# Patient Record
Sex: MALE | Race: WHITE | NOT HISPANIC OR LATINO | Employment: FULL TIME | URBAN - METROPOLITAN AREA
[De-identification: names, ages, dates, MRNs, and addresses within clinical notes are randomized per-mention and may not be internally consistent; named-entity substitution may affect disease eponyms.]

---

## 2019-02-13 ENCOUNTER — APPOINTMENT (OUTPATIENT)
Dept: RADIOLOGY | Facility: OTHER | Age: 59
End: 2019-02-13
Payer: COMMERCIAL

## 2019-02-13 VITALS
BODY MASS INDEX: 24.34 KG/M2 | SYSTOLIC BLOOD PRESSURE: 128 MMHG | HEART RATE: 98 BPM | WEIGHT: 170 LBS | HEIGHT: 70 IN | DIASTOLIC BLOOD PRESSURE: 88 MMHG

## 2019-02-13 DIAGNOSIS — M79.642 LEFT HAND PAIN: ICD-10-CM

## 2019-02-13 DIAGNOSIS — M79.642 LEFT HAND PAIN: Primary | ICD-10-CM

## 2019-02-13 DIAGNOSIS — M65.322 TRIGGER FINGER, LEFT INDEX FINGER: ICD-10-CM

## 2019-02-13 PROCEDURE — 73130 X-RAY EXAM OF HAND: CPT

## 2019-02-13 PROCEDURE — 99213 OFFICE O/P EST LOW 20 MIN: CPT | Performed by: ORTHOPAEDIC SURGERY

## 2019-02-13 PROCEDURE — 20551 NJX 1 TENDON ORIGIN/INSJ: CPT | Performed by: PHYSICIAN ASSISTANT

## 2019-02-13 RX ORDER — SERTRALINE HYDROCHLORIDE 100 MG/1
50 TABLET, FILM COATED ORAL DAILY
COMMUNITY

## 2019-02-13 RX ORDER — BUPIVACAINE HYDROCHLORIDE 2.5 MG/ML
2 INJECTION, SOLUTION INFILTRATION; PERINEURAL
Status: COMPLETED | OUTPATIENT
Start: 2019-02-13 | End: 2019-02-13

## 2019-02-13 RX ORDER — METHYLPREDNISOLONE ACETATE 40 MG/ML
1 INJECTION, SUSPENSION INTRA-ARTICULAR; INTRALESIONAL; INTRAMUSCULAR; SOFT TISSUE
Status: COMPLETED | OUTPATIENT
Start: 2019-02-13 | End: 2019-02-13

## 2019-02-13 RX ADMIN — METHYLPREDNISOLONE ACETATE 1 ML: 40 INJECTION, SUSPENSION INTRA-ARTICULAR; INTRALESIONAL; INTRAMUSCULAR; SOFT TISSUE at 14:39

## 2019-02-13 RX ADMIN — BUPIVACAINE HYDROCHLORIDE 2 ML: 2.5 INJECTION, SOLUTION INFILTRATION; PERINEURAL at 14:39

## 2019-02-13 NOTE — PROGRESS NOTES
Orthopaedic Surgery - Office Note  Gianfranco Chase (60 y o  male)   : 1960   MRN: [id number]  Encounter Date: 2019    Chief Complaint   Patient presents with    Left Hand - Pain       Assessment / Plan  Left hand index finger trigger finger    · After discussion of risks and benefits the patient agreed to proceed with an injection of steroids in his left trigger finger  · Continue with ice and analgesics as needed  · Patient will start nighttime splinting with Band-Aids  Return in about 1 month (around 3/13/2019)  History of Present Illness  Gianfranco Chase is a 60 y o  male who presents with left hand metacarpal phalangeal pain over all 4 digits and locking over his left index finger  He does have a history of trigger finger which was treated with splinting in the past and possibly injections but he cannot recall for sure  He has history of bilateral carpal tunnel syndrome surgery in  which did relieve the symptoms and his current symptoms are not what he was feeling prior to the surgery  The patient is a  so constantly sits with his hands clutched  Review of systems  Pertinent items are noted in HPI  All other systems were reviewed and are negative  Physical Exam  /88   Pulse 98   Ht 5' 10" (1 778 m)   Wt 77 1 kg (170 lb)   BMI 24 39 kg/m²   Cons: Appears well  No apparent distress  Psych: Alert  Oriented x3  Mood and affect normal   Eyes: PERRLA, EOMI  Resp: Normal effort  No audible wheezing or stridor  CV: Palpable pulse  No discernable arrhythmia  No LE edema  Lymph:  No palpable cervical, axillary, or inguinal lymphadenopathy  Skin: Warm  No palpable masses  No visible lesions  Neuro: Normal muscle tone  Normal and symmetric DTR's  Left Hand & Wrist Exam  Alignment:  Normal resting hand posture  Inspection:  No swelling  No erythema  No muscle atrophy  No deformity  Palpation:  Mild tenderness at The 1st MCP joint    ROM: Normal finger ROM  Flexion of the fingers does cause some discomfort over the 1st MCP joint of all his fingers except for his thumb  Strength:  Not tested  Stability:  No objective hand or wrist instability  Tests:  (+) Triggering of the Index finger of the left hand  Neurovascular:  Sensation intact in Ax/R/M/U nerve distributions  Sensation intact in all digital nerve distributions  Fingers warm and perfused  Studies Reviewed  I have personally reviewed pertinent films in PACS  XR of left hand - From 02/13/2019 show no fractures or dislocations  Minimal arthritic changes noted  Hand/upper extremity injection: L index A2  Date/Time: 2/13/2019 2:39 PM  Consent given by: patient  Supporting Documentation  Indications: pain and tendon swelling   Procedure Details  Condition:trigger finger Location: index finger - L index A2   Ultrasound guidance: no  Approach: volar  Medications administered: 2 mL bupivacaine 0 25 %; 1 mL methylPREDNISolone acetate 40 mg/mL    Patient tolerance: patient tolerated the procedure well with no immediate complications              Medical, Surgical, Family, and Social History  The patient's medical history, family history, and social history, were reviewed and updated as appropriate  Past Medical History:   Diagnosis Date    OCD (obsessive compulsive disorder)        Past Surgical History:   Procedure Laterality Date    CARPECTOMY HAND Bilateral        History reviewed  No pertinent family history  Social History     Occupational History    Not on file   Tobacco Use    Smoking status: Never Smoker    Smokeless tobacco: Never Used   Substance and Sexual Activity    Alcohol use:  Yes    Drug use: Never    Sexual activity: Not on file       No Known Allergies      Current Outpatient Medications:     sertraline (ZOLOFT) 100 mg tablet, Take 50 mg by mouth 2 (two) times a day, Disp: , Rfl:       Rishi Kent PA-C    Scribe Attestation    I,:    am acting as a scribe while in the presence of the attending physician :        I,:    personally performed the services described in this documentation    as scribed in my presence :

## 2020-05-08 ENCOUNTER — APPOINTMENT (OUTPATIENT)
Dept: RADIOLOGY | Facility: MEDICAL CENTER | Age: 60
End: 2020-05-08
Payer: COMMERCIAL

## 2020-05-08 VITALS
TEMPERATURE: 98.8 F | HEART RATE: 82 BPM | WEIGHT: 170 LBS | SYSTOLIC BLOOD PRESSURE: 132 MMHG | DIASTOLIC BLOOD PRESSURE: 88 MMHG | BODY MASS INDEX: 24.39 KG/M2

## 2020-05-08 DIAGNOSIS — M25.511 RIGHT SHOULDER PAIN, UNSPECIFIED CHRONICITY: ICD-10-CM

## 2020-05-08 DIAGNOSIS — M25.511 RIGHT SHOULDER PAIN, UNSPECIFIED CHRONICITY: Primary | ICD-10-CM

## 2020-05-08 DIAGNOSIS — S46.111A LABRAL TEAR OF LONG HEAD OF BICEPS TENDON, RIGHT, INITIAL ENCOUNTER: ICD-10-CM

## 2020-05-08 PROCEDURE — 99214 OFFICE O/P EST MOD 30 MIN: CPT | Performed by: ORTHOPAEDIC SURGERY

## 2020-05-08 PROCEDURE — 73030 X-RAY EXAM OF SHOULDER: CPT

## 2020-07-12 ENCOUNTER — HOSPITAL ENCOUNTER (INPATIENT)
Facility: HOSPITAL | Age: 60
LOS: 2 days | Discharge: HOME | DRG: 247 | End: 2020-07-14
Attending: EMERGENCY MEDICINE | Admitting: HOSPITALIST
Payer: COMMERCIAL

## 2020-07-12 ENCOUNTER — APPOINTMENT (EMERGENCY)
Dept: RADIOLOGY | Facility: HOSPITAL | Age: 60
DRG: 247 | End: 2020-07-12
Payer: COMMERCIAL

## 2020-07-12 DIAGNOSIS — R77.8 ELEVATED TROPONIN: ICD-10-CM

## 2020-07-12 DIAGNOSIS — I21.4 NSTEMI (NON-ST ELEVATED MYOCARDIAL INFARCTION) (HCC): ICD-10-CM

## 2020-07-12 DIAGNOSIS — R07.9 CHEST PAIN: Primary | ICD-10-CM

## 2020-07-12 PROBLEM — E11.9 DIABETES (HCC): Status: ACTIVE | Noted: 2020-07-12

## 2020-07-12 LAB
ANION GAP SERPL CALCULATED.3IONS-SCNC: 14 MMOL/L (ref 4–13)
APTT PPP: 26 SECONDS (ref 23–37)
APTT PPP: 30 SECONDS (ref 23–37)
APTT PPP: 36 SECONDS (ref 23–37)
ATRIAL RATE: 73 BPM
BASOPHILS # BLD AUTO: 0.02 THOUSANDS/ΜL (ref 0–0.1)
BASOPHILS NFR BLD AUTO: 0 % (ref 0–1)
BUN SERPL-MCNC: 16 MG/DL (ref 5–25)
CALCIUM SERPL-MCNC: 8.8 MG/DL (ref 8.3–10.1)
CHLORIDE SERPL-SCNC: 98 MMOL/L (ref 100–108)
CO2 SERPL-SCNC: 22 MMOL/L (ref 21–32)
CREAT SERPL-MCNC: 1.2 MG/DL (ref 0.6–1.3)
EOSINOPHIL # BLD AUTO: 0.23 THOUSAND/ΜL (ref 0–0.61)
EOSINOPHIL NFR BLD AUTO: 3 % (ref 0–6)
ERYTHROCYTE [DISTWIDTH] IN BLOOD BY AUTOMATED COUNT: 11.5 % (ref 11.6–15.1)
ERYTHROCYTE [DISTWIDTH] IN BLOOD BY AUTOMATED COUNT: 11.8 % (ref 11.6–15.1)
GFR SERPL CREATININE-BSD FRML MDRD: 65 ML/MIN/1.73SQ M
GLUCOSE SERPL-MCNC: 168 MG/DL (ref 65–140)
GLUCOSE SERPL-MCNC: 344 MG/DL (ref 65–140)
HCT VFR BLD AUTO: 44.2 % (ref 36.5–49.3)
HCT VFR BLD AUTO: 47.3 % (ref 36.5–49.3)
HGB BLD-MCNC: 15.4 G/DL (ref 12–17)
HGB BLD-MCNC: 16.6 G/DL (ref 12–17)
IMM GRANULOCYTES # BLD AUTO: 0.02 THOUSAND/UL (ref 0–0.2)
IMM GRANULOCYTES NFR BLD AUTO: 0 % (ref 0–2)
INR PPP: 0.88 (ref 0.84–1.19)
INR PPP: 0.9 (ref 0.84–1.19)
LYMPHOCYTES # BLD AUTO: 1.58 THOUSANDS/ΜL (ref 0.6–4.47)
LYMPHOCYTES NFR BLD AUTO: 20 % (ref 14–44)
MCH RBC QN AUTO: 30.3 PG (ref 26.8–34.3)
MCH RBC QN AUTO: 30.6 PG (ref 26.8–34.3)
MCHC RBC AUTO-ENTMCNC: 34.8 G/DL (ref 31.4–37.4)
MCHC RBC AUTO-ENTMCNC: 35.1 G/DL (ref 31.4–37.4)
MCV RBC AUTO: 87 FL (ref 82–98)
MCV RBC AUTO: 87 FL (ref 82–98)
MONOCYTES # BLD AUTO: 0.59 THOUSAND/ΜL (ref 0.17–1.22)
MONOCYTES NFR BLD AUTO: 8 % (ref 4–12)
NEUTROPHILS # BLD AUTO: 5.39 THOUSANDS/ΜL (ref 1.85–7.62)
NEUTS SEG NFR BLD AUTO: 69 % (ref 43–75)
NRBC BLD AUTO-RTO: 0 /100 WBCS
P AXIS: 67 DEGREES
PLATELET # BLD AUTO: 130 THOUSANDS/UL (ref 149–390)
PLATELET # BLD AUTO: 136 THOUSANDS/UL (ref 149–390)
PMV BLD AUTO: 10.5 FL (ref 8.9–12.7)
PMV BLD AUTO: 10.8 FL (ref 8.9–12.7)
POTASSIUM SERPL-SCNC: 3.4 MMOL/L (ref 3.5–5.3)
PR INTERVAL: 146 MS
PROTHROMBIN TIME: 12 SECONDS (ref 11.6–14.5)
PROTHROMBIN TIME: 12.2 SECONDS (ref 11.6–14.5)
QRS AXIS: 41 DEGREES
QRSD INTERVAL: 86 MS
QT INTERVAL: 378 MS
QTC INTERVAL: 416 MS
RBC # BLD AUTO: 5.08 MILLION/UL (ref 3.88–5.62)
RBC # BLD AUTO: 5.43 MILLION/UL (ref 3.88–5.62)
SODIUM SERPL-SCNC: 134 MMOL/L (ref 136–145)
T WAVE AXIS: 21 DEGREES
TROPONIN I SERPL-MCNC: 0.05 NG/ML
TROPONIN I SERPL-MCNC: 0.98 NG/ML
TROPONIN I SERPL-MCNC: 3.9 NG/ML
VENTRICULAR RATE: 73 BPM
WBC # BLD AUTO: 7.83 THOUSAND/UL (ref 4.31–10.16)
WBC # BLD AUTO: 8.22 THOUSAND/UL (ref 4.31–10.16)

## 2020-07-12 PROCEDURE — 85610 PROTHROMBIN TIME: CPT | Performed by: EMERGENCY MEDICINE

## 2020-07-12 PROCEDURE — 85610 PROTHROMBIN TIME: CPT | Performed by: PHYSICIAN ASSISTANT

## 2020-07-12 PROCEDURE — 71046 X-RAY EXAM CHEST 2 VIEWS: CPT

## 2020-07-12 PROCEDURE — 36415 COLL VENOUS BLD VENIPUNCTURE: CPT | Performed by: EMERGENCY MEDICINE

## 2020-07-12 PROCEDURE — 99284 EMERGENCY DEPT VISIT MOD MDM: CPT | Performed by: EMERGENCY MEDICINE

## 2020-07-12 PROCEDURE — 99223 1ST HOSP IP/OBS HIGH 75: CPT | Performed by: INTERNAL MEDICINE

## 2020-07-12 PROCEDURE — 85730 THROMBOPLASTIN TIME PARTIAL: CPT | Performed by: EMERGENCY MEDICINE

## 2020-07-12 PROCEDURE — 93005 ELECTROCARDIOGRAM TRACING: CPT

## 2020-07-12 PROCEDURE — 80048 BASIC METABOLIC PNL TOTAL CA: CPT | Performed by: EMERGENCY MEDICINE

## 2020-07-12 PROCEDURE — 85027 COMPLETE CBC AUTOMATED: CPT | Performed by: PHYSICIAN ASSISTANT

## 2020-07-12 PROCEDURE — 82948 REAGENT STRIP/BLOOD GLUCOSE: CPT

## 2020-07-12 PROCEDURE — 85730 THROMBOPLASTIN TIME PARTIAL: CPT | Performed by: PHYSICIAN ASSISTANT

## 2020-07-12 PROCEDURE — 84484 ASSAY OF TROPONIN QUANT: CPT | Performed by: EMERGENCY MEDICINE

## 2020-07-12 PROCEDURE — 84484 ASSAY OF TROPONIN QUANT: CPT | Performed by: INTERNAL MEDICINE

## 2020-07-12 PROCEDURE — 85025 COMPLETE CBC W/AUTO DIFF WBC: CPT | Performed by: EMERGENCY MEDICINE

## 2020-07-12 PROCEDURE — 99285 EMERGENCY DEPT VISIT HI MDM: CPT

## 2020-07-12 PROCEDURE — 93010 ELECTROCARDIOGRAM REPORT: CPT | Performed by: INTERNAL MEDICINE

## 2020-07-12 PROCEDURE — 85730 THROMBOPLASTIN TIME PARTIAL: CPT | Performed by: INTERNAL MEDICINE

## 2020-07-12 RX ORDER — ASPIRIN 325 MG
325 TABLET ORAL ONCE
Status: COMPLETED | OUTPATIENT
Start: 2020-07-12 | End: 2020-07-12

## 2020-07-12 RX ORDER — NITROGLYCERIN 0.4 MG/1
0.4 TABLET SUBLINGUAL ONCE
Status: COMPLETED | OUTPATIENT
Start: 2020-07-12 | End: 2020-07-12

## 2020-07-12 RX ORDER — HEPARIN SODIUM 10000 [USP'U]/100ML
3-20 INJECTION, SOLUTION INTRAVENOUS
Status: DISCONTINUED | OUTPATIENT
Start: 2020-07-12 | End: 2020-07-14

## 2020-07-12 RX ORDER — CLOPIDOGREL BISULFATE 75 MG/1
300 TABLET ORAL ONCE
Status: DISCONTINUED | OUTPATIENT
Start: 2020-07-12 | End: 2020-07-12

## 2020-07-12 RX ORDER — SODIUM CHLORIDE 9 MG/ML
100 INJECTION, SOLUTION INTRAVENOUS CONTINUOUS
Status: DISCONTINUED | OUTPATIENT
Start: 2020-07-13 | End: 2020-07-13

## 2020-07-12 RX ORDER — LANOLIN ALCOHOL/MO/W.PET/CERES
6 CREAM (GRAM) TOPICAL
Status: DISCONTINUED | OUTPATIENT
Start: 2020-07-12 | End: 2020-07-14 | Stop reason: HOSPADM

## 2020-07-12 RX ORDER — LISINOPRIL 5 MG/1
5 TABLET ORAL EVERY EVENING
Status: DISCONTINUED | OUTPATIENT
Start: 2020-07-12 | End: 2020-07-14 | Stop reason: HOSPADM

## 2020-07-12 RX ORDER — HEPARIN SODIUM 1000 [USP'U]/ML
2000 INJECTION, SOLUTION INTRAVENOUS; SUBCUTANEOUS
Status: DISCONTINUED | OUTPATIENT
Start: 2020-07-12 | End: 2020-07-14

## 2020-07-12 RX ORDER — ATORVASTATIN CALCIUM 40 MG/1
40 TABLET, FILM COATED ORAL
Status: DISCONTINUED | OUTPATIENT
Start: 2020-07-12 | End: 2020-07-12

## 2020-07-12 RX ORDER — CLOPIDOGREL BISULFATE 75 MG/1
600 TABLET ORAL DAILY
Status: DISCONTINUED | OUTPATIENT
Start: 2020-07-12 | End: 2020-07-12

## 2020-07-12 RX ORDER — CLOPIDOGREL BISULFATE 75 MG/1
600 TABLET ORAL ONCE
Status: COMPLETED | OUTPATIENT
Start: 2020-07-12 | End: 2020-07-12

## 2020-07-12 RX ORDER — HEPARIN SODIUM 1000 [USP'U]/ML
4000 INJECTION, SOLUTION INTRAVENOUS; SUBCUTANEOUS
Status: DISCONTINUED | OUTPATIENT
Start: 2020-07-12 | End: 2020-07-14

## 2020-07-12 RX ORDER — HEPARIN SODIUM 5000 [USP'U]/ML
5000 INJECTION, SOLUTION INTRAVENOUS; SUBCUTANEOUS EVERY 8 HOURS SCHEDULED
Status: DISCONTINUED | OUTPATIENT
Start: 2020-07-12 | End: 2020-07-12 | Stop reason: SDUPTHER

## 2020-07-12 RX ORDER — ATORVASTATIN CALCIUM 80 MG/1
80 TABLET, FILM COATED ORAL
Status: DISCONTINUED | OUTPATIENT
Start: 2020-07-12 | End: 2020-07-14 | Stop reason: HOSPADM

## 2020-07-12 RX ORDER — ASPIRIN 81 MG/1
81 TABLET, CHEWABLE ORAL DAILY
Status: DISCONTINUED | OUTPATIENT
Start: 2020-07-13 | End: 2020-07-14 | Stop reason: HOSPADM

## 2020-07-12 RX ADMIN — SERTRALINE HYDROCHLORIDE 50 MG: 50 TABLET ORAL at 17:38

## 2020-07-12 RX ADMIN — NITROGLYCERIN 1 INCH: 20 OINTMENT TOPICAL at 11:00

## 2020-07-12 RX ADMIN — MELATONIN 6 MG: 3 TAB ORAL at 22:32

## 2020-07-12 RX ADMIN — METOPROLOL TARTRATE 12.5 MG: 25 TABLET ORAL at 17:38

## 2020-07-12 RX ADMIN — CLOPIDOGREL BISULFATE 600 MG: 75 TABLET ORAL at 11:00

## 2020-07-12 RX ADMIN — NITROGLYCERIN 0.4 MG: 0.4 TABLET SUBLINGUAL at 07:01

## 2020-07-12 RX ADMIN — ATORVASTATIN CALCIUM 80 MG: 80 TABLET, FILM COATED ORAL at 17:38

## 2020-07-12 RX ADMIN — LISINOPRIL 5 MG: 5 TABLET ORAL at 17:38

## 2020-07-12 RX ADMIN — ASPIRIN 325 MG ORAL TABLET 325 MG: 325 PILL ORAL at 07:01

## 2020-07-12 RX ADMIN — HEPARIN SODIUM 4000 UNITS: 1000 INJECTION INTRAVENOUS; SUBCUTANEOUS at 19:56

## 2020-07-12 RX ADMIN — METOPROLOL TARTRATE 12.5 MG: 25 TABLET ORAL at 11:00

## 2020-07-12 RX ADMIN — HEPARIN SODIUM AND DEXTROSE 12 UNITS/KG/HR: 10000; 5 INJECTION INTRAVENOUS at 12:08

## 2020-07-12 NOTE — ASSESSMENT & PLAN NOTE
Presents with left-sided chest pain, pressure-like, relieved with nitroglycerin  First set troponin negative, EKG with nonspecific ST changes  Repeat EKG  Check troponin, repeat EKG    No prior known cardiac history, TIA/stroke/PAD  History of diabetes on Ozempic  Nonsmoker

## 2020-07-12 NOTE — CONSULTS
Consult - Cardiology   Emerson Roldan 63 y o  male MRN: [id number]  Unit/Bed#: E4 -01 Encounter: [id number]        Reason For Consult: Chest pain, elevated troponin                ASSESSMENT:  1  Chest pain, elevated troponin 0 05 --> # pending:   -EKG NSR, non-specific ST changes in inferior leads   -No prior comparison   -CXR w/o acute disease by my read  2  Type 2 diabetes, A1C was 12 0 in July 2019   3  Dyslipidemia:   -02/2020: Chol 278, Trig 122, , HDL 59   4  Family history of CAD with a brother who had an MI at 47  10  Remote history of ITP, has not seen Hematology for several years (platelets 246 on admission)    PLAN/ DISCUSSION:     His history is concerning for unstable angina  He notes to me that he was given nitroglycerin on admission which brought his pain to his 0  Presently at the time my consultation his pain has gradually come back and is a 4/10  · Apply nitroglycerin paste, if this does not get his pain to zero then start nitroglycerin infusion  · Aspirin 324 x 1, Plavix 600 x 1, thereafter aspirin 81 QD  · Lopressor 12 5 b i d  (HR in the 60's), up titrate as able pending heart rate trend  · Trend troponin until peak, repeat EKG  · Check echocardiogram  · High-dose statin  · Cardiac catheterization advised for direct visualization of coronary arteries --> discussed risks and benefits with the patient who is agreeable to proceed tomorrow  · Will make NPO after midnight and leave message for cath lab scheduling  · Update A1c and lipid panel    History Of Present Illness: This is a 11-year-old gentleman with no prior care from a cardiologist   He has no problems with his heart that he knows of and has never had any testing performed in his heart  He does tell me he has a family history of CAD in his brother who had heart attack at age 62  He does not smoke  He does not use street drugs or drink alcohol in excess  He is generally pretty active and enjoys working on cars    He tells me he could easily walk the length of a football field her up several flights of stairs without any chest pain or shortness of breath  His past medical history is limited to type 2 diabetes  He does tell me that several years ago he was diagnosed with ITP and briefly saw hematologist however this resolved without any treatments  This gentleman is presently hospitalized for chest pain  This began yesterday around 1400 after he was working on a car  He describes it as a pressure  It does not radiate  At its worst it is a 4 or 5/10  Yesterday the pain lasted for several hours before spontaneously resolving  He slept well overnight but awoke early this morning in a cold sweat  As he was getting ready for the day his chest pressure returned and given his family history of CAD he decided to come to the ED for evaluation  He is given nitroglycerin on arrival with complete resolution of his pain  Past Medical History:        Past Medical History:   Diagnosis Date    Diabetes mellitus ([facility] [address] )     OCD (obsessive compulsive disorder)       Past Surgical History:   Procedure Laterality Date    CARPECTOMY HAND Bilateral         Allergy:        Allergies   Allergen Reactions    Metformin GI Intolerance    Rosuvastatin GI Intolerance       Medications:       Prior to Admission medications    Medication Sig Start Date End Date Taking? Authorizing Provider   Semaglutide,0 25 or 0 5MG/DOS, (Ozempic, 0 25 or 0 5 MG/DOSE,) 2 MG/1 5ML SOPN Inject 0 5 mg under the skin   Yes Historical Provider, MD   sertraline (ZOLOFT) 100 mg tablet Take 50 mg by mouth 2 (two) times a day   Yes Historical Provider, MD   Semaglutide (OZEMPIC, 1 MG/DOSE, SC) Inject 0 5 mg under the skin   7/12/20 Yes Historical Provider, MD       Family History:     History reviewed  No pertinent family history       Social History:       Social History     Socioeconomic History    Marital status: /Civil Union     Spouse name: None    Number of children: None    Years of education: None    Highest education level: None   Occupational History    None   Social Needs    Financial resource strain: None    Food insecurity:     Worry: None     Inability: None    Transportation needs:     Medical: None     Non-medical: None   Tobacco Use    Smoking status: Never Smoker    Smokeless tobacco: Never Used   Substance and Sexual Activity    Alcohol use: Yes    Drug use: Never    Sexual activity: None   Lifestyle    Physical activity:     Days per week: None     Minutes per session: None    Stress: None   Relationships    Social connections:     Talks on phone: None     Gets together: None     Attends Sikhism service: None     Active member of club or organization: None     Attends meetings of clubs or organizations: None     Relationship status: None    Intimate partner violence:     Fear of current or ex partner: None     Emotionally abused: None     Physically abused: None     Forced sexual activity: None   Other Topics Concern    None   Social History Narrative    None       ROS:  Symptoms per HPI  Remainder review of systems is negative    Exam:  General:  alert, oriented and in no distress, cooperative  Head: Normocephalic, atraumatic  Eyes:  EOMI  Pupils - equal, round, reactive to accomodation  No icterus  Normal Conjunctiva     Oropharynx: moist and normal-appearing mucosa  Neck: supple, symmetrical, trachea midline and no JVD  Heart:  RRR, No: murmer, rub or gallop, S1 & S2 normal   Respiratory effort / Chest Inspection: unlabored  Lungs:  normal air entry, lungs clear to auscultation and no rales, rhonchi or wheezing   Abdomen: flat, normal findings: bowel sounds normal and soft, non-tender  Lower Limbs:  no pitting edema  Pulses[de-identified]  RLE - DP: present 2+                 LLE - DP: present 2+  Musculoskeletal: ROM grossly normal    DATA:      ECG:   Normal sinus rhythm  Normal axis  Non-specific ST segment changes  Isolated T-wave inversions in lead 3                    Telemetry: Normal sinus rhythm,   HR 60's          Echocardiogram:  Pending           Ischemic Testing:  Pending         Weights: Wt Readings from Last 3 Encounters:   07/12/20 75 6 kg (166 lb 10 7 oz)   05/08/20 77 1 kg (170 lb)   02/13/19 77 1 kg (170 lb)   , Body mass index is 23 91 kg/m²           Lab Studies:    Results from last 7 days   Lab Units 07/12/20  0700   TROPONIN I ng/mL 0 05*          Results from last 7 days   Lab Units 07/12/20  0700   WBC Thousand/uL 7 83   HEMOGLOBIN g/dL 16 6   HEMATOCRIT % 47 3   PLATELETS Thousands/uL 136*   ,   Results from last 7 days   Lab Units 07/12/20  0700   POTASSIUM mmol/L 3 4*   CHLORIDE mmol/L 98*   CO2 mmol/L 22   BUN mg/dL 16   CREATININE mg/dL 1 20   CALCIUM mg/dL 8 8

## 2020-07-12 NOTE — PLAN OF CARE
Problem: Potential for Falls  Goal: Patient will remain free of falls  Description  INTERVENTIONS:  - Assess patient frequently for physical needs  -  Identify cognitive and physical deficits and behaviors that affect risk of falls    -  [place] fall precautions as indicated by assessment   - Educate patient/family on patient safety including physical limitations  - Instruct patient to call for assistance with activity based on assessment  - Modify environment to reduce risk of injury  - Consider OT/PT consult to assist with strengthening/mobility  Outcome: Progressing     Problem: PAIN - ADULT  Goal: Verbalizes/displays adequate comfort level or baseline comfort level  Description  Interventions:  - Encourage patient to monitor pain and request assistance  - Assess pain using appropriate pain scale  - Administer analgesics based on type and severity of pain and evaluate response  - Implement non-pharmacological measures as appropriate and evaluate response  - Consider cultural and social influences on pain and pain management  - Notify physician/advanced practitioner if interventions unsuccessful or patient reports new pain  Outcome: Progressing     Problem: INFECTION - ADULT  Goal: Absence or prevention of progression during hospitalization  Description  INTERVENTIONS:  - Assess and monitor for signs and symptoms of infection  - Monitor lab/diagnostic results  - Monitor all insertion sites, i e  indwelling lines, tubes, and drains  - Monitor endotracheal if appropriate and nasal secretions for changes in amount and color  - [place] appropriate cooling/warming therapies per order  - Administer medications as ordered  - Instruct and encourage patient and family to use good hand hygiene technique  - Identify and instruct in appropriate isolation precautions for identified infection/condition  Outcome: Progressing  Goal: Absence of fever/infection during neutropenic period  Description  INTERVENTIONS:  - Monitor WBC    Outcome: Progressing     Problem: SAFETY ADULT  Goal: Patient will remain free of falls  Description  INTERVENTIONS:  - Assess patient frequently for physical needs  -  Identify cognitive and physical deficits and behaviors that affect risk of falls    -  [place] fall precautions as indicated by assessment   - Educate patient/family on patient safety including physical limitations  - Instruct patient to call for assistance with activity based on assessment  - Modify environment to reduce risk of injury  - Consider OT/PT consult to assist with strengthening/mobility  Outcome: Progressing  Goal: Maintain or return to baseline ADL function  Description  INTERVENTIONS:  -  Assess patient's ability to carry out ADLs; assess patient's baseline for ADL function and identify physical deficits which impact ability to perform ADLs (bathing, care of mouth/teeth, toileting, grooming, dressing, etc )  - Assess/evaluate cause of self-care deficits   - Assess range of motion  - Assess patient's mobility; develop plan if impaired  - Assess patient's need for assistive devices and provide as appropriate  - Encourage maximum independence but intervene and supervise when necessary  - Involve family in performance of ADLs  - Assess for home care needs following discharge   - Consider OT consult to assist with ADL evaluation and planning for discharge  - Provide patient education as appropriate  Outcome: Progressing  Goal: Maintain or return mobility status to optimal level  Description  INTERVENTIONS:  - Assess patient's baseline mobility status (ambulation, transfers, stairs, etc )    - Identify cognitive and physical deficits and behaviors that affect mobility  - Identify mobility aids required to assist with transfers and/or ambulation (gait belt, sit-to-stand, lift, walker, cane, etc )  - [place] fall precautions as indicated by assessment  - Record patient progress and toleration of activity level on Mobility SBAR; progress patient to next Phase/Stage  - Instruct patient to call for assistance with activity based on assessment  - Consider rehabilitation consult to assist with strengthening/weightbearing, etc   Outcome: Progressing     Problem: DISCHARGE PLANNING  Goal: Discharge to home or other facility with appropriate resources  Description  INTERVENTIONS:  - Identify barriers to discharge w/patient and caregiver  - Arrange for needed discharge resources and transportation as appropriate  - Identify discharge learning needs (meds, wound care, etc )  - Arrange for interpretive services to assist at discharge as needed  - Refer to Case Management Department for coordinating discharge planning if the patient needs post-hospital services based on physician/advanced practitioner order or complex needs related to functional status, cognitive ability, or social support system  Outcome: Progressing     Problem: Knowledge Deficit  Goal: Patient/family/caregiver demonstrates understanding of disease process, treatment plan, medications, and discharge instructions  Description  Complete learning assessment and assess knowledge base    Interventions:  - Provide teaching at level of understanding  - Provide teaching via preferred learning methods  Outcome: Progressing     Problem: CARDIOVASCULAR - ADULT  Goal: Maintains optimal cardiac output and hemodynamic stability  Description  INTERVENTIONS:  - Monitor I/O, vital signs and rhythm  - Monitor for S/S and trends of decreased cardiac output  - Administer and titrate ordered vasoactive medications to optimize hemodynamic stability  - Assess quality of pulses, skin color and temperature  - Assess for signs of decreased coronary artery perfusion  - Instruct patient to report change in severity of symptoms  Outcome: Progressing  Goal: Absence of cardiac dysrhythmias or at baseline rhythm  Description  INTERVENTIONS:  - Continuous cardiac monitoring, vital signs, obtain 12 lead EKG if ordered  - Administer antiarrhythmic and heart rate control medications as ordered  - Monitor electrolytes and administer replacement therapy as ordered  Outcome: Progressing

## 2020-07-12 NOTE — ASSESSMENT & PLAN NOTE
Lab Results   Component Value Date    HGBA1C 7 2 (H) 09/29/2014       Recent Labs     07/12/20  0957   POCGLU 168*       Blood Sugar Average: Last 72 hrs:  (P) 168   Check blood glucose 4 times daily with corrective insulin

## 2020-07-12 NOTE — H&P
H&P- Valentina Diaz 1960, 63 y o  male MRN: [id number]    Unit/Bed#: E4 -01 Encounter: [id number]    Primary Care Provider: Arianna Williamson MD   Date and time admitted to hospital: 7/12/2020  6:41 AM    * Chest pain  Assessment & Plan  Presents with left-sided chest pain, pressure-like, relieved with nitroglycerin  First set troponin negative, EKG with nonspecific ST changes  Trend troponin, repeat EKG  No prior known cardiac history, TIA/stroke/PAD  History of diabetes on Ozempic  Nonsmoker    Diabetes [facility])  Assessment & Plan  Lab Results   Component Value Date    HGBA1C 7 2 (H) 09/29/2014     Recent Labs     07/12/20  0957   POCGLU 168*     Blood Sugar Average: Last 72 hrs:  (P) 168   Check blood glucose 4 times daily with corrective insulin    VTE Prophylaxis: Heparin  / sequential compression device   Code Status:  Full code  POLST: There is no POLST form on file for this patient (pre-hospital)    Anticipated Length of Stay:  Patient will be admitted on an Inpatient basis with an anticipated length of stay of  less than 2 midnights  Justification for Hospital Stay:  Chest pain    Total Time for Visit, including Counseling / Coordination of Care: 45 minutes  Greater than 50% of this total time spent on direct patient counseling and coordination of care  History of Present Illness:    Valentina Diaz is a 63 y o  male who presents with left-sided chest pain  Patient was fixing his car when he suddenly felt left-sided pressure-like chest pain which is constant and relieved with nitroglycerin  No prior history of similar chest pain in the past   Patient in the beginning felt he might probably pulled a muscle but however the pain was persistent and different in character  Gave history of generalized fatigue for the last few days  Patient normally active with no significant past medical history except diabetes  Nonsmoker  No prior history of known cardiac illness    History of early MI the family  (brother )    Review of Systems:    Review of Systems   Constitutional: Negative  HENT: Negative  Eyes: Negative  Respiratory: Negative  Cardiovascular: Positive for chest pain  Negative for palpitations and leg swelling  Gastrointestinal: Negative  Endocrine: Negative  Genitourinary: Negative  Musculoskeletal: Negative  Allergic/Immunologic: Negative  Neurological: Negative  Hematological: Negative  Psychiatric/Behavioral: Negative  Past Medical and Surgical History:     Past Medical History:   Diagnosis Date    Diabetes mellitus ([address] )     OCD (obsessive compulsive disorder)        Past Surgical History:   Procedure Laterality Date    CARPECTOMY HAND Bilateral        Meds/Allergies:    Prior to Admission medications    Medication Sig Start Date End Date Taking? Authorizing Provider   Semaglutide,0 25 or 0 5MG/DOS, (Ozempic, 0 25 or 0 5 MG/DOSE,) 2 MG/1 5ML SOPN Inject 0 5 mg under the skin   Yes Historical Provider, MD   sertraline (ZOLOFT) 100 mg tablet Take 50 mg by mouth 2 (two) times a day   Yes Historical Provider, MD   Semaglutide (OZEMPIC, 1 MG/DOSE, SC) Inject 0 5 mg under the skin   7/12/20 Yes Historical Provider, MD     I have reviewed home medications with patient personally  Allergies:    Allergies   Allergen Reactions    Metformin GI Intolerance    Rosuvastatin GI Intolerance       Social History:     Substance Use History:   Social History     Substance and Sexual Activity   Alcohol Use Yes     Social History     Tobacco Use   Smoking Status Never Smoker   Smokeless Tobacco Never Used     Social History     Substance and Sexual Activity   Drug Use Never       Family History:    non-contributory    Physical Exam:     Vitals:   Blood Pressure: 151/80 (07/12/20 0915)  Pulse: 75 (07/12/20 0915)  Temperature: 97 8 °F (36 6 °C) (07/12/20 0915)  Temp Source: Temporal (07/12/20 0915)  Respirations: 18 (07/12/20 0915)  Height: 5' 10" (177 8 cm) (07/12/20 0915)  Weight - Scale: 75 6 kg (166 lb 10 7 oz) (07/12/20 0915)  SpO2: 96 % (07/12/20 0915)    Physical Exam    General appearance: alert, appears stated age and cooperative  Skin: Skin color, texture, turgor normal  No rashes or lesions  Head: Normocephalic, without obvious abnormality, atraumatic  Eyes: conjunctivae/corneas clear  PERRL, EOM's intact  Lungs: clear to auscultation bilaterally  Heart: regular rate and rhythm, S1, S2 normal, no murmur, click, rub or gallop  Abdomen: soft, non-tender; bowel sounds normal; no masses,  no organomegaly  Back: symmetric, no curvature  ROM normal  No CVA tenderness  Extremities: extremities normal, atraumatic, no cyanosis or edema  Neurologic: Alert and oriented X 3, normal strength and tone  Normal symmetric reflexes  Normal coordination and gait  Psychiatric:  Normal mood and affect    Additional Data:     Lab Results: I have personally reviewed pertinent reports  Results from last 7 days   Lab Units 07/12/20  0700   WBC Thousand/uL 7 83   HEMOGLOBIN g/dL 16 6   HEMATOCRIT % 47 3   PLATELETS Thousands/uL 136*   NEUTROS PCT % 69   LYMPHS PCT % 20   MONOS PCT % 8   EOS PCT % 3     Results from last 7 days   Lab Units 07/12/20  0700   SODIUM mmol/L 134*   POTASSIUM mmol/L 3 4*   CHLORIDE mmol/L 98*   CO2 mmol/L 22   BUN mg/dL 16   CREATININE mg/dL 1 20   ANION GAP mmol/L 14*   CALCIUM mg/dL 8 8   GLUCOSE RANDOM mg/dL 344*     Results from last 7 days   Lab Units 07/12/20  0700   INR  0 88     Results from last 7 days   Lab Units 07/12/20  0957   POC GLUCOSE mg/dl 168*               Imaging: I have personally reviewed pertinent reports  XR chest 2 views   ED Interpretation by Felisha Carmona DO (07/12 0807)   nad          EKG, Pathology, and Other Studies Reviewed on Admission: yes    Allscripts / Epic Records Reviewed: Yes     ** Please Note: This note has been constructed using a voice recognition system   **

## 2020-07-12 NOTE — ED PROVIDER NOTES
History  Chief Complaint   Patient presents with    Chest Pain     pt  report chest pain that started yesterday after working on the car  pt  reports felt better but increased this morning  pt  reports "heavy " pt  denies SOB  pt  reports nausea     71-year-old male with a history of diabetes presents to the emergency department complaining of chest pressure  He states he had an episode yesterday while working on his car that lasted several hours  It eventually resolved  He woke again this morning around 4:00 a m  With the chest tightness  He did have some diaphoresis associated with this episode  No shortness of breath  He states he took Motrin this morning without relief  No prior history of cardiac disease  No prior stress test   He states he had a brother that  of heart disease at age 62  History provided by:  Patient   used: No    Chest Pain   Pain location:  Substernal area and L chest  Pain quality: tightness    Pain radiates to:  Does not radiate  Pain radiates to the back: no    Pain severity:  Unable to specify  Onset quality:  Gradual  Duration:  3 hours  Timing:  Constant  Progression:  Unchanged  Chronicity:  New  Context: at rest    Relieved by:  Nothing  Worsened by:  Nothing tried  Ineffective treatments: advil    Associated symptoms: diaphoresis and nausea    Associated symptoms: no abdominal pain, no altered mental status, no anorexia, no anxiety, no back pain, no claudication, no cough, no dizziness, no dysphagia, no fatigue, no fever, no headache, no heartburn, no lower extremity edema, no near-syncope, no numbness, no orthopnea, no palpitations, no PND, no shortness of breath, no syncope, not vomiting and no weakness    Risk factors: diabetes mellitus and male sex    Risk factors: no coronary artery disease, no high cholesterol, no hypertension, not obese, no prior DVT/PE, no smoking and no surgery        Prior to Admission Medications   Prescriptions Last Dose Informant Patient Reported? Taking? Semaglutide,0 25 or 0 5MG/DOS, (Ozempic, 0 25 or 0 5 MG/DOSE,) 2 MG/1 5ML SOPN Past Week at Unknown time  Yes Yes   Sig: Inject 0 5 mg under the skin   sertraline (ZOLOFT) 100 mg tablet 7/11/2020 at Unknown time Self Yes Yes   Sig: Take 50 mg by mouth 2 (two) times a day      Facility-Administered Medications: None       Past Medical History:   Diagnosis Date    Diabetes mellitus ([facility] [address] )     OCD (obsessive compulsive disorder)        Past Surgical History:   Procedure Laterality Date    CARPECTOMY HAND Bilateral        History reviewed  No pertinent family history  I have reviewed and agree with the history as documented  E-Cigarette/Vaping    E-Cigarette Use Never User      E-Cigarette/Vaping Substances     Social History     Tobacco Use    Smoking status: Never Smoker    Smokeless tobacco: Never Used   Substance Use Topics    Alcohol use: Yes    Drug use: Never       Review of Systems   Constitutional: Positive for diaphoresis  Negative for activity change, appetite change, chills, fatigue and fever  HENT: Negative  Negative for trouble swallowing  Eyes: Negative  Respiratory: Negative  Negative for cough and shortness of breath  Cardiovascular: Positive for chest pain  Negative for palpitations, orthopnea, claudication, leg swelling, syncope, PND and near-syncope  Gastrointestinal: Positive for nausea  Negative for abdominal pain, anorexia, heartburn and vomiting  Genitourinary: Negative  Musculoskeletal: Negative for back pain and neck pain  Skin: Negative  Allergic/Immunologic: Negative  Neurological: Negative  Negative for dizziness, weakness, numbness and headaches  Hematological: Negative  Psychiatric/Behavioral: Negative  All other systems reviewed and are negative  Physical Exam  Physical Exam   Constitutional: He is oriented to person, place, and time  He appears well-developed and well-nourished    Non-toxic appearance  He does not have a sickly appearance  He does not appear ill  No distress  HENT:   Head: Normocephalic and atraumatic  Right Ear: External ear normal    Left Ear: External ear normal    Mouth/Throat: No oropharyngeal exudate  Eyes: Pupils are equal, round, and reactive to light  Conjunctivae and EOM are normal  No scleral icterus  Cardiovascular: Normal rate, regular rhythm and normal heart sounds  Pulmonary/Chest: Effort normal and breath sounds normal    Abdominal: Soft  Bowel sounds are normal  He exhibits no distension and no mass  There is no tenderness  No hernia  Musculoskeletal: Normal range of motion  He exhibits no edema, tenderness or deformity  Neurological: He is alert and oriented to person, place, and time  He has normal strength and normal reflexes  He exhibits normal muscle tone  Skin: Skin is warm and dry  No rash noted  He is not diaphoretic  No erythema  No pallor  Psychiatric: He has a normal mood and affect  Nursing note and vitals reviewed        Vital Signs  ED Triage Vitals [07/12/20 0646]   Temperature Pulse Respirations Blood Pressure SpO2   98 6 °F (37 °C) 78 20 (!) 186/100 98 %      Temp Source Heart Rate Source Patient Position - Orthostatic VS BP Location FiO2 (%)   Oral Monitor Sitting Right arm --      Pain Score       4           Vitals:    07/12/20 0646 07/12/20 0703 07/12/20 0821   BP: (!) 186/100 170/92 144/86   Pulse: 78  76   Patient Position - Orthostatic VS: Sitting Lying Lying         Visual Acuity      ED Medications  Medications   nitroglycerin (NITROSTAT) SL tablet 0 4 mg (0 4 mg Sublingual Given 7/12/20 0701)   aspirin tablet 325 mg (325 mg Oral Given 7/12/20 0701)       Diagnostic Studies  Results Reviewed     Procedure Component Value Units Date/Time    Troponin I [[id number]]     Lab Status:  No result Specimen:  Blood     Troponin I [[id number]]  (Abnormal) Collected:  07/12/20 0700    Lab Status:  Final result Specimen:  Blood from Arm, Left Updated:  07/12/20 0743     Troponin I 0 05 ng/mL     Basic metabolic panel [[id number]]  (Abnormal) Collected:  07/12/20 0700    Lab Status:  Final result Specimen:  Blood from Arm, Left Updated:  07/12/20 0733     Sodium 134 mmol/L      Potassium 3 4 mmol/L      Chloride 98 mmol/L      CO2 22 mmol/L      ANION GAP 14 mmol/L      BUN 16 mg/dL      Creatinine 1 20 mg/dL      Glucose 344 mg/dL      Calcium 8 8 mg/dL      eGFR 65 ml/min/1 73sq m     Narrative:       National Kidney Disease Foundation guidelines for Chronic Kidney Disease (CKD):     Stage 1 with normal or high GFR (GFR > 90 mL/min/1 73 square meters)    Stage 2 Mild CKD (GFR = 60-89 mL/min/1 73 square meters)    Stage 3A Moderate CKD (GFR = 45-59 mL/min/1 73 square meters)    Stage 3B Moderate CKD (GFR = 30-44 mL/min/1 73 square meters)    Stage 4 Severe CKD (GFR = 15-29 mL/min/1 73 square meters)    Stage 5 End Stage CKD (GFR <15 mL/min/1 73 square meters)  Note: GFR calculation is accurate only with a steady state creatinine    Protime-INR [[id number]]  (Normal) Collected:  07/12/20 0700    Lab Status:  Final result Specimen:  Blood from Arm, Left Updated:  07/12/20 0724     Protime 12 0 seconds      INR 0 88    APTT [[id number]]  (Normal) Collected:  07/12/20 0700    Lab Status:  Final result Specimen:  Blood from Arm, Left Updated:  07/12/20 0724     PTT 26 seconds     CBC and differential [[id number]]  (Abnormal) Collected:  07/12/20 0700    Lab Status:  Final result Specimen:  Blood from Arm, Left Updated:  07/12/20 0714     WBC 7 83 Thousand/uL      RBC 5 43 Million/uL      Hemoglobin 16 6 g/dL      Hematocrit 47 3 %      MCV 87 fL      MCH 30 6 pg      MCHC 35 1 g/dL      RDW 11 8 %      MPV 10 5 fL      Platelets 913 Thousands/uL      nRBC 0 /100 WBCs      Neutrophils Relative 69 %      Immat GRANS % 0 %      Lymphocytes Relative 20 %      Monocytes Relative 8 %      Eosinophils Relative 3 %      Basophils Relative 0 %      Neutrophils Absolute 5 39 Thousands/µL      Immature Grans Absolute 0 02 Thousand/uL      Lymphocytes Absolute 1 58 Thousands/µL      Monocytes Absolute 0 59 Thousand/µL      Eosinophils Absolute 0 23 Thousand/µL      Basophils Absolute 0 02 Thousands/µL                  XR chest 2 views   ED Interpretation by David Capellan DO (07/12 4944)   nad                 Procedures  ECG 12 Lead Documentation Only  Date/Time: 7/12/2020 7:05 AM  Performed by: David Capellan DO  Authorized by: David Capellan DO     Indications / Diagnosis:  Cp  ECG reviewed by me, the ED Provider: yes    Patient location:  ED  Previous ECG:     Previous ECG:  Unavailable  Interpretation:     Interpretation: abnormal    Rate:     ECG rate assessment: normal    Rhythm:     Rhythm: sinus rhythm    Ectopy:     Ectopy: none    Conduction:     Conduction: normal    ST segments:     ST segments:  Abnormal    Depression:  III  T waves:     T waves: normal               ED Course       US AUDIT      Most Recent Value   Initial Alcohol Screen: US AUDIT-C    1  How often do you have a drink containing alcohol?  0 Filed at: 07/12/2020 0644   2  How many drinks containing alcohol do you have on a typical day you are drinking? 0 Filed at: 07/12/2020 0644   3a  Male UNDER 65: How often do you have five or more drinks on one occasion? 0 Filed at: 07/12/2020 0644   3b  FEMALE Any Age, or MALE 65+: How often do you have 4 or more drinks on one occassion? 0 Filed at: 07/12/2020 0644   Audit-C Score  0 Filed at: 07/12/2020 0490            HEART Risk Score      Most Recent Value   Heart Score Risk Calculator   History  1 Filed at: 07/12/2020 0811   ECG  2 Filed at: 07/12/2020 1776   Age  1 Filed at: 07/12/2020 0869   Risk Factors  1 Filed at: 07/12/2020 0811   Troponin  1 Filed at: 07/12/2020 5898   HEART Score  6 Filed at: 07/12/2020 3333            CARLOS/DAST-10      Most Recent Value   How many times in the past year have you       Used an illegal drug or used a prescription medication for non-medical reasons? Never Filed at: 07/12/2020 0645                                [facility]  Number of Diagnoses or Management Options  Diagnosis management comments: 68-year-old male presents complaining of chest tightness that started yesterday and lasted several hours  He again woke with it this morning at 4:00 a m  And it has been constant since  States he did have some diaphoresis associated with the chest discomfort  No prior history of heart disease  No prior stress test   On exam he appears well in no distress  His exam here is normal   EKG does show some ST depression in lead 3  No old EKG for comparison  Will do cardiac workup based on his story, abnormal EKG and risk factors, will admit for observation  Amount and/or Complexity of Data Reviewed  Clinical lab tests: ordered and reviewed  Tests in the radiology section of CPT®: ordered and reviewed  Discuss the patient with other providers: yes  Independent visualization of images, tracings, or specimens: yes          Disposition  Final diagnoses:   Chest pain   Elevated troponin     Time reflects when diagnosis was documented in both MDM as applicable and the Disposition within this note     Time User Action Codes Description Comment    7/12/2020  8:25 AM Nicci Villalobos Add [R07 9] Chest pain     7/12/2020  8:25 AM Jossue MOYER Add [R79 89] Elevated troponin       ED Disposition     ED Disposition Condition Date/Time Comment    Admit Daron Kitchen Jul 12, 2020  8:25 AM Case was discussed with dr Clare Zaldivar and the patient's admission status was agreed to be Admission Status: inpatient status to the service of Dr Clare Zaldivar   Follow-up Information    None         Patient's Medications   Discharge Prescriptions    No medications on file     No discharge procedures on file      PDMP Review     None          ED Provider  Electronically Signed by           Trinity Paige DO  07/12/20 Alvin  Leonora Montemayor, [place]  07/12/20 8829

## 2020-07-13 ENCOUNTER — APPOINTMENT (INPATIENT)
Dept: NON INVASIVE DIAGNOSTICS | Facility: HOSPITAL | Age: 60
DRG: 247 | End: 2020-07-13
Payer: COMMERCIAL

## 2020-07-13 PROBLEM — I21.4 NSTEMI (NON-ST ELEVATED MYOCARDIAL INFARCTION) (HCC): Status: ACTIVE | Noted: 2020-07-12

## 2020-07-13 LAB
ALBUMIN SERPL BCP-MCNC: 3.5 G/DL (ref 3.5–5)
ALP SERPL-CCNC: 73 U/L (ref 46–116)
ALT SERPL W P-5'-P-CCNC: 34 U/L (ref 12–78)
ANION GAP SERPL CALCULATED.3IONS-SCNC: 8 MMOL/L (ref 4–13)
APTT PPP: 60 SECONDS (ref 23–37)
APTT PPP: 76 SECONDS (ref 23–37)
AST SERPL W P-5'-P-CCNC: 43 U/L (ref 5–45)
BASOPHILS # BLD AUTO: 0.03 THOUSANDS/ΜL (ref 0–0.1)
BASOPHILS NFR BLD AUTO: 0 % (ref 0–1)
BILIRUB SERPL-MCNC: 0.61 MG/DL (ref 0.2–1)
BUN SERPL-MCNC: 16 MG/DL (ref 5–25)
CALCIUM SERPL-MCNC: 8.8 MG/DL (ref 8.3–10.1)
CHLORIDE SERPL-SCNC: 99 MMOL/L (ref 100–108)
CO2 SERPL-SCNC: 26 MMOL/L (ref 21–32)
CREAT SERPL-MCNC: 0.98 MG/DL (ref 0.6–1.3)
EOSINOPHIL # BLD AUTO: 0.25 THOUSAND/ΜL (ref 0–0.61)
EOSINOPHIL NFR BLD AUTO: 3 % (ref 0–6)
ERYTHROCYTE [DISTWIDTH] IN BLOOD BY AUTOMATED COUNT: 11.8 % (ref 11.6–15.1)
GFR SERPL CREATININE-BSD FRML MDRD: 83 ML/MIN/1.73SQ M
GLUCOSE SERPL-MCNC: 171 MG/DL (ref 65–140)
GLUCOSE SERPL-MCNC: 181 MG/DL (ref 65–140)
HCT VFR BLD AUTO: 45.4 % (ref 36.5–49.3)
HGB BLD-MCNC: 15.9 G/DL (ref 12–17)
IMM GRANULOCYTES # BLD AUTO: 0.05 THOUSAND/UL (ref 0–0.2)
IMM GRANULOCYTES NFR BLD AUTO: 1 % (ref 0–2)
KCT BLD-ACNC: 351 SEC (ref 89–137)
LYMPHOCYTES # BLD AUTO: 2.41 THOUSANDS/ΜL (ref 0.6–4.47)
LYMPHOCYTES NFR BLD AUTO: 25 % (ref 14–44)
MAGNESIUM SERPL-MCNC: 1.9 MG/DL (ref 1.6–2.6)
MCH RBC QN AUTO: 30.8 PG (ref 26.8–34.3)
MCHC RBC AUTO-ENTMCNC: 35 G/DL (ref 31.4–37.4)
MCV RBC AUTO: 88 FL (ref 82–98)
MONOCYTES # BLD AUTO: 0.79 THOUSAND/ΜL (ref 0.17–1.22)
MONOCYTES NFR BLD AUTO: 8 % (ref 4–12)
NEUTROPHILS # BLD AUTO: 6.24 THOUSANDS/ΜL (ref 1.85–7.62)
NEUTS SEG NFR BLD AUTO: 63 % (ref 43–75)
NRBC BLD AUTO-RTO: 0 /100 WBCS
PLATELET # BLD AUTO: 132 THOUSANDS/UL (ref 149–390)
PMV BLD AUTO: 10.7 FL (ref 8.9–12.7)
POTASSIUM SERPL-SCNC: 4.1 MMOL/L (ref 3.5–5.3)
PROT SERPL-MCNC: 6.9 G/DL (ref 6.4–8.2)
RBC # BLD AUTO: 5.17 MILLION/UL (ref 3.88–5.62)
SODIUM SERPL-SCNC: 133 MMOL/L (ref 136–145)
SPECIMEN SOURCE: ABNORMAL
TROPONIN I SERPL-MCNC: 4.16 NG/ML
WBC # BLD AUTO: 9.77 THOUSAND/UL (ref 4.31–10.16)

## 2020-07-13 PROCEDURE — 93306 TTE W/DOPPLER COMPLETE: CPT

## 2020-07-13 PROCEDURE — C1769 GUIDE WIRE: HCPCS | Performed by: PHYSICIAN ASSISTANT

## 2020-07-13 PROCEDURE — C1894 INTRO/SHEATH, NON-LASER: HCPCS | Performed by: PHYSICIAN ASSISTANT

## 2020-07-13 PROCEDURE — 85347 COAGULATION TIME ACTIVATED: CPT

## 2020-07-13 PROCEDURE — B2111ZZ FLUOROSCOPY OF MULTIPLE CORONARY ARTERIES USING LOW OSMOLAR CONTRAST: ICD-10-PCS | Performed by: INTERNAL MEDICINE

## 2020-07-13 PROCEDURE — C9600 PERC DRUG-EL COR STENT SING: HCPCS | Performed by: PHYSICIAN ASSISTANT

## 2020-07-13 PROCEDURE — 85730 THROMBOPLASTIN TIME PARTIAL: CPT | Performed by: INTERNAL MEDICINE

## 2020-07-13 PROCEDURE — C1725 CATH, TRANSLUMIN NON-LASER: HCPCS | Performed by: PHYSICIAN ASSISTANT

## 2020-07-13 PROCEDURE — 027034Z DILATION OF CORONARY ARTERY, ONE ARTERY WITH DRUG-ELUTING INTRALUMINAL DEVICE, PERCUTANEOUS APPROACH: ICD-10-PCS | Performed by: INTERNAL MEDICINE

## 2020-07-13 PROCEDURE — 93454 CORONARY ARTERY ANGIO S&I: CPT | Performed by: INTERNAL MEDICINE

## 2020-07-13 PROCEDURE — C1874 STENT, COATED/COV W/DEL SYS: HCPCS

## 2020-07-13 PROCEDURE — 80053 COMPREHEN METABOLIC PANEL: CPT | Performed by: INTERNAL MEDICINE

## 2020-07-13 PROCEDURE — 99153 MOD SED SAME PHYS/QHP EA: CPT | Performed by: PHYSICIAN ASSISTANT

## 2020-07-13 PROCEDURE — 99152 MOD SED SAME PHYS/QHP 5/>YRS: CPT | Performed by: INTERNAL MEDICINE

## 2020-07-13 PROCEDURE — 99232 SBSQ HOSP IP/OBS MODERATE 35: CPT | Performed by: HOSPITALIST

## 2020-07-13 PROCEDURE — 93454 CORONARY ARTERY ANGIO S&I: CPT | Performed by: PHYSICIAN ASSISTANT

## 2020-07-13 PROCEDURE — 82948 REAGENT STRIP/BLOOD GLUCOSE: CPT

## 2020-07-13 PROCEDURE — 93306 TTE W/DOPPLER COMPLETE: CPT | Performed by: INTERNAL MEDICINE

## 2020-07-13 PROCEDURE — 92928 PRQ TCAT PLMT NTRAC ST 1 LES: CPT | Performed by: INTERNAL MEDICINE

## 2020-07-13 PROCEDURE — C1887 CATHETER, GUIDING: HCPCS | Performed by: PHYSICIAN ASSISTANT

## 2020-07-13 PROCEDURE — 83735 ASSAY OF MAGNESIUM: CPT | Performed by: INTERNAL MEDICINE

## 2020-07-13 PROCEDURE — 85025 COMPLETE CBC W/AUTO DIFF WBC: CPT | Performed by: INTERNAL MEDICINE

## 2020-07-13 PROCEDURE — 84484 ASSAY OF TROPONIN QUANT: CPT | Performed by: INTERNAL MEDICINE

## 2020-07-13 PROCEDURE — 99152 MOD SED SAME PHYS/QHP 5/>YRS: CPT | Performed by: PHYSICIAN ASSISTANT

## 2020-07-13 RX ORDER — LIDOCAINE HYDROCHLORIDE 10 MG/ML
INJECTION, SOLUTION EPIDURAL; INFILTRATION; INTRACAUDAL; PERINEURAL CODE/TRAUMA/SEDATION MEDICATION
Status: COMPLETED | OUTPATIENT
Start: 2020-07-13 | End: 2020-07-13

## 2020-07-13 RX ORDER — CLOPIDOGREL BISULFATE 75 MG/1
TABLET ORAL CODE/TRAUMA/SEDATION MEDICATION
Status: COMPLETED | OUTPATIENT
Start: 2020-07-13 | End: 2020-07-13

## 2020-07-13 RX ORDER — MIDAZOLAM HYDROCHLORIDE 2 MG/2ML
INJECTION, SOLUTION INTRAMUSCULAR; INTRAVENOUS CODE/TRAUMA/SEDATION MEDICATION
Status: COMPLETED | OUTPATIENT
Start: 2020-07-13 | End: 2020-07-13

## 2020-07-13 RX ORDER — SODIUM CHLORIDE 9 MG/ML
100 INJECTION, SOLUTION INTRAVENOUS CONTINUOUS
Status: DISPENSED | OUTPATIENT
Start: 2020-07-13 | End: 2020-07-13

## 2020-07-13 RX ORDER — HEPARIN SODIUM 1000 [USP'U]/ML
INJECTION, SOLUTION INTRAVENOUS; SUBCUTANEOUS CODE/TRAUMA/SEDATION MEDICATION
Status: COMPLETED | OUTPATIENT
Start: 2020-07-13 | End: 2020-07-13

## 2020-07-13 RX ORDER — CLOPIDOGREL BISULFATE 75 MG/1
75 TABLET ORAL DAILY
Status: DISCONTINUED | OUTPATIENT
Start: 2020-07-13 | End: 2020-07-14 | Stop reason: HOSPADM

## 2020-07-13 RX ORDER — ACETAMINOPHEN 325 MG/1
650 TABLET ORAL EVERY 6 HOURS PRN
Status: DISCONTINUED | OUTPATIENT
Start: 2020-07-13 | End: 2020-07-14 | Stop reason: HOSPADM

## 2020-07-13 RX ORDER — OXYCODONE HYDROCHLORIDE 10 MG/1
10 TABLET ORAL EVERY 4 HOURS PRN
Status: DISCONTINUED | OUTPATIENT
Start: 2020-07-13 | End: 2020-07-14 | Stop reason: HOSPADM

## 2020-07-13 RX ORDER — FENTANYL CITRATE 50 UG/ML
INJECTION, SOLUTION INTRAMUSCULAR; INTRAVENOUS CODE/TRAUMA/SEDATION MEDICATION
Status: COMPLETED | OUTPATIENT
Start: 2020-07-13 | End: 2020-07-13

## 2020-07-13 RX ORDER — NITROGLYCERIN 20 MG/100ML
INJECTION INTRAVENOUS CODE/TRAUMA/SEDATION MEDICATION
Status: COMPLETED | OUTPATIENT
Start: 2020-07-13 | End: 2020-07-13

## 2020-07-13 RX ORDER — VERAPAMIL HYDROCHLORIDE 2.5 MG/ML
INJECTION, SOLUTION INTRAVENOUS CODE/TRAUMA/SEDATION MEDICATION
Status: COMPLETED | OUTPATIENT
Start: 2020-07-13 | End: 2020-07-13

## 2020-07-13 RX ADMIN — LIDOCAINE HYDROCHLORIDE 2 ML: 10 INJECTION, SOLUTION EPIDURAL; INFILTRATION; INTRACAUDAL; PERINEURAL at 09:12

## 2020-07-13 RX ADMIN — NITROGLYCERIN 200 MCG: 20 INJECTION INTRAVENOUS at 09:15

## 2020-07-13 RX ADMIN — SERTRALINE HYDROCHLORIDE 50 MG: 50 TABLET ORAL at 17:27

## 2020-07-13 RX ADMIN — FENTANYL CITRATE 50 MCG: 50 INJECTION, SOLUTION INTRAMUSCULAR; INTRAVENOUS at 09:14

## 2020-07-13 RX ADMIN — IOHEXOL 100 ML: 350 INJECTION, SOLUTION INTRAVENOUS at 09:37

## 2020-07-13 RX ADMIN — CLOPIDOGREL BISULFATE 75 MG: 75 TABLET ORAL at 10:09

## 2020-07-13 RX ADMIN — HEPARIN SODIUM 6000 UNITS: 1000 INJECTION INTRAVENOUS; SUBCUTANEOUS at 09:18

## 2020-07-13 RX ADMIN — VERAPAMIL HYDROCHLORIDE 2.5 MG: 2.5 INJECTION, SOLUTION INTRAVENOUS at 09:16

## 2020-07-13 RX ADMIN — MORPHINE SULFATE 2 MG: 2 INJECTION, SOLUTION INTRAMUSCULAR; INTRAVENOUS at 12:55

## 2020-07-13 RX ADMIN — SERTRALINE HYDROCHLORIDE 50 MG: 50 TABLET ORAL at 08:11

## 2020-07-13 RX ADMIN — HEPARIN SODIUM 4000 UNITS: 1000 INJECTION INTRAVENOUS; SUBCUTANEOUS at 09:16

## 2020-07-13 RX ADMIN — ASPIRIN 81 MG 81 MG: 81 TABLET ORAL at 08:11

## 2020-07-13 RX ADMIN — CLOPIDOGREL 75 MG: 75 TABLET, FILM COATED ORAL at 09:18

## 2020-07-13 RX ADMIN — SODIUM CHLORIDE 100 ML/HR: 0.9 INJECTION, SOLUTION INTRAVENOUS at 06:01

## 2020-07-13 RX ADMIN — MIDAZOLAM 2 MG: 1 INJECTION INTRAMUSCULAR; INTRAVENOUS at 09:14

## 2020-07-13 RX ADMIN — SODIUM CHLORIDE 100 ML/HR: 0.9 INJECTION, SOLUTION INTRAVENOUS at 13:02

## 2020-07-13 RX ADMIN — ATORVASTATIN CALCIUM 80 MG: 80 TABLET, FILM COATED ORAL at 16:05

## 2020-07-13 RX ADMIN — OXYCODONE HYDROCHLORIDE 10 MG: 10 TABLET ORAL at 21:21

## 2020-07-13 RX ADMIN — OXYCODONE HYDROCHLORIDE 10 MG: 10 TABLET ORAL at 11:28

## 2020-07-13 NOTE — ASSESSMENT & PLAN NOTE
Lab Results   Component Value Date    HGBA1C 7 2 (H) 09/29/2014       Recent Labs     07/12/20  0957 07/13/20  0714   POCGLU 168* 171*       Blood Sugar Average: Last 72 hrs:  (P) 169 5   Check blood glucose 4 times daily with corrective insulin

## 2020-07-13 NOTE — ASSESSMENT & PLAN NOTE
Had elevated trop  Required cardiac cath and stent placement  Hopefully will be able to go home tomorrow    On Plavix, metoprolol, asa  Will add statin prior to discharge

## 2020-07-13 NOTE — UTILIZATION REVIEW
Initial Clinical Review    Admission: Date/Time/Statement: Admission Orders (From admission, onward)     Ordered        07/12/20 0826  Inpatient Admission  Once                   Orders Placed This Encounter   Procedures    Inpatient Admission     Standing Status:   Standing     Number of Occurrences:   1     Order Specific Question:   Admitting Physician     Answer:   Parvez Hewitt [[id number]]     Order Specific Question:   Level of Care     Answer:   Med Surg [[id number]]     Order Specific Question:   Estimated length of stay     Answer:   More than 2 Midnights     Order Specific Question:   Certification     Answer:   I certify that inpatient services are medically necessary for this patient for a duration of greater than two midnights  See H&P and MD Progress Notes for additional information about the patient's course of treatment  ED Arrival Information     Expected Arrival Acuity Means of Arrival Escorted By Service Admission Type    - 7/12/2020 06:37 Urgent Walk-In Self Hospitalist Urgent    Arrival Complaint    Chest Discomfort        Chief Complaint   Patient presents with    Chest Pain     pt  report chest pain that started yesterday after working on the car  pt  reports felt better but increased this morning  pt  reports "heavy " pt  denies SOB  pt  reports nausea     Assessment/Plan: 64 yo male with h/o DM presents to ED from home with Chest pressure  States he had an episode yesterday while working on his car that lasted several hours then resolved  But woke this morning with chest tightness & assoc diaphoreis and nausea  Took motrin w/o relief  BP elevated on arrival EKG does show some ST depression in lead 3  Initial trop 0 05, na 134, K 3 4, Glu 344  Rec'd asa & NTG in the ED with relief  Admitted to IP with Chest Pain R/O ACS  Plan: Tele, ACS R/O, Cardio Consult    Per Cardio 7/12: Acute Coronary Syndrome:  TYPE 1 MI with DM and HTN   Plan for Cardiac Cath, ASA, Plavix, Heparin,  Begin Lopressor q 8 hours   Uptitrate does to 25 b i d  Tomorrow, statin, NTP, ACE - lisinopril, ECHO, Repeat cardiac troponin cardiac enzymes until they start coming down or until PCI    7/13 Cardiac Cath: There was 1-vessel coronary artery disease  1st diagonal: There was a 95 % stenosis  This lesion is a likely culprit for the patient's recent myocardial infarction  It appears amenable to percutaneous intervention  Proximal RCA: There was a 50 % stenosis  Successful balloon angioplasty with stent procedure was performed on the 95 % lesion in the 1st diagonal  Following intervention there was an excellent angiographic appearance with a 0 % residual stenosis    A Resolute [place] Rx 2 25 x 18mm drug-eluting stent was placed across the lesion and deployed at a maximum inflation pressure of 12 thien        ED Triage Vitals [07/12/20 0646]   Temperature Pulse Respirations Blood Pressure SpO2   98 6 °F (37 °C) 78 20 (!) 186/100 98 %      Temp Source Heart Rate Source Patient Position - Orthostatic VS BP Location FiO2 (%)   Oral Monitor Sitting Right arm --      Pain Score       4        Wt Readings from Last 1 Encounters:   07/12/20 75 6 kg (166 lb 10 7 oz)     Additional Vital Signs:   07/13/20 1000  97 3 °F (36 3 °C   67  18  106/61  97 %  None (Room air) Lying   07/13/20 0700  97 5 °F (36 4 °C)  63  18  106/67  99 %  None (Room air) Lying   07/13/20 0144    62    104/69        07/12/20 2212  98 2 °F (36 8 °C)  62  16  126/83  97 %  None (Room air)    07/12/20 1915  98 1 °F (36 7 °C)  65  18  130/90  97 %  None (Room air)    07/12/20 1543  97 9 °F (36 6 °C)  63  18  136/84  96 %  None (Room air) Lying   07/12/20 1204  98 7 °F (37 1 °C)  65  18  140/91  97 %  None (Room air) Sitting   07/12/20 1036    78    151/94        07/12/20 0915  97 8 °F (36 6 °C)  75  18  151/80  96 %  None (Room air) Sitting   07/12/20 0901    74  16  134/78  98 %  None (Room air)    07/12/20 0821    76  16  144/86  98 %  None (Room air) Lying 07/12/20 0703        170/92     Lying       Pertinent Labs/Diagnostic Test Results:     Results from last 7 days   Lab Units 07/13/20  0752 07/12/20  1255 07/12/20  0700   WBC Thousand/uL 9 77 8 22 7 83   HEMOGLOBIN g/dL 15 9 15 4 16 6   HEMATOCRIT % 45 4 44 2 47 3   PLATELETS Thousands/uL 132* 130* 136*   NEUTROS ABS Thousands/µL 6 24  --  5 39     Results from last 7 days   Lab Units 07/13/20  0752 07/12/20  0700   SODIUM mmol/L 133* 134*   POTASSIUM mmol/L 4 1 3 4*   CHLORIDE mmol/L 99* 98*   CO2 mmol/L 26 22   ANION GAP mmol/L 8 14*   BUN mg/dL 16 16   CREATININE mg/dL 0 98 1 20   EGFR ml/min/1 73sq m 83 65   CALCIUM mg/dL 8 8 8 8   MAGNESIUM mg/dL 1 9  --      Results from last 7 days   Lab Units 07/13/20  0752   AST U/L 43   ALT U/L 34   ALK PHOS U/L 73   TOTAL PROTEIN g/dL 6 9   ALBUMIN g/dL 3 5   TOTAL BILIRUBIN mg/dL 0 61     Results from last 7 days   Lab Units 07/13/20  0714 07/12/20  0957   POC GLUCOSE mg/dl 171* 168*     Results from last 7 days   Lab Units 07/13/20  0752 07/12/20  0700   GLUCOSE RANDOM mg/dL 181* 344*     Results from last 7 days   Lab Units 07/13/20  0929 07/12/20  1255 07/12/20  1001 07/12/20  0700   TROPONIN I ng/mL 4 16* 3 90* 0 98* 0 05*     Results from last 7 days   Lab Units 07/13/20  0752 07/13/20  0149 07/12/20  1854 07/12/20  1255 07/12/20  0700   PROTIME seconds  --   --   --  12 2 12 0   INR   --   --   --  0 90 0 88   PTT seconds 60* 76* 36 30 26     7/12 CXR:Normal examination    7/12 EKG:  NSR     ED Treatment:   Medication Administration from 07/12/2020 0637 to 07/12/2020 7027       Date/Time Order Dose Route Action     07/12/2020 0701 nitroglycerin (NITROSTAT) SL tablet 0 4 mg 0 4 mg Sublingual Given     07/12/2020 0701 aspirin tablet 325 mg 325 mg Oral Given        Past Medical History:   Diagnosis Date    Diabetes mellitus ([facility] [address] )     OCD (obsessive compulsive disorder)      Present on Admission:   Diabetes [facility])    Admitting Diagnosis: Chest pain [R07 9]  Elevated troponin [R79 89]  Age/Sex: 63 y o  male  Admission Orders:  Scheduled Medications:  Medications:  aspirin 81 mg Oral Daily   atorvastatin 80 mg Oral Daily With Dinner   clopidogrel 75 mg Oral Daily   lisinopril 5 mg Oral QPM   melatonin 6 mg Oral HS   metoprolol tartrate 12 5 mg Oral Q8H   sertraline 50 mg Oral BID     Continuous IV Infusions:  heparin (porcine) 3-20 Units/kg/hr (Order-Specific) Intravenous Titrated   sodium chloride 100 mL/hr Intravenous Continuous     PRN Meds:  acetaminophen 650 mg Oral Q6H PRN   heparin (porcine) 2,000 Units Intravenous Q1H PRN   heparin (porcine) 4,000 Units Intravenous Q1H PRN   morphine injection 2 mg Intravenous Q4H PRN x 1 7/13   oxyCODONE 10 mg Oral Q4H PRN x 1 713     TELE  SCDs  IP CONSULT TO CARDIOLOGY    Network Utilization Review Department  [email or web address]  ATTENTION: Please call with any questions or concerns to [phone] and carefully listen to the prompts so that you are directed to the right person  All voicemails are confidential   Audrey Grand all requests for admission clinical reviews, approved or denied determinations and any other requests to dedicated fax number below belonging to the campus where the patient is receiving treatment   List of dedicated fax numbers for the Facilities:  [address] [place] [address] DENIALS (Administrative/Medical Necessity) [phone]   [address] (Maternity/NICU/Pediatrics) [phone]   Inna Hernandez [phone]   [facility] [phone]   [place] [facility] [phone]   Rasheeda Garza [phone]   [address] [phone]   [facility]  [phone]   [address] [facility] [address]  [address] [facility] [address] [facility] [phone]

## 2020-07-13 NOTE — PROGRESS NOTES
Progress Note - Arturo Palma 1960, 63 y o  male MRN: [id number]    Unit/Bed#: E4 -01 Encounter: [id number]    Primary Care Provider: Myriam Patterson MD   Date and time admitted to hospital: 2020  6:41 AM        Diabetes [facility])  Assessment & Plan  Lab Results   Component Value Date    HGBA1C 7 2 (H) 2014       Recent Labs     20  0957 20  0714   POCGLU 168* 171*       Blood Sugar Average: Last 72 hrs:  (P) 169 5   Check blood glucose 4 times daily with corrective insulin    * NSTEMI (non-ST elevated myocardial infarction) [facility])  Assessment & Plan  Had elevated trop  Required cardiac cath and stent placement  Hopefully will be able to go home tomorrow    On Plavix, metoprolol, asa  Will add statin prior to discharge  Subjective:   Has some pain at TR band, but it is improving    No chest pain  No palpitations  Objective:     Vitals:   Temp (24hrs), Av 9 °F (36 6 °C), Min:97 3 °F (36 3 °C), Max:98 2 °F (36 8 °C)    Temp:  [97 3 °F (36 3 °C)-98 2 °F (36 8 °C)] 98 °F (36 7 °C)  HR:  [60-73] 65  Resp:  [16-18] 18  BP: (104-136)/(61-90) 115/74  SpO2:  [96 %-99 %] 99 %  Body mass index is 23 91 kg/m²  Input and Output Summary (last 24 hours): Intake/Output Summary (Last 24 hours) at 2020 1235  Last data filed at 2020 0701  Gross per 24 hour   Intake    Output 1325 ml   Net -1325 ml       Physical Exam:     Physical Exam   HENT:   Head: Normocephalic and atraumatic  Eyes: Pupils are equal, round, and reactive to light  EOM are normal    Cardiovascular: Normal rate and regular rhythm  Exam reveals no gallop and no friction rub  No murmur heard  Pulmonary/Chest: Effort normal and breath sounds normal  He has no wheezes  He has no rales  Abdominal: Soft  Bowel sounds are normal  There is no tenderness  Musculoskeletal: He exhibits no edema  Nursing note and vitals reviewed      Right arm:  No swelling at TR band    Has normal capillary refill to right hand      Additional Data:     Labs:    Results from last 7 days   Lab Units 07/13/20  0752   WBC Thousand/uL 9 77   HEMOGLOBIN g/dL 15 9   HEMATOCRIT % 45 4   PLATELETS Thousands/uL 132*   NEUTROS PCT % 63   LYMPHS PCT % 25   MONOS PCT % 8   EOS PCT % 3     Results from last 7 days   Lab Units 07/13/20  0752   POTASSIUM mmol/L 4 1   CHLORIDE mmol/L 99*   CO2 mmol/L 26   BUN mg/dL 16   CREATININE mg/dL 0 98   CALCIUM mg/dL 8 8   ALK PHOS U/L 73   ALT U/L 34   AST U/L 43     Results from last 7 days   Lab Units 07/12/20  1255   INR  0 90     Results from last 7 days   Lab Units 07/13/20  0714 07/12/20  0957   POC GLUCOSE mg/dl 171* 168*               * I Have Reviewed All Lab Data     Recent Cultures (last 7 days):             Last 24 Hours Medication List:     Current Facility-Administered Medications:  acetaminophen 650 mg Oral Q6H PRN Dalton Prechtel, DO    aspirin 81 mg Oral Daily Mk Eubanks PA-C    atorvastatin 80 mg Oral Daily With [employer]    clopidogrel 75 mg Oral Daily [employer], DO    heparin (porcine) 3-20 Units/kg/hr (Order-Specific) Intravenous Titrated Tiago Eubanks PA-C Last Rate: Stopped (07/13/20 0830)   heparin (porcine) 2,000 Units Intravenous Q1H PRN Tiago Eubanks PA-C    heparin (porcine) 4,000 Units Intravenous Q1H PRN Mk Eubanks PA-C    lisinopril 5 mg Oral QPM Faye Mar MD    melatonin 6 mg Oral HS NIKITA Mar    metoprolol tartrate 12 5 mg Oral Q8H Faye Mar MD    morphine injection 2 mg Intravenous Q4H PRN Dalton Prechtel, DO    oxyCODONE 10 mg Oral Q4H PRN Dalton Prechtel, DO    sertraline 50 mg Oral BID Mary Haji MD    sodium chloride 100 mL/hr Intravenous Continuous [employer], DO Last Rate: 100 mL/hr (07/13/20 1005)         VTE Pharmacologic Prophylaxis:   Pharmacologic: off heparin drip      Current Length of Stay: 1 day(s)    Current Patient Status: Inpatient Discharge Plan: home tomorrow      Code Status: Level 1 - Full Code           Today, Patient Was Seen By: Sara Gonzalez DO    ** Please Note: Dictation voice to text software may have been used in the creation of this document   **

## 2020-07-13 NOTE — PLAN OF CARE
Problem: Potential for Falls  Goal: Patient will remain free of falls  Description  INTERVENTIONS:  - Assess patient frequently for physical needs  -  Identify cognitive and physical deficits and behaviors that affect risk of falls    -  [place] fall precautions as indicated by assessment   - Educate patient/family on patient safety including physical limitations  - Instruct patient to call for assistance with activity based on assessment  - Modify environment to reduce risk of injury  - Consider OT/PT consult to assist with strengthening/mobility  Outcome: Progressing

## 2020-07-13 NOTE — QUICK NOTE
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

## 2020-07-13 NOTE — PLAN OF CARE
Problem: Potential for Falls  Goal: Patient will remain free of falls  Description  INTERVENTIONS:  - Assess patient frequently for physical needs  -  Identify cognitive and physical deficits and behaviors that affect risk of falls    -  [place] fall precautions as indicated by assessment   - Educate patient/family on patient safety including physical limitations  - Instruct patient to call for assistance with activity based on assessment  - Modify environment to reduce risk of injury  - Consider OT/PT consult to assist with strengthening/mobility  Outcome: Progressing     Problem: PAIN - ADULT  Goal: Verbalizes/displays adequate comfort level or baseline comfort level  Description  Interventions:  - Encourage patient to monitor pain and request assistance  - Assess pain using appropriate pain scale  - Administer analgesics based on type and severity of pain and evaluate response  - Implement non-pharmacological measures as appropriate and evaluate response  - Consider cultural and social influences on pain and pain management  - Notify physician/advanced practitioner if interventions unsuccessful or patient reports new pain  Outcome: Progressing     Problem: INFECTION - ADULT  Goal: Absence or prevention of progression during hospitalization  Description  INTERVENTIONS:  - Assess and monitor for signs and symptoms of infection  - Monitor lab/diagnostic results  - Monitor all insertion sites, i e  indwelling lines, tubes, and drains  - Monitor endotracheal if appropriate and nasal secretions for changes in amount and color  - [place] appropriate cooling/warming therapies per order  - Administer medications as ordered  - Instruct and encourage patient and family to use good hand hygiene technique  - Identify and instruct in appropriate isolation precautions for identified infection/condition  Outcome: Progressing  Goal: Absence of fever/infection during neutropenic period  Description  INTERVENTIONS:  - Monitor WBC    Outcome: Progressing     Problem: SAFETY ADULT  Goal: Patient will remain free of falls  Description  INTERVENTIONS:  - Assess patient frequently for physical needs  -  Identify cognitive and physical deficits and behaviors that affect risk of falls    -  [place] fall precautions as indicated by assessment   - Educate patient/family on patient safety including physical limitations  - Instruct patient to call for assistance with activity based on assessment  - Modify environment to reduce risk of injury  - Consider OT/PT consult to assist with strengthening/mobility  Outcome: Progressing  Goal: Maintain or return to baseline ADL function  Description  INTERVENTIONS:  -  Assess patient's ability to carry out ADLs; assess patient's baseline for ADL function and identify physical deficits which impact ability to perform ADLs (bathing, care of mouth/teeth, toileting, grooming, dressing, etc )  - Assess/evaluate cause of self-care deficits   - Assess range of motion  - Assess patient's mobility; develop plan if impaired  - Assess patient's need for assistive devices and provide as appropriate  - Encourage maximum independence but intervene and supervise when necessary  - Involve family in performance of ADLs  - Assess for home care needs following discharge   - Consider OT consult to assist with ADL evaluation and planning for discharge  - Provide patient education as appropriate  Outcome: Progressing  Goal: Maintain or return mobility status to optimal level  Description  INTERVENTIONS:  - Assess patient's baseline mobility status (ambulation, transfers, stairs, etc )    - Identify cognitive and physical deficits and behaviors that affect mobility  - Identify mobility aids required to assist with transfers and/or ambulation (gait belt, sit-to-stand, lift, walker, cane, etc )  - [place] fall precautions as indicated by assessment  - Record patient progress and toleration of activity level on Mobility SBAR; progress patient to next Phase/Stage  - Instruct patient to call for assistance with activity based on assessment  - Consider rehabilitation consult to assist with strengthening/weightbearing, etc   Outcome: Progressing     Problem: DISCHARGE PLANNING  Goal: Discharge to home or other facility with appropriate resources  Description  INTERVENTIONS:  - Identify barriers to discharge w/patient and caregiver  - Arrange for needed discharge resources and transportation as appropriate  - Identify discharge learning needs (meds, wound care, etc )  - Arrange for interpretive services to assist at discharge as needed  - Refer to Case Management Department for coordinating discharge planning if the patient needs post-hospital services based on physician/advanced practitioner order or complex needs related to functional status, cognitive ability, or social support system  Outcome: Progressing     Problem: Knowledge Deficit  Goal: Patient/family/caregiver demonstrates understanding of disease process, treatment plan, medications, and discharge instructions  Description  Complete learning assessment and assess knowledge base    Interventions:  - Provide teaching at level of understanding  - Provide teaching via preferred learning methods  Outcome: Progressing     Problem: CARDIOVASCULAR - ADULT  Goal: Maintains optimal cardiac output and hemodynamic stability  Description  INTERVENTIONS:  - Monitor I/O, vital signs and rhythm  - Monitor for S/S and trends of decreased cardiac output  - Administer and titrate ordered vasoactive medications to optimize hemodynamic stability  - Assess quality of pulses, skin color and temperature  - Assess for signs of decreased coronary artery perfusion  - Instruct patient to report change in severity of symptoms  Outcome: Progressing  Goal: Absence of cardiac dysrhythmias or at baseline rhythm  Description  INTERVENTIONS:  - Continuous cardiac monitoring, vital signs, obtain 12 lead EKG if ordered  - Administer antiarrhythmic and heart rate control medications as ordered  - Monitor electrolytes and administer replacement therapy as ordered  Outcome: Progressing

## 2020-07-14 VITALS
RESPIRATION RATE: 18 BRPM | OXYGEN SATURATION: 99 % | HEART RATE: 70 BPM | DIASTOLIC BLOOD PRESSURE: 72 MMHG | TEMPERATURE: 97.7 F | WEIGHT: 166.67 LBS | HEIGHT: 70 IN | SYSTOLIC BLOOD PRESSURE: 126 MMHG | BODY MASS INDEX: 23.86 KG/M2

## 2020-07-14 DIAGNOSIS — I21.4 NSTEMI (NON-ST ELEVATED MYOCARDIAL INFARCTION) (HCC): Primary | ICD-10-CM

## 2020-07-14 LAB
ANION GAP SERPL CALCULATED.3IONS-SCNC: 12 MMOL/L (ref 4–13)
BUN SERPL-MCNC: 14 MG/DL (ref 5–25)
CALCIUM SERPL-MCNC: 8.5 MG/DL (ref 8.3–10.1)
CHLORIDE SERPL-SCNC: 100 MMOL/L (ref 100–108)
CHOLEST SERPL-MCNC: 204 MG/DL (ref 50–200)
CO2 SERPL-SCNC: 23 MMOL/L (ref 21–32)
CREAT SERPL-MCNC: 0.94 MG/DL (ref 0.6–1.3)
ERYTHROCYTE [DISTWIDTH] IN BLOOD BY AUTOMATED COUNT: 11.7 % (ref 11.6–15.1)
EST. AVERAGE GLUCOSE BLD GHB EST-MCNC: 194 MG/DL
GFR SERPL CREATININE-BSD FRML MDRD: 87 ML/MIN/1.73SQ M
GLUCOSE SERPL-MCNC: 162 MG/DL (ref 65–140)
HBA1C MFR BLD: 8.4 %
HCT VFR BLD AUTO: 43.1 % (ref 36.5–49.3)
HDLC SERPL-MCNC: 47 MG/DL
HGB BLD-MCNC: 14.9 G/DL (ref 12–17)
LDLC SERPL CALC-MCNC: 120 MG/DL (ref 0–100)
MCH RBC QN AUTO: 30.3 PG (ref 26.8–34.3)
MCHC RBC AUTO-ENTMCNC: 34.6 G/DL (ref 31.4–37.4)
MCV RBC AUTO: 88 FL (ref 82–98)
NONHDLC SERPL-MCNC: 157 MG/DL
PLATELET # BLD AUTO: 122 THOUSANDS/UL (ref 149–390)
PMV BLD AUTO: 10.4 FL (ref 8.9–12.7)
POTASSIUM SERPL-SCNC: 4 MMOL/L (ref 3.5–5.3)
RBC # BLD AUTO: 4.92 MILLION/UL (ref 3.88–5.62)
SODIUM SERPL-SCNC: 135 MMOL/L (ref 136–145)
TRIGL SERPL-MCNC: 187 MG/DL
WBC # BLD AUTO: 8.22 THOUSAND/UL (ref 4.31–10.16)

## 2020-07-14 PROCEDURE — 83036 HEMOGLOBIN GLYCOSYLATED A1C: CPT | Performed by: PHYSICIAN ASSISTANT

## 2020-07-14 PROCEDURE — 80061 LIPID PANEL: CPT | Performed by: PHYSICIAN ASSISTANT

## 2020-07-14 PROCEDURE — 80048 BASIC METABOLIC PNL TOTAL CA: CPT | Performed by: INTERNAL MEDICINE

## 2020-07-14 PROCEDURE — 85027 COMPLETE CBC AUTOMATED: CPT | Performed by: HOSPITALIST

## 2020-07-14 PROCEDURE — 99239 HOSP IP/OBS DSCHRG MGMT >30: CPT | Performed by: HOSPITALIST

## 2020-07-14 PROCEDURE — 99232 SBSQ HOSP IP/OBS MODERATE 35: CPT | Performed by: PHYSICIAN ASSISTANT

## 2020-07-14 RX ORDER — METOPROLOL SUCCINATE 25 MG/1
12.5 TABLET, EXTENDED RELEASE ORAL EVERY 12 HOURS
Status: DISCONTINUED | OUTPATIENT
Start: 2020-07-14 | End: 2020-07-14 | Stop reason: HOSPADM

## 2020-07-14 RX ORDER — LISINOPRIL 5 MG/1
5 TABLET ORAL EVERY EVENING
Qty: 30 TABLET | Refills: 1 | Status: SHIPPED | OUTPATIENT
Start: 2020-07-14 | End: 2020-07-21 | Stop reason: SDUPTHER

## 2020-07-14 RX ORDER — METOPROLOL SUCCINATE 25 MG/1
60 TABLET, EXTENDED RELEASE ORAL EVERY 12 HOURS
Qty: 60 TABLET | Refills: 1 | Status: SHIPPED | OUTPATIENT
Start: 2020-07-14 | End: 2020-07-21 | Stop reason: SDUPTHER

## 2020-07-14 RX ORDER — CLOPIDOGREL BISULFATE 75 MG/1
75 TABLET ORAL DAILY
Qty: 30 TABLET | Refills: 11 | Status: SHIPPED | OUTPATIENT
Start: 2020-07-15 | End: 2020-07-21 | Stop reason: SDUPTHER

## 2020-07-14 RX ORDER — ASPIRIN 81 MG/1
81 TABLET, CHEWABLE ORAL DAILY
Qty: 30 TABLET | Refills: 11 | Status: SHIPPED | OUTPATIENT
Start: 2020-07-15

## 2020-07-14 RX ORDER — ATORVASTATIN CALCIUM 80 MG/1
80 TABLET, FILM COATED ORAL
Qty: 30 TABLET | Refills: 1 | Status: SHIPPED | OUTPATIENT
Start: 2020-07-14 | End: 2020-07-21 | Stop reason: SDUPTHER

## 2020-07-14 RX ADMIN — ASPIRIN 81 MG 81 MG: 81 TABLET ORAL at 09:54

## 2020-07-14 RX ADMIN — METOPROLOL SUCCINATE 12.5 MG: 25 TABLET, EXTENDED RELEASE ORAL at 09:57

## 2020-07-14 RX ADMIN — SERTRALINE HYDROCHLORIDE 50 MG: 50 TABLET ORAL at 09:54

## 2020-07-14 RX ADMIN — CLOPIDOGREL BISULFATE 75 MG: 75 TABLET ORAL at 09:55

## 2020-07-14 NOTE — ASSESSMENT & PLAN NOTE
Lab Results   Component Value Date    HGBA1C 7 2 (H) 09/29/2014       Recent Labs     07/12/20  0957 07/13/20  0714   POCGLU 168* 171*       Blood Sugar Average: Last 72 hrs:  (P) 169 5     Continue home medication

## 2020-07-14 NOTE — PROGRESS NOTES
Progress Note - Cardiology   [facility]uli 63 y o  male MRN: [id number]  Unit/Bed#: E4 -01 Encounter: [id number]        Problem List:  Principal Problem:    NSTEMI (non-ST elevated myocardial infarction) ([facility] )  Active Problems:    Diabetes ([facility] )      Asessment:  1  Coronary artery disease, s/p PCI X 1 to 95% 1st diag on 7/13/20   -Residual proximal 50% RCA  2  Preserved LVEF 63% on echo 7/13/20  3  Type 2 DM  4  Dyslipidemia  5  Family history of CAD w/ a brother who had MI in his 55's  8  Remote history of ITP, platelets stable since admission  7  Five beats of NSVT, asymptomatic    Plan/ Discussion:  He has had no further episodes of chest discomfort  He is comfortable this morning  Radial cath site is clean, dry, and intact  Distal neurovascular intact  We discussed the details of his procedure and the importance of medications moving forwards  · DAPT (Asa 81, Plavix 75) X 1 year, thereafter only Asa 81 mg  · Change Lopressor to Metoprolol Succinate 12 5 BID  · Atorvastatin 80 mg  · Add-on HgB A1C and Lipid Panel to this mornings lab work  · BP running soft (100's/60's), may not need Lisinopril on discharge  · No strenuous activity until seen in the office, will arrange follow up in 2-3 weeks with out group  · 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     Addendum: 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 yesterday  Follow up has been arranged with him in 2 weeks  Subjective:  Feels well   No physical complaints     Vitals:  Vitals:    07/12/20 0646 07/12/20 0915   Weight: 75 8 kg (167 lb 1 7 oz) 75 6 kg (166 lb 10 7 oz)   ,  Vitals:    07/13/20 1745 07/13/20 1958 07/13/20 2342 07/14/20 0700   BP: 97/55 121/76 94/61 103/65   BP Location: Left arm Left arm Left arm Right arm   Pulse: 76 69 86 68   Resp: 18 16 18 18   Temp: 98 1 °F (36 7 °C) 98 9 °F (37 2 °C) (!) 96 9 °F (36 1 °C) 97 9 °F (36 6 °C)   TempSrc: Temporal Temporal Tympanic Temporal   SpO2: 97% 96% 98% 97%   Weight:       Height:           Exam:  General: Alert awake and oriented, no acute distress    Heart:  Regular rate and rhythm, no murmurs   Respiratory effort:  Breathing comfortably on room air   Lungs:  Clear bilaterally without wheezing, rales, crackles   Lower Limbs:  No edema           Telemetry:       Normal sinus rhythm, , Heart Rate 70's  5 beats NSVT noted     Medications:    Current Facility-Administered Medications:     acetaminophen (TYLENOL) tablet 650 mg, 650 mg, Oral, Q6H PRN, Charles David, DO    aspirin chewable tablet 81 mg, 81 mg, Oral, Daily, Chichi Eubanks PA-C, 81 mg at 07/13/20 7261    atorvastatin (LIPITOR) tablet 80 mg, 80 mg, Oral, Daily With Cyril Eubanks PA-C, 80 mg at 07/13/20 1605    clopidogrel (PLAVIX) tablet 75 mg, 75 mg, Oral, Daily, Luz Elena Liu DO, 75 mg at 07/13/20 1009    heparin (porcine) 25,000 units in 250 mL infusion (premix), 3-20 Units/kg/hr (Order-Specific), Intravenous, Titrated, [employer], PA-C, Stopped at 07/13/20 0830    heparin (porcine) injection 2,000 Units, 2,000 Units, Intravenous, Q1H PRN, Chichi Eubanks PA-C    heparin (porcine) injection 4,000 Units, 4,000 Units, Intravenous, Q1H PRN, Chichi Eubanks PA-C, 4,000 Units at 07/12/20 1956    lisinopril (ZESTRIL) tablet 5 mg, 5 mg, Oral, QPM, Faye Mar MD, 5 mg at 07/12/20 1738    melatonin tablet 6 mg, 6 mg, Oral, HS, NIKITA Mar, 6 mg at 07/12/20 2232    metoprolol tartrate (LOPRESSOR) partial tablet 12 5 mg, 12 5 mg, Oral, Q8H, Faye Mar MD, Stopped at 07/13/20 0144    morphine injection 2 mg, 2 mg, Intravenous, Q4H PRN, Dalton Morales DO, 2 mg at 07/13/20 1255    oxyCODONE (ROXICODONE) immediate release tablet 10 mg, 10 mg, Oral, Q4H PRN, Dalton Morales DO, 10 mg at 07/13/20 2121    sertraline (ZOLOFT) tablet 50 mg, 50 mg, Oral, BID, Ines Paget, MD, 50 mg at 07/13/20 1727      Labs/Data:        Results from last 7 days Lab Units 07/14/20  0500 07/13/20  0752 07/12/20  1255   WBC Thousand/uL 8 22 9 77 8 22   HEMOGLOBIN g/dL 14 9 15 9 15 4   HEMATOCRIT % 43 1 45 4 44 2   PLATELETS Thousands/uL 122* 132* 130*     Results from last 7 days   Lab Units 07/14/20  0500 07/13/20  0929 07/13/20  0752 07/12/20  1255 07/12/20  1001 07/12/20  0700   POTASSIUM mmol/L 4 0  --  4 1  --   --  3 4*   CHLORIDE mmol/L 100  --  99*  --   --  98*   CO2 mmol/L 23  --  26  --   --  22   BUN mg/dL 14  --  16  --   --  16   TROPONIN I ng/mL  --  4 16*  --  3 90* 0 98* 0 05*

## 2020-07-14 NOTE — PLAN OF CARE
Problem: Potential for Falls  Goal: Patient will remain free of falls  Description  INTERVENTIONS:  - Assess patient frequently for physical needs  -  Identify cognitive and physical deficits and behaviors that affect risk of falls    -  [place] fall precautions as indicated by assessment   - Educate patient/family on patient safety including physical limitations  - Instruct patient to call for assistance with activity based on assessment  - Modify environment to reduce risk of injury  - Consider OT/PT consult to assist with strengthening/mobility  Outcome: Progressing     Problem: PAIN - ADULT  Goal: Verbalizes/displays adequate comfort level or baseline comfort level  Description  Interventions:  - Encourage patient to monitor pain and request assistance  - Assess pain using appropriate pain scale  - Administer analgesics based on type and severity of pain and evaluate response  - Implement non-pharmacological measures as appropriate and evaluate response  - Consider cultural and social influences on pain and pain management  - Notify physician/advanced practitioner if interventions unsuccessful or patient reports new pain  Outcome: Progressing     Problem: INFECTION - ADULT  Goal: Absence or prevention of progression during hospitalization  Description  INTERVENTIONS:  - Assess and monitor for signs and symptoms of infection  - Monitor lab/diagnostic results  - Monitor all insertion sites, i e  indwelling lines, tubes, and drains  - Monitor endotracheal if appropriate and nasal secretions for changes in amount and color  - [place] appropriate cooling/warming therapies per order  - Administer medications as ordered  - Instruct and encourage patient and family to use good hand hygiene technique  - Identify and instruct in appropriate isolation precautions for identified infection/condition  Outcome: Progressing  Goal: Absence of fever/infection during neutropenic period  Description  INTERVENTIONS:  - Monitor WBC    Outcome: Progressing     Problem: SAFETY ADULT  Goal: Patient will remain free of falls  Description  INTERVENTIONS:  - Assess patient frequently for physical needs  -  Identify cognitive and physical deficits and behaviors that affect risk of falls    -  [place] fall precautions as indicated by assessment   - Educate patient/family on patient safety including physical limitations  - Instruct patient to call for assistance with activity based on assessment  - Modify environment to reduce risk of injury  - Consider OT/PT consult to assist with strengthening/mobility  Outcome: Progressing  Goal: Maintain or return to baseline ADL function  Description  INTERVENTIONS:  -  Assess patient's ability to carry out ADLs; assess patient's baseline for ADL function and identify physical deficits which impact ability to perform ADLs (bathing, care of mouth/teeth, toileting, grooming, dressing, etc )  - Assess/evaluate cause of self-care deficits   - Assess range of motion  - Assess patient's mobility; develop plan if impaired  - Assess patient's need for assistive devices and provide as appropriate  - Encourage maximum independence but intervene and supervise when necessary  - Involve family in performance of ADLs  - Assess for home care needs following discharge   - Consider OT consult to assist with ADL evaluation and planning for discharge  - Provide patient education as appropriate  Outcome: Progressing  Goal: Maintain or return mobility status to optimal level  Description  INTERVENTIONS:  - Assess patient's baseline mobility status (ambulation, transfers, stairs, etc )    - Identify cognitive and physical deficits and behaviors that affect mobility  - Identify mobility aids required to assist with transfers and/or ambulation (gait belt, sit-to-stand, lift, walker, cane, etc )  - [place] fall precautions as indicated by assessment  - Record patient progress and toleration of activity level on Mobility SBAR; progress patient to next Phase/Stage  - Instruct patient to call for assistance with activity based on assessment  - Consider rehabilitation consult to assist with strengthening/weightbearing, etc   Outcome: Progressing     Problem: DISCHARGE PLANNING  Goal: Discharge to home or other facility with appropriate resources  Description  INTERVENTIONS:  - Identify barriers to discharge w/patient and caregiver  - Arrange for needed discharge resources and transportation as appropriate  - Identify discharge learning needs (meds, wound care, etc )  - Arrange for interpretive services to assist at discharge as needed  - Refer to Case Management Department for coordinating discharge planning if the patient needs post-hospital services based on physician/advanced practitioner order or complex needs related to functional status, cognitive ability, or social support system  Outcome: Progressing     Problem: Knowledge Deficit  Goal: Patient/family/caregiver demonstrates understanding of disease process, treatment plan, medications, and discharge instructions  Description  Complete learning assessment and assess knowledge base    Interventions:  - Provide teaching at level of understanding  - Provide teaching via preferred learning methods  Outcome: Progressing     Problem: CARDIOVASCULAR - ADULT  Goal: Maintains optimal cardiac output and hemodynamic stability  Description  INTERVENTIONS:  - Monitor I/O, vital signs and rhythm  - Monitor for S/S and trends of decreased cardiac output  - Administer and titrate ordered vasoactive medications to optimize hemodynamic stability  - Assess quality of pulses, skin color and temperature  - Assess for signs of decreased coronary artery perfusion  - Instruct patient to report change in severity of symptoms  Outcome: Progressing  Goal: Absence of cardiac dysrhythmias or at baseline rhythm  Description  INTERVENTIONS:  - Continuous cardiac monitoring, vital signs, obtain 12 lead EKG if ordered  - Administer antiarrhythmic and heart rate control medications as ordered  - Monitor electrolytes and administer replacement therapy as ordered  Outcome: Progressing

## 2020-07-14 NOTE — DISCHARGE SUMMARY
Discharge- Naeem Colon 1960, 63 y o  male MRN: [id number]    Unit/Bed#: E4 -01 Encounter: [id number]    Primary Care Provider: Sergio Mederos MD   Date and time admitted to hospital: 7/12/2020  6:41 AM        * NSTEMI (non-ST elevated myocardial infarction) [facility])  Assessment & Plan  Underwent cardiac cath with stent placement  Will go home on Metoprolol, ASA, Plavix, Lipitor      Diabetes [facility])  Assessment & Plan  Lab Results   Component Value Date    HGBA1C 7 2 (H) 09/29/2014       Recent Labs     07/12/20  0957 07/13/20  0714   POCGLU 168* 171*       Blood Sugar Average: Last 72 hrs:  (P) 169 5     Continue home medication        Discharging Physician / Practitioner: Peyton Vigil DO  PCP: Sergio Mederos MD  Admission Date:   Admission Orders (From admission, onward)     Ordered        07/12/20 0826  Inpatient Admission  Once                   Discharge Date: 07/14/20    Resolved Problems  Date Reviewed: 7/14/2020    None            Consultations During Hospital Stay:  · cardiology      Procedures Performed:     · Cardiac catheterization      Reason for Admission: chest pain      Hospital Course:     Naeem Colon is a 63 y o  male patient who originally presented to the hospital on 7/12/2020 due to chest pain  He was found to have a NONSTEMI  He underwent cardiac catheterization with stent placement  He will go home with risk stratification with asa, plavix, metoprolol, and statin  He will follow up with cardiology in the office  Please see above list of diagnoses and related plan for additional information         Condition at Discharge: good       Discharge Day Visit / Exam:     Subjective:  No chest pain  No SOB      Vitals: Blood Pressure: 103/65 (07/14/20 0700)  Pulse: 68 (07/14/20 0700)  Temperature: 97 9 °F (36 6 °C) (07/14/20 0700)  Temp Source: Temporal (07/14/20 0700)  Respirations: 18 (07/14/20 0700)  Height: 5' 10" (177 8 cm) (07/12/20 0915)  Weight - Scale: 75 6 kg (166 lb 10 7 oz) (07/12/20 0915)  SpO2: 97 % (07/14/20 0700)    Exam:     Physical Exam   HENT:   Head: Normocephalic and atraumatic  Eyes: Pupils are equal, round, and reactive to light  EOM are normal    Cardiovascular: Normal rate and regular rhythm  Exam reveals no gallop and no friction rub  No murmur heard  Pulmonary/Chest: Effort normal and breath sounds normal  He has no wheezes  He has no rales  Abdominal: Soft  Bowel sounds are normal  There is no tenderness  Musculoskeletal: He exhibits no edema  Nursing note and vitals reviewed            Discharge instructions/Information to patient and family:   See after visit summary for information provided to patient and family  Provisions for Follow-Up Care:  See after visit summary for information related to follow-up care and any pertinent home health orders  Disposition:     Home       Discharge Statement:  I spent 36 minutes discharging the patient  This time was spent on the day of discharge  I had direct contact with the patient on the day of discharge  Greater than 50% of the total time was spent examining patient, answering all patient questions, arranging and discussing plan of care with patient as well as directly providing post-discharge instructions  Additional time then spent on discharge activities  Discharge Medications:  See after visit summary for reconciled discharge medications provided to patient and family        ** Please Note: This note has been constructed using a voice recognition system **

## 2020-07-14 NOTE — NURSING NOTE
Patient had IV and tele removed  Instructions reviewed and Rx sent electronically  Patient escorted to the lobby with his wife

## 2020-07-14 NOTE — PLAN OF CARE
Problem: Potential for Falls  Goal: Patient will remain free of falls  Description  INTERVENTIONS:  - Assess patient frequently for physical needs  -  Identify cognitive and physical deficits and behaviors that affect risk of falls    -  [place] fall precautions as indicated by assessment   - Educate patient/family on patient safety including physical limitations  - Instruct patient to call for assistance with activity based on assessment  - Modify environment to reduce risk of injury  - Consider OT/PT consult to assist with strengthening/mobility  Outcome: Progressing

## 2020-07-21 ENCOUNTER — OFFICE VISIT (OUTPATIENT)
Dept: CARDIOLOGY CLINIC | Facility: CLINIC | Age: 60
End: 2020-07-21
Payer: COMMERCIAL

## 2020-07-21 VITALS
BODY MASS INDEX: 23.24 KG/M2 | SYSTOLIC BLOOD PRESSURE: 110 MMHG | HEART RATE: 60 BPM | WEIGHT: 162 LBS | DIASTOLIC BLOOD PRESSURE: 70 MMHG | TEMPERATURE: 98.1 F

## 2020-07-21 DIAGNOSIS — Z98.61 HISTORY OF PERCUTANEOUS CORONARY INTERVENTION: ICD-10-CM

## 2020-07-21 DIAGNOSIS — I25.10 CORONARY ARTERY DISEASE INVOLVING NATIVE CORONARY ARTERY OF NATIVE HEART WITHOUT ANGINA PECTORIS: ICD-10-CM

## 2020-07-21 DIAGNOSIS — E11.9 TYPE 2 DIABETES MELLITUS WITHOUT COMPLICATION, WITHOUT LONG-TERM CURRENT USE OF INSULIN (HCC): ICD-10-CM

## 2020-07-21 DIAGNOSIS — E78.2 MIXED DYSLIPIDEMIA: ICD-10-CM

## 2020-07-21 DIAGNOSIS — I21.4 NSTEMI (NON-ST ELEVATED MYOCARDIAL INFARCTION) (HCC): Primary | ICD-10-CM

## 2020-07-21 PROCEDURE — 93000 ELECTROCARDIOGRAM COMPLETE: CPT | Performed by: INTERNAL MEDICINE

## 2020-07-21 PROCEDURE — 99214 OFFICE O/P EST MOD 30 MIN: CPT | Performed by: INTERNAL MEDICINE

## 2020-07-21 RX ORDER — LISINOPRIL 5 MG/1
5 TABLET ORAL EVERY EVENING
Qty: 90 TABLET | Refills: 3 | Status: SHIPPED | OUTPATIENT
Start: 2020-07-21 | End: 2020-10-20 | Stop reason: HOSPADM

## 2020-07-21 RX ORDER — ATORVASTATIN CALCIUM 80 MG/1
80 TABLET, FILM COATED ORAL
Qty: 90 TABLET | Refills: 3 | Status: SHIPPED | OUTPATIENT
Start: 2020-07-21 | End: 2020-10-20 | Stop reason: SDUPTHER

## 2020-07-21 RX ORDER — METOPROLOL SUCCINATE 50 MG/1
50 TABLET, EXTENDED RELEASE ORAL EVERY 12 HOURS
Qty: 180 TABLET | Refills: 3 | Status: SHIPPED | OUTPATIENT
Start: 2020-07-21 | End: 2020-10-20 | Stop reason: SDUPTHER

## 2020-07-21 RX ORDER — CLOPIDOGREL BISULFATE 75 MG/1
75 TABLET ORAL DAILY
Qty: 90 TABLET | Refills: 3 | Status: SHIPPED | OUTPATIENT
Start: 2020-07-21 | End: 2021-09-07

## 2020-07-21 NOTE — ASSESSMENT & PLAN NOTE
Mr Reyna Valenzuela is status post recent MI and was noted to have one-vessel significant CAD and received stent to his 1st diagonal branch of LAD  He also has 50% proximal RCA disease and luminal irregularities in LAD  He seems to be overall doing well  His blood pressure is borderline low and he has some symptoms  He is a  for [employer]  He seems to be having mild palpitations and lightheadedness  Blood pressure is noted to be borderline  Physical examination does not suggest signs of heart failure or valvular heart disease  Today we discussed routine post MI and post intervention care  His him and his wife had several questions which were answered  We are making following changes to the regimen     - as blood pressure is on the borderline low side we are reducing the dose of metoprolol succinate to 50 mg twice daily  - he will continue to take the lisinopril, aspirin, clopidogrel and atorvastatin  - because of palpitation am going to have a 24 hour Holter monitor performed to rule out any ectopy  - he will participate in cardiac rehab as scheduled at least for the initial sessions so that he can learn what he can follow in the future  - I will arrange for a the symptom limited treadmill stress test in 3 weeks from his MI  If he demonstrate reasonable exercise tolerance then he will be cleared to go back to work without restrictions

## 2020-07-21 NOTE — PATIENT INSTRUCTIONS
CARDIOLOGY ASSESSMENT & PLAN:  1  NSTEMI (non-ST elevated myocardial infarction) ([facility])  POCT ECG    atorvastatin (LIPITOR) 80 mg tablet    clopidogrel (PLAVIX) 75 mg tablet    lisinopril (ZESTRIL) 5 mg tablet    metoprolol succinate (TOPROL-XL) 50 mg 24 hr tablet    Holter monitor - 24 hour    Stress test only, exercise   2  Type 2 diabetes mellitus without complication, without long-term current use of insulin ([address] )     3  Mixed dyslipidemia     4  History of percutaneous coronary intervention     5  Coronary artery disease involving native coronary artery of native heart without angina pectoris  Holter monitor - 24 hour    Stress test only, exercise     CAD, S/P MI June 2020, s/p PCI D1 July 2020  Mr Jose Ramon Davila is status post recent MI and was noted to have one-vessel significant CAD and received stent to his 1st diagonal branch of LAD  He also has 50% proximal RCA disease and luminal irregularities in LAD  He seems to be overall doing well  His blood pressure is borderline low and he has some symptoms  He is a  for [employer]  He seems to be having mild palpitations and lightheadedness  Blood pressure is noted to be borderline  Physical examination does not suggest signs of heart failure or valvular heart disease  Today we discussed routine post MI and post intervention care  His him and his wife had several questions which were answered  We are making following changes to the regimen     - as blood pressure is on the borderline low side we are reducing the dose of metoprolol succinate to 50 mg twice daily  - he will continue to take the lisinopril, aspirin, clopidogrel and atorvastatin  - because of palpitation am going to have a 24 hour Holter monitor performed to rule out any ectopy  - he will participate in cardiac rehab as scheduled at least for the initial sessions so that he can learn what he can follow in the future    - I will arrange for a the symptom limited treadmill stress test in 3 weeks from his MI  If he demonstrate reasonable exercise tolerance then he will be cleared to go back to work without restrictions

## 2020-07-21 NOTE — PROGRESS NOTES
[facility]  [id number] [facility], Stephanie Smith  Phone#  [phone]  Fax#  [phone]  *-*-*-*-*-*-*-*-*-*-*-*-*-*-*-*-*-*-*-*-*-*-*-*-*-*-*-*-*-*-*-*-*-*-*-*-*-*-*-*-*-*-*-*-*-*-*-*-*-*-*-*-*-*  Jaqueline Oh DATE: 07/21/20 2:35 PM  PATIENT NAME: Elias Roldan   1960    [id number]  Age: 63 y o  Sex: male  AUTHOR: Shawnee Wahl MD  [facility] PHYSICIAN: Ruddy Pond MD    DIAGNOSES:  1  One-vessel coronary artery disease status post ACS-NSTEMI July 2020, status post PCI of diagonal branch on July 13, 2020   2  Normal left ventricular systolic function  3  Diabetes mellitus with poor glycemic control with last hemoglobin see of 8 4  4  Dyslipidemia  5  Family history premature CAD with brother having MI at 48 years  6  Remote history of ITP  7  History of trigger finger  8  History of OCD     CARDIAC CATHETERIZATION JULY 13, 2020: The coronary circulation is right dominant  -- There was 1-vessel coronary artery disease  -- Left main: Angiography showed minor luminal irregularities  -- LAD: Angiography showed minor luminal irregularities  -- 1st diagonal:  -- 1st diagonal: There was a 95 % stenosis  There was LEA grade 2 flow through the vessel (partial perfusion)  This lesion is a likely culprit for the patient's recent myocardial infarction  It appears amenable to percutaneous  intervention  -- Circumflex: Angiography showed minor luminal irregularities  -- Proximal RCA: There was a 50 % stenosis  ECHOCARDIOGRAM JULY 13, 2020:   Technical quality: Good  Cardiac rhythm: Normal sinus     1  Normal left ventricular size and systolic and diastolic function, EF around 63%  2  Normal right ventricular size and systolic function  4  Normal left and right atrial size  5  Mild aortic valve sclerosis, no aortic valve stenosis or regurgitation  6  Trace mitral and tricuspid valve regurgitation  7  No obvious pulmonary hypertension    8  No pericardial effusion  CURRENT ECG:  Results for orders placed or performed in visit on 07/21/20   POCT ECG    Narrative    Sinus bradycardia at 59 beats per minute, normal axis and intervals, no significant ST T-wave abnormalities  Possible prior septal infarction  CARDIOLOGY ASSESSMENT & PLAN:  1  NSTEMI (non-ST elevated myocardial infarction) ([facility])  POCT ECG    atorvastatin (LIPITOR) 80 mg tablet    clopidogrel (PLAVIX) 75 mg tablet    lisinopril (ZESTRIL) 5 mg tablet    metoprolol succinate (TOPROL-XL) 50 mg 24 hr tablet    Holter monitor - 24 hour    Stress test only, exercise   2  Type 2 diabetes mellitus without complication, without long-term current use of insulin ([facility] [address] )     3  Mixed dyslipidemia     4  History of percutaneous coronary intervention     5  Coronary artery disease involving native coronary artery of native heart without angina pectoris  Holter monitor - 24 hour    Stress test only, exercise     CAD, S/P MI June 2020, s/p PCI D1 July 2020  Mr Wil Jin is status post recent MI and was noted to have one-vessel significant CAD and received stent to his 1st diagonal branch of LAD  He also has 50% proximal RCA disease and luminal irregularities in LAD  He seems to be overall doing well  His blood pressure is borderline low and he has some symptoms  He is a  for [employer]  He seems to be having mild palpitations and lightheadedness  Blood pressure is noted to be borderline  Physical examination does not suggest signs of heart failure or valvular heart disease  Today we discussed routine post MI and post intervention care  His him and his wife had several questions which were answered  We are making following changes to the regimen     - as blood pressure is on the borderline low side we are reducing the dose of metoprolol succinate to 50 mg twice daily  - he will continue to take the lisinopril, aspirin, clopidogrel and atorvastatin    - because of palpitation am going to have a 24 hour Holter monitor performed to rule out any ectopy  - he will participate in cardiac rehab as scheduled at least for the initial sessions so that he can learn what he can follow in the future  - I will arrange for a the symptom limited treadmill stress test in 3 weeks from his MI  If he demonstrate reasonable exercise tolerance then he will be cleared to go back to work without restrictions  INTERVAL HISTORY & HISTORY OF PRESENT ILLNESS:  Artis Tran is here for follow-up regarding his cardiac comorbidities which include:  Coronary artery disease with recent non ST-elevation MI status post PCI of diagonal branch, dyslipidemia, diabetes and comorbidities  Patient is here for post hospitalization visit accompanied with his wife  He reports overall feeling well  He does report occasional transient palpitations  Also experiences transient chest discomfort at times but no were close to what he felt before  Has some symptoms of bloating and indigestion but no heartburn  His not yet active  He has not exerted himself since the recent interventions    Functional capacity status: Moderate   (Excellent- >10 METs; Good: (7-10 METs); Moderate (4-7 METs); Poor (<= 4 METs)    Any chronic stressors:  None   (feeling of poor health, financial problems, and social isolation etc)  Tobacco or alcohol dependence:  None  He has been monitoring his vital signs at home  His systolic blood pressures have ranged 026-351 and diastolic blood pressure have been around 70s  His weight has come down by approximately 3 lb  Being compliant with medications  Denies any bleeding or bruising  He has been monitoring his blood sugars at home and now they ranging 130s to 140s random  He has been started on Ozempic recently  REVIEW OF SYMPTOMS:    Positive for:  As described in HPI  Negative for: All remaining as reviewed below and in HPI        SYSTEM SYMPTOMS REVIEWED:  General--weight change, fever, night sweats  Respiratory--cough, wheezing, shortness of breath, sputum production  Cardiovascular--chest pain, syncope, dyspnea on exertion, edema, decline in exercise tolerance, claudication   Gastrointestinal--persistent vomiting, diarrhea, abdominal distention, blood in stool   Muscular or skeletal--joint pain or swelling   Neurologic--headaches, syncope, abnormal movement  Hematologic--history of easy bruising and bleeding   Endocrine--thyroid enlargement, heat or cold intolerance, polyuria   Psychiatric--anxiety, depression     *-*-*-*-*-*-*-*-*-*-*-*-*-*-*-*-*-*-*-*-*-*-*-*-*-*-*-*-*-*-*-*-*-*-*-*-*-*-*-*-*-*-*-*-*-*-*-*-*-*-*-*-*-*-  VITAL SIGNS:  Vitals:    07/21/20 1344   BP: 110/70   BP Location: Left arm   Patient Position: Sitting   Cuff Size: Adult   Pulse: 60   Temp: 98 1 °F (36 7 °C)   Weight: 73 5 kg (162 lb)     Weight (last 2 days)     Date/Time   Weight    07/21/20 1344   73 5 (162)           ,   Wt Readings from Last 3 Encounters:   07/21/20 73 5 kg (162 lb)   07/12/20 75 6 kg (166 lb 10 7 oz)   05/08/20 77 1 kg (170 lb)    , Body mass index is 23 24 kg/m²  *-*-*-*-*-*-*-*-*-*-*-*-*-*-*-*-*-*-*-*-*-*-*-*-*-*-*-*-*-*-*-*-*-*-*-*-*-*-*-*-*-*-*-*-*-*-*-*-*-*-*-*-*-*-  PHYSICAL EXAM:  General Appearance:    Alert, cooperative, no distress, appears stated age   Head, Eyes, ENT:    No obvious abnormality, moist mucous mebranes  Neck:   Supple, no carotid bruit or JVD   Back:     Symmetric, no curvature  Lungs:     Respirations unlabored  Clear to auscultation bilaterally,    Chest wall:    No tenderness or deformity   Heart:    Regular rate and rhythm, S1 and S2 normal, no murmur, rub  or gallop     Abdomen:     Soft, non-tender, No obvious masses, or organomegaly   Extremities:   Extremities normal, no cyanosis or edema    Skin:   Skin color, texture, turgor normal, no rashes or lesions *-*-*-*-*-*-*-*-*-*-*-*-*-*-*-*-*-*-*-*-*-*-*-*-*-*-*-*-*-*-*-*-*-*-*-*-*-*-*-*-*-*-*-*-*-*-*-*-*-*-*-*-*-*-  CURRENT MEDICATION LIST:    Current Outpatient Medications:     aspirin 81 mg chewable tablet, Chew 1 tablet (81 mg total) daily, Disp: 30 tablet, Rfl: 11    atorvastatin (LIPITOR) 80 mg tablet, Take 1 tablet (80 mg total) by mouth daily with dinner, Disp: 90 tablet, Rfl: 3    clopidogrel (PLAVIX) 75 mg tablet, Take 1 tablet (75 mg total) by mouth daily, Disp: 90 tablet, Rfl: 3    lisinopril (ZESTRIL) 5 mg tablet, Take 1 tablet (5 mg total) by mouth every evening, Disp: 90 tablet, Rfl: 3    metoprolol succinate (TOPROL-XL) 50 mg 24 hr tablet, Take 1 tablet (50 mg total) by mouth every 12 (twelve) hours, Disp: 180 tablet, Rfl: 3    Semaglutide,0 25 or 0 5MG/DOS, (Ozempic, 0 25 or 0 5 MG/DOSE,) 2 MG/1 5ML SOPN, Inject 0 5 mg under the skin, Disp: , Rfl:     sertraline (ZOLOFT) 100 mg tablet, Take 50 mg by mouth 2 (two) times a day, Disp: , Rfl:     ALLERGIES:  Allergies   Allergen Reactions    Metformin GI Intolerance    Rosuvastatin GI Intolerance       *-*-*-*-*-*-*-*-*-*-*-*-*-*-*-*-*-*-*-*-*-*-*-*-*-*-*-*-*-*-*-*-*-*-*-*-*-*-*-*-*-*-*-*-*-*-*-*-*-*-*-*-*-*-  The LABORATORY DATA:  I have personally reviewed pertinent labs      No results found for: NA  Potassium   Date Value Ref Range Status   07/14/2020 4 0 3 5 - 5 3 mmol/L Final   07/13/2020 4 1 3 5 - 5 3 mmol/L Final   07/12/2020 3 4 (L) 3 5 - 5 3 mmol/L Final     Chloride   Date Value Ref Range Status   07/14/2020 100 100 - 108 mmol/L Final   07/13/2020 99 (L) 100 - 108 mmol/L Final   07/12/2020 98 (L) 100 - 108 mmol/L Final     CO2   Date Value Ref Range Status   07/14/2020 23 21 - 32 mmol/L Final   07/13/2020 26 21 - 32 mmol/L Final   07/12/2020 22 21 - 32 mmol/L Final     BUN   Date Value Ref Range Status   07/14/2020 14 5 - 25 mg/dL Final   07/13/2020 16 5 - 25 mg/dL Final   07/12/2020 16 5 - 25 mg/dL Final     Creatinine   Date Value Ref Range Status   07/14/2020 0 94 0 60 - 1 30 mg/dL Final     Comment:     Standardized to IDMS reference method   07/13/2020 0 98 0 60 - 1 30 mg/dL Final     Comment:     Standardized to IDMS reference method   07/12/2020 1 20 0 60 - 1 30 mg/dL Final     Comment:     Standardized to IDMS reference method     eGFR   Date Value Ref Range Status   07/14/2020 87 ml/min/1 73sq m Final   07/13/2020 83 ml/min/1 73sq m Final   07/12/2020 65 ml/min/1 73sq m Final     Calcium   Date Value Ref Range Status   07/14/2020 8 5 8 3 - 10 1 mg/dL Final   07/13/2020 8 8 8 3 - 10 1 mg/dL Final   07/12/2020 8 8 8 3 - 10 1 mg/dL Final     AST   Date Value Ref Range Status   07/13/2020 43 5 - 45 U/L Final     Comment:       Specimen collection should occur prior to Sulfasalazine administration due to the potential for falsely depressed results  ALT   Date Value Ref Range Status   07/13/2020 34 12 - 78 U/L Final     Comment:       Specimen collection should occur prior to Sulfasalazine administration due to the potential for falsely depressed results        Alkaline Phosphatase   Date Value Ref Range Status   07/13/2020 73 46 - 116 U/L Final     Magnesium   Date Value Ref Range Status   07/13/2020 1 9 1 6 - 2 6 mg/dL Final     WBC   Date Value Ref Range Status   07/14/2020 8 22 4 31 - 10 16 Thousand/uL Final   07/13/2020 9 77 4 31 - 10 16 Thousand/uL Final   07/12/2020 8 22 4 31 - 10 16 Thousand/uL Final     Hemoglobin   Date Value Ref Range Status   07/14/2020 14 9 12 0 - 17 0 g/dL Final   07/13/2020 15 9 12 0 - 17 0 g/dL Final   07/12/2020 15 4 12 0 - 17 0 g/dL Final     Platelets   Date Value Ref Range Status   07/14/2020 122 (L) 149 - 390 Thousands/uL Final   07/13/2020 132 (L) 149 - 390 Thousands/uL Final   07/12/2020 130 (L) 149 - 390 Thousands/uL Final     PTT   Date Value Ref Range Status   07/13/2020 60 (H) 23 - 37 seconds Final     Comment:     Therapeutic Heparin Range =  60-90 seconds   07/13/2020 76 (H) 23 - 37 seconds Final Comment:     Therapeutic Heparin Range =  60-90 seconds     INR   Date Value Ref Range Status   07/12/2020 0 90 0 84 - 1 19 Final   07/12/2020 0 88 0 84 - 1 19 Final     No results found for: CKMB, DIGOXIN  No results found for: TSH  No results found for: CHOL   Hemoglobin A1C   Date Value Ref Range Status   07/14/2020 8 4 (H) Normal 3 8-5 6%; PreDiabetic 5 7-6 4%; Diabetic >=6 5%; Glycemic control for adults with diabetes <7 0% % Final   09/29/2014 7 2 (H) <5 7 % of total Hgb Final     Comment:     Result Comment: According to ADA guidelines, hemoglobin A1c <7 0%  represents optimal control in non-pregnant diabetic  patients  Different metrics may apply to specific  patient populations  Standards of Medical Care in    Diabetes Care  2013;36:s11-s66     For the purpose of screening for the presence of  diabetes  <5 7%       Consistent with the absence of diabetes  5 7-6 4%    Consistent with increased risk for diabetes              (prediabetes)  >or=6 5%    Consistent with diabetes     This assay result is consistent with diabetes  mellitus  Currently, no consensus exists for use of hemoglobin  A1c for diagnosis of diabetes for children  Performed at: [address] [facility],[address], [place], [address] [facility], [facility]  16        No results found for: Caro Betters, Rosas Ridges, WOUNDCULT, BODYFLUIDCUL, MRSACULTURE, INFLUAPCR, INFLUBPCR, RSVPCR, LEGIONELLAUR, CDIFFTOXINB    *-*-*-*-*-*-*-*-*-*-*-*-*-*-*-*-*-*-*-*-*-*-*-*-*-*-*-*-*-*-*-*-*-*-*-*-*-*-*-*-*-*-*-*-*-*-*-*-*-*-*-*-*-*-  PREVIOUS CARDIOLOGY & RADIOLOGY RESULTS:  Results for orders placed during the hospital encounter of 07/12/20   Echo complete with contrast if indicated    Katty Blackwood [address]    [facility], [address]  [phone]    Transthoracic Echocardiogram  2D, M-mode, Doppler, and Color Doppler    Study date:  13-Jul-2020    Patient: Miguel Powell  MR number: [id number]  Account number: [de-identified]  : 1960  Age: 63 years  Gender: Male  Status: Inpatient  Location: Bedside  Height: 70 in  Weight: 165 7 lb  BP: 115/ 74 mmHg    Indications: CAD  Diagnoses: I25 83 - Coronary atherosclerosis due to lipid rich plaque    Sonographer:  [address]  Primary Physician:  Rey Villalobos MD  Referring Physician:  Stevenson Eubanks PA-C  Group:  [facility]  Interpreting Physician:  Sonia Cowan MD    IMPRESSIONS:  Technical quality: Good  Cardiac rhythm: Normal sinus    1  Normal left ventricular size and systolic and diastolic function, EF around 63%  2  Normal right ventricular size and systolic function  4  Normal left and right atrial size  5  Mild aortic valve sclerosis, no aortic valve stenosis or regurgitation  6  Trace mitral and tricuspid valve regurgitation  7  No obvious pulmonary hypertension  8  No pericardial effusion  No previous echocardiogram is available for comparison  SUMMARY    LEFT VENTRICLE:  Normal left ventricular cavity size, mildly increased wall thickness, normal left ventricular systolic function  No regional wall motion abnormalities noted  Ejection fraction is estimated as around 63%  Doppler evaluation is consistent  with normal diastolic function  RIGHT VENTRICLE:  Normal right ventricular size and systolic function  Normal estimated right ventricular systolic pressure, 33 mmHg  LEFT ATRIUM:  Normal left atrial cavity size  Intact interatrial septum  RIGHT ATRIUM:  Normal right atrial cavity size  MITRAL VALVE:  Mild mitral valve sclerosis, adequate leaflet mobility  Trace mitral valve regurgitation  AORTIC VALVE:  Tricuspid aortic valve with mild sclerosis, adequate cuspal separation  No aortic valve stenosis or regurgitation  TRICUSPID VALVE:  Trace, physiologic tricuspid valve regurgitation      PULMONIC VALVE:  No significant pulmonic valve regurgitation  AORTA:  Aortic root and proximal ascending aorta are normal in size on 2D imaging  IVC, HEPATIC VEINS:  Inferior vena cava is normal in size and demonstrates appropriate respiratory phasic changes in diameter  PERICARDIUM:  No pericardial effusion  HISTORY: PRIOR HISTORY: Diabetes  DM II    PROCEDURE: The procedure was performed at the bedside  This was a routine study  The transthoracic approach was used  The study included complete 2D imaging, M-mode, complete spectral Doppler, and color Doppler  The heart rate was 62 bpm,  at the start of the study  Image quality was adequate  LEFT VENTRICLE: Normal left ventricular cavity size, mildly increased wall thickness, normal left ventricular systolic function  No regional wall motion abnormalities noted  Ejection fraction is estimated as around 63%  Doppler evaluation  is consistent with normal diastolic function  RIGHT VENTRICLE: Normal right ventricular size and systolic function  Normal estimated right ventricular systolic pressure, 33 mmHg  LEFT ATRIUM: Normal left atrial cavity size  Intact interatrial septum  RIGHT ATRIUM: Normal right atrial cavity size  MITRAL VALVE: Mild mitral valve sclerosis, adequate leaflet mobility  Trace mitral valve regurgitation  AORTIC VALVE: Tricuspid aortic valve with mild sclerosis, adequate cuspal separation  No aortic valve stenosis or regurgitation  TRICUSPID VALVE: Trace, physiologic tricuspid valve regurgitation  PULMONIC VALVE: No significant pulmonic valve regurgitation  PERICARDIUM: No pericardial effusion  AORTA: Aortic root and proximal ascending aorta are normal in size on 2D imaging  SYSTEMIC VEINS: IVC: Inferior vena cava is normal in size and demonstrates appropriate respiratory phasic changes in diameter      SYSTEM MEASUREMENT TABLES    2D  %FS: 50 6 %  Ao Diam: 3 1 cm  EDV(Teich): 56 4 ml  EF Biplane: 64 2 %  EF(Teich): 82 6 %  ESV(Teich): 9 8 ml  IVSd: 0 9 cm  LA Diam: 2 7 cm  LAAs A4C: 13 3 cm2  LAESV A-L A4C: 36 8 ml  LAESV MOD A4C: 31 3 ml  LALs A4C: 4 1 cm  LVEDV MOD A2C: 84 4 ml  LVEDV MOD A4C: 80 9 ml  LVEDV MOD BP: 84 1 ml  LVEF MOD A2C: 64 7 %  LVEF MOD A4C: 62 8 %  LVESV MOD A2C: 29 8 ml  LVESV MOD A4C: 30 1 ml  LVESV MOD BP: 30 1 ml  LVIDd: 3 7 cm  LVIDs: 1 8 cm  LVLd A2C: 8 4 cm  LVLd A4C: 8 1 cm  LVLs A2C: 7 cm  LVLs A4C: 7 2 cm  LVPWd: 1 1 cm  RAAs: 11 8 cm2  RAESV A-L: 26 1 ml  RAESV MOD: 26 2 ml  RALs: 4 5 cm  RVIDd: 3 3 cm  SV MOD A2C: 54 6 ml  SV MOD A4C: 50 8 ml  SV(Teich): 46 6 ml    CW  AV Vmax: 1 1 m/s  AV maxP 5 mmHg  AVC: 331 ms  TR Vmax: 2 4 m/s  TR maxP mmHg    MM  TAPSE: 2 5 cm    PW  E': 0 1 m/s  E/E': 9 9  LVOT Vmax: 1 m/s  LVOT maxPG: 3 7 mmHg  Lateral E': 0 1 m/s  MV A Tha: 0 7 m/s  MV Dec [place]: 4 m/s2  MV DecT: 177 7 ms  MV E Tha: 0 7 m/s  MV E/A Ratio: 0 9  MV PHT: 51 5 ms  MVA By PHT: 4 3 cm2    [facility] Accredited Echocardiography Laboratory    Prepared and electronically signed by    Malissa Back MD  Signed 2020 16:21:42       No results found for this or any previous visit  No results found for this or any previous visit  No results found for this or any previous visit  Echo complete with contrast if indicated  [facility] [address]  [place], [address]  [phone]    Transthoracic Echocardiogram  2D, M-mode, Doppler, and Color Doppler    Study date:  2020    Patient: Aj Beal  MR number: [id number]  Account number: [de-identified]  : 1960  Age: 63 years  Gender: Male  Status: Inpatient  Location: Bedside  Height: 70 in  Weight: 165 7 lb  BP: 115/ 74 mmHg    Indications: CAD  Diagnoses: I25 83 - Coronary atherosclerosis due to lipid rich plaque    Sonographer:  [address]  Primary Physician:  Pilo Barron MD  Referring Physician:  Larose Fothergill Hosford, PA-C  Group:  [facility]  Interpreting Physician:  Jackie Arredondo MD    IMPRESSIONS:  Technical quality: Good  Cardiac rhythm: Normal sinus    1  Normal left ventricular size and systolic and diastolic function, EF around 63%  2  Normal right ventricular size and systolic function  4  Normal left and right atrial size  5  Mild aortic valve sclerosis, no aortic valve stenosis or regurgitation  6  Trace mitral and tricuspid valve regurgitation  7  No obvious pulmonary hypertension  8  No pericardial effusion  No previous echocardiogram is available for comparison  SUMMARY    LEFT VENTRICLE:  Normal left ventricular cavity size, mildly increased wall thickness, normal left ventricular systolic function  No regional wall motion abnormalities noted  Ejection fraction is estimated as around 63%  Doppler evaluation is consistent  with normal diastolic function  RIGHT VENTRICLE:  Normal right ventricular size and systolic function  Normal estimated right ventricular systolic pressure, 33 mmHg  LEFT ATRIUM:  Normal left atrial cavity size  Intact interatrial septum  RIGHT ATRIUM:  Normal right atrial cavity size  MITRAL VALVE:  Mild mitral valve sclerosis, adequate leaflet mobility  Trace mitral valve regurgitation  AORTIC VALVE:  Tricuspid aortic valve with mild sclerosis, adequate cuspal separation  No aortic valve stenosis or regurgitation  TRICUSPID VALVE:  Trace, physiologic tricuspid valve regurgitation  PULMONIC VALVE:  No significant pulmonic valve regurgitation  AORTA:  Aortic root and proximal ascending aorta are normal in size on 2D imaging  IVC, HEPATIC VEINS:  Inferior vena cava is normal in size and demonstrates appropriate respiratory phasic changes in diameter  PERICARDIUM:  No pericardial effusion  HISTORY: PRIOR HISTORY: Diabetes  DM II    PROCEDURE: The procedure was performed at the bedside  This was a routine study  The transthoracic approach was used   The study included complete 2D imaging, M-mode, complete spectral Doppler, and color Doppler  The heart rate was 62 bpm,  at the start of the study  Image quality was adequate  LEFT VENTRICLE: Normal left ventricular cavity size, mildly increased wall thickness, normal left ventricular systolic function  No regional wall motion abnormalities noted  Ejection fraction is estimated as around 63%  Doppler evaluation  is consistent with normal diastolic function  RIGHT VENTRICLE: Normal right ventricular size and systolic function  Normal estimated right ventricular systolic pressure, 33 mmHg  LEFT ATRIUM: Normal left atrial cavity size  Intact interatrial septum  RIGHT ATRIUM: Normal right atrial cavity size  MITRAL VALVE: Mild mitral valve sclerosis, adequate leaflet mobility  Trace mitral valve regurgitation  AORTIC VALVE: Tricuspid aortic valve with mild sclerosis, adequate cuspal separation  No aortic valve stenosis or regurgitation  TRICUSPID VALVE: Trace, physiologic tricuspid valve regurgitation  PULMONIC VALVE: No significant pulmonic valve regurgitation  PERICARDIUM: No pericardial effusion  AORTA: Aortic root and proximal ascending aorta are normal in size on 2D imaging  SYSTEMIC VEINS: IVC: Inferior vena cava is normal in size and demonstrates appropriate respiratory phasic changes in diameter      SYSTEM MEASUREMENT TABLES    2D  %FS: 50 6 %  Ao Diam: 3 1 cm  EDV(Teich): 56 4 ml  EF Biplane: 64 2 %  EF(Teich): 82 6 %  ESV(Teich): 9 8 ml  IVSd: 0 9 cm  LA Diam: 2 7 cm  LAAs A4C: 13 3 cm2  LAESV A-L A4C: 36 8 ml  LAESV MOD A4C: 31 3 ml  LALs A4C: 4 1 cm  LVEDV MOD A2C: 84 4 ml  LVEDV MOD A4C: 80 9 ml  LVEDV MOD BP: 84 1 ml  LVEF MOD A2C: 64 7 %  LVEF MOD A4C: 62 8 %  LVESV MOD A2C: 29 8 ml  LVESV MOD A4C: 30 1 ml  LVESV MOD BP: 30 1 ml  LVIDd: 3 7 cm  LVIDs: 1 8 cm  LVLd A2C: 8 4 cm  LVLd A4C: 8 1 cm  LVLs A2C: 7 cm  LVLs A4C: 7 2 cm  LVPWd: 1 1 cm  RAAs: 11 8 cm2  RAESV A-L: 26 1 ml  RAESV MOD: 26 2 ml  RALs: 4 5 cm  RVIDd: 3 3 cm  SV MOD A2C: 54 6 ml  SV MOD A4C: 50 8 ml  SV(Teich): 46 6 ml    CW  AV Vmax: 1 1 m/s  AV maxP 5 mmHg  AVC: 331 ms  TR Vmax: 2 4 m/s  TR maxP mmHg    MM  TAPSE: 2 5 cm    PW  E': 0 1 m/s  E/E': 9 9  LVOT Vmax: 1 m/s  LVOT maxPG: 3 7 mmHg  Lateral E': 0 1 m/s  MV A Tha: 0 7 m/s  MV Dec [place]: 4 m/s2  MV DecT: 177 7 ms  MV E Tha: 0 7 m/s  MV E/A Ratio: 0 9  MV PHT: 51 5 ms  MVA By PHT: 4 3 cm2    [employer] Accredited Echocardiography Laboratory    Prepared and electronically signed by    Beatriz Coughlin MD  Signed 2020 16:21:42  Cardiac catheterization  [address] [facility] [address]  [place], [address]  [phone]    [facility]    Invasive Cardiovascular Lab Complete Report    Patient: Raudel Rosario  MR number: [id number]  Account number: [de-identified]  Study date: 2020  Gender: Male  : 1960  Height: 70 1 in  Weight: 166 3 lb  BSA: 1 93 mï¾²    Allergies: METFORMIN, ROSUVASTATIN    Diagnostic Cardiologist:  Tiffanie Mcdowell DO  Interventional Cardiologist:  Tiffanie Mcdowell DO  Primary Physician:  Tessie Gonzalez MD    SUMMARY    CORONARY CIRCULATION:  There was 1-vessel coronary artery disease  1st diagonal: There was a 95 % stenosis  There was LEA grade 2 flow through the vessel (partial perfusion)  This lesion is a likely culprit for the patient's recent myocardial infarction  It appears amenable to percutaneous intervention  Proximal RCA: There was a 50 % stenosis  1ST LESION INTERVENTIONS:  A successful balloon angioplasty with stent procedure was performed on the 95 % lesion in the 1st diagonal  Following intervention there was an excellent angiographic appearance with a 0 % residual stenosis  A Resolute Liam Rx 2 25 x 18mm drug-eluting stent was placed across the lesion and deployed at a maximum inflation pressure of 12 thien      INDICATIONS:  --  Possible CAD: myocardial infarction without ST elevation (NSTEMI)  PROCEDURES PERFORMED    --  Left coronary angiography  --  Right coronary angiography  --  Inpatient  --  Mod Sedation Same Physician Initial 15min  --  Mod Sedation Same Physician Add 15min  --  Mod Sedation Same Physician Add 15min  --  Coronary Catheterization (w/o [facility])  --  Coronary Drug Eluting Stent w/PTCA  --  Intervention on D1: balloon angioplasty, stent  PROCEDURE: The risks and alternatives of the procedures and conscious sedation were explained to the patient and informed consent was obtained  The patient was brought to the cath lab and placed on the table  The planned puncture sites  were prepped and draped in the usual sterile fashion  --  Right radial artery access  After performing an Kendrick's test to verify adequate ulnar artery supply to the hand, the radial site was prepped  The puncture site was infiltrated with local anesthetic  The vessel was accessed using the  modified Seldinger technique, a wire was advanced into the vessel, and a sheath was advanced over the wire into the vessel  --  Left coronary artery angiography  A catheter was advanced over a guidewire into the aorta and positioned in the left coronary artery ostium under fluoroscopic guidance  Angiography was performed  --  Right coronary artery angiography  A catheter was advanced over a guidewire into the aorta and positioned in the right coronary artery ostium under fluoroscopic guidance  Angiography was performed  --  Inpatient  --  Mod Sedation Same Physician Initial 15min  --  Mod Sedation Same Physician Add 15min  --  Mod Sedation Same Physician Add 15min  --  Coronary Catheterization (w/o [facility])  LESION INTERVENTION: A successful balloon angioplasty with stent procedure was performed on the 95 % lesion in the 1st diagonal  Following intervention there was an excellent angiographic appearance with a 0 % residual stenosis   This was  an ACC/AHA type B "moderate risk" lesion for intervention  There was LEA 2 flow before the procedure and LEA 3 flow after the procedure  --  Vessel setup was performed  A Launcher 6Fr Ebu 3 5 guiding catheter was used to cannulate the vessel  --  Vessel setup was performed  A Runthrough NS 180cm wire was used to cross the lesion  --  Balloon angioplasty was performed, using a Mini Trek Rx 2 0 x 15mm balloon, with 2 inflations and a maximum inflation pressure of 8 thien  --  A Resolute [place] Rx 2 25 x 18mm drug-eluting stent was placed across the lesion and deployed at a maximum inflation pressure of 12 thien  INTERVENTIONS:  --  Coronary Drug Eluting Stent w/PTCA  PROCEDURE COMPLETION: The patient tolerated the procedure well and was discharged from the cath lab  TIMING: Test started at 09:16  Test concluded at 09:35  HEMOSTASIS: The sheath was removed  The site was compressed with a Hemoband  device  Hemostasis was obtained  MEDICATIONS GIVEN: Fentanyl (1OOmcg/2 ml), 50 mcg, IV, at 09:12  Versed (2mg/2ml), 2 mg, IV, at 09:12  1% Lidocaine, 2 ml, subcutaneously, at 09:12  Verapamil (5mg/2ml), 2 5 mg, IV, at 09:13  Heparin 1000  units/ml, 4,000 units, IV, at 09:13  Nitroglycerin (200mcg/ml), 200 mcg, at 09:13  Heparin 1000 units/ml, 6,000 units, IV, at 09:17  Plavix 75mg, 75 mg, PO, at 09:18  CONTRAST GIVEN: 100 ml Omnipaque (350mg I /ml)  RADIATION EXPOSURE:  Fluoroscopy time: 4 3 min  CORONARY VESSELS:   --  The coronary circulation is right dominant  --  There was 1-vessel coronary artery disease  --  Left main: Angiography showed minor luminal irregularities  --  LAD: Angiography showed minor luminal irregularities  --  1st diagonal:  --  1st diagonal: There was a 95 % stenosis  There was LEA grade 2 flow through the vessel (partial perfusion)  This lesion is a likely culprit for the patient's recent myocardial infarction  It appears amenable to percutaneous  intervention    --  Circumflex: Angiography showed minor luminal irregularities  --  Proximal RCA: There was a 50 % stenosis  IMPRESSIONS:  1V CAD with diagonal culprit fro nstemi, successfully revascualrized with PCI and JENNY placement    RECOMMENDATIONS  gdmt cad, dapt x 1 year minimum    DISPOSITION:  The patient left the catheterization laboratory in stable condition  Prepared and signed by    Josi Arana DO  Signed 2020 09:40:01    Study diagram    Angiographic findings  Native coronary lesions:  ï¾·D1: Lesion 1: 95 % stenosis  ï¾·Proximal RCA: Lesion 1: 50 % stenosis  Intervention results  Native coronary lesions:  ï¾·Successful balloon angioplasty and stent of the 95 % stenosis in D1  Appearance excellent with 0 % residual stenosis  Stent: Resolute Liam Rx 2 25 x 18mm drug-eluting  Hemodynamic tables    Pressures:  Baseline  Pressures:  - HR: 69  Pressures:  - Rhythm:  Pressures:  -- Aortic Pressure (S/D/M): 98/68/83    Outputs:  Baseline  Outputs:  -- CALCULATIONS: [age]  Outputs:  -- CALCULATIONS: Body Surface Area: 1 93  Outputs:  -- CALCULATIONS: Height in cm: 178 00  Outputs:  -- CALCULATIONS: Sex: Male  Outputs:  -- CALCULATIONS: Weight in k 60        *-*-*-*-*-*-*-*-*-*-*-*-*-*-*-*-*-*-*-*-*-*-*-*-*-*-*-*-*-*-*-*-*-*-*-*-*-*-*-*-*-*-*-*-*-*-*-*-*-*-*-*-*-*-  SIGNATURES:   @SIG@   Faye Mar MD     *-*-*-*-*-*-*-*-*-*-*-*-*-*-*-*-*-*-*-*-*-*-*-*-*-*-*-*-*-*-*-*-*-*-*-*-*-*-*-*-*-*-*-*-*-*-*-*-*-*-*-*-*-*-    PAST MEDICAL HISTORY:  Past Medical History:   Diagnosis Date    Diabetes mellitus ([address] )     OCD (obsessive compulsive disorder)     PAST SURGICAL HISTORY:   Past Surgical History:   Procedure Laterality Date    CARPECTOMY HAND Bilateral          FAMILY HISTORY:  History reviewed  No pertinent family history   SOCIAL HISTORY:  Social History     Tobacco Use   Smoking Status Never Smoker   Smokeless Tobacco Never Used      Social History     Substance and Sexual Activity   Alcohol Use Yes     Social History Substance and Sexual Activity   Drug Use Never    @[facility]@     *-*-*-*-*-*-*-*-*-*-*-*-*-*-*-*-*-*-*-*-*-*-*-*-*-*-*-*-*-*-*-*-*-*-*-*-*-*-*-*-*-*-*-*-*-*-*-*-*-*-*-*-*-*  ALLERGIES:  Allergies   Allergen Reactions    Metformin GI Intolerance    Rosuvastatin GI Intolerance    CURRENT SCHEDULED MEDICATIONS:    Current Outpatient Medications:     aspirin 81 mg chewable tablet, Chew 1 tablet (81 mg total) daily, Disp: 30 tablet, Rfl: 11    atorvastatin (LIPITOR) 80 mg tablet, Take 1 tablet (80 mg total) by mouth daily with dinner, Disp: 90 tablet, Rfl: 3    clopidogrel (PLAVIX) 75 mg tablet, Take 1 tablet (75 mg total) by mouth daily, Disp: 90 tablet, Rfl: 3    lisinopril (ZESTRIL) 5 mg tablet, Take 1 tablet (5 mg total) by mouth every evening, Disp: 90 tablet, Rfl: 3    metoprolol succinate (TOPROL-XL) 50 mg 24 hr tablet, Take 1 tablet (50 mg total) by mouth every 12 (twelve) hours, Disp: 180 tablet, Rfl: 3    Semaglutide,0 25 or 0 5MG/DOS, (Ozempic, 0 25 or 0 5 MG/DOSE,) 2 MG/1 5ML SOPN, Inject 0 5 mg under the skin, Disp: , Rfl:     sertraline (ZOLOFT) 100 mg tablet, Take 50 mg by mouth 2 (two) times a day, Disp: , Rfl:      *-*-*-*-*-*-*-*-*-*-*-*-*-*-*-*-*-*-*-*-*-*-*-*-*-*-*-*-*-*-*-*-*-*-*-*-*-*-*-*-*-*-*-*-*-*-*-*-*-*-*-*-*-*

## 2020-07-23 ENCOUNTER — TELEPHONE (OUTPATIENT)
Dept: CARDIOLOGY CLINIC | Facility: CLINIC | Age: 60
End: 2020-07-23

## 2020-07-23 NOTE — TELEPHONE ENCOUNTER
Pt needs paperwork for FMLA completed it is in your inbox arrived 7/17/20 and pt did just have OV with you 7/21  Needs to be returned by 7/29    Please complete

## 2020-07-30 ENCOUNTER — HOSPITAL ENCOUNTER (OUTPATIENT)
Dept: NON INVASIVE DIAGNOSTICS | Facility: HOSPITAL | Age: 60
Discharge: HOME | End: 2020-07-30
Attending: INTERNAL MEDICINE
Payer: COMMERCIAL

## 2020-07-30 ENCOUNTER — TELEPHONE (OUTPATIENT)
Dept: CARDIOLOGY CLINIC | Facility: CLINIC | Age: 60
End: 2020-07-30

## 2020-07-30 DIAGNOSIS — I25.10 CORONARY ARTERY DISEASE INVOLVING NATIVE CORONARY ARTERY OF NATIVE HEART WITHOUT ANGINA PECTORIS: ICD-10-CM

## 2020-07-30 DIAGNOSIS — I21.4 NSTEMI (NON-ST ELEVATED MYOCARDIAL INFARCTION) (HCC): ICD-10-CM

## 2020-07-30 LAB
CHEST PAIN STATEMENT: NORMAL
MAX DIASTOLIC BP: 72 MMHG
MAX HEART RATE: 141 BPM
MAX PREDICTED HEART RATE: 159 BPM
MAX. SYSTOLIC BP: 160 MMHG
PROTOCOL NAME: NORMAL
TARGET HR FORMULA: NORMAL
TEST INDICATION: NORMAL
TIME IN EXERCISE PHASE: NORMAL

## 2020-07-30 PROCEDURE — 93017 CV STRESS TEST TRACING ONLY: CPT

## 2020-07-30 PROCEDURE — 93016 CV STRESS TEST SUPVJ ONLY: CPT | Performed by: INTERNAL MEDICINE

## 2020-07-30 PROCEDURE — 93018 CV STRESS TEST I&R ONLY: CPT | Performed by: INTERNAL MEDICINE

## 2020-07-30 PROCEDURE — 93226 XTRNL ECG REC<48 HR SCAN A/R: CPT

## 2020-07-30 PROCEDURE — 93225 XTRNL ECG REC<48 HRS REC: CPT

## 2020-08-04 PROCEDURE — 93227 XTRNL ECG REC<48 HR R&I: CPT | Performed by: INTERNAL MEDICINE

## 2020-08-11 ENCOUNTER — TELEPHONE (OUTPATIENT)
Dept: CARDIOLOGY CLINIC | Facility: CLINIC | Age: 60
End: 2020-08-11

## 2020-08-11 NOTE — TELEPHONE ENCOUNTER
Wife called stating that due to penndot rules patient cannot have physical to return to work til 9/15 (2 months after discharge) so she wanted Dr Christine Ogden that he will probably be getting additonal paperwork to extend his short term disablility til 9/16/20 for this reason

## 2020-08-25 ENCOUNTER — TELEPHONE (OUTPATIENT)
Dept: CARDIOLOGY CLINIC | Facility: CLINIC | Age: 60
End: 2020-08-25

## 2020-08-25 NOTE — TELEPHONE ENCOUNTER
Phone call from patient, questioned if dr Tiffany Shipman has completed paperwork for return to work certification  Explained to patient, he has not, paperwork is at office, waiting for completion  Will notify dr Tiffany Shipman of call  Please see note in chart from 8/11/20, telephone note to dr Tiffany Shipman from [facility]

## 2020-10-20 ENCOUNTER — OFFICE VISIT (OUTPATIENT)
Dept: CARDIOLOGY CLINIC | Facility: CLINIC | Age: 60
End: 2020-10-20
Payer: COMMERCIAL

## 2020-10-20 VITALS
DIASTOLIC BLOOD PRESSURE: 68 MMHG | BODY MASS INDEX: 22.61 KG/M2 | SYSTOLIC BLOOD PRESSURE: 105 MMHG | HEART RATE: 70 BPM | TEMPERATURE: 96.9 F | WEIGHT: 157.6 LBS

## 2020-10-20 DIAGNOSIS — I21.4 NSTEMI (NON-ST ELEVATED MYOCARDIAL INFARCTION) (HCC): ICD-10-CM

## 2020-10-20 PROCEDURE — 99214 OFFICE O/P EST MOD 30 MIN: CPT | Performed by: INTERNAL MEDICINE

## 2020-10-20 RX ORDER — ATORVASTATIN CALCIUM 40 MG/1
40 TABLET, FILM COATED ORAL
Qty: 90 TABLET | Refills: 3 | Status: SHIPPED | OUTPATIENT
Start: 2020-10-20 | End: 2022-04-26 | Stop reason: HOSPADM

## 2020-10-20 RX ORDER — METOPROLOL SUCCINATE 25 MG/1
12.5 TABLET, EXTENDED RELEASE ORAL DAILY
Qty: 30 TABLET | Refills: 6 | Status: SHIPPED | OUTPATIENT
Start: 2020-10-20 | End: 2022-04-26 | Stop reason: ALTCHOICE

## 2021-02-15 ENCOUNTER — TELEPHONE (OUTPATIENT)
Dept: CARDIOLOGY CLINIC | Facility: CLINIC | Age: 61
End: 2021-02-15

## 2021-02-15 DIAGNOSIS — I25.10 ATHEROSCLEROSIS OF NATIVE CORONARY ARTERY OF NATIVE HEART WITHOUT ANGINA PECTORIS: Primary | ICD-10-CM

## 2021-02-15 NOTE — TELEPHONE ENCOUNTER
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

## 2021-02-16 NOTE — TELEPHONE ENCOUNTER
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

## 2021-02-24 ENCOUNTER — HOSPITAL ENCOUNTER (OUTPATIENT)
Dept: NON INVASIVE DIAGNOSTICS | Age: 61
Discharge: HOME | End: 2021-02-24
Payer: COMMERCIAL

## 2021-02-24 DIAGNOSIS — I25.10 ATHEROSCLEROSIS OF NATIVE CORONARY ARTERY OF NATIVE HEART WITHOUT ANGINA PECTORIS: ICD-10-CM

## 2021-02-24 PROCEDURE — 93016 CV STRESS TEST SUPVJ ONLY: CPT | Performed by: INTERNAL MEDICINE

## 2021-02-24 PROCEDURE — 93017 CV STRESS TEST TRACING ONLY: CPT

## 2021-02-24 PROCEDURE — 93018 CV STRESS TEST I&R ONLY: CPT | Performed by: INTERNAL MEDICINE

## 2021-02-25 ENCOUNTER — TELEPHONE (OUTPATIENT)
Dept: CARDIOLOGY CLINIC | Facility: CLINIC | Age: 61
End: 2021-02-25

## 2021-02-25 LAB
CHEST PAIN STATEMENT: NORMAL
MAX DIASTOLIC BP: 80 MMHG
MAX HEART RATE: 179 BPM
MAX PREDICTED HEART RATE: 159 BPM
MAX. SYSTOLIC BP: 200 MMHG
PROTOCOL NAME: NORMAL
REASON FOR TERMINATION: NORMAL
TARGET HR FORMULA: NORMAL
TEST INDICATION: NORMAL
TIME IN EXERCISE PHASE: NORMAL

## 2021-02-25 NOTE — TELEPHONE ENCOUNTER
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

## 2021-02-26 NOTE — TELEPHONE ENCOUNTER
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

## 2021-03-29 ENCOUNTER — TELEPHONE (OUTPATIENT)
Dept: CARDIOLOGY CLINIC | Facility: CLINIC | Age: 61
End: 2021-03-29

## 2021-03-29 NOTE — TELEPHONE ENCOUNTER
Phone call from patient stating that Dr Sarath Morataya wanted him to have an echo done before his next appt  (April)  He needs an order put in before he has it scheduled  Please place echo order?

## 2021-04-06 ENCOUNTER — TRANSCRIBE ORDERS (OUTPATIENT)
Dept: ADMINISTRATIVE | Facility: HOSPITAL | Age: 61
End: 2021-04-06

## 2021-04-06 DIAGNOSIS — I25.10 CORONARY ARTERY DISEASE INVOLVING NATIVE HEART WITHOUT ANGINA PECTORIS, UNSPECIFIED VESSEL OR LESION TYPE: Primary | ICD-10-CM

## 2021-04-06 NOTE — PROGRESS NOTES
ICU End of Shift Summary. See flowsheets for vital signs and detailed assessment.    Changes this shift: Pt remains intubated on 45% PEEP of 12. HR 90's-100's. T max 98.9. BP stable. Labs were unremarkable. Heparin therapeutic at 950. Remains on insulin gtt alg 2.Meeting CRRT goals of  net negative/hr. 550ml of stool output. Sedated on 7 of versed, and 125 of Fentanyl.    Plan: Continue to monitor and address changes.     Problem: Adult Inpatient Plan of Care  Goal: Plan of Care Review  Outcome: No Change  Goal: Patient-Specific Goal (Individualized)  Outcome: No Change  Goal: Absence of Hospital-Acquired Illness or Injury  Outcome: No Change  Intervention: Identify and Manage Fall Risk  Recent Flowsheet Documentation  Taken 2/5/2021 2000 by Alka Rocha RN  Safety Promotion/Fall Prevention:    clutter free environment maintained    fall prevention program maintained    lighting adjusted  Intervention: Prevent Skin Injury  Recent Flowsheet Documentation  Taken 2/6/2021 0400 by Alka Rocha RN  Body Position: turned  Taken 2/6/2021 0000 by Alka Rocha RN  Body Position: turned  Taken 2/5/2021 2200 by Alka Rocha RN  Body Position: turned  Taken 2/5/2021 2000 by Alka Rocha RN  Body Position: turned  Intervention: Prevent and Manage VTE (Venous Thromboembolism) Risk  Recent Flowsheet Documentation  Taken 2/6/2021 0400 by Alka Rocha RN  VTE Prevention/Management:    pneumatic compression device    PROM (passive range of motion) performed  Taken 2/6/2021 0000 by Alka Rocha RN  VTE Prevention/Management:    pneumatic compression device    PROM (passive range of motion) performed  Taken 2/5/2021 2000 by Alka Rocha RN  VTE Prevention/Management:    pneumatic compression device    PROM (passive range of motion) performed  Intervention: Prevent Infection  Recent Flowsheet Documentation  Taken 2/6/2021 0400 by Alka Rocha RN  Infection Prevention:    environmental surveillance performed    equipment surfaces  echo disinfected    hand hygiene promoted    rest/sleep promoted    personal protective equipment utilized  Taken 2/6/2021 0000 by Alka Rocha RN  Infection Prevention:    environmental surveillance performed    equipment surfaces disinfected    hand hygiene promoted    rest/sleep promoted    personal protective equipment utilized  Taken 2/5/2021 2000 by Alka Rocha RN  Infection Prevention:    environmental surveillance performed    equipment surfaces disinfected    hand hygiene promoted    rest/sleep promoted    personal protective equipment utilized  Goal: Optimal Comfort and Wellbeing  Outcome: No Change  Goal: Readiness for Transition of Care  Outcome: No Change

## 2021-05-11 ENCOUNTER — HOSPITAL ENCOUNTER (OUTPATIENT)
Dept: NON INVASIVE DIAGNOSTICS | Facility: HOSPITAL | Age: 61
Discharge: HOME | End: 2021-05-11
Attending: INTERNAL MEDICINE
Payer: COMMERCIAL

## 2021-05-11 DIAGNOSIS — I25.10 CORONARY ARTERY DISEASE INVOLVING NATIVE HEART WITHOUT ANGINA PECTORIS, UNSPECIFIED VESSEL OR LESION TYPE: ICD-10-CM

## 2021-05-11 PROCEDURE — 93306 TTE W/DOPPLER COMPLETE: CPT

## 2021-05-11 PROCEDURE — 93306 TTE W/DOPPLER COMPLETE: CPT | Performed by: INTERNAL MEDICINE

## 2021-05-24 ENCOUNTER — TELEPHONE (OUTPATIENT)
Dept: CARDIOLOGY CLINIC | Facility: CLINIC | Age: 61
End: 2021-05-24

## 2021-08-18 ENCOUNTER — OFFICE VISIT (OUTPATIENT)
Dept: CARDIOLOGY CLINIC | Facility: CLINIC | Age: 61
End: 2021-08-18
Payer: COMMERCIAL

## 2021-08-18 VITALS
WEIGHT: 161.8 LBS | BODY MASS INDEX: 23.22 KG/M2 | SYSTOLIC BLOOD PRESSURE: 120 MMHG | DIASTOLIC BLOOD PRESSURE: 78 MMHG | HEART RATE: 62 BPM

## 2021-08-18 DIAGNOSIS — I25.10 CORONARY ARTERY DISEASE INVOLVING NATIVE CORONARY ARTERY OF NATIVE HEART WITHOUT ANGINA PECTORIS: ICD-10-CM

## 2021-08-18 DIAGNOSIS — R00.2 PALPITATIONS: Primary | ICD-10-CM

## 2021-08-18 DIAGNOSIS — E78.2 MIXED DYSLIPIDEMIA: ICD-10-CM

## 2021-08-18 DIAGNOSIS — E11.9 TYPE 2 DIABETES MELLITUS WITHOUT COMPLICATION, WITHOUT LONG-TERM CURRENT USE OF INSULIN (HCC): ICD-10-CM

## 2021-08-18 DIAGNOSIS — R53.83 FATIGUE, UNSPECIFIED TYPE: ICD-10-CM

## 2021-08-18 DIAGNOSIS — Z98.61 HISTORY OF PERCUTANEOUS CORONARY INTERVENTION: ICD-10-CM

## 2021-08-18 PROCEDURE — 93000 ELECTROCARDIOGRAM COMPLETE: CPT | Performed by: INTERNAL MEDICINE

## 2021-08-18 PROCEDURE — 99214 OFFICE O/P EST MOD 30 MIN: CPT | Performed by: INTERNAL MEDICINE

## 2021-08-18 NOTE — PATIENT INSTRUCTIONS
CARDIOLOGY ASSESSMENT & PLAN     1  Palpitations  POCT ECG   2  Coronary artery disease involving native coronary artery of native heart without angina pectoris  Hemoglobin A1C    TSH+Free T4    COMPREHENSIVE METABOLIC THCVU(57)    Magnesium   3  Type 2 diabetes mellitus without complication, without long-term current use of insulin ([facility] [address] )     4  History of percutaneous coronary intervention     5  Mixed dyslipidemia     6  Fatigue, unspecified type  Hemoglobin A1C    TSH+Free T4    COMPREHENSIVE METABOLIC XQXTQ(69)    Magnesium    Iron Panel (Includes Ferritin, Iron Sat%, Iron, and TIBC)     CAD, S/P MI June 2020, s/p PCI D1 July 2020  Mr Kristyn Marte is noted to be experiencing decline in exercise tolerance and fatigue in the recent months but has had no chest pain  He underwent a stress test in February 2021 during which he exercised for over 9 minutes and did not show any signs of ischemia  He had an echocardiogram in May 2021 which overall shows normal left ventricular systolic and diastolic function  Currently his blood pressure is well controlled  On examination there are no signs of any pulmonary or peripheral vascular congestion or signs of significant valvular heart disease  His ECG is benign  Etiology of his current symptoms is unclear  I am not convinced that this is ischemia  -- at this time I am advising him to get some blood work including thyroid function tests, hemoglobin A1c, NT proBNP level and iron panel  -- if these tests are normal and he is experiencing symptoms then would consider repeating stress test with imaging modality  -- he may likely benefit from cardiac rehab  I will consider this following review of his blood work being ordered

## 2021-08-18 NOTE — ASSESSMENT & PLAN NOTE
Mr Frankey Dodrill is noted to be experiencing decline in exercise tolerance and fatigue in the recent months but has had no chest pain  He underwent a stress test in February 2021 during which he exercised for over 9 minutes and did not show any signs of ischemia  He had an echocardiogram in May 2021 which overall shows normal left ventricular systolic and diastolic function  Currently his blood pressure is well controlled  On examination there are no signs of any pulmonary or peripheral vascular congestion or signs of significant valvular heart disease  His ECG is benign  Etiology of his current symptoms is unclear  I am not convinced that this is ischemia  -- at this time I am advising him to get some blood work including thyroid function tests, hemoglobin A1c, NT proBNP level and iron panel  -- if these tests are normal and he is experiencing symptoms then would consider repeating stress test with imaging modality  -- he may likely benefit from cardiac rehab  I will consider this following review of his blood work being ordered

## 2021-08-18 NOTE — PROGRESS NOTES
[facility]  [address] [facility], [place] [address]  Phone#  [phone]  Fax#  [phone]  *-*-*-*-*-*-*-*-*-*-*-*-*-*-*-*-*-*-*-*-*-*-*-*-*-*-*-*-*-*-*-*-*-*-*-*-*-*-*-*-*-*-*-*-*-*-*-*-*-*-*-*-*-*  ENCOUNTER DATE: 08/18/21 5:08 PM  PATIENT NAME: Mal Roldan   1960    [id number]  AGE:61 y o  SEX: male  ENCOUNTER PROVIDER:Faye Mar MD     PRIMARY CARE PHYSICIAN: Tono Deluna MD    DIAGNOSES:   1  One-vessel coronary artery disease status post ACS-NSTEMI July 2020, status post PCI of diagonal branch on July 13, 2020    2  Normal left ventricular systolic function  3  Diabetes mellitus with poor glycemic control with last hemoglobin see of 8 4   4  Dyslipidemia   5  Family history premature CAD with brother having MI at 48 years  6  Remote history of ITP   7  History of trigger finger   8  History of OCD     ECHOCARDIOGRAM May 11, 2021:  1  Mild concentric left ventricular hypertrophy, normal left ventricular systolic and diastolic function  EF around 64%  2  Normal right ventricular size and systolic function  3  Normal left and right atrial size  4  Aortic valve sclerosis, no aortic valve stenosis or regurgitation  5  Mild mitral valve sclerosis, trace to mild mitral valve regurgitation  6  Trace tricuspid valve regurgitation  7  No obvious pulmonary hypertension  8  No pericardial effusion      Compared to report of previous echocardiogram from July 13, 2020 there is overall no significant change  CARDIAC CATHETERIZATION JULY 13, 2020: The coronary circulation is right dominant  -- There was 1-vessel coronary artery disease  -- Left main: Angiography showed minor luminal irregularities  -- LAD: Angiography showed minor luminal irregularities  -- 1st diagonal:   -- 1st diagonal: There was a 95 % stenosis  There was LEA grade 2 flow through the vessel (partial perfusion)   This lesion is a likely culprit for the patient's recent myocardial infarction  It appears amenable to percutaneous   intervention  -- Circumflex: Angiography showed minor luminal irregularities  -- Proximal RCA: There was a 50 % stenosis  ECHOCARDIOGRAM JULY 13, 2020:   Technical quality: Good   Cardiac rhythm: Normal sinus     1  Normal left ventricular size and systolic and diastolic function, EF around 63%  2  Normal right ventricular size and systolic function  4  Normal left and right atrial size  5  Mild aortic valve sclerosis, no aortic valve stenosis or regurgitation  6  Trace mitral and tricuspid valve regurgitation  7  No obvious pulmonary hypertension  8  No pericardial effusion  Holter monitor July 2020:   1  Holter monitor reveals the underlying rhythm is sinus rhythm with an average heart rate of 73 beats per minute, a minimum heart rate of 54 beats per minute and a maximum heart rate of 121 beats per minute  2  There are rare ventricular ectopy comprised of isolated VPCs   3  There are rare supraventricular ectopy comprised of isolated APCs   4  There is no evidence of significant bradyarrhythmia or advanced heart block  The longest R to R was 1 4 seconds  5  Patient diary did not report any symptoms  Exercise treadmill stress test July 30, 2020:   1  Exercise stress test is negative for myocardial ischemia   2  Exercise time 9 minutes achieving 88% of MPHR with a total workload of 10 1 METs   3  There is a normal blood pressure response   4  No symptoms reported   5  There are no significant arrhythmias   6  There is no study for comparison       CURRENT ECG     Results for orders placed or performed in visit on 08/18/21   POCT ECG    Narrative    Sinus rhythm, HR 63 beats per minute, normal axis intervals, possible prior septal infarction, no significant ST T-wave abnormalities  No significant chamber hypertrophy or enlargement  CARDIOLOGY ASSESSMENT & PLAN     1  Palpitations  POCT ECG   2   Coronary artery disease involving native coronary artery of native heart without angina pectoris  Hemoglobin A1C    TSH+Free T4    COMPREHENSIVE METABOLIC PEPXO(75)    Magnesium   3  Type 2 diabetes mellitus without complication, without long-term current use of insulin ([facility] [address] )     4  History of percutaneous coronary intervention     5  Mixed dyslipidemia     6  Fatigue, unspecified type  Hemoglobin A1C    TSH+Free T4    COMPREHENSIVE METABOLIC SRQRX(62)    Magnesium    Iron Panel (Includes Ferritin, Iron Sat%, Iron, and TIBC)     CAD, S/P MI June 2020, s/p PCI D1 July 2020  Mr Shawna Beatty is noted to be experiencing decline in exercise tolerance and fatigue in the recent months but has had no chest pain  He underwent a stress test in February 2021 during which he exercised for over 9 minutes and did not show any signs of ischemia  He had an echocardiogram in May 2021 which overall shows normal left ventricular systolic and diastolic function  Currently his blood pressure is well controlled  On examination there are no signs of any pulmonary or peripheral vascular congestion or signs of significant valvular heart disease  His ECG is benign  Etiology of his current symptoms is unclear  I am not convinced that this is ischemia  -- at this time I am advising him to get some blood work including thyroid function tests, hemoglobin A1c, NT proBNP level and iron panel  -- if these tests are normal and he is experiencing symptoms then would consider repeating stress test with imaging modality  -- he may likely benefit from cardiac rehab  I will consider this following review of his blood work being ordered  INTERVAL HISTORY & HISTORY OF PRESENT ILLNESS     Mukul Palm is here for follow-up regarding his cardiac comorbidities which include:  Coronary artery disease with history of MI in 2020 July status post PCI of 1st diagonal branch, hypertension, dyslipidemia and other comorbidities    He is here for follow-up accompanied with his wife  He reports that for last several months he has been feeling tired all the time and is affecting his activities of daily living and work  Reports that he was feeling so tired that he had to pull over while driving  He has stop taking metoprolol medication and this seemed to have improved his tiredness to some extent but not fully  Also reports of increased shortness of breath with activity  Denies typical angina-like chest pain  Denies any orthopnea, PND or pedal edema  Denies any palpitations  Functional capacity status: Moderate clear reduced   (Excellent- >10 METs; Good: (7-10 METs); Moderate (4-7 METs); Poor (<= 4 METs)    Any chronic stressors:  None   (feeling of poor health, financial problems, and social isolation etc)  Tobacco or alcohol dependence:  Does not smoke or drink  Current cardiac medications:  Aspirin 81 mg daily, clopidogrel 75 mg daily  He was supposed to be on atorvastatin and metoprolol succinate but he is not taking them  REVIEW OF SYSTEMS   Positive for:  As described above in HPI  Negative for: All remaining as reviewed below and in HPI      SYSTEM SYMPTOMS REVIEWED:  General--weight change, fever, night sweats  Respiratory--cough, wheezing, shortness of breath, sputum production  Cardiovascular--chest pain, syncope, dyspnea on exertion, edema, decline in exercise tolerance, claudication   Gastrointestinal--persistent vomiting, diarrhea, abdominal distention, blood in stool   Muscular or skeletal--joint pain or swelling   Neurologic--headaches, syncope, abnormal movement  Hematologic--history of easy bruising and bleeding   Endocrine--thyroid enlargement, heat or cold intolerance, polyuria   Psychiatric--anxiety, depression     *-*-*-*-*-*-*-*-*-*-*-*-*-*-*-*-*-*-*-*-*-*-*-*-*-*-*-*-*-*-*-*-*-*-*-*-*-*-*-*-*-*-*-*-*-*-*-*-*-*-*-*-*-*-  VITAL SIGNS     CURRENT VITAL SIGNS:   Vitals:    08/18/21 1643   BP: 120/78   BP Location: Left arm Patient Position: Sitting   Cuff Size: Adult   Pulse: 62   Weight: 73 4 kg (161 lb 12 8 oz)       BMI: Body mass index is 23 22 kg/m²  WEIGHTS:   Wt Readings from Last 25 Encounters:   08/18/21 73 4 kg (161 lb 12 8 oz)   10/20/20 71 5 kg (157 lb 9 6 oz)   07/21/20 73 5 kg (162 lb)   07/12/20 75 6 kg (166 lb 10 7 oz)   05/08/20 77 1 kg (170 lb)   02/13/19 77 1 kg (170 lb)   05/29/15 79 8 kg (176 lb)   05/06/15 79 6 kg (175 lb 6 1 oz)   10/16/14 80 7 kg (178 lb)   05/27/14 82 1 kg (181 lb)   05/07/14 81 9 kg (180 lb 8 oz)   04/23/14 82 3 kg (181 lb 8 oz)   04/01/14 81 4 kg (179 lb 8 oz)   12/17/13 81 6 kg (180 lb)   11/25/13 79 kg (174 lb 4 oz)   08/19/13 80 5 kg (177 lb 8 oz)   08/21/12 80 4 kg (177 lb 3 oz)   06/08/12 81 6 kg (180 lb)   05/09/12 80 9 kg (178 lb 4 oz)        *-*-*-*-*-*-*-*-*-*-*-*-*-*-*-*-*-*-*-*-*-*-*-*-*-*-*-*-*-*-*-*-*-*-*-*-*-*-*-*-*-*-*-*-*-*-*-*-*-*-*-*-*-*-  PHYSICAL EXAM     General Appearance:    Alert, cooperative, no distress, appears stated age   Head, Eyes, ENT:    No obvious abnormality, moist mucous mebranes  Neck:   Supple, no carotid bruit or JVD   Back:     Symmetric, no curvature  Lungs:     Respirations unlabored  Clear to auscultation bilaterally,    Chest wall:    No tenderness or deformity   Heart:    Regular rate and rhythm, S1 and S2 normal, no murmur, rub  or gallop  Abdomen:     Soft, non-tender, No obvious masses, or organomegaly   Extremities:   Extremities warm, no cyanosis or edema    Skin:   No venostatic changes in lower extremities  Normal skin color, texture, and turgor   No rashes or lesions     *-*-*-*-*-*-*-*-*-*-*-*-*-*-*-*-*-*-*-*-*-*-*-*-*-*-*-*-*-*-*-*-*-*-*-*-*-*-*-*-*-*-*-*-*-*-*-*-*-*-*-*-*-*-  CURRENT MEDICATIONS LIST     Current Outpatient Medications:     aspirin 81 mg chewable tablet, Chew 1 tablet (81 mg total) daily, Disp: 30 tablet, Rfl: 11    clopidogrel (PLAVIX) 75 mg tablet, Take 1 tablet (75 mg total) by mouth daily, Disp: 90 tablet, Rfl: 3    Semaglutide,0 25 or 0 5MG/DOS, (Ozempic, 0 25 or 0 5 MG/DOSE,) 2 MG/1 5ML SOPN, Inject 0 5 mg under the skin, Disp: , Rfl:     sertraline (ZOLOFT) 100 mg tablet, Take 50 mg by mouth 2 (two) times a day, Disp: , Rfl:     atorvastatin (LIPITOR) 40 mg tablet, Take 1 tablet (40 mg total) by mouth daily with dinner (Patient not taking: Reported on 8/18/2021), Disp: 90 tablet, Rfl: 3    metoprolol succinate (TOPROL-XL) 25 mg 24 hr tablet, Take 0 5 tablets (12 5 mg total) by mouth daily (Patient not taking: Reported on 8/18/2021), Disp: 30 tablet, Rfl: 6       ALLERGIES     Allergies   Allergen Reactions    Metformin GI Intolerance    Rosuvastatin GI Intolerance       *-*-*-*-*-*-*-*-*-*-*-*-*-*-*-*-*-*-*-*-*-*-*-*-*-*-*-*-*-*-*-*-*-*-*-*-*-*-*-*-*-*-*-*-*-*-*-*-*-*-*-*-*-*-  LABORATORY DATA   No results found for: NA  Potassium   Date Value Ref Range Status   07/14/2020 4 0 3 5 - 5 3 mmol/L Final   07/13/2020 4 1 3 5 - 5 3 mmol/L Final   07/12/2020 3 4 (L) 3 5 - 5 3 mmol/L Final     Chloride   Date Value Ref Range Status   07/14/2020 100 100 - 108 mmol/L Final   07/13/2020 99 (L) 100 - 108 mmol/L Final   07/12/2020 98 (L) 100 - 108 mmol/L Final     CO2   Date Value Ref Range Status   07/14/2020 23 21 - 32 mmol/L Final   07/13/2020 26 21 - 32 mmol/L Final   07/12/2020 22 21 - 32 mmol/L Final     BUN   Date Value Ref Range Status   07/14/2020 14 5 - 25 mg/dL Final   07/13/2020 16 5 - 25 mg/dL Final   07/12/2020 16 5 - 25 mg/dL Final     Creatinine   Date Value Ref Range Status   07/14/2020 0 94 0 60 - 1 30 mg/dL Final     Comment:     Standardized to IDMS reference method   07/13/2020 0 98 0 60 - 1 30 mg/dL Final     Comment:     Standardized to IDMS reference method   07/12/2020 1 20 0 60 - 1 30 mg/dL Final     Comment:     Standardized to IDMS reference method     eGFR   Date Value Ref Range Status   07/14/2020 87 ml/min/1 73sq m Final   07/13/2020 83 ml/min/1 73sq m Final   07/12/2020 65 ml/min/1 73sq m Final     Calcium   Date Value Ref Range Status   07/14/2020 8 5 8 3 - 10 1 mg/dL Final   07/13/2020 8 8 8 3 - 10 1 mg/dL Final   07/12/2020 8 8 8 3 - 10 1 mg/dL Final     AST   Date Value Ref Range Status   07/13/2020 43 5 - 45 U/L Final     Comment:       Specimen collection should occur prior to Sulfasalazine administration due to the potential for falsely depressed results  ALT   Date Value Ref Range Status   07/13/2020 34 12 - 78 U/L Final     Comment:       Specimen collection should occur prior to Sulfasalazine administration due to the potential for falsely depressed results  Alkaline Phosphatase   Date Value Ref Range Status   07/13/2020 73 46 - 116 U/L Final     Magnesium   Date Value Ref Range Status   07/13/2020 1 9 1 6 - 2 6 mg/dL Final     WBC   Date Value Ref Range Status   07/14/2020 8 22 4 31 - 10 16 Thousand/uL Final   07/13/2020 9 77 4 31 - 10 16 Thousand/uL Final   07/12/2020 8 22 4 31 - 10 16 Thousand/uL Final     Hemoglobin   Date Value Ref Range Status   07/14/2020 14 9 12 0 - 17 0 g/dL Final   07/13/2020 15 9 12 0 - 17 0 g/dL Final   07/12/2020 15 4 12 0 - 17 0 g/dL Final     Platelets   Date Value Ref Range Status   07/14/2020 122 (L) 149 - 390 Thousands/uL Final   07/13/2020 132 (L) 149 - 390 Thousands/uL Final   07/12/2020 130 (L) 149 - 390 Thousands/uL Final     PTT   Date Value Ref Range Status   07/13/2020 60 (H) 23 - 37 seconds Final     Comment:     Therapeutic Heparin Range =  60-90 seconds   07/13/2020 76 (H) 23 - 37 seconds Final     Comment:     Therapeutic Heparin Range =  60-90 seconds     INR   Date Value Ref Range Status   07/12/2020 0 90 0 84 - 1 19 Final   07/12/2020 0 88 0 84 - 1 19 Final     No results found for: CKMB, DIGOXIN  No results found for: TSH  No results found for: CHOL   Hemoglobin A1C   Date Value Ref Range Status   07/14/2020 8 4 (H) Normal 3 8-5 6%; PreDiabetic 5 7-6 4%;  Diabetic >=6 5%; Glycemic control for adults with diabetes <7 0% % Final   2014 7 2 (H) <5 7 % of total Hgb Final     Comment:     Result Comment: According to ADA guidelines, hemoglobin A1c <7 0%  represents optimal control in non-pregnant diabetic  patients  Different metrics may apply to specific  patient populations  Standards of Medical Care in    Diabetes Care  2013;36:s11-s66     For the purpose of screening for the presence of  diabetes  <5 7%       Consistent with the absence of diabetes  5 7-6 4%    Consistent with increased risk for diabetes              (prediabetes)  >or=6 5%    Consistent with diabetes     This assay result is consistent with diabetes  mellitus  Currently, no consensus exists for use of hemoglobin  A1c for diagnosis of diabetes for children  Performed at: [address] [facility],[address], [place], [address] [facility], [facility]  16        No results found for: Blease , GRAMSTAIN, URINECX, WOUNDCULT, BODYFLUIDCUL, MRSACULTURE, INFLUAPCR, INFLUBPCR, RSVPCR, LEGIONELLAUR, CDIFFTOXINB    *-*-*-*-*-*-*-*-*-*-*-*-*-*-*-*-*-*-*-*-*-*-*-*-*-*-*-*-*-*-*-*-*-*-*-*-*-*-*-*-*-*-*-*-*-*-*-*-*-*-*-*-*-*-  PREVIOUS CARDIOLOGY & RADIOLOGY TEST RESULTS   I have personally reviewed pertinent results of cardiovascular tests noted below      Results for orders placed during the hospital encounter of 21    Echo complete with contrast if indicated    Narrative  [address]    Transthoracic Echocardiogram  2D, M-mode, Doppler, and Color Doppler    Study date:  11-May-2021    Patient: Pierre Cheatham  MR number: [id number]  Account number: [de-identified]  : 1960  Age: 63 years  Gender: Male  Status: Outpatient  Location: [facility]  Height: 70 in  Weight: 149 6 lb  BP: 105/ 68 mmHg    Indications: CAD    Diagnoses: I25 10 - Atherosclerotic heart disease of native coronary artery without angina pectoris    Sonographer: JAMAL Velazquez  Interpreting Physician:  Eartha Kocher, MD  Referring Physician:  Eartha Kocher, MD  Group:  [facility]    IMPRESSIONS:  Technical quality: Good  Cardiac rhythm: Sinus    1  Mild concentric left ventricular hypertrophy, normal left ventricular systolic and diastolic function  EF around 64%  2  Normal right ventricular size and systolic function  3  Normal left and right atrial size  4  Aortic valve sclerosis, no aortic valve stenosis or regurgitation  5  Mild mitral valve sclerosis, trace to mild mitral valve regurgitation  6  Trace tricuspid valve regurgitation  7  No obvious pulmonary hypertension  8  No pericardial effusion  Compared to report of previous echocardiogram from July 13, 2020 there is overall no significant change  SUMMARY    LEFT VENTRICLE:  Normal left ventricular cavity size, mild concentric left ventricular hypertrophy, normal left ventricular systolic function, normal regional wall motion  Ejection fraction is estimated as around 64%  Normal left ventricular diastolic  function  RIGHT VENTRICLE:  Normal right ventricular size and systolic function  Normal estimated right ventricular systolic pressure, 17 mmHg  LEFT ATRIUM:  Normal left atrial cavity size  Intact interatrial septum  RIGHT ATRIUM:  Normal right atrial cavity size  MITRAL VALVE:  Mild mitral valve leaflet thickening and sclerosis, adequate leaflet mobility  Trace to mild mitral valve regurgitation    AORTIC VALVE:  Tricuspid aortic valve with mild sclerosis, adequate cuspal separation  No aortic valve stenosis or regurgitation  TRICUSPID VALVE:  Normal tricuspid valve morphology and leaflet separation  Trace tricuspid valve regurgitation  PULMONIC VALVE:  No significant pulmonic valve regurgitation  AORTA:  Aortic root and proximal ascending aorta are normal in size on 2D imaging      IVC, HEPATIC VEINS:  Inferior vena cava is normal in size and demonstrates appropriate respiratory phasic changes in diameter  PERICARDIUM:  No pericardial effusion  HISTORY: PRIOR HISTORY: Risk factors: diabetes  PROCEDURE: The study was performed in the [address] [place] [address]  This was a routine study  The transthoracic approach was used  The study included complete 2D imaging, M-mode, complete spectral Doppler, and color Doppler  The heart rate was 79  bpm, at the start of the study  Images were obtained from the parasternal, apical, subcostal, and suprasternal notch acoustic windows  Image quality was adequate  LEFT VENTRICLE: Normal left ventricular cavity size, mild concentric left ventricular hypertrophy, normal left ventricular systolic function, normal regional wall motion  Ejection fraction is estimated as around 64%  Normal left  ventricular diastolic function  RIGHT VENTRICLE: Normal right ventricular size and systolic function  Normal estimated right ventricular systolic pressure, 17 mmHg  LEFT ATRIUM: Normal left atrial cavity size  Intact interatrial septum  RIGHT ATRIUM: Normal right atrial cavity size  MITRAL VALVE: Mild mitral valve leaflet thickening and sclerosis, adequate leaflet mobility  Trace to mild mitral valve regurgitation    AORTIC VALVE: Tricuspid aortic valve with mild sclerosis, adequate cuspal separation  No aortic valve stenosis or regurgitation  TRICUSPID VALVE: Normal tricuspid valve morphology and leaflet separation  Trace tricuspid valve regurgitation  PULMONIC VALVE: No significant pulmonic valve regurgitation  PERICARDIUM: No pericardial effusion  AORTA: Aortic root and proximal ascending aorta are normal in size on 2D imaging  SYSTEMIC VEINS: IVC: Inferior vena cava is normal in size and demonstrates appropriate respiratory phasic changes in diameter      SYSTEM MEASUREMENT TABLES    2D  %FS: 36 9 %  Ao Diam: 2 86 cm  EDV(Teich): 46 93 ml  EF(Teich): 67 96 %  ESV(Teich): 15 04 ml  IVSd: 1 07 cm  LA Area: 15 62 cm2  LA Diam: 3 66 cm  LVEDV MOD A4C: 82 01 ml  LVEF MOD A4C: 63 87 %  LVESV MOD A4C: 29 63 ml  LVIDd: 3 39 cm  LVIDs: 2 14 cm  LVLd A4C: 8 44 cm  LVLs A4C: 6 89 cm  LVPWd: 1 14 cm  RA Area: 12 23 cm2  RVIDd: 2 91 cm  SV MOD A4C: 52 38 ml  SV(Teich): 31 9 ml    CW  TR Vmax: 1 38 m/s  TR maxP 57 mmHg    MM  TAPSE: 2 3 cm    PW  E' Sept: 0 08 m/s  E/E' Sept: 8 32  MV A Tha: 0 76 m/s  MV Dec [place]: 3 87 m/s2  MV DecT: 181 59 ms  MV E Tha: 0 7 m/s  MV E/A Ratio: 0 92  MV PHT: 52 66 ms  MVA By PHT: 4 18 cm2    [employer] Accredited Echocardiography Laboratory    Prepared and electronically signed by    Bolivar Meredith MD  Signed 11-May-2021 12:51:04    No results found for this or any previous visit  No results found for this or any previous visit  No results found for this or any previous visit  Echo complete with contrast if indicated  [address]    Transthoracic Echocardiogram  2D, M-mode, Doppler, and Color Doppler    Study date:  11-May-2021    Patient: Fanta Benavides  MR number: [id number]  Account number: [de-identified]  : 1960  Age: 63 years  Gender: Male  Status: Outpatient  Location: [facility]  Height: 70 in  Weight: 149 6 lb  BP: 105/ 68 mmHg    Indications: CAD    Diagnoses: I25 10 - Atherosclerotic heart disease of native coronary artery without angina pectoris    Sonographer:  Claudette Mohr, RCS  Interpreting Physician:  Bolivar Meredith MD  Referring Physician:  Bolivar Meredith MD  Group:  [facility]    IMPRESSIONS:  Technical quality: Good  Cardiac rhythm: Sinus    1  Mild concentric left ventricular hypertrophy, normal left ventricular systolic and diastolic function  EF around 64%  2  Normal right ventricular size and systolic function  3  Normal left and right atrial size  4  Aortic valve sclerosis, no aortic valve stenosis or regurgitation    5  Mild mitral valve sclerosis, trace to mild mitral valve regurgitation  6  Trace tricuspid valve regurgitation  7  No obvious pulmonary hypertension  8  No pericardial effusion  Compared to report of previous echocardiogram from July 13, 2020 there is overall no significant change  SUMMARY    LEFT VENTRICLE:  Normal left ventricular cavity size, mild concentric left ventricular hypertrophy, normal left ventricular systolic function, normal regional wall motion  Ejection fraction is estimated as around 64%  Normal left ventricular diastolic  function  RIGHT VENTRICLE:  Normal right ventricular size and systolic function  Normal estimated right ventricular systolic pressure, 17 mmHg  LEFT ATRIUM:  Normal left atrial cavity size  Intact interatrial septum  RIGHT ATRIUM:  Normal right atrial cavity size  MITRAL VALVE:  Mild mitral valve leaflet thickening and sclerosis, adequate leaflet mobility  Trace to mild mitral valve regurgitation    AORTIC VALVE:  Tricuspid aortic valve with mild sclerosis, adequate cuspal separation  No aortic valve stenosis or regurgitation  TRICUSPID VALVE:  Normal tricuspid valve morphology and leaflet separation  Trace tricuspid valve regurgitation  PULMONIC VALVE:  No significant pulmonic valve regurgitation  AORTA:  Aortic root and proximal ascending aorta are normal in size on 2D imaging  IVC, HEPATIC VEINS:  Inferior vena cava is normal in size and demonstrates appropriate respiratory phasic changes in diameter  PERICARDIUM:  No pericardial effusion  HISTORY: PRIOR HISTORY: Risk factors: diabetes  PROCEDURE: The study was performed in the [facility]  This was a routine study  The transthoracic approach was used  The study included complete 2D imaging, M-mode, complete spectral Doppler, and color Doppler  The heart rate was 79  bpm, at the start of the study  Images were obtained from the parasternal, apical, subcostal, and suprasternal notch acoustic windows   Image quality was adequate  LEFT VENTRICLE: Normal left ventricular cavity size, mild concentric left ventricular hypertrophy, normal left ventricular systolic function, normal regional wall motion  Ejection fraction is estimated as around 64%  Normal left  ventricular diastolic function  RIGHT VENTRICLE: Normal right ventricular size and systolic function  Normal estimated right ventricular systolic pressure, 17 mmHg  LEFT ATRIUM: Normal left atrial cavity size  Intact interatrial septum  RIGHT ATRIUM: Normal right atrial cavity size  MITRAL VALVE: Mild mitral valve leaflet thickening and sclerosis, adequate leaflet mobility  Trace to mild mitral valve regurgitation    AORTIC VALVE: Tricuspid aortic valve with mild sclerosis, adequate cuspal separation  No aortic valve stenosis or regurgitation  TRICUSPID VALVE: Normal tricuspid valve morphology and leaflet separation  Trace tricuspid valve regurgitation  PULMONIC VALVE: No significant pulmonic valve regurgitation  PERICARDIUM: No pericardial effusion  AORTA: Aortic root and proximal ascending aorta are normal in size on 2D imaging  SYSTEMIC VEINS: IVC: Inferior vena cava is normal in size and demonstrates appropriate respiratory phasic changes in diameter      SYSTEM MEASUREMENT TABLES    2D  %FS: 36 9 %  Ao Diam: 2 86 cm  EDV(Teich): 46 93 ml  EF(Teich): 67 96 %  ESV(Teich): 15 04 ml  IVSd: 1 07 cm  LA Area: 15 62 cm2  LA Diam: 3 66 cm  LVEDV MOD A4C: 82 01 ml  LVEF MOD A4C: 63 87 %  LVESV MOD A4C: 29 63 ml  LVIDd: 3 39 cm  LVIDs: 2 14 cm  LVLd A4C: 8 44 cm  LVLs A4C: 6 89 cm  LVPWd: 1 14 cm  RA Area: 12 23 cm2  RVIDd: 2 91 cm  SV MOD A4C: 52 38 ml  SV(Teich): 31 9 ml    CW  TR Vmax: 1 38 m/s  TR maxP 57 mmHg    MM  TAPSE: 2 3 cm    PW  E' Sept: 0 08 m/s  E/E' Sept: 8 32  MV A Tha: 0 76 m/s  MV Dec Simpson: 3 87 m/s2  MV DecT: 181 59 ms  MV E Tha: 0 7 m/s  MV E/A Ratio: 0 92  MV PHT: 52 66 ms  MVA By PHT: 4 18 cm2    [employer] [facility]    Prepared and electronically signed by    Junie Cardona MD  Signed 11-May-2021 12:51:04        *-*-*-*-*-*-*-*-*-*-*-*-*-*-*-*-*-*-*-*-*-*-*-*-*-*-*-*-*-*-*-*-*-*-*-*-*-*-*-*-*-*-*-*-*-*-*-*-*-*-*-*-*-*-  SIGNATURES:   @WUX@   Faye Mar MD; [facility]    *-*-*-*-*-*-*-*-*-*-*-*-*-*-*-*-*-*-*-*-*-*-*-*-*-*-*-*-*-*-*-*-*-*-*-*-*-*-*-*-*-*-*-*-*-*-*-*-*-*-*-*-*-*-  PAST MEDICAL HISTORY:  Past Medical History:   Diagnosis Date    Diabetes mellitus ([address] )     OCD (obsessive compulsive disorder)     PAST SURGICAL HISTORY:  Past Surgical History:   Procedure Laterality Date    CARPECTOMY HAND Bilateral          FAMILY HISTORY:  No family history on file   SOCIAL HISTORY:  Social History     Tobacco Use   Smoking Status Never Smoker   Smokeless Tobacco Never Used      Social History     Substance and Sexual Activity   Alcohol Use Yes     Social History     Substance and Sexual Activity   Drug Use Never    [unfilled]     *-*-*-*-*-*-*-*-*-*-*-*-*-*-*-*-*-*-*-*-*-*-*-*-*-*-*-*-*-*-*-*-*-*-*-*-*-*-*-*-*-*-*-*-*-*-*-*-*-*-*-*-*-*  ALLERGIES:  Allergies   Allergen Reactions    Metformin GI Intolerance    Rosuvastatin GI Intolerance    CURRENT SCHEDULED MEDICATIONS:    Current Outpatient Medications:     aspirin 81 mg chewable tablet, Chew 1 tablet (81 mg total) daily, Disp: 30 tablet, Rfl: 11    clopidogrel (PLAVIX) 75 mg tablet, Take 1 tablet (75 mg total) by mouth daily, Disp: 90 tablet, Rfl: 3    Semaglutide,0 25 or 0 5MG/DOS, (Ozempic, 0 25 or 0 5 MG/DOSE,) 2 MG/1 5ML SOPN, Inject 0 5 mg under the skin, Disp: , Rfl:     sertraline (ZOLOFT) 100 mg tablet, Take 50 mg by mouth 2 (two) times a day, Disp: , Rfl:     atorvastatin (LIPITOR) 40 mg tablet, Take 1 tablet (40 mg total) by mouth daily with dinner (Patient not taking: Reported on 8/18/2021), Disp: 90 tablet, Rfl: 3    metoprolol succinate (TOPROL-XL) 25 mg 24 hr tablet, Take 0 5 tablets (12 5 mg total) by mouth daily (Patient not taking: Reported on 8/18/2021), Disp: 30 tablet, Rfl: 6     *-*-*-*-*-*-*-*-*-*-*-*-*-*-*-*-*-*-*-*-*-*-*-*-*-*-*-*-*-*-*-*-*-*-*-*-*-*-*-*-*-*-*-*-*-*-*-*-*-*-*-*-*-*

## 2021-08-19 ENCOUNTER — APPOINTMENT (OUTPATIENT)
Dept: LAB | Facility: HOSPITAL | Age: 61
End: 2021-08-19
Attending: INTERNAL MEDICINE
Payer: COMMERCIAL

## 2021-08-19 DIAGNOSIS — R53.83 FATIGUE, UNSPECIFIED TYPE: ICD-10-CM

## 2021-08-19 DIAGNOSIS — I25.10 CORONARY ARTERY DISEASE INVOLVING NATIVE CORONARY ARTERY OF NATIVE HEART WITHOUT ANGINA PECTORIS: ICD-10-CM

## 2021-08-19 LAB
ALBUMIN SERPL BCP-MCNC: 4.3 G/DL (ref 3.5–5)
ALP SERPL-CCNC: 82 U/L (ref 46–116)
ALT SERPL W P-5'-P-CCNC: 29 U/L (ref 12–78)
ANION GAP SERPL CALCULATED.3IONS-SCNC: 10 MMOL/L (ref 4–13)
AST SERPL W P-5'-P-CCNC: 18 U/L (ref 5–45)
BILIRUB SERPL-MCNC: 0.55 MG/DL (ref 0.2–1)
BUN SERPL-MCNC: 19 MG/DL (ref 5–25)
CALCIUM SERPL-MCNC: 9 MG/DL (ref 8.3–10.1)
CHLORIDE SERPL-SCNC: 102 MMOL/L (ref 100–108)
CO2 SERPL-SCNC: 26 MMOL/L (ref 21–32)
CREAT SERPL-MCNC: 1.15 MG/DL (ref 0.6–1.3)
EST. AVERAGE GLUCOSE BLD GHB EST-MCNC: 171 MG/DL
FERRITIN SERPL-MCNC: 394 NG/ML (ref 8–388)
GFR SERPL CREATININE-BSD FRML MDRD: 68 ML/MIN/1.73SQ M
GLUCOSE P FAST SERPL-MCNC: 162 MG/DL (ref 65–99)
HBA1C MFR BLD: 7.6 %
IRON SATN MFR SERPL: 30 %
IRON SERPL-MCNC: 109 UG/DL (ref 65–175)
MAGNESIUM SERPL-MCNC: 2.1 MG/DL (ref 1.6–2.6)
POTASSIUM SERPL-SCNC: 4.1 MMOL/L (ref 3.5–5.3)
PROT SERPL-MCNC: 7.7 G/DL (ref 6.4–8.2)
SODIUM SERPL-SCNC: 138 MMOL/L (ref 136–145)
T4 FREE SERPL-MCNC: 0.86 NG/DL (ref 0.76–1.46)
TIBC SERPL-MCNC: 368 UG/DL (ref 250–450)
TSH SERPL DL<=0.05 MIU/L-ACNC: 1.38 UIU/ML (ref 0.36–3.74)

## 2021-08-19 PROCEDURE — 83550 IRON BINDING TEST: CPT

## 2021-08-19 PROCEDURE — 84443 ASSAY THYROID STIM HORMONE: CPT

## 2021-08-19 PROCEDURE — 82728 ASSAY OF FERRITIN: CPT

## 2021-08-19 PROCEDURE — 80053 COMPREHEN METABOLIC PANEL: CPT

## 2021-08-19 PROCEDURE — 83036 HEMOGLOBIN GLYCOSYLATED A1C: CPT

## 2021-08-19 PROCEDURE — 84439 ASSAY OF FREE THYROXINE: CPT

## 2021-08-19 PROCEDURE — 83735 ASSAY OF MAGNESIUM: CPT

## 2021-08-19 PROCEDURE — 83540 ASSAY OF IRON: CPT

## 2021-08-19 PROCEDURE — 36415 COLL VENOUS BLD VENIPUNCTURE: CPT

## 2021-09-07 DIAGNOSIS — I21.4 NSTEMI (NON-ST ELEVATED MYOCARDIAL INFARCTION) (HCC): ICD-10-CM

## 2021-09-07 RX ORDER — CLOPIDOGREL BISULFATE 75 MG/1
TABLET ORAL
Qty: 90 TABLET | Refills: 3 | Status: SHIPPED | OUTPATIENT
Start: 2021-09-07

## 2021-10-13 ENCOUNTER — OFFICE VISIT (OUTPATIENT)
Dept: CARDIOLOGY CLINIC | Facility: CLINIC | Age: 61
End: 2021-10-13
Payer: COMMERCIAL

## 2021-10-13 VITALS
DIASTOLIC BLOOD PRESSURE: 80 MMHG | WEIGHT: 163 LBS | RESPIRATION RATE: 16 BRPM | SYSTOLIC BLOOD PRESSURE: 108 MMHG | BODY MASS INDEX: 23.34 KG/M2 | HEART RATE: 72 BPM | HEIGHT: 70 IN

## 2021-10-13 DIAGNOSIS — E78.2 MIXED DYSLIPIDEMIA: ICD-10-CM

## 2021-10-13 DIAGNOSIS — E11.9 TYPE 2 DIABETES MELLITUS WITHOUT COMPLICATION, WITHOUT LONG-TERM CURRENT USE OF INSULIN (HCC): ICD-10-CM

## 2021-10-13 DIAGNOSIS — R53.82 CHRONIC FATIGUE: ICD-10-CM

## 2021-10-13 DIAGNOSIS — Z98.61 HISTORY OF PERCUTANEOUS CORONARY INTERVENTION: ICD-10-CM

## 2021-10-13 DIAGNOSIS — I25.10 CORONARY ARTERY DISEASE INVOLVING NATIVE CORONARY ARTERY OF NATIVE HEART WITHOUT ANGINA PECTORIS: Primary | ICD-10-CM

## 2021-10-13 PROCEDURE — 99214 OFFICE O/P EST MOD 30 MIN: CPT | Performed by: INTERNAL MEDICINE

## 2021-10-19 DIAGNOSIS — E78.2 MIXED HYPERLIPIDEMIA: Primary | ICD-10-CM

## 2021-10-19 RX ORDER — PRAVASTATIN SODIUM 40 MG
40 TABLET ORAL DAILY
Qty: 30 TABLET | Refills: 3 | Status: SHIPPED | OUTPATIENT
Start: 2021-10-19 | End: 2022-01-14 | Stop reason: SDUPTHER

## 2021-11-16 ENCOUNTER — CLINICAL SUPPORT (OUTPATIENT)
Dept: CARDIOLOGY CLINIC | Facility: CLINIC | Age: 61
End: 2021-11-16
Payer: COMMERCIAL

## 2021-11-16 DIAGNOSIS — R53.82 CHRONIC FATIGUE: ICD-10-CM

## 2021-11-16 DIAGNOSIS — I25.10 CORONARY ARTERY DISEASE INVOLVING NATIVE CORONARY ARTERY OF NATIVE HEART WITHOUT ANGINA PECTORIS: ICD-10-CM

## 2021-11-16 PROCEDURE — 93248 EXT ECG>7D<15D REV&INTERPJ: CPT | Performed by: INTERNAL MEDICINE

## 2022-01-12 DIAGNOSIS — E78.2 MIXED HYPERLIPIDEMIA: ICD-10-CM

## 2022-01-14 RX ORDER — PRAVASTATIN SODIUM 40 MG
TABLET ORAL
Qty: 90 TABLET | Refills: 1 | Status: SHIPPED | OUTPATIENT
Start: 2022-01-14 | End: 2022-07-10

## 2022-01-28 ENCOUNTER — TELEPHONE (OUTPATIENT)
Dept: CARDIOLOGY CLINIC | Facility: CLINIC | Age: 62
End: 2022-01-28

## 2022-01-28 NOTE — TELEPHONE ENCOUNTER
Pt called looking for form told him Dr Eveline Hutchins is off today and has taken forms to complete  Billie Rosen Please let me know when done so can let patient know

## 2022-01-31 ENCOUNTER — TELEPHONE (OUTPATIENT)
Dept: CARDIOLOGY CLINIC | Facility: CLINIC | Age: 62
End: 2022-01-31

## 2022-04-26 ENCOUNTER — OFFICE VISIT (OUTPATIENT)
Dept: CARDIOLOGY CLINIC | Facility: CLINIC | Age: 62
End: 2022-04-26
Payer: COMMERCIAL

## 2022-04-26 VITALS
WEIGHT: 164.2 LBS | SYSTOLIC BLOOD PRESSURE: 116 MMHG | BODY MASS INDEX: 23.9 KG/M2 | HEART RATE: 78 BPM | DIASTOLIC BLOOD PRESSURE: 78 MMHG

## 2022-04-26 DIAGNOSIS — Z98.61 HISTORY OF PERCUTANEOUS CORONARY INTERVENTION: ICD-10-CM

## 2022-04-26 DIAGNOSIS — E11.9 TYPE 2 DIABETES MELLITUS WITHOUT COMPLICATION, WITHOUT LONG-TERM CURRENT USE OF INSULIN (HCC): ICD-10-CM

## 2022-04-26 DIAGNOSIS — R53.82 CHRONIC FATIGUE: ICD-10-CM

## 2022-04-26 DIAGNOSIS — E78.2 MIXED DYSLIPIDEMIA: ICD-10-CM

## 2022-04-26 DIAGNOSIS — R06.02 SOB (SHORTNESS OF BREATH): Primary | ICD-10-CM

## 2022-04-26 DIAGNOSIS — I25.10 CORONARY ARTERY DISEASE INVOLVING NATIVE CORONARY ARTERY OF NATIVE HEART WITHOUT ANGINA PECTORIS: ICD-10-CM

## 2022-04-26 PROCEDURE — 99214 OFFICE O/P EST MOD 30 MIN: CPT | Performed by: INTERNAL MEDICINE

## 2022-04-26 PROCEDURE — 93000 ELECTROCARDIOGRAM COMPLETE: CPT | Performed by: INTERNAL MEDICINE

## 2022-04-26 NOTE — PATIENT INSTRUCTIONS
CARDIOLOGY ASSESSMENT & PLAN     1  SOB (shortness of breath)  POCT ECG    NM myocardial perfusion spect (rx stress and/or rest)   2  Coronary artery disease involving native coronary artery of native heart without angina pectoris  NM myocardial perfusion spect (rx stress and/or rest)   3  Mixed dyslipidemia     4  History of percutaneous coronary intervention  NM myocardial perfusion spect (rx stress and/or rest)   5  Chronic fatigue     6  Type 2 diabetes mellitus without complication, without long-term current use of insulin ([facility])       CAD, S/P MI June 2020, s/p PCI D1 July 2020  Mr Raul Cabrera is experiencing progressive shortness of breath with activity and decrease in exercise tolerance  He has known CAD with residual 50% stenosis in proximal RCA  He was previously on beta-blocker therapy but stopped taking it because of symptomatic hypotension  Heart rate and blood pressure is reasonably controlled  On examination there is no evidence of pulmonary or peripheral vascular congestion  His ECG is benign  He has had workup for chronic fatigue and he did not identify any specific etiology  He is not on beta-blocker therapy because of fatigue  -- at this time given his known history and concerning symptoms we will arrange for exercise nuclear stress test   -- he is advised to continue current medications    -- I would like to see him back in 2 months and consider and additional anti anginal therapy following review of findings of the stress test

## 2022-04-26 NOTE — ASSESSMENT & PLAN NOTE
Mr Lilliana Adkins is experiencing progressive shortness of breath with activity and decrease in exercise tolerance  He has known CAD with residual 50% stenosis in proximal RCA  He was previously on beta-blocker therapy but stopped taking it because of symptomatic hypotension  Heart rate and blood pressure is reasonably controlled  On examination there is no evidence of pulmonary or peripheral vascular congestion  His ECG is benign  He has had workup for chronic fatigue and he did not identify any specific etiology  He is not on beta-blocker therapy because of fatigue  -- at this time given his known history and concerning symptoms we will arrange for exercise nuclear stress test   -- he is advised to continue current medications    -- I would like to see him back in 2 months and consider and additional anti anginal therapy following review of findings of the stress test

## 2022-04-26 NOTE — PROGRESS NOTES
[facility]  [address] [facility], [place] [address]  Phone#  [phone]  Fax#  [phone]  *-*-*-*-*-*-*-*-*-*-*-*-*-*-*-*-*-*-*-*-*-*-*-*-*-*-*-*-*-*-*-*-*-*-*-*-*-*-*-*-*-*-*-*-*-*-*-*-*-*-*-*-*-*  ENCOUNTER DATE: 04/26/22 3:53 PM  PATIENT NAME: Ana Maria Roldan   1960    [id number]  AGE:61 y o  SEX: male  ENCOUNTER PROVIDER:Faye Mar MD     PRIMARY CARE PHYSICIAN: Clifton King MD    DIAGNOSES:   1  One-vessel coronary artery disease status post ACS-NSTEMI July 2020, status post PCI of diagonal branch on July 13, 2020    2  Normal left ventricular systolic function  3  Diabetes mellitus with poor glycemic control with last hemoglobin see of 8 4   4  Dyslipidemia   5  Family history premature CAD with brother having MI at 48 years  6  Remote history of ITP   7  History of trigger finger   8  History of OCD     EXTENDED HOLTER MONITOR November 2021:  Patient was monitored for 13 days 23 hours  Average heart rate was 87, minimum heart rate was 58  There was 1 run of ventricular tachycardia involving 6 beats  There were 14 runs of nonsustained SVT with longest run lasting 9 beats at 245 beats per minute  Overall ventricular ectopy burden was less than 1%  There were no diary entries but there were triggered events which correlated with sinus rhythm and sinus tachycardia  EXERCISE TREADMILL STRESS TEST February 24, 2021: Total exercise time was 9 minutes 17 seconds, achieving 103% of his maximal age predicted heart rate, he had slightly hypertensive blood pressure response with peak blood pressure of 200/80 mmHg  Resting blood pressure was normal  There were no ECG changes of ischemia  There was no angina reported with exercise  ECHOCARDIOGRAM May 11, 2021:   1  Mild concentric left ventricular hypertrophy, normal left ventricular systolic and diastolic function  EF around 64%  2  Normal right ventricular size and systolic function     3  Normal left and right atrial size  4  Aortic valve sclerosis, no aortic valve stenosis or regurgitation  5  Mild mitral valve sclerosis, trace to mild mitral valve regurgitation  6  Trace tricuspid valve regurgitation  7  No obvious pulmonary hypertension  8  No pericardial effusion        Compared to report of previous echocardiogram from July 13, 2020 there is overall no significant change        CARDIAC CATHETERIZATION JULY 13, 2020: The coronary circulation is right dominant  -- There was 1-vessel coronary artery disease  -- Left main: Angiography showed minor luminal irregularities  -- LAD: Angiography showed minor luminal irregularities  -- 1st diagonal:   -- 1st diagonal: There was a 95 % stenosis  There was LEA grade 2 flow through the vessel (partial perfusion)  This lesion is a likely culprit for the patient's recent myocardial infarction  It appears amenable to percutaneous   intervention  -- Circumflex: Angiography showed minor luminal irregularities  -- Proximal RCA: There was a 50 % stenosis  ECHOCARDIOGRAM JULY 13, 2020:   Technical quality: Good   Cardiac rhythm: Normal sinus     1  Normal left ventricular size and systolic and diastolic function, EF around 63%  2  Normal right ventricular size and systolic function  4  Normal left and right atrial size  5  Mild aortic valve sclerosis, no aortic valve stenosis or regurgitation  6  Trace mitral and tricuspid valve regurgitation  7  No obvious pulmonary hypertension  8  No pericardial effusion  Holter monitor July 2020:   1  Holter monitor reveals the underlying rhythm is sinus rhythm with an average heart rate of 73 beats per minute, a minimum heart rate of 54 beats per minute and a maximum heart rate of 121 beats per minute  2  There are rare ventricular ectopy comprised of isolated VPCs   3  There are rare supraventricular ectopy comprised of isolated APCs   4   There is no evidence of significant bradyarrhythmia or advanced heart block  The longest R to R was 1 4 seconds  5  Patient diary did not report any symptoms  Exercise treadmill stress test July 30, 2020:   1  Exercise stress test is negative for myocardial ischemia   2  Exercise time 9 minutes achieving 88% of MPHR with a total workload of 10 1 METs   3  There is a normal blood pressure response   4  No symptoms reported   5  There are no significant arrhythmias   6  There is no study for comparison       CURRENT ECG     Results for orders placed or performed in visit on 04/26/22   POCT ECG    Narrative    Sinus rhythm, HR 78 beats per minute, normal axis and intervals, no significant ST T-wave abnormalities  No significant chamber hypertrophy or enlargement  No evidence of prior infarction  CARDIOLOGY ASSESSMENT & PLAN     1  SOB (shortness of breath)  POCT ECG    NM myocardial perfusion spect (rx stress and/or rest)   2  Coronary artery disease involving native coronary artery of native heart without angina pectoris  NM myocardial perfusion spect (rx stress and/or rest)   3  Mixed dyslipidemia     4  History of percutaneous coronary intervention  NM myocardial perfusion spect (rx stress and/or rest)   5  Chronic fatigue     6  Type 2 diabetes mellitus without complication, without long-term current use of insulin ([facility])       CAD, S/P MI June 2020, s/p PCI D1 July 2020  Mr Yudy Saab is experiencing progressive shortness of breath with activity and decrease in exercise tolerance  He has known CAD with residual 50% stenosis in proximal RCA  He was previously on beta-blocker therapy but stopped taking it because of symptomatic hypotension  Heart rate and blood pressure is reasonably controlled  On examination there is no evidence of pulmonary or peripheral vascular congestion  His ECG is benign  He has had workup for chronic fatigue and he did not identify any specific etiology  He is not on beta-blocker therapy because of fatigue      -- at this time given his known history and concerning symptoms we will arrange for exercise nuclear stress test   -- he is advised to continue current medications  -- I would like to see him back in 2 months and consider and additional anti anginal therapy following review of findings of the stress test      [address] is here for follow-up regarding his cardiac comorbidities which include:  Coronary artery disease with history of MI and PCI in 2020, dyslipidemia and other comorbidities  He is here for follow-up accompanied with his wife  He was last seen by me in October 2021  At that time he was reporting palpitation  An extended Holter monitor was requested  This did not identify significant arrhythmias  Today he reports that he has been experiencing progressive shortness of breath with physical activity and fatigue  Denies symptoms at rest but his exercise tolerance has decreased and he feels short of breath  Denies symptoms at rest or when doing at normal paced  Denies orthopnea, PND or pedal edema  Occasionally gets palpitations  Has had no presyncope/syncope  Functional capacity status: Moderately reduced   (Excellent- >10 METs; Good: (7-10 METs); Moderate (4-7 METs); Poor (<= 4 METs)    Any chronic stressors:  None   (feeling of poor health, financial problems, and social isolation etc)  Tobacco or alcohol dependence:  He does not smoke  He does not drink  Current cardiac medications:  Aspirin 81 mg daily, clopidogrel 75 mg daily, pravastatin 40 mg daily, coenzyme Q time  Was changed from Lipitor to pravastatin due to fatigue and muscle aches  Blood work done at [facility] on April 14, 2022 shows normal renal function and electrolytes, normal liver function tests, hemoglobin A1c is 8 9, previous [facility] was 7 6 in August 2020    Lipid profile in January 2022 showed total cholesterol 181, directed HDL 65, triglyceride 105, calculated LDL 96 last TSH was 1 38 in August 2021    REVIEW OF SYSTEMS   Positive for:  As noted above in HPI  Negative for: All remaining as reviewed below and in HPI  SYSTEM SYMPTOMS REVIEWED:  General--weight change, fever, night sweats  Respiratory--cough, wheezing, shortness of breath, sputum production  Cardiovascular--chest pain, syncope, dyspnea on exertion, edema, decline in exercise tolerance, claudication   Gastrointestinal--persistent vomiting, diarrhea, abdominal distention, blood in stool   Muscular or skeletal--joint pain or swelling   Neurologic--headaches, syncope, abnormal movement  Hematologic--history of easy bruising and bleeding   Endocrine--thyroid enlargement, heat or cold intolerance, polyuria   Psychiatric--anxiety, depression     *-*-*-*-*-*-*-*-*-*-*-*-*-*-*-*-*-*-*-*-*-*-*-*-*-*-*-*-*-*-*-*-*-*-*-*-*-*-*-*-*-*-*-*-*-*-*-*-*-*-*-*-*-*-  VITAL SIGNS     CURRENT VITAL SIGNS:   Vitals:    04/26/22 1510   BP: 116/78   BP Location: Left arm   Patient Position: Sitting   Cuff Size: Large   Pulse: 78   Weight: 74 5 kg (164 lb 3 2 oz)       BMI: Body mass index is 23 9 kg/m²      WEIGHTS:   Wt Readings from Last 25 Encounters:   04/26/22 74 5 kg (164 lb 3 2 oz)   10/13/21 73 9 kg (163 lb)   08/18/21 73 4 kg (161 lb 12 8 oz)   10/20/20 71 5 kg (157 lb 9 6 oz)   07/21/20 73 5 kg (162 lb)   07/12/20 75 6 kg (166 lb 10 7 oz)   05/08/20 77 1 kg (170 lb)   02/13/19 77 1 kg (170 lb)   05/29/15 79 8 kg (176 lb)   05/06/15 79 6 kg (175 lb 6 1 oz)   10/16/14 80 7 kg (178 lb)   05/27/14 82 1 kg (181 lb)   05/07/14 81 9 kg (180 lb 8 oz)   04/23/14 82 3 kg (181 lb 8 oz)   04/01/14 81 4 kg (179 lb 8 oz)   12/17/13 81 6 kg (180 lb)   11/25/13 79 kg (174 lb 4 oz)   08/19/13 80 5 kg (177 lb 8 oz)   08/21/12 80 4 kg (177 lb 3 oz)   06/08/12 81 6 kg (180 lb)   05/09/12 80 9 kg (178 lb 4 oz) *-*-*-*-*-*-*-*-*-*-*-*-*-*-*-*-*-*-*-*-*-*-*-*-*-*-*-*-*-*-*-*-*-*-*-*-*-*-*-*-*-*-*-*-*-*-*-*-*-*-*-*-*-*-  PHYSICAL EXAM     General Appearance:    Alert, cooperative, no distress, appears stated age   Head, Eyes, ENT:    No obvious abnormality, moist mucous mebranes  Neck:   Supple, no carotid bruit or JVD   Back:     Symmetric, no curvature  Lungs:     Respirations unlabored  Clear to auscultation bilaterally,    Chest wall:    No tenderness or deformity   Heart:    Regular rate and rhythm, S1 and S2 normal, no murmur, rub  or gallop  Abdomen:     Soft, non-tender, No obvious masses, or organomegaly   Extremities:   Extremities warm, no cyanosis or edema    Skin:   no venostatic changes in lower extremities  Normal skin color, texture, and turgor   No rashes or lesions     *-*-*-*-*-*-*-*-*-*-*-*-*-*-*-*-*-*-*-*-*-*-*-*-*-*-*-*-*-*-*-*-*-*-*-*-*-*-*-*-*-*-*-*-*-*-*-*-*-*-*-*-*-*-  CURRENT MEDICATIONS LIST     Current Outpatient Medications:     aspirin 81 mg chewable tablet, Chew 1 tablet (81 mg total) daily, Disp: 30 tablet, Rfl: 11    clopidogrel (PLAVIX) 75 mg tablet, TAKE 1 TABLET BY MOUTH EVERY DAY, Disp: 90 tablet, Rfl: 3    co-enzyme Q-10 30 MG capsule, Take 30 mg by mouth 3 (three) times a day, Disp: , Rfl:     pravastatin (PRAVACHOL) 40 mg tablet, TAKE 1 TABLET BY MOUTH EVERY DAY, Disp: 90 tablet, Rfl: 1    Semaglutide,0 25 or 0 5MG/DOS, (Ozempic, 0 25 or 0 5 MG/DOSE,) 2 MG/1 5ML SOPN, Inject 0 5 mg under the skin, Disp: , Rfl:     sertraline (ZOLOFT) 100 mg tablet, Take 50 mg by mouth daily  , Disp: , Rfl:        ALLERGIES     Allergies   Allergen Reactions    Metformin GI Intolerance    Rosuvastatin GI Intolerance       *-*-*-*-*-*-*-*-*-*-*-*-*-*-*-*-*-*-*-*-*-*-*-*-*-*-*-*-*-*-*-*-*-*-*-*-*-*-*-*-*-*-*-*-*-*-*-*-*-*-*-*-*-*-  LABORATORY DATA   No results found for: NA  Potassium   Date Value Ref Range Status   08/19/2021 4 1 3 5 - 5 3 mmol/L Final   07/14/2020 4 0 3 5 - 5 3 mmol/L Final   07/13/2020 4 1 3 5 - 5 3 mmol/L Final     Chloride   Date Value Ref Range Status   08/19/2021 102 100 - 108 mmol/L Final   07/14/2020 100 100 - 108 mmol/L Final   07/13/2020 99 (L) 100 - 108 mmol/L Final     CO2   Date Value Ref Range Status   08/19/2021 26 21 - 32 mmol/L Final   07/14/2020 23 21 - 32 mmol/L Final   07/13/2020 26 21 - 32 mmol/L Final     BUN   Date Value Ref Range Status   08/19/2021 19 5 - 25 mg/dL Final   07/14/2020 14 5 - 25 mg/dL Final   07/13/2020 16 5 - 25 mg/dL Final     Creatinine   Date Value Ref Range Status   08/19/2021 1 15 0 60 - 1 30 mg/dL Final     Comment:     Standardized to IDMS reference method   07/14/2020 0 94 0 60 - 1 30 mg/dL Final     Comment:     Standardized to IDMS reference method   07/13/2020 0 98 0 60 - 1 30 mg/dL Final     Comment:     Standardized to IDMS reference method     eGFR   Date Value Ref Range Status   08/19/2021 68 ml/min/1 73sq m Final   07/14/2020 87 ml/min/1 73sq m Final   07/13/2020 83 ml/min/1 73sq m Final     Calcium   Date Value Ref Range Status   08/19/2021 9 0 8 3 - 10 1 mg/dL Final   07/14/2020 8 5 8 3 - 10 1 mg/dL Final   07/13/2020 8 8 8 3 - 10 1 mg/dL Final     AST   Date Value Ref Range Status   08/19/2021 18 5 - 45 U/L Final     Comment:     Specimen collection should occur prior to Sulfasalazine administration due to the potential for falsely depressed results  07/13/2020 43 5 - 45 U/L Final     Comment:       Specimen collection should occur prior to Sulfasalazine administration due to the potential for falsely depressed results  ALT   Date Value Ref Range Status   08/19/2021 29 12 - 78 U/L Final     Comment:     Specimen collection should occur prior to Sulfasalazine administration due to the potential for falsely depressed results  07/13/2020 34 12 - 78 U/L Final     Comment:       Specimen collection should occur prior to Sulfasalazine administration due to the potential for falsely depressed results        Alkaline Phosphatase   Date Value Ref Range Status   08/19/2021 82 46 - 116 U/L Final   07/13/2020 73 46 - 116 U/L Final     Magnesium   Date Value Ref Range Status   08/19/2021 2 1 1 6 - 2 6 mg/dL Final   07/13/2020 1 9 1 6 - 2 6 mg/dL Final     WBC   Date Value Ref Range Status   07/14/2020 8 22 4 31 - 10 16 Thousand/uL Final   07/13/2020 9 77 4 31 - 10 16 Thousand/uL Final   07/12/2020 8 22 4 31 - 10 16 Thousand/uL Final     Hemoglobin   Date Value Ref Range Status   07/14/2020 14 9 12 0 - 17 0 g/dL Final   07/13/2020 15 9 12 0 - 17 0 g/dL Final   07/12/2020 15 4 12 0 - 17 0 g/dL Final     Platelets   Date Value Ref Range Status   07/14/2020 122 (L) 149 - 390 Thousands/uL Final   07/13/2020 132 (L) 149 - 390 Thousands/uL Final   07/12/2020 130 (L) 149 - 390 Thousands/uL Final     PTT   Date Value Ref Range Status   07/13/2020 60 (H) 23 - 37 seconds Final     Comment:     Therapeutic Heparin Range =  60-90 seconds   07/13/2020 76 (H) 23 - 37 seconds Final     Comment:     Therapeutic Heparin Range =  60-90 seconds     INR   Date Value Ref Range Status   07/12/2020 0 90 0 84 - 1 19 Final   07/12/2020 0 88 0 84 - 1 19 Final     No results found for: CKMB, DIGOXIN  No results found for: TSH  No results found for: CHOL   Hemoglobin A1C   Date Value Ref Range Status   08/19/2021 7 6 (H) Normal 3 8-5 6%; PreDiabetic 5 7-6 4%; Diabetic >=6 5%; Glycemic control for adults with diabetes <7 0% % Final     Hgb A1c   Date Value Ref Range Status   08/26/2020 7 9 (H) <5 7 % of total Hgb Final     Comment:     For someone without known diabetes, a hemoglobin A1c  value of 6 5% or greater indicates that they may have   diabetes and this should be confirmed with a follow-up   test     For someone with known diabetes, a value <7% indicates   that their diabetes is well controlled and a value   greater than or equal to 7% indicates suboptimal   control   A1c targets should be individualized based on   duration of diabetes, age, comorbid conditions, and   other considerations  Currently, no consensus exists regarding use of  hemoglobin A1c for diagnosis of diabetes for children          No results found for: Exie Michelle, GRAMSTAIN, URINECX, WOUNDCULT, BODYFLUIDCUL, MRSACULTURE, INFLUAPCR, INFLUBPCR, RSVPCR, LEGIONELLAUR, CDIFFTOXINB    *-*-*-*-*-*-*-*-*-*-*-*-*-*-*-*-*-*-*-*-*-*-*-*-*-*-*-*-*-*-*-*-*-*-*-*-*-*-*-*-*-*-*-*-*-*-*-*-*-*-*-*-*-*-  PREVIOUS CARDIOLOGY & RADIOLOGY TEST RESULTS   I have personally reviewed pertinent results of cardiovascular tests noted below  Results for orders placed during the hospital encounter of 21    Echo complete with contrast if indicated    Narrative  [facility] [address]  [address] [place] [address]    Transthoracic Echocardiogram  2D, M-mode, Doppler, and Color Doppler    Study date:  11-May-2021    Patient: Mc Owens  MR number: [id number]  Account number: [de-identified]  : 1960  Age: 63 years  Gender: Male  Status: Outpatient  Location: [facility]  Height: 70 in  Weight: 149 6 lb  BP: 105/ 68 mmHg    Indications: CAD    Diagnoses: I25 10 - Atherosclerotic heart disease of native coronary artery without angina pectoris    Sonographer:  Kelleen Olszewski, RCS  Interpreting Physician:  Bebeto Castillo MD  Referring Physician:  Bebeto Castillo MD  Group:  [facility]    IMPRESSIONS:  Technical quality: Good  Cardiac rhythm: Sinus    1  Mild concentric left ventricular hypertrophy, normal left ventricular systolic and diastolic function  EF around 64%  2  Normal right ventricular size and systolic function  3  Normal left and right atrial size  4  Aortic valve sclerosis, no aortic valve stenosis or regurgitation  5  Mild mitral valve sclerosis, trace to mild mitral valve regurgitation  6  Trace tricuspid valve regurgitation  7  No obvious pulmonary hypertension  8  No pericardial effusion      Compared to report of previous echocardiogram from July 13, 2020 there is overall no significant change  SUMMARY    LEFT VENTRICLE:  Normal left ventricular cavity size, mild concentric left ventricular hypertrophy, normal left ventricular systolic function, normal regional wall motion  Ejection fraction is estimated as around 64%  Normal left ventricular diastolic  function  RIGHT VENTRICLE:  Normal right ventricular size and systolic function  Normal estimated right ventricular systolic pressure, 17 mmHg  LEFT ATRIUM:  Normal left atrial cavity size  Intact interatrial septum  RIGHT ATRIUM:  Normal right atrial cavity size  MITRAL VALVE:  Mild mitral valve leaflet thickening and sclerosis, adequate leaflet mobility  Trace to mild mitral valve regurgitation    AORTIC VALVE:  Tricuspid aortic valve with mild sclerosis, adequate cuspal separation  No aortic valve stenosis or regurgitation  TRICUSPID VALVE:  Normal tricuspid valve morphology and leaflet separation  Trace tricuspid valve regurgitation  PULMONIC VALVE:  No significant pulmonic valve regurgitation  AORTA:  Aortic root and proximal ascending aorta are normal in size on 2D imaging  IVC, HEPATIC VEINS:  Inferior vena cava is normal in size and demonstrates appropriate respiratory phasic changes in diameter  PERICARDIUM:  No pericardial effusion  HISTORY: PRIOR HISTORY: Risk factors: diabetes  PROCEDURE: The study was performed in the [address] [place] [address]  This was a routine study  The transthoracic approach was used  The study included complete 2D imaging, M-mode, complete spectral Doppler, and color Doppler  The heart rate was 79  bpm, at the start of the study  Images were obtained from the parasternal, apical, subcostal, and suprasternal notch acoustic windows  Image quality was adequate      LEFT VENTRICLE: Normal left ventricular cavity size, mild concentric left ventricular hypertrophy, normal left ventricular systolic function, normal regional wall motion  Ejection fraction is estimated as around 64%  Normal left  ventricular diastolic function  RIGHT VENTRICLE: Normal right ventricular size and systolic function  Normal estimated right ventricular systolic pressure, 17 mmHg  LEFT ATRIUM: Normal left atrial cavity size  Intact interatrial septum  RIGHT ATRIUM: Normal right atrial cavity size  MITRAL VALVE: Mild mitral valve leaflet thickening and sclerosis, adequate leaflet mobility  Trace to mild mitral valve regurgitation    AORTIC VALVE: Tricuspid aortic valve with mild sclerosis, adequate cuspal separation  No aortic valve stenosis or regurgitation  TRICUSPID VALVE: Normal tricuspid valve morphology and leaflet separation  Trace tricuspid valve regurgitation  PULMONIC VALVE: No significant pulmonic valve regurgitation  PERICARDIUM: No pericardial effusion  AORTA: Aortic root and proximal ascending aorta are normal in size on 2D imaging  SYSTEMIC VEINS: IVC: Inferior vena cava is normal in size and demonstrates appropriate respiratory phasic changes in diameter      SYSTEM MEASUREMENT TABLES    2D  %FS: 36 9 %  Ao Diam: 2 86 cm  EDV(Teich): 46 93 ml  EF(Teich): 67 96 %  ESV(Teich): 15 04 ml  IVSd: 1 07 cm  LA Area: 15 62 cm2  LA Diam: 3 66 cm  LVEDV MOD A4C: 82 01 ml  LVEF MOD A4C: 63 87 %  LVESV MOD A4C: 29 63 ml  LVIDd: 3 39 cm  LVIDs: 2 14 cm  LVLd A4C: 8 44 cm  LVLs A4C: 6 89 cm  LVPWd: 1 14 cm  RA Area: 12 23 cm2  RVIDd: 2 91 cm  SV MOD A4C: 52 38 ml  SV(Teich): 31 9 ml    CW  TR Vmax: 1 38 m/s  TR maxP 57 mmHg    MM  TAPSE: 2 3 cm    PW  E' Sept: 0 08 m/s  E/E' Sept: 8 32  MV A Tha: 0 76 m/s  MV Dec [place]: 3 87 m/s2  MV DecT: 181 59 ms  MV E Tha: 0 7 m/s  MV E/A Ratio: 0 92  MV PHT: 52 66 ms  MVA By PHT: 4 18 cm2    [employer] Accredited Echocardiography Laboratory    Prepared and electronically signed by    Janet Perkins MD  Signed 11-May-2021 12:51:04    No results found for this or any previous visit  No results found for this or any previous visit  No results found for this or any previous visit  Echo complete with contrast if indicated  [address]  [address]    Transthoracic Echocardiogram  2D, M-mode, Doppler, and Color Doppler    Study date:  11-May-2021    Patient: Winnie Shannon  MR number: [id number]  Account number: [de-identified]  : 1960  Age: 63 years  Gender: Male  Status: Outpatient  Location: [place]   Height: 70 in  Weight: 149 6 lb  BP: 105/ 68 mmHg    Indications: CAD    Diagnoses: I25 10 - Atherosclerotic heart disease of native coronary artery without angina pectoris    Sonographer:  JAMAL Colon  Interpreting Physician:  Nyasia Blackman MD  Referring Physician:  Nyasia Blackman MD  Group:  [facility]    IMPRESSIONS:  Technical quality: Good  Cardiac rhythm: Sinus    1  Mild concentric left ventricular hypertrophy, normal left ventricular systolic and diastolic function  EF around 64%  2  Normal right ventricular size and systolic function  3  Normal left and right atrial size  4  Aortic valve sclerosis, no aortic valve stenosis or regurgitation  5  Mild mitral valve sclerosis, trace to mild mitral valve regurgitation  6  Trace tricuspid valve regurgitation  7  No obvious pulmonary hypertension  8  No pericardial effusion  Compared to report of previous echocardiogram from 2020 there is overall no significant change  SUMMARY    LEFT VENTRICLE:  Normal left ventricular cavity size, mild concentric left ventricular hypertrophy, normal left ventricular systolic function, normal regional wall motion  Ejection fraction is estimated as around 64%  Normal left ventricular diastolic  function  RIGHT VENTRICLE:  Normal right ventricular size and systolic function  Normal estimated right ventricular systolic pressure, 17 mmHg      LEFT ATRIUM:  Normal left atrial cavity size  Intact interatrial septum  RIGHT ATRIUM:  Normal right atrial cavity size  MITRAL VALVE:  Mild mitral valve leaflet thickening and sclerosis, adequate leaflet mobility  Trace to mild mitral valve regurgitation    AORTIC VALVE:  Tricuspid aortic valve with mild sclerosis, adequate cuspal separation  No aortic valve stenosis or regurgitation  TRICUSPID VALVE:  Normal tricuspid valve morphology and leaflet separation  Trace tricuspid valve regurgitation  PULMONIC VALVE:  No significant pulmonic valve regurgitation  AORTA:  Aortic root and proximal ascending aorta are normal in size on 2D imaging  IVC, HEPATIC VEINS:  Inferior vena cava is normal in size and demonstrates appropriate respiratory phasic changes in diameter  PERICARDIUM:  No pericardial effusion  HISTORY: PRIOR HISTORY: Risk factors: diabetes  PROCEDURE: The study was performed in the [facility]  This was a routine study  The transthoracic approach was used  The study included complete 2D imaging, M-mode, complete spectral Doppler, and color Doppler  The heart rate was 79  bpm, at the start of the study  Images were obtained from the parasternal, apical, subcostal, and suprasternal notch acoustic windows  Image quality was adequate  LEFT VENTRICLE: Normal left ventricular cavity size, mild concentric left ventricular hypertrophy, normal left ventricular systolic function, normal regional wall motion  Ejection fraction is estimated as around 64%  Normal left  ventricular diastolic function  RIGHT VENTRICLE: Normal right ventricular size and systolic function  Normal estimated right ventricular systolic pressure, 17 mmHg  LEFT ATRIUM: Normal left atrial cavity size  Intact interatrial septum  RIGHT ATRIUM: Normal right atrial cavity size  MITRAL VALVE: Mild mitral valve leaflet thickening and sclerosis, adequate leaflet mobility   Trace to mild mitral valve regurgitation    AORTIC VALVE: Tricuspid aortic valve with mild sclerosis, adequate cuspal separation  No aortic valve stenosis or regurgitation  TRICUSPID VALVE: Normal tricuspid valve morphology and leaflet separation  Trace tricuspid valve regurgitation  PULMONIC VALVE: No significant pulmonic valve regurgitation  PERICARDIUM: No pericardial effusion  AORTA: Aortic root and proximal ascending aorta are normal in size on 2D imaging  SYSTEMIC VEINS: IVC: Inferior vena cava is normal in size and demonstrates appropriate respiratory phasic changes in diameter  SYSTEM MEASUREMENT TABLES    2D  %FS: 36 9 %  Ao Diam: 2 86 cm  EDV(Teich): 46 93 ml  EF(Teich): 67 96 %  ESV(Teich): 15 04 ml  IVSd: 1 07 cm  LA Area: 15 62 cm2  LA Diam: 3 66 cm  LVEDV MOD A4C: 82 01 ml  LVEF MOD A4C: 63 87 %  LVESV MOD A4C: 29 63 ml  LVIDd: 3 39 cm  LVIDs: 2 14 cm  LVLd A4C: 8 44 cm  LVLs A4C: 6 89 cm  LVPWd: 1 14 cm  RA Area: 12 23 cm2  RVIDd: 2 91 cm  SV MOD A4C: 52 38 ml  SV(Teich): 31 9 ml    CW  TR Vmax: 1 38 m/s  TR maxP 57 mmHg    MM  TAPSE: 2 3 cm    PW  E' Sept: 0 08 m/s  E/E' Sept: 8 32  MV A Tha: 0 76 m/s  MV Dec Towns: 3 87 m/s2  MV DecT: 181 59 ms  MV E Tha: 0 7 m/s  MV E/A Ratio: 0 92  MV PHT: 52 66 ms  MVA By PHT: 4 18 cm2    [facility]    Prepared and electronically signed by    Marshal Hardy MD  Signed 11-May-2021 12:51:04        *-*-*-*-*-*-*-*-*-*-*-*-*-*-*-*-*-*-*-*-*-*-*-*-*-*-*-*-*-*-*-*-*-*-*-*-*-*-*-*-*-*-*-*-*-*-*-*-*-*-*-*-*-*-  SIGNATURES:   [unfilled]   Faye Mar MD; [facility]    *-*-*-*-*-*-*-*-*-*-*-*-*-*-*-*-*-*-*-*-*-*-*-*-*-*-*-*-*-*-*-*-*-*-*-*-*-*-*-*-*-*-*-*-*-*-*-*-*-*-*-*-*-*-  PAST MEDICAL HISTORY:  Past Medical History:   Diagnosis Date    Diabetes mellitus ([facility] [address] )     OCD (obsessive compulsive disorder)     PAST SURGICAL HISTORY:  Past Surgical History:   Procedure Laterality Date    CARPECTOMY HAND Bilateral          FAMILY HISTORY:  No family history on file  SOCIAL HISTORY:  Social History     Tobacco Use   Smoking Status Never Smoker   Smokeless Tobacco Never Used      Social History     Substance and Sexual Activity   Alcohol Use Yes     Social History     Substance and Sexual Activity   Drug Use Never    [id number]     *-*-*-*-*-*-*-*-*-*-*-*-*-*-*-*-*-*-*-*-*-*-*-*-*-*-*-*-*-*-*-*-*-*-*-*-*-*-*-*-*-*-*-*-*-*-*-*-*-*-*-*-*-*  ALLERGIES:  Allergies   Allergen Reactions    Metformin GI Intolerance    Rosuvastatin GI Intolerance    CURRENT SCHEDULED MEDICATIONS:    Current Outpatient Medications:     aspirin 81 mg chewable tablet, Chew 1 tablet (81 mg total) daily, Disp: 30 tablet, Rfl: 11    clopidogrel (PLAVIX) 75 mg tablet, TAKE 1 TABLET BY MOUTH EVERY DAY, Disp: 90 tablet, Rfl: 3    co-enzyme Q-10 30 MG capsule, Take 30 mg by mouth 3 (three) times a day, Disp: , Rfl:     pravastatin (PRAVACHOL) 40 mg tablet, TAKE 1 TABLET BY MOUTH EVERY DAY, Disp: 90 tablet, Rfl: 1    Semaglutide,0 25 or 0 5MG/DOS, (Ozempic, 0 25 or 0 5 MG/DOSE,) 2 MG/1 5ML SOPN, Inject 0 5 mg under the skin, Disp: , Rfl:     sertraline (ZOLOFT) 100 mg tablet, Take 50 mg by mouth daily  , Disp: , Rfl:      *-*-*-*-*-*-*-*-*-*-*-*-*-*-*-*-*-*-*-*-*-*-*-*-*-*-*-*-*-*-*-*-*-*-*-*-*-*-*-*-*-*-*-*-*-*-*-*-*-*-*-*-*-*

## 2022-05-10 ENCOUNTER — HOSPITAL ENCOUNTER (OUTPATIENT)
Dept: NUCLEAR MEDICINE | Facility: HOSPITAL | Age: 62
Discharge: HOME | End: 2022-05-10
Attending: INTERNAL MEDICINE
Payer: COMMERCIAL

## 2022-05-10 ENCOUNTER — HOSPITAL ENCOUNTER (OUTPATIENT)
Dept: NON INVASIVE DIAGNOSTICS | Facility: HOSPITAL | Age: 62
Discharge: HOME | End: 2022-05-10
Attending: INTERNAL MEDICINE
Payer: COMMERCIAL

## 2022-05-10 VITALS
DIASTOLIC BLOOD PRESSURE: 78 MMHG | HEART RATE: 87 BPM | BODY MASS INDEX: 24.29 KG/M2 | WEIGHT: 164 LBS | SYSTOLIC BLOOD PRESSURE: 126 MMHG | HEIGHT: 69 IN | OXYGEN SATURATION: 99 %

## 2022-05-10 DIAGNOSIS — Z98.61 HISTORY OF PERCUTANEOUS CORONARY INTERVENTION: ICD-10-CM

## 2022-05-10 DIAGNOSIS — I25.10 CORONARY ARTERY DISEASE INVOLVING NATIVE CORONARY ARTERY OF NATIVE HEART WITHOUT ANGINA PECTORIS: ICD-10-CM

## 2022-05-10 DIAGNOSIS — R06.02 SOB (SHORTNESS OF BREATH): ICD-10-CM

## 2022-05-10 LAB
CHEST PAIN STATEMENT: NORMAL
MAX DIASTOLIC BP: 68 MMHG
MAX HEART RATE: 146 BPM
MAX PREDICTED HEART RATE: 158 BPM
MAX. SYSTOLIC BP: 140 MMHG
PROTOCOL NAME: NORMAL
REASON FOR TERMINATION: NORMAL
TARGET HR FORMULA: NORMAL
TIME IN EXERCISE PHASE: NORMAL

## 2022-05-10 PROCEDURE — 78452 HT MUSCLE IMAGE SPECT MULT: CPT

## 2022-05-10 PROCEDURE — 93017 CV STRESS TEST TRACING ONLY: CPT

## 2022-05-10 PROCEDURE — A9502 TC99M TETROFOSMIN: HCPCS

## 2022-05-10 RX ADMIN — REGADENOSON 0.4 MG: 0.08 INJECTION, SOLUTION INTRAVENOUS at 10:07

## 2022-05-11 LAB
MAX HR PERCENT: 92 %
NUC STRESS EJECTION FRACTION: 65 %
RATE PRESSURE PRODUCT: NORMAL
SL CV REST NUCLEAR ISOTOPE DOSE: 10.3 MCI
SL CV STRESS NUCLEAR ISOTOPE DOSE: 31.2 MCI
SL CV STRESS RECOVERY BP: NORMAL MMHG
SL CV STRESS RECOVERY HR: 105 BPM
SL CV STRESS RECOVERY O2 SAT: 98 %
STRESS ANGINA INDEX: 0
STRESS BASELINE BP: NORMAL MMHG
STRESS BASELINE HR: 87 BPM
STRESS DUKE TREADMILL SCORE: 3
STRESS O2 SAT REST: 99 %
STRESS PEAK HR: 146 BPM
STRESS PERCENT HR: 92 %
STRESS POST ESTIMATED WORKLOAD: 1.5 METS
STRESS POST EXERCISE DUR MIN: 3 MIN
STRESS POST EXERCISE DUR SEC: 0 SEC
STRESS POST O2 SAT PEAK: 99 %
STRESS POST PEAK BP: 140 MMHG
STRESS ST DEPRESSION: 0 MM
STRESS TARGET HR: 146 BPM
STRESS/REST PERFUSION RATIO: 1.16

## 2022-05-11 PROCEDURE — 93016 CV STRESS TEST SUPVJ ONLY: CPT | Performed by: INTERNAL MEDICINE

## 2022-05-11 PROCEDURE — 78452 HT MUSCLE IMAGE SPECT MULT: CPT | Performed by: INTERNAL MEDICINE

## 2022-05-11 PROCEDURE — 93018 CV STRESS TEST I&R ONLY: CPT | Performed by: INTERNAL MEDICINE

## 2022-05-26 ENCOUNTER — APPOINTMENT (OUTPATIENT)
Dept: RADIOLOGY | Facility: MEDICAL CENTER | Age: 62
End: 2022-05-26
Payer: COMMERCIAL

## 2022-05-26 ENCOUNTER — OFFICE VISIT (OUTPATIENT)
Dept: URGENT CARE | Facility: MEDICAL CENTER | Age: 62
End: 2022-05-26
Payer: COMMERCIAL

## 2022-05-26 VITALS
HEART RATE: 69 BPM | DIASTOLIC BLOOD PRESSURE: 72 MMHG | SYSTOLIC BLOOD PRESSURE: 124 MMHG | OXYGEN SATURATION: 98 % | WEIGHT: 164 LBS | RESPIRATION RATE: 18 BRPM | TEMPERATURE: 98.2 F | BODY MASS INDEX: 24.22 KG/M2

## 2022-05-26 DIAGNOSIS — M54.50 LUMBAR PAIN: Primary | ICD-10-CM

## 2022-05-26 DIAGNOSIS — M54.31 SCIATICA OF RIGHT SIDE: ICD-10-CM

## 2022-05-26 DIAGNOSIS — M54.50 LUMBAR PAIN: ICD-10-CM

## 2022-05-26 PROCEDURE — 72110 X-RAY EXAM L-2 SPINE 4/>VWS: CPT

## 2022-05-26 PROCEDURE — G0382 LEV 3 HOSP TYPE B ED VISIT: HCPCS | Performed by: FAMILY MEDICINE

## 2022-05-26 RX ORDER — METHOCARBAMOL 500 MG/1
500 TABLET, FILM COATED ORAL
Qty: 20 TABLET | Refills: 0 | Status: SHIPPED | OUTPATIENT
Start: 2022-05-26

## 2022-05-26 RX ORDER — CHOLECALCIFEROL (VITAMIN D3) 1250 MCG
CAPSULE ORAL
COMMUNITY
Start: 2022-04-23

## 2022-05-26 NOTE — PATIENT INSTRUCTIONS
Lumbar x-ray revealed mild degenerative changes  No fracture subluxation observed  Official radiology interpretation is still pending  However based on history I suspect there is some mild sciatica on the right side  I prescribed Robaxin 500 mg at bedtime  Patient warned about somnolence  Advised patient to consider taking NSAIDs throughout the day  Also recommended alternate between ice and heat 15-30 minutes as often as needed  He was also given outpatient physical therapy referral   Sciatica   WHAT YOU NEED TO KNOW:   Sciatica is a condition that causes pain along your sciatic nerve  The sciatic nerve runs from your spine through both sides of your buttocks  It then runs down the back of your thigh, into your lower leg and foot  Your sciatic nerve may be compressed, inflamed, irritated, or stretched  DISCHARGE INSTRUCTIONS:   Medicines:   NSAIDs:  These medicines decrease swelling and pain  NSAIDs are available without a doctor's order  Ask your healthcare provider which medicine is right for you  Ask how much to take and when to take it  Take as directed  NSAIDs can cause stomach bleeding or kidney problems if not taken correctly  Acetaminophen: This medicine decreases pain  Acetaminophen is available without a doctor's order  Ask how much to take and when to take it  Follow directions  Acetaminophen can cause liver damage if not taken correctly  Muscle relaxers  help decrease pain and muscle spasms  Take your medicine as directed  Contact your healthcare provider if you think your medicine is not helping or if you have side effects  Tell him of her if you are allergic to any medicine  Keep a list of the medicines, vitamins, and herbs you take  Include the amounts, and when and why you take them  Bring the list or the pill bottles to follow-up visits  Carry your medicine list with you in case of an emergency      Follow up with your doctor as directed:  Write down your questions so you remember to ask them during your visits  Manage your symptoms:   Activity:  Decrease your activity  Do not lift heavy objects or twist your back for at least 6 weeks  Slowly return to your usual activity  Ice:  Ice helps decrease swelling and pain  Ice may also help prevent tissue damage  Use an ice pack, or put crushed ice in a plastic bag  Cover it with a towel and place it on your low back or leg for 15 to 20 minutes every hour or as directed  Heat:  Heat helps decrease pain and muscle spasms  Apply heat on the area for 20 to 30 minutes every 2 hours for as many days as directed  Physical therapy:  You may need to see physical therapist to teach you exercises to help improve movement and strength, and to decrease pain  An occupational therapist teaches you skills to help with your daily activities  Use assistive devices if directed: You may need to wear back support, such as a back brace  You may need crutches, a cane, or a walker to decrease stress on your lower back and leg muscles  Ask your healthcare provider for more information about assistive devices and how to use them correctly  Self-care: Avoid pressure on your back and legs:  Do not  lift heavy objects, or stand or sit for long periods of time  Lift objects safely:  Keep your back straight and bend your knees when you  an object  Do not bend or twist your back when you lift  Maintain a healthy weight:  Ask your healthcare provider how much you should weigh  Ask him to help you create a weight loss plan if you are overweight  Exercise:  Ask your healthcare provider about the best stretching, warmup, and exercise plan for you  Contact your healthcare provider if:   You have pain in your lower back at night or when resting  You have pain in your lower back with numbness below the knee  You have weakness in one leg only  You have questions or concerns about your condition or care      Return to the emergency department if:   You have trouble holding back your urine or bowel movements  You have weakness in both legs  You have numbness in your groin or buttocks  © Copyright [address] 2022 Information is for End User's use only and may not be sold, redistributed or otherwise used for commercial purposes  All illustrations and images included in CareNotes® are the copyrighted property of [employer]  or [employer]   The above information is an  only  It is not intended as medical advice for individual conditions or treatments  Talk to your doctor, nurse or pharmacist before following any medical regimen to see if it is safe and effective for you  Low Back Strain   WHAT YOU NEED TO KNOW:   Low back strain is an injury to your lower back muscles or tendons  Tendons are strong tissues that connect muscles to bones  The lower back supports most of your body weight and helps you move, twist, and bend  DISCHARGE INSTRUCTIONS:   Return to the emergency department if:   You hear or feel a pop in your lower back  You have increased swelling or pain in your lower back  You have trouble moving your legs  Your legs are numb  Call your doctor if:   You have a fever  Your pain does not go away, even after treatment  You have questions or concerns about your condition or care  Medicines: The following medicines may be ordered by your healthcare provider:  Acetaminophen  decreases pain and fever  It is available without a doctor's order  Ask how much to take and how often to take it  Follow directions  Read the labels of all other medicines you are using to see if they also contain acetaminophen, or ask your doctor or pharmacist  Acetaminophen can cause liver damage if not taken correctly  Do not use more than 4 grams (4,000 milligrams) total of acetaminophen in one day  NSAIDs , such as ibuprofen, help decrease swelling, pain, and fever   This medicine is available with or without a doctor's order  NSAIDs can cause stomach bleeding or kidney problems in certain people  If you take blood thinner medicine, always ask your healthcare provider if NSAIDs are safe for you  Always read the medicine label and follow directions  Muscle relaxers  help decrease pain and muscle spasms  Prescription pain medicine  may be given  Ask your healthcare provider how to take this medicine safely  Some prescription pain medicines contain acetaminophen  Do not take other medicines that contain acetaminophen without talking to your healthcare provider  Too much acetaminophen may cause liver damage  Prescription pain medicine may cause constipation  Ask your healthcare provider how to prevent or treat constipation  Take your medicine as directed  Contact your healthcare provider if you think your medicine is not helping or if you have side effects  Tell him or her if you are allergic to any medicine  Keep a list of the medicines, vitamins, and herbs you take  Include the amounts, and when and why you take them  Bring the list or the pill bottles to follow-up visits  Carry your medicine list with you in case of an emergency  Self-care:   Rest  as directed  You may need to rest in bed for a period of time after your injury  Do not lift heavy objects  Apply ice  on your back for 15 to 20 minutes every hour or as directed  Use an ice pack, or put crushed ice in a plastic bag  Cover it with a towel  Ice helps prevent tissue damage and decreases swelling and pain  Apply heat  on your lower back for 20 to 30 minutes every 2 hours for as many days as directed  Heat helps decrease pain and muscle spasms  Slowly start to increase your activity  as the pain decreases, or as directed  Prevent another low back strain:   Use correct body movements  Bend at the hips and knees when you  objects  Do not bend from the waist  Use your leg muscles as you lift the load   Do not use your back  Keep the object close to your chest as you lift it  Try not to twist or lift anything above your waist          Change your position often when you stand for long periods of time  Rest one foot on a small box or footrest, and then switch to the other foot often  Try not to sit for long periods of time  When you do, sit in a straight-backed chair with your feet flat on the floor  Never reach, pull, or push while you are sitting  Warm up before you exercise  Do exercises that strengthen your back muscles  Ask your healthcare provider about the best exercise plan for you  Maintain a healthy weight  Ask your healthcare provider how much you should weigh  Ask him to help you create a weight loss plan if you are overweight  © Copyright [employer] 2022 Information is for End User's use only and may not be sold, redistributed or otherwise used for commercial purposes  All illustrations and images included in CareNotes® are the copyrighted property of [employer]  or [employer]   The above information is an  only  It is not intended as medical advice for individual conditions or treatments  Talk to your doctor, nurse or pharmacist before following any medical regimen to see if it is safe and effective for you

## 2022-05-26 NOTE — PROGRESS NOTES
[facility]        NAME: Lilliana Adkins is a 60 y o  male  : 1960    MRN: [id number]  DATE: May 26, 2022  TIME: 9:25 AM    Assessment and Plan   Lumbar pain [M54 50]  1  Lumbar pain  XR spine lumbar minimum 4 views non injury    methocarbamol (ROBAXIN) 500 mg tablet    Ambulatory Referral to Physical Therapy   2  Sciatica of right side  methocarbamol (ROBAXIN) 500 mg tablet    Ambulatory Referral to Physical Therapy         Patient Instructions       Follow up with PCP in 3-5 days  Proceed to  ER if symptoms worsen  Chief Complaint     Chief Complaint   Patient presents with    Back Pain     Patient C/O lower back pain x 3 month that has radiated to his groin for the last 3 weeks  History of Present Illness       59-year-old male here today with complaint lumbar pain for the last 3 or 4 months  Pain seems to be across the lumbar area  At times it radiates into his right groin  Symptoms seem to be aggravated by bending walking feels better if he lies supine  Denies in the back injuries or trauma  Applying ice seems to help  He is taking some ibuprofen with no improvement  He is currently seeing a chiropractor and has had 3 treatments so far with no improvement  Pain at rest is 0/10 but increases to 9/10 with activities  Review of Systems   Review of Systems   Constitutional: Negative  Musculoskeletal: Positive for back pain           Current Medications       Current Outpatient Medications:     methocarbamol (ROBAXIN) 500 mg tablet, Take 1 tablet (500 mg total) by mouth daily at bedtime as needed for muscle spasms, Disp: 20 tablet, Rfl: 0    aspirin 81 mg chewable tablet, Chew 1 tablet (81 mg total) daily, Disp: 30 tablet, Rfl: 11    Cholecalciferol (Vitamin D3) 1 25 MG (96371 UT) CAPS, TAKE 1 CAPSULE BY MOUTH ONE TIME PER WEEK, Disp: , Rfl:     clopidogrel (PLAVIX) 75 mg tablet, TAKE 1 TABLET BY MOUTH EVERY DAY, Disp: 90 tablet, Rfl: 3    co-enzyme Q-10 30 MG capsule, Take 30 mg by mouth 3 (three) times a day, Disp: , Rfl:     pravastatin (PRAVACHOL) 40 mg tablet, TAKE 1 TABLET BY MOUTH EVERY DAY, Disp: 90 tablet, Rfl: 1    Semaglutide,0 25 or 0 5MG/DOS, (Ozempic, 0 25 or 0 5 MG/DOSE,) 2 MG/1 5ML SOPN, Inject 0 5 mg under the skin, Disp: , Rfl:     sertraline (ZOLOFT) 100 mg tablet, Take 50 mg by mouth daily  , Disp: , Rfl:     Current Allergies     Allergies as of 05/26/2022 - Reviewed 05/26/2022   Allergen Reaction Noted    Metformin GI Intolerance 07/29/2019    Rosuvastatin GI Intolerance 07/29/2019            The following portions of the patient's history were reviewed and updated as appropriate: allergies, current medications, past family history, past medical history, past social history, past surgical history and problem list      Past Medical History:   Diagnosis Date    Diabetes mellitus ([facility] [address] )     OCD (obsessive compulsive disorder)        Past Surgical History:   Procedure Laterality Date    CARPECTOMY HAND Bilateral        No family history on file  Medications have been verified  Objective   /72   Pulse 69   Temp 98 2 °F (36 8 °C)   Resp 18   Wt 74 4 kg (164 lb)   SpO2 98%   BMI 24 22 kg/m²   No LMP for male patient  Physical Exam     Physical Exam  Vitals and nursing note reviewed  Constitutional:       Appearance: Normal appearance  Musculoskeletal:         General: Tenderness present  Comments: LS spine: Flexion to 45 degrees extension to 20 degrees lateral rotation side bend 30 degrees  For muscles palpated over the right paraspinal muscle  Findings are consistent with muscle spasm that extend all the way up to the thoracic spine  Extremities:  Lower-good strength and tone, 5/5  Gait-normal   Neurological:      Mental Status: He is alert  Comments: Straight leg raise-negative  DTRs-2+  Good tactile sensation of lower extremity

## 2022-06-21 ENCOUNTER — OFFICE VISIT (OUTPATIENT)
Dept: CARDIOLOGY CLINIC | Facility: CLINIC | Age: 62
End: 2022-06-21
Payer: COMMERCIAL

## 2022-06-21 VITALS
SYSTOLIC BLOOD PRESSURE: 110 MMHG | HEIGHT: 69 IN | DIASTOLIC BLOOD PRESSURE: 70 MMHG | WEIGHT: 160.2 LBS | HEART RATE: 74 BPM | BODY MASS INDEX: 23.73 KG/M2

## 2022-06-21 DIAGNOSIS — R53.82 CHRONIC FATIGUE: ICD-10-CM

## 2022-06-21 DIAGNOSIS — I25.10 CORONARY ARTERY DISEASE INVOLVING NATIVE CORONARY ARTERY OF NATIVE HEART WITHOUT ANGINA PECTORIS: Primary | ICD-10-CM

## 2022-06-21 DIAGNOSIS — R06.02 SOB (SHORTNESS OF BREATH): ICD-10-CM

## 2022-06-21 DIAGNOSIS — E78.2 MIXED DYSLIPIDEMIA: ICD-10-CM

## 2022-06-21 DIAGNOSIS — E11.9 TYPE 2 DIABETES MELLITUS WITHOUT COMPLICATION, WITHOUT LONG-TERM CURRENT USE OF INSULIN (HCC): ICD-10-CM

## 2022-06-21 PROCEDURE — 99214 OFFICE O/P EST MOD 30 MIN: CPT | Performed by: INTERNAL MEDICINE

## 2022-06-21 RX ORDER — SEMAGLUTIDE 1.34 MG/ML
INJECTION, SOLUTION SUBCUTANEOUS
COMMUNITY
Start: 2022-06-10

## 2022-06-21 NOTE — PATIENT INSTRUCTIONS
CARDIOLOGY ASSESSMENT & PLAN     1  Coronary artery disease involving native coronary artery of native heart without angina pectoris     2  Mixed dyslipidemia     3  Type 2 diabetes mellitus without complication, without long-term current use of insulin ([address] )     4  SOB (shortness of breath)     5  Chronic fatigue       CAD, S/P MI June 2020, s/p PCI D1 July 2020  Mr Dilia Penaloza continues to experience fatigue and exertional shortness of breath though description and does not suggest angina  He had a nuclear stress test recently which demonstrated normal myocardial perfusion with no ECG changes and no angina noted during the stress test   His left ventricular systolic function was normal and there was no regional wall motion abnormality  He did have sinus tachycardia response to low-level physical activity suggesting possible physical deconditioning  He does have suboptimally controlled diabetes  He has been intolerant to beta-blocker use due to fatigue  He has been intolerant to statin therapy for similar symptom of fatigue  -- at this time I am advising him to continue current medications and to gradually increase physical activity  He does not have to exert himself but he should do more walking and gradually increase the time spent on this effort  -- I would also like him to be worked up for possible medication related increased fatigue  His diabetes medicine and Zoloft has been associated with fatigue in about 11 to 15% patient's  May also on need workup for possible underlying iron and vitamin deficiency that can also produce similar symptoms  He may also been need workup for possible fibromyalgia  -- today we reviewed the warning signs of angina  If symptoms become worse or persist and there is no other explanation then the next step would be to do a cardiac catheterization  -- I would see him back in 6 months time  Earlier if necessary

## 2022-06-21 NOTE — PROGRESS NOTES
[facility]  [address] [facility], Stephanie [place]  Phone#  [phone]  Fax#  [phone]  *-*-*-*-*-*-*-*-*-*-*-*-*-*-*-*-*-*-*-*-*-*-*-*-*-*-*-*-*-*-*-*-*-*-*-*-*-*-*-*-*-*-*-*-*-*-*-*-*-*-*-*-*-*  ENCOUNTER DATE: 06/21/22 4:11 PM  PATIENT NAME: Josh Roldan   1960    [id number]  AGE:62 y o  SEX: male  ENCOUNTER PROVIDER:Faye Mar MD     PRIMARY CARE PHYSICIAN: Avery Crespo    DIAGNOSES:   1  One-vessel coronary artery disease status post ACS-NSTEMI July 2020, status post PCI of diagonal branch on July 13, 2020    2  Normal left ventricular systolic function  3  Diabetes mellitus with poor glycemic control with last hemoglobin see of 8 4   4  Dyslipidemia   5  Family history premature CAD with brother having MI at 48 years  6  Remote history of ITP   7  History of trigger finger   8  History of OCD     REGADENOSON NUCLEAR STRESS TEST May 2022:  1  Negative regadenoson nuclear stress test for symptoms of angina pectoris and negative for ECG changes of ischemia  2  Normal myocardial perfusion scan with no evidence of reversible or fixed perfusion abnormality  3  Normal left ventricular systolic function and normal regional wall motion  Post-stress left ventricular ejection fraction was determined as 65%  4  Normal resting blood pressure and appropriate blood pressure response to exercise  5  Nonspecific resting ECG abnormality  There was sinus tachycardia response to low-level physical activity suggesting possible physical deconditioning  6  There was an incidental finding relatively nonfocal soft tissue uptake of tracer in left shoulder/axillary/upper back region  This may represent an inflammation or possible muscle strain  Clinical correlation is recommended  EXTENDED HOLTER MONITOR November 2021: Patient was monitored for 13 days 23 hours  Average heart rate was 87, minimum heart rate was 58  There was 1 run of ventricular tachycardia involving 6 beats  There were 14 runs of nonsustained SVT with longest run lasting 9 beats at 245 beats per minute  Overall ventricular ectopy burden was less than 1%  There were no diary entries but there were triggered events which correlated with sinus rhythm and sinus tachycardia  ECHOCARDIOGRAM May 11, 2021:   1  Mild concentric left ventricular hypertrophy, normal left ventricular systolic and diastolic function  EF around 64%  2  Normal right ventricular size and systolic function  3  Normal left and right atrial size  4  Aortic valve sclerosis, no aortic valve stenosis or regurgitation  5  Mild mitral valve sclerosis, trace to mild mitral valve regurgitation  6  Trace tricuspid valve regurgitation  7  No obvious pulmonary hypertension  8  No pericardial effusion          CARDIAC CATHETERIZATION JULY 13, 2020: The coronary circulation is right dominant  -- There was 1-vessel coronary artery disease  -- Left main: Angiography showed minor luminal irregularities  -- LAD: Angiography showed minor luminal irregularities  -- 1st diagonal:   -- 1st diagonal: There was a 95 % stenosis  There was LEA grade 2 flow through the vessel (partial perfusion)  This lesion is a likely culprit for the patient's recent myocardial infarction  It appears amenable to percutaneous   intervention  -- Circumflex: Angiography showed minor luminal irregularities  -- Proximal RCA: There was a 50 % stenosis  ECHOCARDIOGRAM JULY 13, 2020:   Technical quality: Good   Cardiac rhythm: Normal sinus     1  Normal left ventricular size and systolic and diastolic function, EF around 63%  2  Normal right ventricular size and systolic function  4  Normal left and right atrial size  5  Mild aortic valve sclerosis, no aortic valve stenosis or regurgitation  6  Trace mitral and tricuspid valve regurgitation  7  No obvious pulmonary hypertension  8  No pericardial effusion  Holter monitor July 2020:   1   Holter monitor reveals the underlying rhythm is sinus rhythm with an average heart rate of 73 beats per minute, a minimum heart rate of 54 beats per minute and a maximum heart rate of 121 beats per minute  2  There are rare ventricular ectopy comprised of isolated VPCs   3  There are rare supraventricular ectopy comprised of isolated APCs   4  There is no evidence of significant bradyarrhythmia or advanced heart block  The longest R to R was 1 4 seconds  5  Patient diary did not report any symptoms  CURRENT ECG   No results found for this visit on 06/21/22  CARDIOLOGY ASSESSMENT & PLAN     1  Coronary artery disease involving native coronary artery of native heart without angina pectoris     2  Mixed dyslipidemia     3  Type 2 diabetes mellitus without complication, without long-term current use of insulin ([facility] [address] )     4  SOB (shortness of breath)     5  Chronic fatigue       CAD, S/P MI June 2020, s/p PCI D1 July 2020  Mr Sugey Corbin continues to experience fatigue and exertional shortness of breath though description and does not suggest angina  He had a nuclear stress test recently which demonstrated normal myocardial perfusion with no ECG changes and no angina noted during the stress test   His left ventricular systolic function was normal and there was no regional wall motion abnormality  He did have sinus tachycardia response to low-level physical activity suggesting possible physical deconditioning  He does have suboptimally controlled diabetes  He has been intolerant to beta-blocker use due to fatigue  He has been intolerant to statin therapy for similar symptom of fatigue  -- at this time I am advising him to continue current medications and to gradually increase physical activity  He does not have to exert himself but he should do more walking and gradually increase the time spent on this effort    -- I would also like him to be worked up for possible medication related increased fatigue  His diabetes medicine and Zoloft has been associated with fatigue in about 11 to 15% patient's  May also on need workup for possible underlying iron and vitamin deficiency that can also produce similar symptoms  He may also been need workup for possible fibromyalgia  -- today we reviewed the warning signs of angina  If symptoms become worse or persist and there is no other explanation then the next step would be to do a cardiac catheterization  -- I would see him back in 6 months time  Earlier if necessary  INTERVAL HISTORY & HISTORY OF PRESENT ILLNESS     Mukul Irelandjanny Beth is here for follow-up regarding his cardiac comorbidities which include: Coronary artery disease with history of MI and PCI in 2020, dyslipidemia and other comorbidities  He is here for follow-up accompanied with his wife  At last visit in April 2022 he had reporting fatigue and progressive shortness of breath with physical activity  We recommended nuclear stress test which has since been completed  Today he reports that his symptoms persist   Denies symptoms at rest but when he exerts himself he feels fatigued and tired  Denies orthopnea or PND  Denies diaphoresis or chest tightness  Has had no pedal edema  Reports occasional palpitations and she he feels short of breath when he gets palpitations  Functional capacity status: Moderately decreased  He continues to function as a  but he reports finding difficulty  (Excellent- >10 METs; Good: (7-10 METs); Moderate (4-7 METs); Poor (<= 4 METs)    Any chronic stressors:  None   (feeling of poor health, financial problems, and social isolation etc)  Tobacco or alcohol dependence:  He does not smoke  He does not drink  Current cardiac medications:   Aspirin 81 mg daily, clopidogrel 75 mg daily, pravastatin 40 mg daily, coenzyme Q time  Was changed from Lipitor to pravastatin due to fatigue and muscle aches      Blood work done at [facility] on April 14, 2022 shows normal renal function and electrolytes, normal liver function tests, hemoglobin A1c is 8 9, previous [place] was 7 6 in August 2020  Lipid profile in January 2022 showed total cholesterol 181, directed HDL 65, triglyceride 105, calculated LDL 96 last TSH was 1 38 in August 2021  REVIEW OF SYSTEMS   Positive for:  As noted above in HPI  Negative for: All remaining as reviewed below and in HPI  SYSTEM SYMPTOMS REVIEWED:  General--weight change, fever, night sweats  Respiratory--cough, wheezing, shortness of breath, sputum production  Cardiovascular--chest pain, syncope, dyspnea on exertion, edema, decline in exercise tolerance, claudication   Gastrointestinal--persistent vomiting, diarrhea, abdominal distention, blood in stool   Muscular or skeletal--joint pain or swelling   Neurologic--headaches, syncope, abnormal movement  Hematologic--history of easy bruising and bleeding   Endocrine--thyroid enlargement, heat or cold intolerance, polyuria   Psychiatric--anxiety, depression     *-*-*-*-*-*-*-*-*-*-*-*-*-*-*-*-*-*-*-*-*-*-*-*-*-*-*-*-*-*-*-*-*-*-*-*-*-*-*-*-*-*-*-*-*-*-*-*-*-*-*-*-*-*-  VITAL SIGNS     CURRENT VITAL SIGNS:   Vitals:    06/21/22 1524   BP: 110/70   BP Location: Right arm   Patient Position: Sitting   Cuff Size: Adult   Pulse: 74   Weight: 72 7 kg (160 lb 3 2 oz)   Height: 5' 9" (1 753 m)       BMI: Body mass index is 23 66 kg/m²      WEIGHTS:   Wt Readings from Last 25 Encounters:   06/21/22 72 7 kg (160 lb 3 2 oz)   05/26/22 74 4 kg (164 lb)   05/10/22 74 4 kg (164 lb)   04/26/22 74 5 kg (164 lb 3 2 oz)   10/13/21 73 9 kg (163 lb)   08/18/21 73 4 kg (161 lb 12 8 oz)   10/20/20 71 5 kg (157 lb 9 6 oz)   07/21/20 73 5 kg (162 lb)   07/12/20 75 6 kg (166 lb 10 7 oz)   05/08/20 77 1 kg (170 lb)   02/13/19 77 1 kg (170 lb)   05/29/15 79 8 kg (176 lb)   05/06/15 79 6 kg (175 lb 6 1 oz)   10/16/14 80 7 kg (178 lb)   05/27/14 82 1 kg (181 lb)   05/07/14 81 9 kg (180 lb 8 oz)   04/23/14 82 3 kg (181 lb 8 oz)   04/01/14 81 4 kg (179 lb 8 oz)   12/17/13 81 6 kg (180 lb)   11/25/13 79 kg (174 lb 4 oz)   08/19/13 80 5 kg (177 lb 8 oz)   08/21/12 80 4 kg (177 lb 3 oz)   06/08/12 81 6 kg (180 lb)   05/09/12 80 9 kg (178 lb 4 oz)        *-*-*-*-*-*-*-*-*-*-*-*-*-*-*-*-*-*-*-*-*-*-*-*-*-*-*-*-*-*-*-*-*-*-*-*-*-*-*-*-*-*-*-*-*-*-*-*-*-*-*-*-*-*-  PHYSICAL EXAM     General Appearance:    Alert, cooperative, no distress, appears stated age   Head, Eyes, ENT:    No obvious abnormality, moist mucous mebranes  Neck:   Supple, no carotid bruit or JVD   Back:     Symmetric, no curvature  Lungs:     Respirations unlabored  Clear to auscultation bilaterally,    Chest wall:    No tenderness or deformity   Heart:    Regular rate and rhythm, S1 and S2 normal, no murmur, rub  or gallop  Abdomen:     Soft, non-tender, No obvious masses, or organomegaly   Extremities:   Extremities warm, no cyanosis or edema    Skin:   no venostatic changes in lower extremities  Normal skin color, texture, and turgor   No rashes or lesions     *-*-*-*-*-*-*-*-*-*-*-*-*-*-*-*-*-*-*-*-*-*-*-*-*-*-*-*-*-*-*-*-*-*-*-*-*-*-*-*-*-*-*-*-*-*-*-*-*-*-*-*-*-*-  CURRENT MEDICATIONS LIST     Current Outpatient Medications:     aspirin 81 mg chewable tablet, Chew 1 tablet (81 mg total) daily, Disp: 30 tablet, Rfl: 11    Cholecalciferol (Vitamin D3) 1 25 MG (03061 UT) CAPS, TAKE 1 CAPSULE BY MOUTH ONE TIME PER WEEK, Disp: , Rfl:     clopidogrel (PLAVIX) 75 mg tablet, TAKE 1 TABLET BY MOUTH EVERY DAY, Disp: 90 tablet, Rfl: 3    co-enzyme Q-10 30 MG capsule, Take 30 mg by mouth 3 (three) times a day, Disp: , Rfl:     methocarbamol (ROBAXIN) 500 mg tablet, Take 1 tablet (500 mg total) by mouth daily at bedtime as needed for muscle spasms, Disp: 20 tablet, Rfl: 0    Ozempic, 1 MG/DOSE, 4 MG/3ML SOPN injection pen, INJECT 1 MG UNDER THE SKIN EVERY 7 DAYS, Disp: , Rfl:     pravastatin (PRAVACHOL) 40 mg tablet, TAKE 1 TABLET BY MOUTH EVERY DAY, Disp: 90 tablet, Rfl: 1    sertraline (ZOLOFT) 100 mg tablet, Take 50 mg by mouth daily  , Disp: , Rfl:     Semaglutide,0 25 or 0 5MG/DOS, 2 MG/1 5ML SOPN, Inject 0 5 mg under the skin (Patient not taking: Reported on 6/21/2022), Disp: , Rfl:        ALLERGIES     Allergies   Allergen Reactions    Metformin GI Intolerance    Rosuvastatin GI Intolerance       *-*-*-*-*-*-*-*-*-*-*-*-*-*-*-*-*-*-*-*-*-*-*-*-*-*-*-*-*-*-*-*-*-*-*-*-*-*-*-*-*-*-*-*-*-*-*-*-*-*-*-*-*-*-  LABORATORY DATA   No results found for: NA  Potassium   Date Value Ref Range Status   08/19/2021 4 1 3 5 - 5 3 mmol/L Final   07/14/2020 4 0 3 5 - 5 3 mmol/L Final   07/13/2020 4 1 3 5 - 5 3 mmol/L Final     Chloride   Date Value Ref Range Status   08/19/2021 102 100 - 108 mmol/L Final   07/14/2020 100 100 - 108 mmol/L Final   07/13/2020 99 (L) 100 - 108 mmol/L Final     CO2   Date Value Ref Range Status   08/19/2021 26 21 - 32 mmol/L Final   07/14/2020 23 21 - 32 mmol/L Final   07/13/2020 26 21 - 32 mmol/L Final     BUN   Date Value Ref Range Status   08/19/2021 19 5 - 25 mg/dL Final   07/14/2020 14 5 - 25 mg/dL Final   07/13/2020 16 5 - 25 mg/dL Final     Creatinine   Date Value Ref Range Status   08/19/2021 1 15 0 60 - 1 30 mg/dL Final     Comment:     Standardized to IDMS reference method   07/14/2020 0 94 0 60 - 1 30 mg/dL Final     Comment:     Standardized to IDMS reference method   07/13/2020 0 98 0 60 - 1 30 mg/dL Final     Comment:     Standardized to IDMS reference method     eGFR   Date Value Ref Range Status   08/19/2021 68 ml/min/1 73sq m Final   07/14/2020 87 ml/min/1 73sq m Final   07/13/2020 83 ml/min/1 73sq m Final     Calcium   Date Value Ref Range Status   08/19/2021 9 0 8 3 - 10 1 mg/dL Final   07/14/2020 8 5 8 3 - 10 1 mg/dL Final   07/13/2020 8 8 8 3 - 10 1 mg/dL Final     AST   Date Value Ref Range Status   08/19/2021 18 5 - 45 U/L Final     Comment:     Specimen collection should occur prior to Sulfasalazine administration due to the potential for falsely depressed results  07/13/2020 43 5 - 45 U/L Final     Comment:       Specimen collection should occur prior to Sulfasalazine administration due to the potential for falsely depressed results  ALT   Date Value Ref Range Status   08/19/2021 29 12 - 78 U/L Final     Comment:     Specimen collection should occur prior to Sulfasalazine administration due to the potential for falsely depressed results  07/13/2020 34 12 - 78 U/L Final     Comment:       Specimen collection should occur prior to Sulfasalazine administration due to the potential for falsely depressed results        Alkaline Phosphatase   Date Value Ref Range Status   08/19/2021 82 46 - 116 U/L Final   07/13/2020 73 46 - 116 U/L Final     Magnesium   Date Value Ref Range Status   08/19/2021 2 1 1 6 - 2 6 mg/dL Final   07/13/2020 1 9 1 6 - 2 6 mg/dL Final     WBC   Date Value Ref Range Status   07/14/2020 8 22 4 31 - 10 16 Thousand/uL Final   07/13/2020 9 77 4 31 - 10 16 Thousand/uL Final   07/12/2020 8 22 4 31 - 10 16 Thousand/uL Final     Hemoglobin   Date Value Ref Range Status   07/14/2020 14 9 12 0 - 17 0 g/dL Final   07/13/2020 15 9 12 0 - 17 0 g/dL Final   07/12/2020 15 4 12 0 - 17 0 g/dL Final     Platelets   Date Value Ref Range Status   07/14/2020 122 (L) 149 - 390 Thousands/uL Final   07/13/2020 132 (L) 149 - 390 Thousands/uL Final   07/12/2020 130 (L) 149 - 390 Thousands/uL Final     PTT   Date Value Ref Range Status   07/13/2020 60 (H) 23 - 37 seconds Final     Comment:     Therapeutic Heparin Range =  60-90 seconds   07/13/2020 76 (H) 23 - 37 seconds Final     Comment:     Therapeutic Heparin Range =  60-90 seconds     INR   Date Value Ref Range Status   07/12/2020 0 90 0 84 - 1 19 Final   07/12/2020 0 88 0 84 - 1 19 Final     No results found for: CKMB, DIGOXIN  No results found for: TSH  No results found for: CHOL   Hemoglobin A1C   Date Value Ref Range Status   08/19/2021 7 6 (H) Normal 3 8-5 6%; PreDiabetic 5 7-6 4%; Diabetic >=6 5%; Glycemic control for adults with diabetes <7 0% % Final     Hgb A1c   Date Value Ref Range Status   2020 7 9 (H) <5 7 % of total Hgb Final     Comment:     For someone without known diabetes, a hemoglobin A1c  value of 6 5% or greater indicates that they may have   diabetes and this should be confirmed with a follow-up   test     For someone with known diabetes, a value <7% indicates   that their diabetes is well controlled and a value   greater than or equal to 7% indicates suboptimal   control  A1c targets should be individualized based on   duration of diabetes, age, comorbid conditions, and   other considerations  Currently, no consensus exists regarding use of  hemoglobin A1c for diagnosis of diabetes for children          No results found for: Shawna Melquiades, GRAMSTAIN, URINECX, WOUNDCULT, BODYFLUIDCUL, MRSACULTURE, INFLUAPCR, INFLUBPCR, RSVPCR, LEGIONELLAUR, CDIFFTOXINB    *-*-*-*-*-*-*-*-*-*-*-*-*-*-*-*-*-*-*-*-*-*-*-*-*-*-*-*-*-*-*-*-*-*-*-*-*-*-*-*-*-*-*-*-*-*-*-*-*-*-*-*-*-*-  PREVIOUS CARDIOLOGY & RADIOLOGY TEST RESULTS   I have personally reviewed pertinent results of cardiovascular tests noted below      Results for orders placed during the hospital encounter of 21    Echo complete with contrast if indicated    Narrative  [address] [place] [address]    Transthoracic Echocardiogram  2D, M-mode, Doppler, and Color Doppler    Study date:  11-May-2021    Patient: Elizabeth Renee  MR number: [id number]  Account number: [de-identified]  : 1960  Age: 63 years  Gender: Male  Status: Outpatient  Location: [facility]  Height: 70 in  Weight: 149 6 lb  BP: 105/ 68 mmHg    Indications: CAD    Diagnoses: I25 10 - Atherosclerotic heart disease of native coronary artery without angina pectoris    Sonographer:  JAMAL Wolf  Interpreting Physician:  Faye Mar MD  Referring Physician:  Yanci Keith MD  Group:  [facility]    IMPRESSIONS:  Technical quality: Good  Cardiac rhythm: Sinus    1  Mild concentric left ventricular hypertrophy, normal left ventricular systolic and diastolic function  EF around 64%  2  Normal right ventricular size and systolic function  3  Normal left and right atrial size  4  Aortic valve sclerosis, no aortic valve stenosis or regurgitation  5  Mild mitral valve sclerosis, trace to mild mitral valve regurgitation  6  Trace tricuspid valve regurgitation  7  No obvious pulmonary hypertension  8  No pericardial effusion  Compared to report of previous echocardiogram from July 13, 2020 there is overall no significant change  SUMMARY    LEFT VENTRICLE:  Normal left ventricular cavity size, mild concentric left ventricular hypertrophy, normal left ventricular systolic function, normal regional wall motion  Ejection fraction is estimated as around 64%  Normal left ventricular diastolic  function  RIGHT VENTRICLE:  Normal right ventricular size and systolic function  Normal estimated right ventricular systolic pressure, 17 mmHg  LEFT ATRIUM:  Normal left atrial cavity size  Intact interatrial septum  RIGHT ATRIUM:  Normal right atrial cavity size  MITRAL VALVE:  Mild mitral valve leaflet thickening and sclerosis, adequate leaflet mobility  Trace to mild mitral valve regurgitation    AORTIC VALVE:  Tricuspid aortic valve with mild sclerosis, adequate cuspal separation  No aortic valve stenosis or regurgitation  TRICUSPID VALVE:  Normal tricuspid valve morphology and leaflet separation  Trace tricuspid valve regurgitation  PULMONIC VALVE:  No significant pulmonic valve regurgitation  AORTA:  Aortic root and proximal ascending aorta are normal in size on 2D imaging      IVC, HEPATIC VEINS:  Inferior vena cava is normal in size and demonstrates appropriate respiratory phasic changes in diameter  PERICARDIUM:  No pericardial effusion  HISTORY: PRIOR HISTORY: Risk factors: diabetes  PROCEDURE: The study was performed in the [facility]  This was a routine study  The transthoracic approach was used  The study included complete 2D imaging, M-mode, complete spectral Doppler, and color Doppler  The heart rate was 79  bpm, at the start of the study  Images were obtained from the parasternal, apical, subcostal, and suprasternal notch acoustic windows  Image quality was adequate  LEFT VENTRICLE: Normal left ventricular cavity size, mild concentric left ventricular hypertrophy, normal left ventricular systolic function, normal regional wall motion  Ejection fraction is estimated as around 64%  Normal left  ventricular diastolic function  RIGHT VENTRICLE: Normal right ventricular size and systolic function  Normal estimated right ventricular systolic pressure, 17 mmHg  LEFT ATRIUM: Normal left atrial cavity size  Intact interatrial septum  RIGHT ATRIUM: Normal right atrial cavity size  MITRAL VALVE: Mild mitral valve leaflet thickening and sclerosis, adequate leaflet mobility  Trace to mild mitral valve regurgitation    AORTIC VALVE: Tricuspid aortic valve with mild sclerosis, adequate cuspal separation  No aortic valve stenosis or regurgitation  TRICUSPID VALVE: Normal tricuspid valve morphology and leaflet separation  Trace tricuspid valve regurgitation  PULMONIC VALVE: No significant pulmonic valve regurgitation  PERICARDIUM: No pericardial effusion  AORTA: Aortic root and proximal ascending aorta are normal in size on 2D imaging  SYSTEMIC VEINS: IVC: Inferior vena cava is normal in size and demonstrates appropriate respiratory phasic changes in diameter      SYSTEM MEASUREMENT TABLES    2D  %FS: 36 9 %  Ao Diam: 2 86 cm  EDV(Teich): 46 93 ml  EF(Teich): 67 96 %  ESV(Teich): 15 04 ml  IVSd: 1 07 cm  LA Area: 15 62 cm2  LA Diam: 3 66 cm  LVEDV MOD A4C: 82 01 ml  LVEF MOD A4C: 63 87 %  LVESV MOD A4C: 29 63 ml  LVIDd: 3 39 cm  LVIDs: 2 14 cm  LVLd A4C: 8 44 cm  LVLs A4C: 6 89 cm  LVPWd: 1 14 cm  RA Area: 12 23 cm2  RVIDd: 2 91 cm  SV MOD A4C: 52 38 ml  SV(Teich): 31 9 ml    CW  TR Vmax: 1 38 m/s  TR maxP 57 mmHg    MM  TAPSE: 2 3 cm    PW  E' Sept: 0 08 m/s  E/E' Sept: 8 32  MV A Tha: 0 76 m/s  MV Dec Tate: 3 87 m/s2  MV DecT: 181 59 ms  MV E Tha: 0 7 m/s  MV E/A Ratio: 0 92  MV PHT: 52 66 ms  MVA By PHT: 4 18 cm2    [employer] Accredited Echocardiography [facility]    Prepared and electronically signed by    Denise Jimenez MD  Signed 11-May-2021 12:51:04    No results found for this or any previous visit  No results found for this or any previous visit  No results found for this or any previous visit  XR spine lumbar minimum 4 views non injury  Narrative: LUMBAR SPINE    INDICATION:   M[age]: Low back pain, unspecified  COMPARISON:  None    VIEWS:  XR SPINE LUMBAR MINIMUM 4 VIEWS NON INJURY  Images: 5    FINDINGS:    There are 5 non rib bearing lumbar vertebral bodies  There is no evidence of acute fracture or destructive osseous lesion  Alignment is unremarkable  Only minimal degenerative changes    The pedicles appear intact  Soft tissues are unremarkable  Impression: Normal examination  Workstation performed: [id number]        *-*-*-*-*-*-*-*-*-*-*-*-*-*-*-*-*-*-*-*-*-*-*-*-*-*-*-*-*-*-*-*-*-*-*-*-*-*-*-*-*-*-*-*-*-*-*-*-*-*-*-*-*-*-  SIGNATURES:   @GTR@   Denise Jimenez MD; MHA    *-*-*-*-*-*-*-*-*-*-*-*-*-*-*-*-*-*-*-*-*-*-*-*-*-*-*-*-*-*-*-*-*-*-*-*-*-*-*-*-*-*-*-*-*-*-*-*-*-*-*-*-*-*-  PAST MEDICAL HISTORY:  Past Medical History:   Diagnosis Date    Diabetes mellitus ([facility] [address] )     OCD (obsessive compulsive disorder)     PAST SURGICAL HISTORY:  Past Surgical History:   Procedure Laterality Date    CARPECTOMY HAND Bilateral          FAMILY HISTORY:  History reviewed  No pertinent family history   SOCIAL HISTORY:  Social History     Tobacco Use   Smoking Status Never Smoker   Smokeless Tobacco Never Used      Social History     Substance and Sexual Activity   Alcohol Use Yes     Social History     Substance and Sexual Activity   Drug Use Never    [unfilled]     *-*-*-*-*-*-*-*-*-*-*-*-*-*-*-*-*-*-*-*-*-*-*-*-*-*-*-*-*-*-*-*-*-*-*-*-*-*-*-*-*-*-*-*-*-*-*-*-*-*-*-*-*-*  ALLERGIES:  Allergies   Allergen Reactions    Metformin GI Intolerance    Rosuvastatin GI Intolerance    CURRENT SCHEDULED MEDICATIONS:    Current Outpatient Medications:     aspirin 81 mg chewable tablet, Chew 1 tablet (81 mg total) daily, Disp: 30 tablet, Rfl: 11    Cholecalciferol (Vitamin D3) 1 25 MG (10343 UT) CAPS, TAKE 1 CAPSULE BY MOUTH ONE TIME PER WEEK, Disp: , Rfl:     clopidogrel (PLAVIX) 75 mg tablet, TAKE 1 TABLET BY MOUTH EVERY DAY, Disp: 90 tablet, Rfl: 3    co-enzyme Q-10 30 MG capsule, Take 30 mg by mouth 3 (three) times a day, Disp: , Rfl:     methocarbamol (ROBAXIN) 500 mg tablet, Take 1 tablet (500 mg total) by mouth daily at bedtime as needed for muscle spasms, Disp: 20 tablet, Rfl: 0    Ozempic, 1 MG/DOSE, 4 MG/3ML SOPN injection pen, INJECT 1 MG UNDER THE SKIN EVERY 7 DAYS, Disp: , Rfl:     pravastatin (PRAVACHOL) 40 mg tablet, TAKE 1 TABLET BY MOUTH EVERY DAY, Disp: 90 tablet, Rfl: 1    sertraline (ZOLOFT) 100 mg tablet, Take 50 mg by mouth daily  , Disp: , Rfl:     Semaglutide,0 25 or 0 5MG/DOS, 2 MG/1 5ML SOPN, Inject 0 5 mg under the skin (Patient not taking: Reported on 6/21/2022), Disp: , Rfl:      *-*-*-*-*-*-*-*-*-*-*-*-*-*-*-*-*-*-*-*-*-*-*-*-*-*-*-*-*-*-*-*-*-*-*-*-*-*-*-*-*-*-*-*-*-*-*-*-*-*-*-*-*-*

## 2022-06-21 NOTE — ASSESSMENT & PLAN NOTE
Mr Janis Scruggs continues to experience fatigue and exertional shortness of breath though description and does not suggest angina  He had a nuclear stress test recently which demonstrated normal myocardial perfusion with no ECG changes and no angina noted during the stress test   His left ventricular systolic function was normal and there was no regional wall motion abnormality  He did have sinus tachycardia response to low-level physical activity suggesting possible physical deconditioning  He does have suboptimally controlled diabetes  He has been intolerant to beta-blocker use due to fatigue  He has been intolerant to statin therapy for similar symptom of fatigue  -- at this time I am advising him to continue current medications and to gradually increase physical activity  He does not have to exert himself but he should do more walking and gradually increase the time spent on this effort  -- I would also like him to be worked up for possible medication related increased fatigue  His diabetes medicine and Zoloft has been associated with fatigue in about 11 to 15% patient's  May also on need workup for possible underlying iron and vitamin deficiency that can also produce similar symptoms  He may also been need workup for possible fibromyalgia  -- today we reviewed the warning signs of angina  If symptoms become worse or persist and there is no other explanation then the next step would be to do a cardiac catheterization  -- I would see him back in 6 months time  Earlier if necessary

## 2022-07-07 DIAGNOSIS — E78.2 MIXED HYPERLIPIDEMIA: ICD-10-CM

## 2022-07-07 NOTE — TELEPHONE ENCOUNTER
Requested medication(s) are due for refill today: Yes  Patient has already received a courtesy refill: No  Other reason request has been forwarded to provider: failed

## 2022-07-10 RX ORDER — PRAVASTATIN SODIUM 40 MG
TABLET ORAL
Qty: 90 TABLET | Refills: 1 | Status: SHIPPED | OUTPATIENT
Start: 2022-07-10

## 2022-12-13 DIAGNOSIS — I21.4 NSTEMI (NON-ST ELEVATED MYOCARDIAL INFARCTION) (HCC): ICD-10-CM

## 2022-12-13 RX ORDER — CLOPIDOGREL BISULFATE 75 MG/1
TABLET ORAL
Qty: 90 TABLET | Refills: 3 | Status: SHIPPED | OUTPATIENT
Start: 2022-12-13

## 2023-05-02 ENCOUNTER — OFFICE VISIT (OUTPATIENT)
Dept: CARDIOLOGY CLINIC | Facility: CLINIC | Age: 63
End: 2023-05-02

## 2023-05-02 VITALS
HEART RATE: 82 BPM | HEIGHT: 69 IN | DIASTOLIC BLOOD PRESSURE: 70 MMHG | WEIGHT: 164.6 LBS | BODY MASS INDEX: 24.38 KG/M2 | SYSTOLIC BLOOD PRESSURE: 128 MMHG

## 2023-05-02 DIAGNOSIS — I21.4 NSTEMI (NON-ST ELEVATED MYOCARDIAL INFARCTION) (HCC): ICD-10-CM

## 2023-05-02 DIAGNOSIS — I25.10 CORONARY ARTERY DISEASE INVOLVING NATIVE CORONARY ARTERY OF NATIVE HEART WITHOUT ANGINA PECTORIS: Primary | ICD-10-CM

## 2023-05-02 DIAGNOSIS — Z98.61 HISTORY OF PERCUTANEOUS CORONARY INTERVENTION: ICD-10-CM

## 2023-05-02 DIAGNOSIS — R53.82 CHRONIC FATIGUE: ICD-10-CM

## 2023-05-02 DIAGNOSIS — E78.2 MIXED DYSLIPIDEMIA: ICD-10-CM

## 2023-05-02 DIAGNOSIS — E11.9 TYPE 2 DIABETES MELLITUS WITHOUT COMPLICATION, WITHOUT LONG-TERM CURRENT USE OF INSULIN (HCC): ICD-10-CM

## 2023-05-02 NOTE — PROGRESS NOTES
[facility]  [address] [facility], [place] [address]  Phone#  [phone]  Fax#  [phone]  *-*-*-*-*-*-*-*-*-*-*-*-*-*-*-*-*-*-*-*-*-*-*-*-*-*-*-*-*-*-*-*-*-*-*-*-*-*-*-*-*-*-*-*-*-*-*-*-*-*-*-*-*-*  ENCOUNTER DATE: 05/02/23 3:35 PM  PATIENT NAME: Nadeen Roldan   1960    [id number]  AGE:62 y o  SEX: male  ENCOUNTER PROVIDER:Faye Mar     PRIMARY CARE PHYSICIAN: Guevara Jarvis    DIAGNOSES:   1  One-vessel coronary artery disease status post ACS-NSTEMI July 2020, status post PCI of diagonal branch on July 13, 2020    2  Normal left ventricular systolic function  3  Diabetes mellitus with poor glycemic control with last hemoglobin see of 8 4   4  Dyslipidemia   5  Family history premature CAD with brother having MI at 48 years  6  Remote history of ITP   7  History of trigger finger   8  History of OCD     REGADENOSON NUCLEAR STRESS TEST May 2022:  1  Negative regadenoson nuclear stress test for symptoms of angina pectoris and negative for ECG changes of ischemia  2  Normal myocardial perfusion scan with no evidence of reversible or fixed perfusion abnormality  3  Normal left ventricular systolic function and normal regional wall motion  Post-stress left ventricular ejection fraction was determined as 65%  4  Normal resting blood pressure and appropriate blood pressure response to exercise  5  Nonspecific resting ECG abnormality  There was sinus tachycardia response to low-level physical activity suggesting possible physical deconditioning  6  There was an incidental finding relatively nonfocal soft tissue uptake of tracer in left shoulder/axillary/upper back region  This may represent an inflammation or possible muscle strain  Clinical correlation is recommended  EXTENDED HOLTER MONITOR November 2021: Patient was monitored for 13 days 23 hours  Average heart rate was 87, minimum heart rate was 58  There was 1 run of ventricular tachycardia involving 6 beats   There were 14 runs of nonsustained SVT with longest run lasting 9 beats at 245 beats per minute  Overall ventricular ectopy burden was less than 1%  There were no diary entries but there were triggered events which correlated with sinus rhythm and sinus tachycardia  ECHOCARDIOGRAM May 11, 2021:   1  Mild concentric left ventricular hypertrophy, normal left ventricular systolic and diastolic function  EF around 64%  2  Normal right ventricular size and systolic function  3  Normal left and right atrial size  4  Aortic valve sclerosis, no aortic valve stenosis or regurgitation  5  Mild mitral valve sclerosis, trace to mild mitral valve regurgitation  6  Trace tricuspid valve regurgitation  7  No obvious pulmonary hypertension  8  No pericardial effusion          CARDIAC CATHETERIZATION JULY 13, 2020: The coronary circulation is right dominant  -- There was 1-vessel coronary artery disease  -- Left main: Angiography showed minor luminal irregularities  -- LAD: Angiography showed minor luminal irregularities  -- 1st diagonal:   -- 1st diagonal: There was a 95 % stenosis  There was LEA grade 2 flow through the vessel (partial perfusion)  This lesion is a likely culprit for the patient's recent myocardial infarction  It appears amenable to percutaneous   intervention  -- Circumflex: Angiography showed minor luminal irregularities  -- Proximal RCA: There was a 50 % stenosis  ECHOCARDIOGRAM JULY 13, 2020:   Technical quality: Good   Cardiac rhythm: Normal sinus     1  Normal left ventricular size and systolic and diastolic function, EF around 63%  2  Normal right ventricular size and systolic function  4  Normal left and right atrial size  5  Mild aortic valve sclerosis, no aortic valve stenosis or regurgitation  6  Trace mitral and tricuspid valve regurgitation  7  No obvious pulmonary hypertension  8  No pericardial effusion  Holter monitor July 2020:   1   Holter monitor reveals the underlying rhythm is sinus rhythm with an average heart rate of 73 beats per minute, a minimum heart rate of 54 beats per minute and a maximum heart rate of 121 beats per minute  2  There are rare ventricular ectopy comprised of isolated VPCs   3  There are rare supraventricular ectopy comprised of isolated APCs   4  There is no evidence of significant bradyarrhythmia or advanced heart block  The longest R to R was 1 4 seconds  5  Patient diary did not report any symptoms  CURRENT ECG     Results for orders placed or performed in visit on 05/02/23   POCT ECG    Narrative    Sinus rhythm, HR 82 bpm, normal axis and intervals, delayed R wave transition, no significant chamber hypertrophy enlargement  No evidence of prior infarction  No significant ST-T wave abnormalities  CARDIOLOGY ASSESSMENT & PLAN     1  NSTEMI (non-ST elevated myocardial infarction) ([facility] [address] )  POCT ECG      2  Coronary artery disease involving native coronary artery of native heart without angina pectoris  POCT ECG      3  Type 2 diabetes mellitus without complication, without long-term current use of insulin ([facility] [address] )        4  History of percutaneous coronary intervention        5  Chronic fatigue        6  Mixed dyslipidemia          CAD, S/P MI June 2020, s/p PCI D1 July 2020  Mr Mukul MOYER Yuli overall stable from cardiac perspective with no recent symptoms that suggest angina or decompensated heart failure  Unfortunately he has not been taking any of his cardiac medications  Likely his blood pressure is well controlled  Physical examination is overall unremarkable  He states that he is scheduled to have blood work in the next few days  He has poorly controlled diabetes mellitus and he has stopped taking his diabetes medications also  ECG is overall unremarkable  Today I reinforced with him the importance of taking medications consistently  He has agreed to restart taking aspirin    He mentions that he is taking 1 anticholesterol medication  I am not sure if this is pravastatin or atorvastatin as both are listed on his medication list   -- Given his noncompliance I am holding off on reinitiating beta-blocker therapy  -- I am advising him to work closely with primary care physician and if needed see diabetes specialist so that his diabetes can be controlled as this is his single major risk factor for recurrence of coronary artery disease  -- Advising him to continue normal activities as much as tolerated  -- We will plan to see him back again in 8 months to a year  Earlier if necessary  -- Dietary and medical compliance are reinforced  -- He is advised  to report any concerning symptoms such as chest pain, shortness of breath, decline in exercise tolerance or presyncope/syncope  INTERVAL HISTORY & HISTORY OF PRESENT ILLNESS     Mukul Ga is here for follow-up regarding his cardiac comorbidities which include: Coronary artery disease with history of MI and PCI in 2020, dyslipidemia and other comorbidities  He is here for follow-up accompanied with his wife  He was last seen by me in June 2022  He mentions that since last visit he has had no hospitalizations or significant illnesses  He does report having elevated blood sugars  He was previously placed on Ozempic therapy however it is he discontinued this because it was giving him significant abdominal symptoms with nausea  From a cardiac perspective he has had no recent chest pains or palpitations or dizziness or lightheadedness  He does report some huffing and puffing and palpitation when he is exerting himself but this is for short period of time  He does report continued fatigue which she has been dealing with since last year  Denies orthopnea PND or worsening pedal edema  Has had no passing out or near passing out episodes    Reports that he has stopped working since last year because he could not exert himself due to shortness of breath and fatigue  He mentions that he has discontinued most of his medications  Only medication he is taking right now is sertraline for his OCD and other cholesterol medications  Functional capacity status: Moderately decreased  (Excellent- >10 METs; Good: (7-10 METs); Moderate (4-7 METs); Poor (<= 4 METs)    Any chronic stressors:  None   (feeling of poor health, financial problems, and social isolation etc)  Tobacco or alcohol dependence:  He does not smoke  He does not drink  Current cardiac medications:    Currently he is not on aspirin or clopidogrel but says he is taking one of the anticholesterol medications  Blood work from 3/24/2023 at [facility] [place] [facility] is significant for hemoglobin A1c of 9 4  Last routine chemistry is from April 2022 and showed normal renal function and electrolytes, normal liver function tests,  Lipid profile in January 2022 showed total cholesterol 181, directed HDL 65, triglyceride 105, calculated LDL 96 last TSH was 1 38 in August 2021  REVIEW OF SYSTEMS   Positive for:  As noted above in HPI  Negative for: All remaining as reviewed below and in HPI      SYSTEM SYMPTOMS REVIEWED:  General--weight change, fever, night sweats  Respiratory--cough, wheezing, shortness of breath, sputum production  Cardiovascular--chest pain, syncope, dyspnea on exertion, edema, decline in exercise tolerance, claudication   Gastrointestinal--persistent vomiting, diarrhea, abdominal distention, blood in stool   Muscular or skeletal--joint pain or swelling   Neurologic--headaches, syncope, abnormal movement  Hematologic--history of easy bruising and bleeding   Endocrine--thyroid enlargement, heat or cold intolerance, polyuria   Psychiatric--anxiety, depression     *-*-*-*-*-*-*-*-*-*-*-*-*-*-*-*-*-*-*-*-*-*-*-*-*-*-*-*-*-*-*-*-*-*-*-*-*-*-*-*-*-*-*-*-*-*-*-*-*-*-*-*-*-*-  VITAL SIGNS     CURRENT VITAL SIGNS:   Vitals:    05/02/23 1505   BP: 128/70   Pulse: 82   Weight: "74 7 kg (164 lb 9 6 oz)   Height: 5' 9\" (1 753 m)       BMI: Body mass index is 24 31 kg/m²  WEIGHTS:   Wt Readings from Last 25 Encounters:   05/02/23 74 7 kg (164 lb 9 6 oz)   06/21/22 72 7 kg (160 lb 3 2 oz)   05/26/22 74 4 kg (164 lb)   05/10/22 74 4 kg (164 lb)   04/26/22 74 5 kg (164 lb 3 2 oz)   10/13/21 73 9 kg (163 lb)   08/18/21 73 4 kg (161 lb 12 8 oz)   10/20/20 71 5 kg (157 lb 9 6 oz)   07/21/20 73 5 kg (162 lb)   07/12/20 75 6 kg (166 lb 10 7 oz)   05/08/20 77 1 kg (170 lb)   02/13/19 77 1 kg (170 lb)   05/29/15 79 8 kg (176 lb)   05/06/15 79 6 kg (175 lb 6 1 oz)   10/16/14 80 7 kg (178 lb)   05/27/14 82 1 kg (181 lb)   05/07/14 81 9 kg (180 lb 8 oz)   04/23/14 82 3 kg (181 lb 8 oz)   04/01/14 81 4 kg (179 lb 8 oz)   12/17/13 81 6 kg (180 lb)   11/25/13 79 kg (174 lb 4 oz)   08/19/13 80 5 kg (177 lb 8 oz)   08/21/12 80 4 kg (177 lb 3 oz)   06/08/12 81 6 kg (180 lb)   05/09/12 80 9 kg (178 lb 4 oz)        *-*-*-*-*-*-*-*-*-*-*-*-*-*-*-*-*-*-*-*-*-*-*-*-*-*-*-*-*-*-*-*-*-*-*-*-*-*-*-*-*-*-*-*-*-*-*-*-*-*-*-*-*-*-  PHYSICAL EXAM     General Appearance:    Alert, cooperative, no distress, appears stated age   Head, Eyes, ENT:    No obvious abnormality, moist mucous mebranes  Neck:   Supple, no carotid bruit or JVD   Back:     Symmetric, no curvature  Lungs:     Respirations unlabored  Clear to auscultation bilaterally,    Chest wall:    No tenderness or deformity   Heart:    Regular rate and rhythm, S1 and S2 normal, no murmur, rub  or gallop  Abdomen:     Soft, non-tender   Extremities:   Extremities warm, no cyanosis or edema    Skin:   no venostatic changes in lower extremities  Normal skin color, texture, and turgor   No rashes or lesions     *-*-*-*-*-*-*-*-*-*-*-*-*-*-*-*-*-*-*-*-*-*-*-*-*-*-*-*-*-*-*-*-*-*-*-*-*-*-*-*-*-*-*-*-*-*-*-*-*-*-*-*-*-*-  CURRENT MEDICATIONS LIST     Current Outpatient Medications:     sertraline (ZOLOFT) 100 mg tablet, Take 50 mg by mouth daily  , Disp: , Rfl:    " aspirin 81 mg chewable tablet, Chew 1 tablet (81 mg total) daily (Patient not taking: Reported on 5/2/2023), Disp: 30 tablet, Rfl: 11    Cholecalciferol (Vitamin D3) 1 25 MG (57195 UT) CAPS, TAKE 1 CAPSULE BY MOUTH ONE TIME PER WEEK (Patient not taking: Reported on 5/2/2023), Disp: , Rfl:     clopidogrel (PLAVIX) 75 mg tablet, TAKE 1 TABLET BY MOUTH EVERY DAY (Patient not taking: Reported on 5/2/2023), Disp: 90 tablet, Rfl: 3    co-enzyme Q-10 30 MG capsule, Take 30 mg by mouth 3 (three) times a day (Patient not taking: Reported on 5/2/2023), Disp: , Rfl:     methocarbamol (ROBAXIN) 500 mg tablet, Take 1 tablet (500 mg total) by mouth daily at bedtime as needed for muscle spasms (Patient not taking: Reported on 4/17/2023), Disp: 20 tablet, Rfl: 0    naproxen (EC NAPROSYN) 500 MG EC tablet, Take 1 tablet (500 mg total) by mouth 2 (two) times a day with meals for 15 days (Patient not taking: Reported on 5/2/2023), Disp: 30 tablet, Rfl: 0    Ozempic, 1 MG/DOSE, 4 MG/3ML SOPN injection pen, INJECT 1 MG UNDER THE SKIN EVERY 7 DAYS (Patient not taking: Reported on 5/2/2023), Disp: , Rfl:     pravastatin (PRAVACHOL) 40 mg tablet, TAKE 1 TABLET BY MOUTH EVERY DAY (Patient not taking: Reported on 5/2/2023), Disp: 90 tablet, Rfl: 1    Semaglutide,0 25 or 0 5MG/DOS, 2 MG/1 5ML SOPN, Inject 0 5 mg under the skin (Patient not taking: Reported on 6/21/2022), Disp: , Rfl:        ALLERGIES     Allergies   Allergen Reactions    Metformin GI Intolerance    Rosuvastatin GI Intolerance       *-*-*-*-*-*-*-*-*-*-*-*-*-*-*-*-*-*-*-*-*-*-*-*-*-*-*-*-*-*-*-*-*-*-*-*-*-*-*-*-*-*-*-*-*-*-*-*-*-*-*-*-*-*-  LABORATORY DATA   No results found for: NA  Potassium   Date Value Ref Range Status   08/19/2021 4 1 3 5 - 5 3 mmol/L Final   07/14/2020 4 0 3 5 - 5 3 mmol/L Final   07/13/2020 4 1 3 5 - 5 3 mmol/L Final     Chloride   Date Value Ref Range Status   08/19/2021 102 100 - 108 mmol/L Final   07/14/2020 100 100 - 108 mmol/L Final 07/13/2020 99 (L) 100 - 108 mmol/L Final     CO2   Date Value Ref Range Status   08/19/2021 26 21 - 32 mmol/L Final   07/14/2020 23 21 - 32 mmol/L Final   07/13/2020 26 21 - 32 mmol/L Final     BUN   Date Value Ref Range Status   08/19/2021 19 5 - 25 mg/dL Final   07/14/2020 14 5 - 25 mg/dL Final   07/13/2020 16 5 - 25 mg/dL Final     Creatinine   Date Value Ref Range Status   08/19/2021 1 15 0 60 - 1 30 mg/dL Final     Comment:     Standardized to IDMS reference method   07/14/2020 0 94 0 60 - 1 30 mg/dL Final     Comment:     Standardized to IDMS reference method   07/13/2020 0 98 0 60 - 1 30 mg/dL Final     Comment:     Standardized to IDMS reference method     eGFR   Date Value Ref Range Status   08/19/2021 68 ml/min/1 73sq m Final   07/14/2020 87 ml/min/1 73sq m Final   07/13/2020 83 ml/min/1 73sq m Final     Calcium   Date Value Ref Range Status   08/19/2021 9 0 8 3 - 10 1 mg/dL Final   07/14/2020 8 5 8 3 - 10 1 mg/dL Final   07/13/2020 8 8 8 3 - 10 1 mg/dL Final     AST   Date Value Ref Range Status   08/19/2021 18 5 - 45 U/L Final     Comment:     Specimen collection should occur prior to Sulfasalazine administration due to the potential for falsely depressed results  07/13/2020 43 5 - 45 U/L Final     Comment:       Specimen collection should occur prior to Sulfasalazine administration due to the potential for falsely depressed results  ALT   Date Value Ref Range Status   08/19/2021 29 12 - 78 U/L Final     Comment:     Specimen collection should occur prior to Sulfasalazine administration due to the potential for falsely depressed results  07/13/2020 34 12 - 78 U/L Final     Comment:       Specimen collection should occur prior to Sulfasalazine administration due to the potential for falsely depressed results        Alkaline Phosphatase   Date Value Ref Range Status   08/19/2021 82 46 - 116 U/L Final   07/13/2020 73 46 - 116 U/L Final     Magnesium   Date Value Ref Range Status   08/19/2021 2 1 1 6 - 2 6 mg/dL Final   07/13/2020 1 9 1 6 - 2 6 mg/dL Final     WBC   Date Value Ref Range Status   07/14/2020 8 22 4 31 - 10 16 Thousand/uL Final   07/13/2020 9 77 4 31 - 10 16 Thousand/uL Final   07/12/2020 8 22 4 31 - 10 16 Thousand/uL Final     Hemoglobin   Date Value Ref Range Status   07/14/2020 14 9 12 0 - 17 0 g/dL Final   07/13/2020 15 9 12 0 - 17 0 g/dL Final   07/12/2020 15 4 12 0 - 17 0 g/dL Final     Platelets   Date Value Ref Range Status   07/14/2020 122 (L) 149 - 390 Thousands/uL Final   07/13/2020 132 (L) 149 - 390 Thousands/uL Final   07/12/2020 130 (L) 149 - 390 Thousands/uL Final     PTT   Date Value Ref Range Status   07/13/2020 60 (H) 23 - 37 seconds Final     Comment:     Therapeutic Heparin Range =  60-90 seconds   07/13/2020 76 (H) 23 - 37 seconds Final     Comment:     Therapeutic Heparin Range =  60-90 seconds     INR   Date Value Ref Range Status   07/12/2020 0 90 0 84 - 1 19 Final   07/12/2020 0 88 0 84 - 1 19 Final     No results found for: CKMB, DIGOXIN  No results found for: TSH  No results found for: CHOL   Hemoglobin A1C   Date Value Ref Range Status   08/19/2021 7 6 (H) Normal 3 8-5 6%; PreDiabetic 5 7-6 4%; Diabetic >=6 5%; Glycemic control for adults with diabetes <7 0% % Final     Hgb A1c   Date Value Ref Range Status   08/26/2020 7 9 (H) <5 7 % of total Hgb Final     Comment:     For someone without known diabetes, a hemoglobin A1c  value of 6 5% or greater indicates that they may have   diabetes and this should be confirmed with a follow-up   test     For someone with known diabetes, a value <7% indicates   that their diabetes is well controlled and a value   greater than or equal to 7% indicates suboptimal   control  A1c targets should be individualized based on   duration of diabetes, age, comorbid conditions, and   other considerations      Currently, no consensus exists regarding use of  hemoglobin A1c for diagnosis of diabetes for children          No results found for: Chyna Macias, GRAMSTAIN, URINECX, WOUNDCULT, BODYFLUIDCUL, MRSACULTURE, INFLUAPCR, INFLUBPCR, RSVPCR, LEGIONELLAUR, CDIFFTOXINB    *-*-*-*-*-*-*-*-*-*-*-*-*-*-*-*-*-*-*-*-*-*-*-*-*-*-*-*-*-*-*-*-*-*-*-*-*-*-*-*-*-*-*-*-*-*-*-*-*-*-*-*-*-*-  PREVIOUS CARDIOLOGY & RADIOLOGY TEST RESULTS   I have personally reviewed pertinent results of cardiovascular tests noted below  Results for orders placed during the hospital encounter of 21    Echo complete with contrast if indicated    Narrative  [facility] [employer] [address]    Transthoracic Echocardiogram  2D, M-mode, Doppler, and Color Doppler    Study date:  11-May-2021    Patient: Fred Pete  MR number: [id number]  Account number: [de-identified]  : 1960  Age: 63 years  Gender: Male  Status: Outpatient  Location: [facility]  Height: 70 in  Weight: 149 6 lb  BP: 105/ 68 mmHg    Indications: CAD    Diagnoses: I25 10 - Atherosclerotic heart disease of native coronary artery without angina pectoris    Sonographer:  JAMAL Quiroga  Interpreting Physician:  Santi Swift MD  Referring Physician:  Santi Swift MD  Group:  [facility]    IMPRESSIONS:  Technical quality: Good  Cardiac rhythm: Sinus    1  Mild concentric left ventricular hypertrophy, normal left ventricular systolic and diastolic function  EF around 64%  2  Normal right ventricular size and systolic function  3  Normal left and right atrial size  4  Aortic valve sclerosis, no aortic valve stenosis or regurgitation  5  Mild mitral valve sclerosis, trace to mild mitral valve regurgitation  6  Trace tricuspid valve regurgitation  7  No obvious pulmonary hypertension  8  No pericardial effusion  Compared to report of previous echocardiogram from 2020 there is overall no significant change      SUMMARY    LEFT VENTRICLE:  Normal left ventricular cavity size, mild concentric left ventricular hypertrophy, normal left ventricular systolic function, normal regional wall motion  Ejection fraction is estimated as around 64%  Normal left ventricular diastolic  function  RIGHT VENTRICLE:  Normal right ventricular size and systolic function  Normal estimated right ventricular systolic pressure, 17 mmHg  LEFT ATRIUM:  Normal left atrial cavity size  Intact interatrial septum  RIGHT ATRIUM:  Normal right atrial cavity size  MITRAL VALVE:  Mild mitral valve leaflet thickening and sclerosis, adequate leaflet mobility  Trace to mild mitral valve regurgitation    AORTIC VALVE:  Tricuspid aortic valve with mild sclerosis, adequate cuspal separation  No aortic valve stenosis or regurgitation  TRICUSPID VALVE:  Normal tricuspid valve morphology and leaflet separation  Trace tricuspid valve regurgitation  PULMONIC VALVE:  No significant pulmonic valve regurgitation  AORTA:  Aortic root and proximal ascending aorta are normal in size on 2D imaging  IVC, HEPATIC VEINS:  Inferior vena cava is normal in size and demonstrates appropriate respiratory phasic changes in diameter  PERICARDIUM:  No pericardial effusion  HISTORY: PRIOR HISTORY: Risk factors: diabetes  PROCEDURE: The study was performed in the [address] [place] [address]  This was a routine study  The transthoracic approach was used  The study included complete 2D imaging, M-mode, complete spectral Doppler, and color Doppler  The heart rate was 79  bpm, at the start of the study  Images were obtained from the parasternal, apical, subcostal, and suprasternal notch acoustic windows  Image quality was adequate  LEFT VENTRICLE: Normal left ventricular cavity size, mild concentric left ventricular hypertrophy, normal left ventricular systolic function, normal regional wall motion  Ejection fraction is estimated as around 64%  Normal left  ventricular diastolic function  RIGHT VENTRICLE: Normal right ventricular size and systolic function  Normal estimated right ventricular systolic pressure, 17 mmHg  LEFT ATRIUM: Normal left atrial cavity size  Intact interatrial septum  RIGHT ATRIUM: Normal right atrial cavity size  MITRAL VALVE: Mild mitral valve leaflet thickening and sclerosis, adequate leaflet mobility  Trace to mild mitral valve regurgitation    AORTIC VALVE: Tricuspid aortic valve with mild sclerosis, adequate cuspal separation  No aortic valve stenosis or regurgitation  TRICUSPID VALVE: Normal tricuspid valve morphology and leaflet separation  Trace tricuspid valve regurgitation  PULMONIC VALVE: No significant pulmonic valve regurgitation  PERICARDIUM: No pericardial effusion  AORTA: Aortic root and proximal ascending aorta are normal in size on 2D imaging  SYSTEMIC VEINS: IVC: Inferior vena cava is normal in size and demonstrates appropriate respiratory phasic changes in diameter  SYSTEM MEASUREMENT TABLES    2D  %FS: 36 9 %  Ao Diam: 2 86 cm  EDV(Teich): 46 93 ml  EF(Teich): 67 96 %  ESV(Teich): 15 04 ml  IVSd: 1 07 cm  LA Area: 15 62 cm2  LA Diam: 3 66 cm  LVEDV MOD A4C: 82 01 ml  LVEF MOD A4C: 63 87 %  LVESV MOD A4C: 29 63 ml  LVIDd: 3 39 cm  LVIDs: 2 14 cm  LVLd A4C: 8 44 cm  LVLs A4C: 6 89 cm  LVPWd: 1 14 cm  RA Area: 12 23 cm2  RVIDd: 2 91 cm  SV MOD A4C: 52 38 ml  SV(Teich): 31 9 ml    CW  TR Vmax: 1 38 m/s  TR maxP 57 mmHg    MM  TAPSE: 2 3 cm    PW  E' Sept: 0 08 m/s  E/E' Sept: 8 32  MV A Tha: 0 76 m/s  MV Dec Nolan: 3 87 m/s2  MV DecT: 181 59 ms  MV E Tha: 0 7 m/s  MV E/A Ratio: 0 92  MV PHT: 52 66 ms  MVA By PHT: 4 18 cm2    [employer] Accredited Echocardiography Laboratory    Prepared and electronically signed by    Bereket Prince MD  Signed 11-May-2021 12:51:04    No results found for this or any previous visit  No results found for this or any previous visit  No results found for this or any previous visit      XR spine lumbar 2 or 3 views injury  Narrative: LUMBAR SPINE    INDICATION:   M[age]: Low back pain, unspecified  COMPARISON:  Lumbar spine radiographs 5/26/2022  VIEWS:  XR SPINE LUMBAR 2 OR 3 VIEWS INJURY  Images: 3    FINDINGS:    There are 5 non rib bearing lumbar vertebral bodies  There is no evidence of acute fracture or destructive osseous lesion  Alignment is unremarkable  Mild degenerative disease of L2-L4 with minimal anterior endplate osteophytosis, similar to prior study  The pedicles appear intact  Soft tissues are unremarkable  Impression: Mild lumbar degenerative disc disease, similar to prior study  Workstation performed: [id number]        *-*-*-*-*-*-*-*-*-*-*-*-*-*-*-*-*-*-*-*-*-*-*-*-*-*-*-*-*-*-*-*-*-*-*-*-*-*-*-*-*-*-*-*-*-*-*-*-*-*-*-*-*-*-  SIGNATURES:   @AMQ@   Ever Singh MD; A    *-*-*-*-*-*-*-*-*-*-*-*-*-*-*-*-*-*-*-*-*-*-*-*-*-*-*-*-*-*-*-*-*-*-*-*-*-*-*-*-*-*-*-*-*-*-*-*-*-*-*-*-*-*-  PAST MEDICAL HISTORY:  Past Medical History:   Diagnosis Date    Diabetes mellitus ([facility] [address] )     OCD (obsessive compulsive disorder)     PAST SURGICAL HISTORY:  Past Surgical History:   Procedure Laterality Date    CARPECTOMY HAND Bilateral          FAMILY HISTORY:  No family history on file   SOCIAL HISTORY:  Social History     Tobacco Use   Smoking Status Never   Smokeless Tobacco Never      Social History     Substance and Sexual Activity   Alcohol Use Yes     Social History     Substance and Sexual Activity   Drug Use Never    [id number]     *-*-*-*-*-*-*-*-*-*-*-*-*-*-*-*-*-*-*-*-*-*-*-*-*-*-*-*-*-*-*-*-*-*-*-*-*-*-*-*-*-*-*-*-*-*-*-*-*-*-*-*-*-*  ALLERGIES:  Allergies   Allergen Reactions    Metformin GI Intolerance    Rosuvastatin GI Intolerance    CURRENT SCHEDULED MEDICATIONS:    Current Outpatient Medications:     sertraline (ZOLOFT) 100 mg tablet, Take 50 mg by mouth daily  , Disp: , Rfl:     aspirin 81 mg chewable tablet, Chew 1 tablet (81 mg total) daily (Patient not taking: Reported on 5/2/2023), Disp: 30 tablet, Rfl: 11    Cholecalciferol (Vitamin D3) 1 25 MG (76902 UT) CAPS, TAKE 1 CAPSULE BY MOUTH ONE TIME PER WEEK (Patient not taking: Reported on 5/2/2023), Disp: , Rfl:     clopidogrel (PLAVIX) 75 mg tablet, TAKE 1 TABLET BY MOUTH EVERY DAY (Patient not taking: Reported on 5/2/2023), Disp: 90 tablet, Rfl: 3    co-enzyme Q-10 30 MG capsule, Take 30 mg by mouth 3 (three) times a day (Patient not taking: Reported on 5/2/2023), Disp: , Rfl:     methocarbamol (ROBAXIN) 500 mg tablet, Take 1 tablet (500 mg total) by mouth daily at bedtime as needed for muscle spasms (Patient not taking: Reported on 4/17/2023), Disp: 20 tablet, Rfl: 0    naproxen (EC NAPROSYN) 500 MG EC tablet, Take 1 tablet (500 mg total) by mouth 2 (two) times a day with meals for 15 days (Patient not taking: Reported on 5/2/2023), Disp: 30 tablet, Rfl: 0    Ozempic, 1 MG/DOSE, 4 MG/3ML SOPN injection pen, INJECT 1 MG UNDER THE SKIN EVERY 7 DAYS (Patient not taking: Reported on 5/2/2023), Disp: , Rfl:     pravastatin (PRAVACHOL) 40 mg tablet, TAKE 1 TABLET BY MOUTH EVERY DAY (Patient not taking: Reported on 5/2/2023), Disp: 90 tablet, Rfl: 1    Semaglutide,0 25 or 0 5MG/DOS, 2 MG/1 5ML SOPN, Inject 0 5 mg under the skin (Patient not taking: Reported on 6/21/2022), Disp: , Rfl:      *-*-*-*-*-*-*-*-*-*-*-*-*-*-*-*-*-*-*-*-*-*-*-*-*-*-*-*-*-*-*-*-*-*-*-*-*-*-*-*-*-*-*-*-*-*-*-*-*-*-*-*-*-*

## 2023-05-02 NOTE — PATIENT INSTRUCTIONS
CARDIOLOGY ASSESSMENT & PLAN     1  NSTEMI (non-ST elevated myocardial infarction) ([facility] [address] )  POCT ECG      2  Coronary artery disease involving native coronary artery of native heart without angina pectoris  POCT ECG      3  Type 2 diabetes mellitus without complication, without long-term current use of insulin [facility]        4  History of percutaneous coronary intervention        5  Chronic fatigue        6  Mixed dyslipidemia          CAD, S/P MI June 2020, s/p PCI D1 July 2020  Mr Mukul Roldan overall stable from cardiac perspective with no recent symptoms that suggest angina or decompensated heart failure  Unfortunately he has not been taking any of his cardiac medications  Likely his blood pressure is well controlled  Physical examination is overall unremarkable  He states that he is scheduled to have blood work in the next few days  He has poorly controlled diabetes mellitus and he has stopped taking his diabetes medications also  ECG is overall unremarkable  Today I reinforced with him the importance of taking medications consistently  He has agreed to restart taking aspirin  He mentions that he is taking 1 anticholesterol medication  I am not sure if this is pravastatin or atorvastatin as both are listed on his medication list   -- Given his noncompliance I am holding off on reinitiating beta-blocker therapy  -- I am advising him to work closely with primary care physician and if needed see diabetes specialist so that his diabetes can be controlled as this is his single major risk factor for recurrence of coronary artery disease  -- Advising him to continue normal activities as much as tolerated  -- We will plan to see him back again in 8 months to a year  Earlier if necessary  -- Dietary and medical compliance are reinforced  -- He is advised  to report any concerning symptoms such as chest pain, shortness of breath, decline in exercise tolerance or presyncope/syncope

## 2023-05-02 NOTE — ASSESSMENT & PLAN NOTE
Mr Johnnie Bear overall stable from cardiac perspective with no recent symptoms that suggest angina or decompensated heart failure  Unfortunately he has not been taking any of his cardiac medications  Likely his blood pressure is well controlled  Physical examination is overall unremarkable  He states that he is scheduled to have blood work in the next few days  He has poorly controlled diabetes mellitus and he has stopped taking his diabetes medications also  ECG is overall unremarkable  Today I reinforced with him the importance of taking medications consistently  He has agreed to restart taking aspirin  He mentions that he is taking 1 anticholesterol medication  I am not sure if this is pravastatin or atorvastatin as both are listed on his medication list   -- Given his noncompliance I am holding off on reinitiating beta-blocker therapy  -- I am advising him to work closely with primary care physician and if needed see diabetes specialist so that his diabetes can be controlled as this is his single major risk factor for recurrence of coronary artery disease  -- Advising him to continue normal activities as much as tolerated  -- We will plan to see him back again in 8 months to a year  Earlier if necessary  -- Dietary and medical compliance are reinforced  -- He is advised  to report any concerning symptoms such as chest pain, shortness of breath, decline in exercise tolerance or presyncope/syncope

## 2023-05-03 ENCOUNTER — OFFICE VISIT (OUTPATIENT)
Dept: OBGYN CLINIC | Facility: MEDICAL CENTER | Age: 63
End: 2023-05-03

## 2023-05-03 VITALS
HEIGHT: 69 IN | DIASTOLIC BLOOD PRESSURE: 70 MMHG | HEART RATE: 72 BPM | WEIGHT: 162 LBS | BODY MASS INDEX: 23.99 KG/M2 | SYSTOLIC BLOOD PRESSURE: 128 MMHG

## 2023-05-03 DIAGNOSIS — G89.29 CHRONIC BILATERAL LOW BACK PAIN WITHOUT SCIATICA: Primary | ICD-10-CM

## 2023-05-03 DIAGNOSIS — M47.816 FACET ARTHROPATHY, LUMBAR: ICD-10-CM

## 2023-05-03 DIAGNOSIS — M54.50 CHRONIC BILATERAL LOW BACK PAIN WITHOUT SCIATICA: Primary | ICD-10-CM

## 2023-05-03 NOTE — PROGRESS NOTES
Assessment/Plan:    Diagnoses and all orders for this visit:    Chronic bilateral low back pain without sciatica  -     MRI lumbar spine wo contrast; Future  -     Ambulatory Referral to Pain Management; Future  -     Ambulatory Referral to Physical Therapy; Future    Facet arthropathy, lumbar  -     MRI lumbar spine wo contrast; Future  -     Ambulatory Referral to Pain Management; Future  -     Ambulatory Referral to Physical Therapy; Future    MRI of the lumbar spine to evaluate etiology for chronic worsening pain  He has tried treatment with chiropractor in the past as well as massage  He has completed a prednisone taper and was provided a Toradol injection at the urgent care  I would like to have the patient follow-up with pain management to review the MRI and discuss further treatment options  Patient states he is retired and physical therapy is too expensive for him to participate in as he does pay $80 per session  Patient was instructed to check with his cardiologist to see if he is able to take NSAIDs  Return if symptoms worsen or fail to improve  Subjective:   Patient ID: Bart Reid is a 60 y o  male  NP presents for a stabbing or bone on bone pain of the lower back x 2 years worsening over the last year and more recently month  Was evaluated in urgent care was provided Toradol inj and Prednisone taper and muscle relaxant  He has treated with chiro x 5 sessions a year ago with no improvement  Also tried massage  Notes PT is expensive $80 per session  Patient is retired      Review of Systems    The following portions of the patient's chart were reviewed and updated as appropriate:    Allergy:    Allergies   Allergen Reactions    Metformin GI Intolerance    Rosuvastatin GI Intolerance       Medications:    Current Outpatient Medications:     aspirin 81 mg chewable tablet, Chew 1 tablet (81 mg total) daily, Disp: 30 tablet, Rfl: 11    co-enzyme Q-10 30 MG capsule, Take 30 mg "by mouth 3 (three) times a day, Disp: , Rfl:     pravastatin (PRAVACHOL) 40 mg tablet, TAKE 1 TABLET BY MOUTH EVERY DAY, Disp: 90 tablet, Rfl: 1    sertraline (ZOLOFT) 100 mg tablet, Take 50 mg by mouth daily  , Disp: , Rfl:     Cholecalciferol (Vitamin D3) 1 25 MG (76086 UT) CAPS, TAKE 1 CAPSULE BY MOUTH ONE TIME PER WEEK (Patient not taking: Reported on 5/2/2023), Disp: , Rfl:     clopidogrel (PLAVIX) 75 mg tablet, TAKE 1 TABLET BY MOUTH EVERY DAY (Patient not taking: Reported on 5/2/2023), Disp: 90 tablet, Rfl: 3    methocarbamol (ROBAXIN) 500 mg tablet, Take 1 tablet (500 mg total) by mouth daily at bedtime as needed for muscle spasms (Patient not taking: Reported on 4/17/2023), Disp: 20 tablet, Rfl: 0    naproxen (EC NAPROSYN) 500 MG EC tablet, Take 1 tablet (500 mg total) by mouth 2 (two) times a day with meals for 15 days (Patient not taking: Reported on 5/2/2023), Disp: 30 tablet, Rfl: 0    Ozempic, 1 MG/DOSE, 4 MG/3ML SOPN injection pen, INJECT 1 MG UNDER THE SKIN EVERY 7 DAYS (Patient not taking: Reported on 5/2/2023), Disp: , Rfl:     Semaglutide,0 25 or 0 5MG/DOS, 2 MG/1 5ML SOPN, Inject 0 5 mg under the skin (Patient not taking: Reported on 6/21/2022), Disp: , Rfl:     Patient Active Problem List   Diagnosis    Trigger finger, left index finger    Labral tear of long head of biceps tendon, right, initial encounter    Diabetes ([facility] [address] )    Mixed dyslipidemia    History of percutaneous coronary intervention    CAD, S/P MI June 2020, s/p PCI D1 July 2020    Chronic fatigue    SOB (shortness of breath)       Objective:  /70   Pulse 72   Ht 5' 9\" (1 753 m)   Wt 73 5 kg (162 lb)   BMI 23 92 kg/m²     Back Exam     Range of Motion   Extension: abnormal   Flexion: normal     Muscle Strength   The patient has normal back strength      Tests   Straight leg raise right: negative  Straight leg raise left: negative    Reflexes   Patellar: normal    Other   Toe walk: normal  Heel walk: " normal  Gait: antalgic     Comments:  No pain reproduced with external and internal rotation of the right hip            Physical Exam  Musculoskeletal:      Lumbar back: Negative right straight leg raise test and negative left straight leg raise test            Neurologic Exam    Procedures    I have personally reviewed pertinent films in PACS  and I have personally reviewed the written report of the pertinent studies  LUMBAR SPINE     INDICATION:   M[age]: Low back pain, unspecified        COMPARISON:  Lumbar spine radiographs 5/26/2022      VIEWS:  XR SPINE LUMBAR 2 OR 3 VIEWS INJURY  Images: 3     FINDINGS:     There are 5 non rib bearing lumbar vertebral bodies       There is no evidence of acute fracture or destructive osseous lesion      Alignment is unremarkable       Mild degenerative disease of L2-L4 with minimal anterior endplate osteophytosis, similar to prior study      The pedicles appear intact      Soft tissues are unremarkable      IMPRESSION:     Mild lumbar degenerative disc disease, similar to prior study  Past Medical History:   Diagnosis Date    Diabetes mellitus ([address] )     OCD (obsessive compulsive disorder)        Past Surgical History:   Procedure Laterality Date    CARPECTOMY HAND Bilateral        Social History     Socioeconomic History    Marital status: /Civil Union     Spouse name: Not on file    Number of children: Not on file    Years of education: Not on file    Highest education level: Not on file   Occupational History    Not on file   Tobacco Use    Smoking status: Never    Smokeless tobacco: Never   Vaping Use    Vaping Use: Never used   Substance and Sexual Activity    Alcohol use:  Yes    Drug use: Never    Sexual activity: Not on file   Other Topics Concern    Not on file   Social History Narrative    Not on file     Social Determinants of Health     Financial Resource Strain: Not on file   Food Insecurity: Not on file   Transportation Needs: Not on file   Physical Activity: Not on file   Stress: Not on file   Social Connections: Not on file   Intimate Partner Violence: Not on file   Housing Stability: Not on file       History reviewed  No pertinent family history

## 2023-05-11 ENCOUNTER — HOSPITAL ENCOUNTER (OUTPATIENT)
Facility: MEDICAL CENTER | Age: 63
Discharge: HOME | End: 2023-05-11

## 2023-05-11 DIAGNOSIS — G89.29 CHRONIC BILATERAL LOW BACK PAIN WITHOUT SCIATICA: ICD-10-CM

## 2023-05-11 DIAGNOSIS — M47.816 FACET ARTHROPATHY, LUMBAR: ICD-10-CM

## 2023-05-11 DIAGNOSIS — M54.50 CHRONIC BILATERAL LOW BACK PAIN WITHOUT SCIATICA: ICD-10-CM

## 2023-06-02 ENCOUNTER — LAB (OUTPATIENT)
Dept: LAB | Facility: HOSPITAL | Age: 63
End: 2023-06-02
Payer: COMMERCIAL

## 2023-06-02 ENCOUNTER — TELEPHONE (OUTPATIENT)
Dept: PAIN MEDICINE | Facility: CLINIC | Age: 63
End: 2023-06-02

## 2023-06-02 ENCOUNTER — CONSULT (OUTPATIENT)
Dept: PAIN MEDICINE | Facility: CLINIC | Age: 63
End: 2023-06-02

## 2023-06-02 VITALS
BODY MASS INDEX: 23.99 KG/M2 | DIASTOLIC BLOOD PRESSURE: 78 MMHG | HEIGHT: 69 IN | WEIGHT: 162 LBS | SYSTOLIC BLOOD PRESSURE: 132 MMHG

## 2023-06-02 DIAGNOSIS — M48.062 SPINAL STENOSIS OF LUMBAR REGION WITH NEUROGENIC CLAUDICATION: ICD-10-CM

## 2023-06-02 DIAGNOSIS — M54.16 LUMBAR RADICULOPATHY: Primary | ICD-10-CM

## 2023-06-02 DIAGNOSIS — M54.16 LUMBAR RADICULOPATHY: ICD-10-CM

## 2023-06-02 DIAGNOSIS — M47.816 FACET ARTHROPATHY, LUMBAR: ICD-10-CM

## 2023-06-02 LAB
ERYTHROCYTE [DISTWIDTH] IN BLOOD BY AUTOMATED COUNT: 11.3 % (ref 11.6–15.1)
HCT VFR BLD AUTO: 47 % (ref 36.5–49.3)
HGB BLD-MCNC: 16.5 G/DL (ref 12–17)
MCH RBC QN AUTO: 30.2 PG (ref 26.8–34.3)
MCHC RBC AUTO-ENTMCNC: 35.1 G/DL (ref 31.4–37.4)
MCV RBC AUTO: 86 FL (ref 82–98)
PLATELET # BLD AUTO: 129 THOUSANDS/UL (ref 149–390)
PMV BLD AUTO: 11.3 FL (ref 8.9–12.7)
RBC # BLD AUTO: 5.46 MILLION/UL (ref 3.88–5.62)
WBC # BLD AUTO: 8.59 THOUSAND/UL (ref 4.31–10.16)

## 2023-06-02 PROCEDURE — 85027 COMPLETE CBC AUTOMATED: CPT

## 2023-06-02 PROCEDURE — 36415 COLL VENOUS BLD VENIPUNCTURE: CPT

## 2023-06-02 RX ORDER — BUPROPION HYDROCHLORIDE 150 MG/1
1 TABLET ORAL EVERY MORNING
COMMUNITY
Start: 2023-03-24 | End: 2024-03-23

## 2023-06-02 RX ORDER — BACLOFEN 10 MG/1
10 TABLET ORAL 3 TIMES DAILY PRN
COMMUNITY
Start: 2023-04-23

## 2023-06-02 RX ORDER — GABAPENTIN 100 MG/1
100 CAPSULE ORAL 3 TIMES DAILY
Qty: 90 CAPSULE | Refills: 1 | Status: SHIPPED | OUTPATIENT
Start: 2023-06-02

## 2023-06-02 NOTE — TELEPHONE ENCOUNTER
I left a detailed message for pt regarding blood work that needs to be completed  His procedure is scheduled for Monday afternoon so he needs to get bloodwork done today or Saturday morning    To call back with any questions

## 2023-06-02 NOTE — Clinical Note
I just realized patient has a history of low platelet count  Will obtain CBC bloodwork prior to [facility]  Please inform patient that he should obtain this bloodwork - order has been placed now  Thanks!

## 2023-06-02 NOTE — TELEPHONE ENCOUNTER
Will MD Michelle Zimmer; [facility]  I just realized patient has a history of low platelet count  Will obtain CBC bloodwork prior to \[facility]\"" & [facility]  Please inform patient that he should obtain this bloodwork - order has been placed now  Thanks!

## 2023-06-02 NOTE — TELEPHONE ENCOUNTER
Caller: Fina Echols PT    Doctor: Dr Lorne Luo    Reason for call: Pt was advised of the below note   [facility]     Call back#: N/A

## 2023-06-02 NOTE — PROGRESS NOTES
Assessment  1  Lumbar radiculopathy    2  Spinal stenosis of lumbar region with neurogenic claudication    3  Facet arthropathy, lumbar        Plan    Patient presenting with chronic low back with radiating leg pains for 2+ years, worsening over the past several months  During today's evaluation patient is demonstrating pain that is likely stemming from lumbar radiculitis secondary to lumbar spinal stenosis  Symptoms are accompanied by pain >7/10 on the pain scale with inability to participate in IADLs for >6 weeks  Patient has tried chiropractic therapy and massage therapy  Patient was referred to physical therapy but states that he is financially unable to attend 80 dollars per session  Has been taking NSAIDs, baclofen with minimal benefit  Denies any bowel or bladder symptoms, saddle anesthesia  In regards to the patient's lumbar spine pathology, we discussed the various treatment options including physical therapy, chiropractic treatment, medication management, activity modifications, interventional spine procedures  Given that patient has not had any benefit with conservative treatments, I think patient would benefit from targeted interventional treatment modalities  I personally reviewed and interpreted pertinent imaging studies - specifically lumbar MRI and discussed the results and clinical significance with the patient  Patient does have facet arthropathy throughout both lumbar spine with congenital narrowing and varying degrees of disc bulges resulting in moderate canal stenosis at L2-3 and L4-5     1   I offered the patient an L4-5 epidural steroid injection for his symptoms have been refractory to time and conservative treatments  Risks, benefits, and alternatives to epidural steroid injections thoroughly discussed with patient  Complete risks and benefits including bleeding, infection, tissue reaction, nerve injury and allergic reaction were discussed   The approach was demonstrated using models and literature was provided  We will obtain a CBC and platelet count due to his history of ITP  2   We will initiate a trial of gabapentin 100 mg 3 times daily  I reviewed external notes from the relevant aspects of the patient's medical record, specifically primary care physician, orthopedic sports medicine office notes in regards to current and prior treatments tried (as mentioned in history of present illness)  I reviewed pertinent laboratory studies, specifically renal function, hemoglobin A1c, CBC, coagulation studies, prior to initiating the recommended medication therapies/interventional treatment options  Handouts provided questions answered to patient's satisfaction  Lifestyle modifications extensively discussed including diet, exercise and weight loss in addition to core strengthening  [place] Prescription Drug Monitoring Program report was reviewed and was appropriate     My impressions and treatment recommendations were discussed in detail with the patient who verbalized understanding and had no further questions  Discharge instructions were provided  I personally saw and examined the patient and I agree with the above discussed plan of care  Orders Placed This Encounter   Procedures   • [place] spine and pain procedure     Standing Status:   Future     Standing Expiration Date:   6/2/2027     Order Specific Question:   Reason for Exam:     Answer:   L4-5 LESI     Order Specific Question:   Anticoagulant hold needed?      Answer:   no     New Medications Ordered This Visit   Medications   • baclofen 10 mg tablet     Sig: Take 10 mg by mouth 3 (three) times a day as needed   • buPROPion (WELLBUTRIN XL) 150 mg 24 hr tablet     Sig: Take 1 tablet by mouth every morning   • gabapentin (NEURONTIN) 100 mg capsule     Sig: Take 1 capsule (100 mg total) by mouth 3 (three) times a day     Dispense:  90 capsule     Refill:  1       History of Present Illness    Mukul Roldan is a 58 y o  male presenting for consultation at [facility] for exam and evaluation of chronic low back and radiating leg pains for greater than 2+ years, worsening over the past several months  Pain started without any specific inciting event  Over the past month, the intensity of pain has been moderate to severe  Pain is currently rated an  8-10 out of 10 on the pain scale  Pain does interfere with age appropriate activities of daily living  Pain is nearly constant, with no typical pattern throughout the day  Pain is described as shooting, sharp, pressure-like  Patient endorses weakness in the lower extremities  Assistance device used: None  Pain is increased with standing, bending, walking, exercise, coughing, sneezing  Pain is decreased with lying down, relaxation, sitting  Patient's past medical history is significant for anxiety, chest pain, coronary artery disease, depression, diabetes, GERD, history of heart attack, ITP  Patient's social history is positive for occasional alcohol intake  Prior pertinent treatments tried: Chiropractic manipulation  Pertinent medications tried/currently taking: Ibuprofen, naproxen, baclofen, oral steroids, Toradol injection  I have personally reviewed and/or updated the patient's past medical history, past surgical history, family history, social history, current medications, allergies, and vital signs today  Review of Systems   Constitutional: Negative for chills and fever  HENT: Negative for ear pain and sore throat  Eyes: Negative for pain and visual disturbance  Respiratory: Positive for shortness of breath  Negative for cough  Cardiovascular: Negative for chest pain and palpitations  Gastrointestinal: Positive for nausea  Negative for abdominal pain and vomiting  Genitourinary: Negative for dysuria and hematuria  Musculoskeletal: Positive for back pain, gait problem and myalgias  Negative for arthralgias  Skin: Negative for color change and rash  Neurological: Positive for weakness (legs)  Negative for seizures and syncope  Psychiatric/Behavioral: Positive for agitation, decreased concentration and dysphoric mood  All other systems reviewed and are negative  Patient Active Problem List   Diagnosis   • Trigger finger, left index finger   • Labral tear of long head of biceps tendon, right, initial encounter   • Diabetes ([facility] [address] )   • Mixed dyslipidemia   • History of percutaneous coronary intervention   • CAD, S/P MI June 2020, s/p PCI D1 July 2020   • Chronic fatigue   • SOB (shortness of breath)       Past Medical History:   Diagnosis Date   • [facility]    • OCD (obsessive compulsive disorder)        Past Surgical History:   Procedure Laterality Date   • CARPECTOMY HAND Bilateral        History reviewed  No pertinent family history  Social History     Occupational History   • Not on file   Tobacco Use   • Smoking status: Never   • Smokeless tobacco: Never   Vaping Use   • Vaping Use: Never used   Substance and Sexual Activity   • Alcohol use:  Yes   • Drug use: Never   • Sexual activity: Not on file       Current Outpatient Medications on File Prior to Visit   Medication Sig   • aspirin 81 mg chewable tablet Chew 1 tablet (81 mg total) daily   • baclofen 10 mg tablet Take 10 mg by mouth 3 (three) times a day as needed   • buPROPion (WELLBUTRIN XL) 150 mg 24 hr tablet Take 1 tablet by mouth every morning   • Cholecalciferol (Vitamin D3) 1 25 MG (25404 UT) CAPS    • co-enzyme Q-10 30 MG capsule Take 30 mg by mouth 3 (three) times a day   • Ozempic, 1 MG/DOSE, 4 MG/3ML SOPN injection pen    • pravastatin (PRAVACHOL) 40 mg tablet TAKE 1 TABLET BY MOUTH EVERY DAY   • sertraline (ZOLOFT) 100 mg tablet Take 50 mg by mouth daily     • clopidogrel (PLAVIX) 75 mg tablet TAKE 1 TABLET BY MOUTH EVERY DAY (Patient not taking: Reported on 5/2/2023)   • methocarbamol (ROBAXIN) 500 mg tablet Take 1 tablet "(500 mg total) by mouth daily at bedtime as needed for muscle spasms (Patient not taking: Reported on 4/17/2023)   • naproxen (EC NAPROSYN) 500 MG EC tablet Take 1 tablet (500 mg total) by mouth 2 (two) times a day with meals for 15 days (Patient not taking: Reported on 5/2/2023)   • Semaglutide,0 25 or 0 5MG/DOS, 2 MG/1 5ML SOPN Inject 0 5 mg under the skin (Patient not taking: Reported on 6/21/2022)   • [DISCONTINUED] atorvastatin (LIPITOR) 40 mg tablet Take 1 tablet (40 mg total) by mouth daily with dinner     No current facility-administered medications on file prior to visit  Allergies   Allergen Reactions   • Metformin GI Intolerance   • Rosuvastatin GI Intolerance       Physical Exam    /78   Ht 5' 9\" (1 753 m)   Wt 73 5 kg (162 lb)   BMI 23 92 kg/m²     Constitutional: normal, well developed, well nourished, alert, in no distress and non-toxic and no overt pain behavior  Eyes: anicteric  HEENT: grossly intact  Neck: supple, symmetric, trachea midline and no masses   Pulmonary:even and unlabored  Cardiovascular:No edema or pitting edema present  Skin:Normal without rashes or lesions and well hydrated  Psychiatric:Mood and affect appropriate  Neurologic: Gait is slow, nonantalgic  Musculoskeletal: Pain with lumbar extension  Imaging  MRI lumbar spine 5/11/2023:  FINDINGS:     VERTEBRAL SEGMENT AND DISC SPACES:  Normal lumbar segmentation      T11-T12:  Only included on sagittal images without the aid of axial images  There is no significant spinal canal or neural foraminal narrowing evident  Moderate disc degeneration with annular fissuring and mixed Modic type I and Modic type II signal   alteration      T12-L1 and L1-L2: No significant abnormality evident   Mild disc degeneration and arthropathy      L2-L3: Evidence of moderate narrowing of the spinal canal with crowding of the nerve rootlets of the cauda equina and neural foraminal narrowing of a mild degree due to disc bulge, " congenitally narrow spinal canal, in combination with hypertrophic   degenerative change of the facet joints and ligamenta flava  Mild to moderate disc degeneration with annular fissuring      L3-L4: Evidence of moderate narrowing of the spinal canal and neural foraminal narrowing of a mild degree due to congenitally narrowed spinal canal, disc bulge, left central disc extrusion with mild cephalad extent, in combination with hypertrophic   degenerative change of the facet joints and ligamenta flava  Mild disc degeneration with annular fissuring      L4-L5: Evidence of mild to moderate narrowing of the spinal canal and neural foraminal narrowing of a mild to moderate degree bilaterally due to disc bulge in combination with hypertrophic degenerative change of the facet joints and ligamenta flava  Mild   disc degeneration with annular fissuring      L5-S1: No significant spinal canal or neural foraminal narrowing evident  Mild disc degeneration with disc bulge and annular fissuring  Mild facet arthropathy      SPINAL CORD: No spinal cord abnormality evident      EXTRASPINAL STRUCTURES: No significant abnormality evident      MARROW SIGNAL: Discogenic Modic signal alteration  No other significant abnormality evident      IMPRESSION:  Lumbar spondylosis associated with some spinal canal and neuroforaminal narrowing detailed level by level above, and most notable at L2-L3, L3-L4 and L4-L5

## 2023-06-05 ENCOUNTER — HOSPITAL ENCOUNTER (OUTPATIENT)
Dept: RADIOLOGY | Facility: MEDICAL CENTER | Age: 63
Discharge: HOME | End: 2023-06-05
Admitting: ANESTHESIOLOGY
Payer: COMMERCIAL

## 2023-06-05 ENCOUNTER — TELEPHONE (OUTPATIENT)
Dept: PAIN MEDICINE | Facility: CLINIC | Age: 63
End: 2023-06-05

## 2023-06-05 VITALS
DIASTOLIC BLOOD PRESSURE: 78 MMHG | SYSTOLIC BLOOD PRESSURE: 122 MMHG | TEMPERATURE: 97.4 F | OXYGEN SATURATION: 97 % | RESPIRATION RATE: 18 BRPM | HEART RATE: 70 BPM

## 2023-06-05 DIAGNOSIS — M54.16 LUMBAR RADICULOPATHY: ICD-10-CM

## 2023-06-05 PROCEDURE — 62323 NJX INTERLAMINAR LMBR/SAC: CPT | Performed by: ANESTHESIOLOGY

## 2023-06-05 RX ORDER — METHYLPREDNISOLONE ACETATE 80 MG/ML
80 INJECTION, SUSPENSION INTRA-ARTICULAR; INTRALESIONAL; INTRAMUSCULAR; PARENTERAL; SOFT TISSUE ONCE
Status: COMPLETED | OUTPATIENT
Start: 2023-06-05 | End: 2023-06-05

## 2023-06-05 RX ORDER — BUPIVACAINE HCL/PF 2.5 MG/ML
1 VIAL (ML) INJECTION ONCE
Status: COMPLETED | OUTPATIENT
Start: 2023-06-05 | End: 2023-06-05

## 2023-06-05 RX ADMIN — IOHEXOL 1 ML: 300 INJECTION, SOLUTION INTRAVENOUS at 14:28

## 2023-06-05 RX ADMIN — Medication 1 ML: at 14:29

## 2023-06-05 RX ADMIN — METHYLPREDNISOLONE ACETATE 80 MG: 80 INJECTION, SUSPENSION INTRA-ARTICULAR; INTRALESIONAL; INTRAMUSCULAR; PARENTERAL; SOFT TISSUE at 14:29

## 2023-06-05 NOTE — TELEPHONE ENCOUNTER
Rafaela  pt cleared for inj today w/ WS @ 6780  S/w pt and advised of same  Pt verbalized understanding and appreciative of call

## 2023-06-05 NOTE — DISCHARGE INSTRUCTIONS
Epidural Steroid Injection   WHAT YOU NEED TO KNOW:   An epidural steroid injection (JOHNNY) is a procedure to inject steroid medicine into the epidural space  The epidural space is between your spinal cord and vertebrae  Steroids reduce inflammation and fluid buildup in your spine that may be causing pain  You may be given pain medicine along with the steroids  ACTIVITY  Do not drive or operate machinery today  No strenuous activity today - bending, lifting, etc   You may resume normal activites starting tomorrow - start slowly and as tolerated  You may shower today, but no tub baths or hot tubs  You may have numbness for several hours from the local anesthetic  Please use caution and common sense, especially with weight-bearing activities  CARE OF THE INJECTION SITE  If you have soreness or pain, apply ice to the area today (20 minutes on/20 minutes off)  Starting tomorrow, you may use warm, moist heat or ice if needed  You may have an increase or change in your discomfort for 36-48 hours after your treatment  Apply ice and continue with any pain medication you have been prescribed  Notify the [facility] if you have any of the following: redness, drainage, swelling, headache, stiff neck or fever above 100°F     SPECIAL INSTRUCTIONS  Our office will contact you in approximately 7 days for a progress report  MEDICATIONS  Continue to take all routine medications  Our office may have instructed you to hold some medications  As no general anesthesia was used in today's procedure, you should not experience any side effects related to anesthesia  If you are diabetic, the steroids used in today's injection may temporarily increase your blood sugar levels after the first few days after your injection  Please keep a close eye on your sugars and alert the doctor who manages your diabetes if your sugars are significantly high from your baseline or you are symptomatic       If you have a problem specifically related to your procedure, please call our office at [phone]  Problems not related to your procedure should be directed to your primary care physician

## 2023-06-05 NOTE — H&P
History of Present Illness:  The patient is a 58 y o  male who presents with complaints of back and leg pain    Past Medical History:   Diagnosis Date   • Diabetes mellitus ([address] )    • OCD (obsessive compulsive disorder)        Past Surgical History:   Procedure Laterality Date   • CARPECTOMY HAND Bilateral          Current Outpatient Medications:   •  aspirin 81 mg chewable tablet, Chew 1 tablet (81 mg total) daily, Disp: 30 tablet, Rfl: 11  •  baclofen 10 mg tablet, Take 10 mg by mouth 3 (three) times a day as needed, Disp: , Rfl:   •  buPROPion (WELLBUTRIN XL) 150 mg 24 hr tablet, Take 1 tablet by mouth every morning, Disp: , Rfl:   •  Cholecalciferol (Vitamin D3) 1 25 MG (69059 UT) CAPS, , Disp: , Rfl:   •  clopidogrel (PLAVIX) 75 mg tablet, TAKE 1 TABLET BY MOUTH EVERY DAY (Patient not taking: Reported on 5/2/2023), Disp: 90 tablet, Rfl: 3  •  co-enzyme Q-10 30 MG capsule, Take 30 mg by mouth 3 (three) times a day, Disp: , Rfl:   •  gabapentin (NEURONTIN) 100 mg capsule, Take 1 capsule (100 mg total) by mouth 3 (three) times a day, Disp: 90 capsule, Rfl: 1  •  methocarbamol (ROBAXIN) 500 mg tablet, Take 1 tablet (500 mg total) by mouth daily at bedtime as needed for muscle spasms (Patient not taking: Reported on 4/17/2023), Disp: 20 tablet, Rfl: 0  •  naproxen (EC NAPROSYN) 500 MG EC tablet, Take 1 tablet (500 mg total) by mouth 2 (two) times a day with meals for 15 days (Patient not taking: Reported on 5/2/2023), Disp: 30 tablet, Rfl: 0  •  Ozempic, 1 MG/DOSE, 4 MG/3ML SOPN injection pen, , Disp: , Rfl:   •  pravastatin (PRAVACHOL) 40 mg tablet, TAKE 1 TABLET BY MOUTH EVERY DAY, Disp: 90 tablet, Rfl: 1  •  Semaglutide,0 25 or 0 5MG/DOS, 2 MG/1 5ML SOPN, Inject 0 5 mg under the skin (Patient not taking: Reported on 6/21/2022), Disp: , Rfl:   •  sertraline (ZOLOFT) 100 mg tablet, Take 50 mg by mouth daily  , Disp: , Rfl:     Current Facility-Administered Medications:   •  bupivacaine (PF) (MARCAINE) 0 25 % injection 1 mL, 1 mL, Epidural, Once, Will Ildefonso Meredith MD  •  iohexol (OMNIPAQUE) 300 mg/mL injection 1 mL, 1 mL, Epidural, Once, Will Ildefonso Meredith MD  •  methylPREDNISolone acetate (DEPO-MEDROL) injection 80 mg, 80 mg, Epidural, Once, Will Ildefonso Meredith MD    Allergies   Allergen Reactions   • Metformin GI Intolerance   • Rosuvastatin GI Intolerance       Physical Exam:   Vitals:    06/05/23 1402   BP: 133/79   Pulse: 74   Resp: 18   Temp: (!) 97 4 °F (36 3 °C)   SpO2: 98%     General: Awake, Alert, Oriented x 3, Mood and affect appropriate  Respiratory: Respirations even and unlabored  Cardiovascular: Peripheral pulses intact; no edema  Musculoskeletal Exam: pain with lumbar extension    ASA Score: 2    Patient/Chart Verification  Patient ID Verified: Verbal  ID Band Applied: No  Consents Confirmed: Procedural, To be obtained in the Pre-Procedure area  H&P( within 30 days) Verified: To be obtained in the Pre-Procedure area  Interval H&P(within 24 hr) Complete (required for Outpatients and Surgery Admit only): To be obtained in the Pre-Procedure area  Allergies Reviewed: Yes  Anticoag/NSAID held?: NA (Pt takes 81 mg aspirin, hold not required for this procedure  Denies taking Plavix  LD was months ago  Jesse Washburn )  Currently on antibiotics?: No    Assessment:   1   Lumbar radiculopathy        Plan: L4-5 LESI

## 2023-06-05 NOTE — TELEPHONE ENCOUNTER
----- Message from Reed Carmichael MD sent at 6/5/2023  8:44 AM EDT -----  Platelet count stable, OK to proceed with JOHNNY as planned

## 2023-06-12 ENCOUNTER — TELEPHONE (OUTPATIENT)
Dept: PAIN MEDICINE | Facility: CLINIC | Age: 63
End: 2023-06-12

## 2023-07-14 ENCOUNTER — OFFICE VISIT (OUTPATIENT)
Dept: PAIN MEDICINE | Facility: CLINIC | Age: 63
End: 2023-07-14
Payer: COMMERCIAL

## 2023-07-14 VITALS
WEIGHT: 162 LBS | BODY MASS INDEX: 23.99 KG/M2 | HEIGHT: 69 IN | SYSTOLIC BLOOD PRESSURE: 120 MMHG | DIASTOLIC BLOOD PRESSURE: 80 MMHG

## 2023-07-14 DIAGNOSIS — M47.817 LUMBOSACRAL SPONDYLOSIS WITHOUT MYELOPATHY: Primary | ICD-10-CM

## 2023-07-14 DIAGNOSIS — M48.061 SPINAL STENOSIS OF LUMBAR REGION WITHOUT NEUROGENIC CLAUDICATION: ICD-10-CM

## 2023-07-14 PROCEDURE — 99214 OFFICE O/P EST MOD 30 MIN: CPT | Performed by: ANESTHESIOLOGY

## 2023-07-14 NOTE — PROGRESS NOTES
Assessment:  1. Lumbosacral spondylosis without myelopathy    2. Spinal stenosis of lumbar region without neurogenic claudication        Plan:     Patient presenting with chronic low back for 2+ years, worsening over the past several months. During today's evaluation patient is demonstrating pain that is likely stemming from lumbar spondylosis. Symptoms are accompanied by pain >7/10 on the pain scale with inability to participate in IADLs for >6 weeks. Patient has tried chiropractic therapy and massage therapy. Patient was referred to physical therapy but states that he is financially unable to attend 80 dollars per session. Has been taking NSAIDs, baclofen with minimal benefit. Denies any bowel or bladder symptoms, saddle anesthesia.     In regards to the patient's lumbar spine pathology, we discussed the various treatment options including physical therapy, chiropractic treatment, medication management, activity modifications, interventional spine procedures.  Given that patient has not had any benefit with conservative treatments, I think patient would benefit from targeted interventional treatment modalities.     I reviewed and interpreted pertinent imaging studies - specifically lumbar MRI and discussed the results and clinical significance with the patient. Patient does have facet arthropathy throughout both lumbar spine with congenital narrowing and varying degrees of disc bulges resulting in moderate canal stenosis at L2-3 and L4-5.     -Patient is status post L4-5 LESI with no reported benefit. However today he endorses no radiating symptoms and his pain is predominantly axial.  Given predominant axial pain component, we will schedule the patient for bilateral L3-5 medial branch nerve blocks with intention of moving forward towards radiofrequency ablation if there is an appropriate diagnostic response.  The initial blocks will be performed with 2% lidocaine and if an appropriate response is obtained upon review of the patient's pain diary, a confirmatory block will be scheduled with 0.5% bupivacaine. In the office today, we reviewed the nature of facet joint pathology in depth using a spine model. We discussed the approach we would use for the injections and provided literature for home review. The patient understands the risks associated with the procedure including bleeding, infection, tissue injury, and allergic reaction and provided verbal informed consent in the office today. I reviewed external notes from the relevant aspects of the patient's medical record, specifically primary care physician, orthopedic sports medicine office notes in regards to current and prior treatments tried (as mentioned in history of present illness).    I reviewed pertinent laboratory studies, specifically renal function, hemoglobin A1c, CBC, coagulation studies, prior to initiating the recommended medication therapies/interventional treatment options.     Handouts provided questions answered to patient's satisfaction. Lifestyle modifications extensively discussed including diet, exercise and weight loss in addition to core strengthening.       [place] Prescription Drug Monitoring Program report was reviewed and was appropriate     My impressions and treatment recommendations were discussed in detail with the patient who verbalized understanding and had no further questions. Discharge instructions were provided. I personally saw and examined the patient and I agree with the above discussed plan of care. Orders Placed This Encounter   Procedures   • FL spine and pain procedure     Standing Status:   Future     Standing Expiration Date:   7/14/2027     Order Specific Question:   Reason for Exam:     Answer:   bilateral L3-5 MBB #1     Order Specific Question:   Anticoagulant hold needed? Answer:   no     No orders of the defined types were placed in this encounter.       History of Present Illness:  Mukul MOYER Donna Brown is a 58 y.o. male who presents for a follow up office visit in regards to chronic lower back pain. The patient’s current symptoms include ongoing lower back pain rated a 9 out of 10 on the pain scale. The pain describes a constant sharp, pressure-like, shooting pain throughout the day with no pattern. Patient underwent a L4-5 LESI on 6/5/2023. Patient noted temporary improvement 1 week after the injection of 80% but pain shortly returned to baseline afterwards. Patient noted no benefit with gabapentin regimen. I have personally reviewed and/or updated the patient's past medical history, past surgical history, family history, social history, current medications, allergies, and vital signs today. Review of Systems   Constitutional: Negative for chills and fever. HENT: Negative for ear pain and sore throat. Eyes: Negative for pain and visual disturbance. Respiratory: Negative for cough and shortness of breath. Cardiovascular: Negative for chest pain and palpitations. Gastrointestinal: Negative for abdominal pain and vomiting. Genitourinary: Negative for dysuria and hematuria. Musculoskeletal: Positive for back pain and gait problem. Negative for arthralgias. Skin: Negative for color change and rash. Neurological: Negative for seizures and syncope. All other systems reviewed and are negative.       Patient Active Problem List   Diagnosis   • Trigger finger, left index finger   • Labral tear of long head of biceps tendon, right, initial encounter   • Diabetes ([address])   • Mixed dyslipidemia   • History of percutaneous coronary intervention   • CAD, S/P MI June 2020, s/p PCI D1 July 2020   • Chronic fatigue   • SOB (shortness of breath)   • Lumbar radiculopathy       Past Medical History:   Diagnosis Date   • Diabetes mellitus ([address])    • OCD (obsessive compulsive disorder)        Past Surgical History:   Procedure Laterality Date   • CARPECTOMY HAND Bilateral        History reviewed. No pertinent family history. Social History     Occupational History   • Not on file   Tobacco Use   • Smoking status: Never   • Smokeless tobacco: Never   Vaping Use   • Vaping Use: Never used   Substance and Sexual Activity   • Alcohol use: Yes   • Drug use: Never   • Sexual activity: Not on file       Current Outpatient Medications on File Prior to Visit   Medication Sig   • aspirin 81 mg chewable tablet Chew 1 tablet (81 mg total) daily   • Cholecalciferol (Vitamin D3) 1.25 MG (30513 UT) CAPS    • co-enzyme Q-10 30 MG capsule Take 30 mg by mouth 3 (three) times a day   • gabapentin (NEURONTIN) 100 mg capsule Take 1 capsule (100 mg total) by mouth 3 (three) times a day   • Ozempic, 1 MG/DOSE, 4 MG/3ML SOPN injection pen    • pravastatin (PRAVACHOL) 40 mg tablet TAKE 1 TABLET BY MOUTH EVERY DAY   • sertraline (ZOLOFT) 100 mg tablet Take 50 mg by mouth daily     • baclofen 10 mg tablet Take 10 mg by mouth 3 (three) times a day as needed   • buPROPion (WELLBUTRIN XL) 150 mg 24 hr tablet Take 1 tablet by mouth every morning   • clopidogrel (PLAVIX) 75 mg tablet TAKE 1 TABLET BY MOUTH EVERY DAY (Patient not taking: Reported on 5/2/2023)   • methocarbamol (ROBAXIN) 500 mg tablet Take 1 tablet (500 mg total) by mouth daily at bedtime as needed for muscle spasms (Patient not taking: Reported on 4/17/2023)   • naproxen (EC NAPROSYN) 500 MG EC tablet Take 1 tablet (500 mg total) by mouth 2 (two) times a day with meals for 15 days (Patient not taking: Reported on 5/2/2023)   • Semaglutide,0.25 or 0.5MG/DOS, 2 MG/1.5ML SOPN Inject 0.5 mg under the skin (Patient not taking: Reported on 6/21/2022)   • [DISCONTINUED] atorvastatin (LIPITOR) 40 mg tablet Take 1 tablet (40 mg total) by mouth daily with dinner     No current facility-administered medications on file prior to visit.        Allergies   Allergen Reactions   • Metformin GI Intolerance   • Rosuvastatin GI Intolerance       Physical Exam:    /80   Ht 5' 9" (1.753 m)   Wt 73.5 kg (162 lb)   BMI 23.92 kg/m²     Constitutional:normal, well developed, well nourished, alert, in no distress and non-toxic and no overt pain behavior. Eyes:anicteric  HEENT:grossly intact  Neck:supple, symmetric, trachea midline and no masses   Pulmonary:even and unlabored  Cardiovascular:No edema or pitting edema present  Skin:Normal without rashes or lesions and well hydrated  Psychiatric:Mood and affect appropriate  Neurologic: Motor function is grossly intact with no focal neurologic deficits. Musculoskeletal: Lumbar extension and lateral flexion elicits significant back pain. Imaging  MRI lumbar spine 5/11/2023:  FINDINGS:     VERTEBRAL SEGMENT AND DISC SPACES:  Normal lumbar segmentation.     T11-T12:  Only included on sagittal images without the aid of axial images.  There is no significant spinal canal or neural foraminal narrowing evident. Moderate disc degeneration with annular fissuring and mixed Modic type I and Modic type II signal   alteration.     T12-L1 and L1-L2: No significant abnormality evident. Mild disc degeneration and arthropathy.     L2-L3: Evidence of moderate narrowing of the spinal canal with crowding of the nerve rootlets of the cauda equina and neural foraminal narrowing of a mild degree due to disc bulge, congenitally narrow spinal canal, in combination with hypertrophic   degenerative change of the facet joints and ligamenta flava. Mild to moderate disc degeneration with annular fissuring.     L3-L4: Evidence of moderate narrowing of the spinal canal and neural foraminal narrowing of a mild degree due to congenitally narrowed spinal canal, disc bulge, left central disc extrusion with mild cephalad extent, in combination with hypertrophic   degenerative change of the facet joints and ligamenta flava.  Mild disc degeneration with annular fissuring.     L4-L5: Evidence of mild to moderate narrowing of the spinal canal and neural foraminal narrowing of a mild to moderate degree bilaterally due to disc bulge in combination with hypertrophic degenerative change of the facet joints and ligamenta flava. Mild   disc degeneration with annular fissuring.     L5-S1: No significant spinal canal or neural foraminal narrowing evident. Mild disc degeneration with disc bulge and annular fissuring. Mild facet arthropathy.     SPINAL CORD: No spinal cord abnormality evident.     EXTRASPINAL STRUCTURES: No significant abnormality evident.     MARROW SIGNAL: Discogenic Modic signal alteration. No other significant abnormality evident.     IMPRESSION:  Lumbar spondylosis associated with some spinal canal and neuroforaminal narrowing detailed level by level above, and most notable at L2-L3, L3-L4 and L4-L5.

## 2023-08-02 ENCOUNTER — OFFICE VISIT (OUTPATIENT)
Dept: FAMILY MEDICINE CLINIC | Facility: CLINIC | Age: 63
End: 2023-08-02
Payer: COMMERCIAL

## 2023-08-02 VITALS
SYSTOLIC BLOOD PRESSURE: 120 MMHG | HEIGHT: 69 IN | OXYGEN SATURATION: 98 % | HEART RATE: 100 BPM | TEMPERATURE: 98.6 F | BODY MASS INDEX: 24.14 KG/M2 | WEIGHT: 163 LBS | DIASTOLIC BLOOD PRESSURE: 80 MMHG

## 2023-08-02 DIAGNOSIS — Z98.61 HISTORY OF PERCUTANEOUS CORONARY INTERVENTION: ICD-10-CM

## 2023-08-02 DIAGNOSIS — E11.9 TYPE 2 DIABETES MELLITUS WITHOUT COMPLICATION, WITHOUT LONG-TERM CURRENT USE OF INSULIN (HCC): Primary | ICD-10-CM

## 2023-08-02 DIAGNOSIS — E78.2 MIXED DYSLIPIDEMIA: ICD-10-CM

## 2023-08-02 DIAGNOSIS — M54.16 LUMBAR RADICULOPATHY: ICD-10-CM

## 2023-08-02 DIAGNOSIS — Z00.00 ROUTINE ADULT HEALTH MAINTENANCE: ICD-10-CM

## 2023-08-02 DIAGNOSIS — Z12.5 PROSTATE CANCER SCREENING: ICD-10-CM

## 2023-08-02 DIAGNOSIS — E78.2 MIXED HYPERLIPIDEMIA: ICD-10-CM

## 2023-08-02 DIAGNOSIS — I25.10 CORONARY ARTERY DISEASE INVOLVING NATIVE CORONARY ARTERY OF NATIVE HEART WITHOUT ANGINA PECTORIS: ICD-10-CM

## 2023-08-02 DIAGNOSIS — F32.9 MAJOR DEPRESSIVE DISORDER, REMISSION STATUS UNSPECIFIED, UNSPECIFIED WHETHER RECURRENT: ICD-10-CM

## 2023-08-02 DIAGNOSIS — Z12.11 COLON CANCER SCREENING: ICD-10-CM

## 2023-08-02 PROBLEM — M54.50 LOWER BACK PAIN: Status: ACTIVE | Noted: 2022-04-01

## 2023-08-02 LAB — SL AMB POCT HEMOGLOBIN AIC: 10.2 (ref ?–6.5)

## 2023-08-02 PROCEDURE — 83036 HEMOGLOBIN GLYCOSYLATED A1C: CPT | Performed by: FAMILY MEDICINE

## 2023-08-02 PROCEDURE — 99386 PREV VISIT NEW AGE 40-64: CPT | Performed by: FAMILY MEDICINE

## 2023-08-02 PROCEDURE — 99204 OFFICE O/P NEW MOD 45 MIN: CPT | Performed by: FAMILY MEDICINE

## 2023-08-02 RX ORDER — PRAVASTATIN SODIUM 40 MG
40 TABLET ORAL DAILY
Qty: 90 TABLET | Refills: 1 | Status: SHIPPED | OUTPATIENT
Start: 2023-08-02

## 2023-08-02 RX ORDER — SERTRALINE HYDROCHLORIDE 100 MG/1
150 TABLET, FILM COATED ORAL DAILY
Qty: 135 TABLET | Refills: 1 | Status: SHIPPED | OUTPATIENT
Start: 2023-08-02

## 2023-08-02 NOTE — PROGRESS NOTES
Diabetic Foot Exam    Patient's shoes and socks removed. Right Foot/Ankle   Right Foot Inspection  Skin Exam: skin normal and skin intact. No dry skin, no warmth, no callus, no erythema, no maceration, no abnormal color, no pre-ulcer, no ulcer and no callus. Toe Exam: ROM and strength within normal limits. Sensory   Monofilament testing: intact    Vascular  The right DP pulse is 2+. The right PT pulse is 2+. Left Foot/Ankle  Left Foot Inspection  Skin Exam: skin normal and skin intact. No dry skin, no warmth, no erythema, no maceration, normal color, no pre-ulcer, no ulcer and no callus. Toe Exam: ROM and strength within normal limits. Sensory   Monofilament testing: intact    Vascular  The left DP pulse is 2+. The left PT pulse is 2+.      Assign Risk Category  No deformity present  No loss of protective sensation  No weak pulses  Risk: 0    Answers for HPI/ROS submitted by the patient on 8/1/2023  Diabetes type: type 2  MedicAlert ID: [id number]  Disease duration: 10 Years  fatigue: Yes  confusion: No  dizziness: No  headaches: No  hunger: No  mood changes: Yes  nervous/anxious: Yes  pallor: No  seizures: No  sleepiness: Yes  speech difficulty: No  sweats: No  tremors: No  blackouts: No  hospitalization: No  nocturnal hypoglycemia: No  required assistance: No  required glucagon: No  CVA: No  heart disease: No  impotence: Yes  nephropathy: No  peripheral neuropathy: No  PVD: No  retinopathy: No  CAD risks: family history, sedentary lifestyle  Current treatments: none, oral agent (monotherapy)  Treatment compliance: all of the time  Home blood tests: 1-2 x per week  Home urines: <1 x per month  Monitoring compliance: poor  Blood glucose trend: fluctuating minimally  breakfast time: 5-6 am  breakfast glucose level: 140-180  lunch time: 12-1 pm  lunch glucose level: 140-180  dinner time: 6-7 pm  dinner glucose level: 140-180  High score: 180-200  Overall: 140-180  Weight trend: fluctuating minimally  Current diet: high fat/cholesterol  Meal planning: none  Exercise: rarely  Dietitian visit: No  Eye exam current: Yes  Sees podiatrist: No

## 2023-08-02 NOTE — PROGRESS NOTES
Assessment/Plan:    57 y/o male with:  DM2, CAD s/p PCI, Lumbar radiculopathy, MDD, HLD along with annual well visit. Will continue meds. Will check labs Discussed supportive care and return parameters. Regarding Annual well visit. Discussed various safety and health maintenance issues including healthy diet like the Mediterranean diet, exercise, ample sleep, stress reduction, and healthy weight as tolerated. Discussed supportive care and return parameters. Follow-up in 3 mo. No problem-specific Assessment & Plan notes found for this encounter. Diagnoses and all orders for this visit:    Type 2 diabetes mellitus without complication, without long-term current use of insulin (HCC)  -     POCT hemoglobin A1c  -     metFORMIN (GLUCOPHAGE) 500 mg tablet; Take 2 tablets (1,000 mg total) by mouth 2 (two) times a day with meals  -     CBC and differential; Future  -     Comprehensive metabolic panel; Future  -     TSH, 3rd generation with Free T4 reflex; Future  -     Lipid Panel with Direct LDL reflex; Future  -     Albumin / creatinine urine ratio  -     UA (URINE) with reflex to Scope  -     PSA, Total Screen; Future    Coronary artery disease involving native coronary artery of native heart without angina pectoris  -     CBC and differential; Future  -     Comprehensive metabolic panel; Future  -     TSH, 3rd generation with Free T4 reflex; Future  -     Lipid Panel with Direct LDL reflex; Future  -     Albumin / creatinine urine ratio  -     UA (URINE) with reflex to Scope  -     PSA, Total Screen; Future    Lumbar radiculopathy  -     CBC and differential; Future  -     Comprehensive metabolic panel; Future  -     TSH, 3rd generation with Free T4 reflex; Future  -     Lipid Panel with Direct LDL reflex; Future  -     Albumin / creatinine urine ratio  -     UA (URINE) with reflex to Scope  -     PSA, Total Screen;  Future    Mixed dyslipidemia  -     CBC and differential; Future  -     Comprehensive metabolic panel; Future  -     TSH, 3rd generation with Free T4 reflex; Future  -     Lipid Panel with Direct LDL reflex; Future  -     Albumin / creatinine urine ratio  -     UA (URINE) with reflex to Scope  -     PSA, Total Screen; Future    History of percutaneous coronary intervention  -     CBC and differential; Future  -     Comprehensive metabolic panel; Future  -     TSH, 3rd generation with Free T4 reflex; Future  -     Lipid Panel with Direct LDL reflex; Future  -     Albumin / creatinine urine ratio  -     UA (URINE) with reflex to Scope  -     PSA, Total Screen; Future    Prostate cancer screening  -     CBC and differential; Future  -     Comprehensive metabolic panel; Future  -     TSH, 3rd generation with Free T4 reflex; Future  -     Lipid Panel with Direct LDL reflex; Future  -     Albumin / creatinine urine ratio  -     UA (URINE) with reflex to Scope  -     PSA, Total Screen; Future    Major depressive disorder, remission status unspecified, unspecified whether recurrent  -     sertraline (ZOLOFT) 100 mg tablet; Take 1.5 tablets (150 mg total) by mouth daily    Mixed hyperlipidemia  -     pravastatin (PRAVACHOL) 40 mg tablet; Take 1 tablet (40 mg total) by mouth daily    Colon cancer screening  -     Cologuard    Routine adult health maintenance          Subjective:     Chief Complaint   Patient presents with   • Establish Care     Depression , anxiety , diabetic . Would like lab work    • Physical Exam     Annual         Patient ID: Alton Harrell is a 58 y.o. male. Patient is a 57 y/o male who presents to establish care in this practice. Patient has DM2, CAD s/p PCI, Lumbar radiculopathy, MDD, HLD. Patient admits being stable on meds. Overall and denies other acute complaints no fevers chills nausea or vomiting. Patient also here for annual well visit admits he is active trying to eat and sleep well no other health maintenance issues. Diabetes  He has type 2 diabetes mellitus.  No MedicAlert identification noted. The initial diagnosis of diabetes was made 10 years ago. Hypoglycemia symptoms include mood changes, nervousness/anxiousness and sleepiness. Pertinent negatives for hypoglycemia include no confusion, dizziness, headaches, hunger, pallor, seizures, speech difficulty, sweats or tremors. Associated symptoms include fatigue. Pertinent negatives for hypoglycemia complications include no blackouts, no hospitalization, no nocturnal hypoglycemia, no required assistance and no required glucagon injection. Pertinent negatives for diabetic complications include no CVA, heart disease, nephropathy, peripheral neuropathy, PVD or retinopathy. Risk factors for coronary artery disease include family history and sedentary lifestyle. Current diabetic treatment includes none and oral agent (monotherapy). He is compliant with treatment all of the time. His weight is fluctuating minimally. He is following a high fat/cholesterol diet. When asked about meal planning, he reported none. He has not had a previous visit with a dietitian. He rarely participates in exercise. He monitors blood glucose at home 1-2 x per week. He monitors urine at home <1 x per month. Blood glucose monitoring compliance is poor. His home blood glucose trend is fluctuating minimally. His breakfast blood glucose is taken between 5-6 am. His breakfast blood glucose range is generally 140-180 mg/dl. His lunch blood glucose is taken between 12-1 pm. His lunch blood glucose range is generally 140-180 mg/dl. His dinner blood glucose is taken between 6-7 pm. His dinner blood glucose range is generally 140-180 mg/dl. His highest blood glucose is 180-200 mg/dl. His overall blood glucose range is 140-180 mg/dl. He does not see a podiatrist.Eye exam is current.        The following portions of the patient's history were reviewed and updated as appropriate: allergies, current medications, past family history, past medical history, past social history, past surgical history and problem list.    Review of Systems   Constitutional: Positive for fatigue. HENT: Negative. Eyes: Negative. Respiratory: Negative. Cardiovascular: Negative. Gastrointestinal: Negative. Endocrine: Negative. Genitourinary: Negative. Musculoskeletal: Negative. Skin: Negative for pallor. Allergic/Immunologic: Negative. Neurological: Negative. Negative for dizziness, tremors, seizures, speech difficulty and headaches. Hematological: Negative. Psychiatric/Behavioral: Negative for confusion. The patient is nervous/anxious. All other systems reviewed and are negative. Objective:      /80   Pulse 100   Temp 98.6 °F (37 °C)   Ht 5' 9" (1.753 m)   Wt 73.9 kg (163 lb)   SpO2 98%   BMI 24.07 kg/m²          Physical Exam  Constitutional:       Appearance: He is well-developed. HENT:      Head: Atraumatic. Right Ear: External ear normal.      Left Ear: External ear normal.   Eyes:      Conjunctiva/sclera: Conjunctivae normal.      Pupils: Pupils are equal, round, and reactive to light. Cardiovascular:      Rate and Rhythm: Normal rate and regular rhythm. Heart sounds: Normal heart sounds. Pulmonary:      Effort: Pulmonary effort is normal. No respiratory distress. Breath sounds: Normal breath sounds. Abdominal:      General: There is no distension. Palpations: Abdomen is soft. Tenderness: There is no abdominal tenderness. There is no guarding or rebound. Musculoskeletal:         General: Normal range of motion. Cervical back: Normal range of motion. Skin:     General: Skin is warm and dry. Neurological:      Mental Status: He is alert and oriented to person, place, and time. Cranial Nerves: No cranial nerve deficit. Psychiatric:         Behavior: Behavior normal.         Thought Content:  Thought content normal.         Judgment: Judgment normal.         Answers for HPI/ROS submitted by the patient on 8/1/2023  Diabetes type: type 2  MedicAlert ID: No  Disease duration: 10 Years  fatigue: Yes  confusion: No  dizziness: No  headaches: No  hunger: No  mood changes: Yes  nervous/anxious: Yes  pallor: No  seizures: No  sleepiness: Yes  speech difficulty: No  sweats: No  tremors: No  blackouts: No  hospitalization: No  nocturnal hypoglycemia: No  required assistance: No  required glucagon: No  CVA: No  heart disease: No  impotence: Yes  nephropathy: No  peripheral neuropathy: No  PVD: No  retinopathy: No  CAD risks: family history, sedentary lifestyle  Current treatments: none, oral agent (monotherapy)  Treatment compliance: all of the time  Home blood tests: 1-2 x per week  Home urines: <1 x per month  Monitoring compliance: poor  Blood glucose trend: fluctuating minimally  breakfast time: 5-6 am  breakfast glucose level: 140-180  lunch time: 12-1 pm  lunch glucose level: 140-180  dinner time: 6-7 pm  dinner glucose level: 140-180  High score: 180-200  Overall: 140-180  Weight trend: fluctuating minimally  Current diet: high fat/cholesterol  Meal planning: none  Exercise: rarely  Dietitian visit: No  Eye exam current: Yes  Sees podiatrist: No

## 2023-08-03 ENCOUNTER — HOSPITAL ENCOUNTER (OUTPATIENT)
Dept: RADIOLOGY | Facility: MEDICAL CENTER | Age: 63
Discharge: HOME | End: 2023-08-03
Admitting: ANESTHESIOLOGY
Payer: COMMERCIAL

## 2023-08-03 VITALS
DIASTOLIC BLOOD PRESSURE: 78 MMHG | HEART RATE: 75 BPM | RESPIRATION RATE: 18 BRPM | OXYGEN SATURATION: 98 % | SYSTOLIC BLOOD PRESSURE: 136 MMHG | TEMPERATURE: 98 F

## 2023-08-03 DIAGNOSIS — M47.817 LUMBOSACRAL SPONDYLOSIS WITHOUT MYELOPATHY: ICD-10-CM

## 2023-08-03 PROCEDURE — 64494 INJ PARAVERT F JNT L/S 2 LEV: CPT | Performed by: ANESTHESIOLOGY

## 2023-08-03 PROCEDURE — 64493 INJ PARAVERT F JNT L/S 1 LEV: CPT | Performed by: ANESTHESIOLOGY

## 2023-08-03 RX ADMIN — Medication 3 ML: at 09:17

## 2023-08-03 NOTE — H&P
History of Present Illness:  The patient is a 58 y.o. male who presents with complaints of back pain    Past Medical History:   Diagnosis Date   • Anxiety    • Arthritis    • Carpal tunnel syndrome, bilateral    • Coronary artery disease 7/13/20   • Depression    • Diabetes mellitus ([address])    • GERD (gastroesophageal reflux disease)    • Heart attack ([address])    • Hyperlipidemia    • Myocardial infarction ([address]) 7/13/20   • OCD (obsessive compulsive disorder)        Past Surgical History:   Procedure Laterality Date   • CARDIAC SURGERY  Stent   • CARPECTOMY HAND Bilateral          Current Outpatient Medications:   •  aspirin 81 mg chewable tablet, Chew 1 tablet (81 mg total) daily (Patient not taking: Reported on 8/2/2023), Disp: 30 tablet, Rfl: 11  •  baclofen 10 mg tablet, Take 10 mg by mouth 3 (three) times a day as needed (Patient not taking: Reported on 8/2/2023), Disp: , Rfl:   •  buPROPion (WELLBUTRIN XL) 150 mg 24 hr tablet, Take 1 tablet by mouth every morning (Patient not taking: Reported on 8/2/2023), Disp: , Rfl:   •  Cholecalciferol (Vitamin D3) 1.25 MG (26638 UT) CAPS, , Disp: , Rfl:   •  clopidogrel (PLAVIX) 75 mg tablet, TAKE 1 TABLET BY MOUTH EVERY DAY (Patient not taking: Reported on 5/2/2023), Disp: 90 tablet, Rfl: 3  •  co-enzyme Q-10 30 MG capsule, Take 30 mg by mouth 3 (three) times a day (Patient not taking: Reported on 8/2/2023), Disp: , Rfl:   •  gabapentin (NEURONTIN) 100 mg capsule, Take 1 capsule (100 mg total) by mouth 3 (three) times a day (Patient not taking: Reported on 8/2/2023), Disp: 90 capsule, Rfl: 1  •  metFORMIN (GLUCOPHAGE) 500 mg tablet, Take 2 tablets (1,000 mg total) by mouth 2 (two) times a day with meals, Disp: 360 tablet, Rfl: 1  •  methocarbamol (ROBAXIN) 500 mg tablet, Take 1 tablet (500 mg total) by mouth daily at bedtime as needed for muscle spasms (Patient not taking: Reported on 4/17/2023), Disp: 20 tablet, Rfl: 0  •  naproxen (EC NAPROSYN) 500 MG EC tablet, Take 1 tablet (500 mg total) by mouth 2 (two) times a day with meals for 15 days (Patient not taking: Reported on 5/2/2023), Disp: 30 tablet, Rfl: 0  •  Ozempic, 1 MG/DOSE, 4 MG/3ML SOPN injection pen, , Disp: , Rfl:   •  pravastatin (PRAVACHOL) 40 mg tablet, Take 1 tablet (40 mg total) by mouth daily, Disp: 90 tablet, Rfl: 1  •  Semaglutide,0.25 or 0.5MG/DOS, 2 MG/1.5ML SOPN, Inject 0.5 mg under the skin (Patient not taking: Reported on 6/21/2022), Disp: , Rfl:   •  sertraline (ZOLOFT) 100 mg tablet, Take 1.5 tablets (150 mg total) by mouth daily, Disp: 135 tablet, Rfl: 1    Current Facility-Administered Medications:   •  lidocaine (PF) (XYLOCAINE-MPF) 2 % injection 3 mL, 3 mL, Perineural, Once, Will Fanny Kc MD    Allergies   Allergen Reactions   • Metformin GI Intolerance   • Rosuvastatin GI Intolerance       Physical Exam:   Vitals:    08/03/23 0905   BP: 132/78   Pulse: 73   Resp: 18   SpO2: 97%     General: Awake, Alert, Oriented x 3, Mood and affect appropriate  Respiratory: Respirations even and unlabored  Cardiovascular: Peripheral pulses intact; no edema  Musculoskeletal Exam: pain with lumbar extension    ASA Score: 2    Patient/Chart Verification  Patient ID Verified: Verbal  ID Band Applied: No  Consents Confirmed: Procedural, To be obtained in the Pre-Procedure area  H&P( within 30 days) Verified: To be obtained in the Pre-Procedure area  Interval H&P(within 24 hr) Complete (required for Outpatients and Surgery Admit only): To be obtained in the Pre-Procedure area  Allergies Reviewed: Yes  Anticoag/NSAID held?: NA  Currently on antibiotics?: No    Assessment:   1.  Lumbosacral spondylosis without myelopathy        Plan: bilateral L3-5 MBB #1

## 2023-08-03 NOTE — DISCHARGE INSTRUCTIONS

## 2023-08-04 ENCOUNTER — TELEPHONE (OUTPATIENT)
Dept: RADIOLOGY | Facility: MEDICAL CENTER | Age: 63
End: 2023-08-04

## 2023-08-04 PROBLEM — Z00.00 ROUTINE ADULT HEALTH MAINTENANCE: Status: ACTIVE | Noted: 2023-08-04

## 2023-08-04 NOTE — TELEPHONE ENCOUNTER
I have scanned the patient's pain diary from B/L L3-5 MBB #1 done on 8/3/23 into his epic chart for you to review.

## 2023-08-04 NOTE — TELEPHONE ENCOUNTER
Pain Diary reviewed and per Dr. Barbara Perez to move forward. Order placed. Scheduled:    Reviewed instructions: , NPO 1 hour prior, loose-fitting/comfortable clothes, if ill/fever/infx/abx to call and reschedule. Also pain level at leat 5/10 and refrain from PRN, as-needed pain meds 6h prior. Patient stated verbal understanding.

## 2023-08-04 NOTE — PATIENT INSTRUCTIONS
Type 2 Diabetes Management for Adults   AMBULATORY CARE:   Type 2 diabetes  is a disease that affects how your body uses glucose (sugar). Either your body cannot make enough insulin, or it cannot use the insulin correctly. It is important to keep diabetes controlled to prevent damage to your heart, blood vessels, and other organs. Management will help you feel well and enjoy your daily activities. Your diabetes care team providers can help you make a plan to fit diabetes care into your schedule. Your plan can change over time to fit your needs and your family's needs. Have someone call your local emergency number (911 in the [address]) if:   • You cannot be woken. • You have signs of diabetic ketoacidosis:     ? confusion, fatigue    ? vomiting    ? rapid heartbeat    ? fruity smelling breath    ? extreme thirst    ? dry mouth and skin    • You have any of the following signs of a heart attack:      ? Squeezing, pressure, or pain in your chest    ? You may  also have any of the following:     - Discomfort or pain in your back, neck, jaw, stomach, or arm    - Shortness of breath    - Nausea or vomiting    - Lightheadedness or a sudden cold sweat    • You have any of the following signs of a stroke:      ? Numbness or drooping on one side of your face     ? Weakness in an arm or leg    ? Confusion or difficulty speaking    ? Dizziness, a severe headache, or vision loss    Call your doctor or diabetes care team provider if:   • You have a sore or wound that will not heal.    • You have a change in the amount you urinate. • Your blood sugar levels are higher than your target goals. • You often have lower blood sugar levels than your target goals. • Your skin is red, dry, warm, or swollen. • You have trouble coping with diabetes, or you feel anxious or depressed. • You have questions or concerns about your condition or care.     What you need to know about high blood sugar levels:  High blood sugar levels may not cause any symptoms. You may feel more thirsty or urinate more often than usual. Over time, high blood sugar levels can damage your nerves, blood vessels, tissues, and organs. The following can increase your blood sugar levels:  • Large meals or large amounts of carbohydrates at one time    • Less physical activity    • Stress    • Illness    • A lower dose of diabetes medicine or insulin, or a late dose    What you need to know about low blood sugar levels:  Symptoms include feeling shaky, dizzy, irritable, or confused. You can prevent symptoms by keeping your blood sugar levels from going too low. • Treat a low blood sugar level right away:      ? Drink 4 ounces of juice or have 1 tube of glucose gel. ? Check your blood sugar level again 10 to 15 minutes later. ? When the level goes back to normal, eat a meal or snack to prevent another decrease. • Keep glucose gel, raisins, or hard candy with you at all times to treat a low blood sugar level. • Your blood sugar level can get too low if you take diabetes medicine or insulin and do not eat enough food. • If you use insulin, check your blood sugar level before you exercise. ? If your blood sugar level is below 100 mg/dL, eat 4 crackers or 2 ounces of raisins, or drink 4 ounces of juice. ? Check your level every 30 minutes if you exercise longer than 1 hour. ? You may need a snack during or after exercise. What you can do to manage your blood sugar levels:   • Check your blood sugar levels as directed and as needed. Several items are available to use to check your levels. You may need to check by testing a drop of blood in a glucose monitor. You may instead be given a continuous glucose monitoring (CGM) device. The device is worn at all times. The CGM checks your blood sugar level every 5 minutes. It sends results to an electronic device such as a smart phone. A CGM can be used with or without an insulin pump.  You and your diabetes care team providers will decide on the best method for you. The goal for blood sugar levels before meals  is between 80 and 130 mg/dL and 2 hours after eating  is lower than 180 mg/dL. • Make healthy food choices. Work with a dietitian to develop a meal plan that works for you and your schedule. A dietitian can help you learn how to eat the right amount of carbohydrates during your meals and snacks. Carbohydrates can raise your blood sugar level if you eat too many at one time. Examples of foods that contain carbohydrates are breads, cereals, rice, pasta, and sweets. • Eat high-fiber foods as directed. Fiber helps improve blood sugar levels. Fiber also lowers your risk for heart disease and other problems diabetes can cause. Examples of high-fiber foods include vegetables, whole-grain bread, and beans such as childress beans. Your dietitian can tell you how much fiber to have each day. • Get regular physical activity. Physical activity can help you get to your target blood sugar level goal and manage your weight. Get at least 150 minutes of moderate to vigorous aerobic physical activity each week. Do not miss more than 2 days in a row. Do not sit longer than 30 minutes at a time. Your healthcare provider can help you create an activity plan. The plan can include the best activities for you and can help you build your strength and endurance. • Maintain a healthy weight. Ask your team what a healthy weight is for you. A healthy weight can help you control diabetes and prevent heart disease. Ask your team to help you create a weight loss plan, if needed. Weight loss can help make a difference in managing diabetes. Your team will help you set a weight-loss goal, such as 10 to 15 pounds, or 5% of your extra weight. Together you and your team can set manageable weight loss goals. • Take your diabetes medicine or insulin as directed.   You may need diabetes medicine, insulin, or both to help control your blood sugar levels. Your healthcare provider will teach you how and when to take your diabetes medicine or insulin. You will also be taught about side effects oral diabetes medicine can cause. Insulin may be injected or given through a pump or pen. You and your providers will decide on the best method for you:    ? An insulin pump  is an implanted device that gives your insulin 24 hours a day. An insulin pump prevents the need for multiple insulin injections in a day. ? An insulin pen  is a device prefilled with the right amount of insulin. ? You and your family members will be taught how to draw up and give insulin  if this is the best method for you. Your providers will also teach you how to dispose of needles and syringes. ? You will learn how much insulin you need  and when to give it. You will be taught when not to give insulin. You will also be taught what to do if your blood sugar level drops too low. This may happen if you take insulin and do not eat the right amount of carbohydrates. More ways to manage type 2 diabetes:   • Wear medical alert identification. Wear medical alert jewelry or carry a card that says you have diabetes. Ask your provider where to get these items. • Do not smoke. Nicotine and other chemicals in cigarettes and cigars can cause lung and blood vessel damage. It also makes it more difficult to manage your diabetes. Ask your provider for information if you currently smoke and need help to quit. Do not use e-cigarettes or smokeless tobacco in place of cigarettes or to help you quit. They still contain nicotine. • Check your feet each day for cuts, scratches, calluses, or other wounds. Look for redness and swelling, and feel for warmth. Wear shoes that fit well. Check your shoes for rocks or other objects that can hurt your feet. Do not walk barefoot or wear shoes without socks.  Wear cotton socks to help keep your feet dry. • Ask about vaccines you may need. You have a higher risk for serious illness if you get the flu, pneumonia, COVID-19, or hepatitis. Ask your provider if you should get vaccines to prevent these or other diseases, and when to get the vaccines. • Talk to your provider if you become stressed about diabetes care. Sometimes being able to fit diabetes care into your life can cause increased stress. The stress can cause you not to take care of yourself properly. Your care team providers can help by offering tips about self-care. Your providers may suggest you talk to a mental health provider who can listen and offer help with self-care issues. • Have your A1c checked as directed. Your provider may check your A1c every 3 months, or 2 times each year if your diabetes is controlled. An A1c test shows the average amount of sugar in your blood over the past 2 to 3 months. Your provider will tell you what your A1c level should be. • Have screening tests as directed. Your provider may recommend screening for complications of diabetes and other conditions that may develop. Some screenings may begin right away and some may happen within the first 5 years of diagnosis:    ? Examples of diabetes complications  include kidney problems, high cholesterol, high blood pressure, blood vessel problems, eye problems, and sleep apnea. ? You may be screened for a low vitamin B level  if you take oral diabetes medicine for a long time. ? Women of childbearing years may be screened  for polycystic ovarian syndrome (PCOS). Follow up with your doctor or diabetes care team providers as directed: You may need to have blood tests done before your follow-up visit. The test results will show if changes need to be made in your treatment or self-care. Talk to your provider if you cannot afford your medicine. Write down your questions so you remember to ask them during your visits.   © Copyright [employer] 2022 Information is for End User's use only and may not be sold, redistributed or otherwise used for commercial purposes. The above information is an  only. It is not intended as medical advice for individual conditions or treatments. Talk to your doctor, nurse or pharmacist before following any medical regimen to see if it is safe and effective for you.

## 2023-08-17 ENCOUNTER — HOSPITAL ENCOUNTER (OUTPATIENT)
Dept: RADIOLOGY | Facility: MEDICAL CENTER | Age: 63
Discharge: HOME | End: 2023-08-17
Payer: COMMERCIAL

## 2023-08-17 VITALS
DIASTOLIC BLOOD PRESSURE: 83 MMHG | HEART RATE: 67 BPM | RESPIRATION RATE: 18 BRPM | SYSTOLIC BLOOD PRESSURE: 152 MMHG | OXYGEN SATURATION: 99 % | TEMPERATURE: 97.6 F

## 2023-08-17 DIAGNOSIS — M47.816 LUMBAR SPONDYLOSIS: ICD-10-CM

## 2023-08-17 PROCEDURE — 64494 INJ PARAVERT F JNT L/S 2 LEV: CPT | Performed by: ANESTHESIOLOGY

## 2023-08-17 PROCEDURE — 64493 INJ PARAVERT F JNT L/S 1 LEV: CPT | Performed by: ANESTHESIOLOGY

## 2023-08-17 RX ORDER — BUPIVACAINE HYDROCHLORIDE 5 MG/ML
3 INJECTION, SOLUTION EPIDURAL; INTRACAUDAL ONCE
Status: COMPLETED | OUTPATIENT
Start: 2023-08-17 | End: 2023-08-17

## 2023-08-17 RX ADMIN — BUPIVACAINE HYDROCHLORIDE 3 ML: 5 INJECTION, SOLUTION EPIDURAL; INTRACAUDAL at 08:47

## 2023-08-17 NOTE — DISCHARGE INSTRUCTIONS

## 2023-08-17 NOTE — H&P
History of Present Illness:  The patient is a 58 y.o. male who presents with complaints of back pain    Past Medical History:   Diagnosis Date   • Anxiety    • Arthritis    • Carpal tunnel syndrome, bilateral    • Coronary artery disease 7/13/20   • Depression    • Diabetes mellitus ([address])    • GERD (gastroesophageal reflux disease)    • Heart attack ([address])    • Hyperlipidemia    • Myocardial infarction ([address]) 7/13/20   • OCD (obsessive compulsive disorder)        Past Surgical History:   Procedure Laterality Date   • CARDIAC SURGERY  Stent   • CARPECTOMY HAND Bilateral          Current Outpatient Medications:   •  aspirin 81 mg chewable tablet, Chew 1 tablet (81 mg total) daily (Patient not taking: Reported on 8/2/2023), Disp: 30 tablet, Rfl: 11  •  baclofen 10 mg tablet, Take 10 mg by mouth 3 (three) times a day as needed (Patient not taking: Reported on 8/2/2023), Disp: , Rfl:   •  buPROPion (WELLBUTRIN XL) 150 mg 24 hr tablet, Take 1 tablet by mouth every morning (Patient not taking: Reported on 8/2/2023), Disp: , Rfl:   •  Cholecalciferol (Vitamin D3) 1.25 MG (04456 UT) CAPS, , Disp: , Rfl:   •  clopidogrel (PLAVIX) 75 mg tablet, TAKE 1 TABLET BY MOUTH EVERY DAY (Patient not taking: Reported on 5/2/2023), Disp: 90 tablet, Rfl: 3  •  co-enzyme Q-10 30 MG capsule, Take 30 mg by mouth 3 (three) times a day (Patient not taking: Reported on 8/2/2023), Disp: , Rfl:   •  gabapentin (NEURONTIN) 100 mg capsule, Take 1 capsule (100 mg total) by mouth 3 (three) times a day (Patient not taking: Reported on 8/2/2023), Disp: 90 capsule, Rfl: 1  •  metFORMIN (GLUCOPHAGE) 500 mg tablet, Take 2 tablets (1,000 mg total) by mouth 2 (two) times a day with meals, Disp: 360 tablet, Rfl: 1  •  methocarbamol (ROBAXIN) 500 mg tablet, Take 1 tablet (500 mg total) by mouth daily at bedtime as needed for muscle spasms (Patient not taking: Reported on 4/17/2023), Disp: 20 tablet, Rfl: 0  •  naproxen (EC NAPROSYN) 500 MG EC tablet, Take 1 tablet (500 mg total) by mouth 2 (two) times a day with meals for 15 days (Patient not taking: Reported on 5/2/2023), Disp: 30 tablet, Rfl: 0  •  Ozempic, 1 MG/DOSE, 4 MG/3ML SOPN injection pen, , Disp: , Rfl:   •  pravastatin (PRAVACHOL) 40 mg tablet, Take 1 tablet (40 mg total) by mouth daily, Disp: 90 tablet, Rfl: 1  •  Semaglutide,0.25 or 0.5MG/DOS, 2 MG/1.5ML SOPN, Inject 0.5 mg under the skin (Patient not taking: Reported on 6/21/2022), Disp: , Rfl:   •  sertraline (ZOLOFT) 100 mg tablet, Take 1.5 tablets (150 mg total) by mouth daily, Disp: 135 tablet, Rfl: 1    Current Facility-Administered Medications:   •  bupivacaine (PF) (MARCAINE) 0.5 % injection 3 mL, 3 mL, Injection, Once, Will Darshan Gilman MD    Allergies   Allergen Reactions   • Metformin GI Intolerance   • Rosuvastatin GI Intolerance       Physical Exam:   Vitals:    08/17/23 0824   BP: 128/83   Pulse: 73   Resp: 18   Temp: 97.6 °F (36.4 °C)   SpO2: 99%     General: Awake, Alert, Oriented x 3, Mood and affect appropriate  Respiratory: Respirations even and unlabored  Cardiovascular: Peripheral pulses intact; no edema  Musculoskeletal Exam: pain with lumbar extension    ASA Score: 2    Patient/Chart Verification  Patient ID Verified: Verbal  ID Band Applied: No  Consents Confirmed: Procedural, To be obtained in the Pre-Procedure area  H&P( within 30 days) Verified: To be obtained in the Pre-Procedure area  Interval H&P(within 24 hr) Complete (required for Outpatients and Surgery Admit only): To be obtained in the Pre-Procedure area  Allergies Reviewed: Yes  Anticoag/NSAID held?: NA  Currently on antibiotics?: No    Assessment:   1.  Lumbar spondylosis        Plan: PEGGY L3-5 MBB #2

## 2023-08-18 ENCOUNTER — TELEPHONE (OUTPATIENT)
Dept: RADIOLOGY | Facility: MEDICAL CENTER | Age: 63
End: 2023-08-18

## 2023-08-18 NOTE — TELEPHONE ENCOUNTER
Pain Diary from B/L L3-5 MBB #2 done on 8/17/23 received and scanned into patient's epic chart for you to review.

## 2023-08-21 NOTE — TELEPHONE ENCOUNTER
Orders placed per Dr. Licha Saldana. Called patient and scheduled:     RT L3-5 RFA on 9/7    LT L3-5 RFA on 9/21    6 wk f/u on 11/3    Reviewed instructions: , NPO 1 hour prior, loose-fitting/comfortable clothing, if ill/fever/infx/abx to call and reschedule. No need to hold any meds prior. Patient stated verbal understanding.

## 2023-09-07 ENCOUNTER — HOSPITAL ENCOUNTER (OUTPATIENT)
Dept: RADIOLOGY | Facility: MEDICAL CENTER | Age: 63
Discharge: HOME | End: 2023-09-07
Admitting: ANESTHESIOLOGY
Payer: COMMERCIAL

## 2023-09-07 ENCOUNTER — TELEPHONE (OUTPATIENT)
Dept: PAIN MEDICINE | Facility: MEDICAL CENTER | Age: 63
End: 2023-09-07

## 2023-09-07 VITALS
OXYGEN SATURATION: 98 % | SYSTOLIC BLOOD PRESSURE: 134 MMHG | DIASTOLIC BLOOD PRESSURE: 86 MMHG | TEMPERATURE: 97.8 F | HEART RATE: 80 BPM | RESPIRATION RATE: 18 BRPM

## 2023-09-07 DIAGNOSIS — M47.816 LUMBAR SPONDYLOSIS: ICD-10-CM

## 2023-09-07 PROCEDURE — 64635 DESTROY LUMB/SAC FACET JNT: CPT | Performed by: ANESTHESIOLOGY

## 2023-09-07 PROCEDURE — 64636 DESTROY L/S FACET JNT ADDL: CPT | Performed by: ANESTHESIOLOGY

## 2023-09-07 RX ORDER — BUPIVACAINE HCL/PF 2.5 MG/ML
2 VIAL (ML) INJECTION ONCE
Status: COMPLETED | OUTPATIENT
Start: 2023-09-07 | End: 2023-09-07

## 2023-09-07 RX ORDER — LIDOCAINE HYDROCHLORIDE 10 MG/ML
10 INJECTION, SOLUTION EPIDURAL; INFILTRATION; INTRACAUDAL; PERINEURAL ONCE
Status: COMPLETED | OUTPATIENT
Start: 2023-09-07 | End: 2023-09-07

## 2023-09-07 RX ORDER — METHYLPREDNISOLONE ACETATE 40 MG/ML
40 INJECTION, SUSPENSION INTRA-ARTICULAR; INTRALESIONAL; INTRAMUSCULAR; PARENTERAL; SOFT TISSUE ONCE
Status: COMPLETED | OUTPATIENT
Start: 2023-09-07 | End: 2023-09-07

## 2023-09-07 RX ADMIN — Medication 1.5 ML: at 09:13

## 2023-09-07 RX ADMIN — LIDOCAINE HYDROCHLORIDE 10 ML: 10 INJECTION, SOLUTION EPIDURAL; INFILTRATION; INTRACAUDAL at 09:05

## 2023-09-07 RX ADMIN — METHYLPREDNISOLONE ACETATE 40 MG: 40 INJECTION, SUSPENSION INTRA-ARTICULAR; INTRALESIONAL; INTRAMUSCULAR; PARENTERAL; SOFT TISSUE at 09:18

## 2023-09-07 RX ADMIN — Medication 2 ML: at 09:18

## 2023-09-07 NOTE — H&P
History of Present Illness:  The patient is a 58 y.o. male who presents with complaints of back pain    Past Medical History:   Diagnosis Date   • Anxiety    • Arthritis    • Carpal tunnel syndrome, bilateral    • Coronary artery disease 7/13/20   • Depression    • Diabetes mellitus ([address])    • GERD (gastroesophageal reflux disease)    • Heart attack ([address])    • Hyperlipidemia    • Myocardial infarction ([address]) 7/13/20   • OCD (obsessive compulsive disorder)        Past Surgical History:   Procedure Laterality Date   • CARDIAC SURGERY  Stent   • CARPECTOMY HAND Bilateral          Current Outpatient Medications:   •  aspirin 81 mg chewable tablet, Chew 1 tablet (81 mg total) daily (Patient not taking: Reported on 8/2/2023), Disp: 30 tablet, Rfl: 11  •  baclofen 10 mg tablet, Take 10 mg by mouth 3 (three) times a day as needed (Patient not taking: Reported on 8/2/2023), Disp: , Rfl:   •  buPROPion (WELLBUTRIN XL) 150 mg 24 hr tablet, Take 1 tablet by mouth every morning (Patient not taking: Reported on 8/2/2023), Disp: , Rfl:   •  Cholecalciferol (Vitamin D3) 1.25 MG (14583 UT) CAPS, , Disp: , Rfl:   •  clopidogrel (PLAVIX) 75 mg tablet, TAKE 1 TABLET BY MOUTH EVERY DAY (Patient not taking: Reported on 5/2/2023), Disp: 90 tablet, Rfl: 3  •  co-enzyme Q-10 30 MG capsule, Take 30 mg by mouth 3 (three) times a day (Patient not taking: Reported on 8/2/2023), Disp: , Rfl:   •  gabapentin (NEURONTIN) 100 mg capsule, Take 1 capsule (100 mg total) by mouth 3 (three) times a day (Patient not taking: Reported on 8/2/2023), Disp: 90 capsule, Rfl: 1  •  metFORMIN (GLUCOPHAGE) 500 mg tablet, Take 2 tablets (1,000 mg total) by mouth 2 (two) times a day with meals, Disp: 360 tablet, Rfl: 1  •  methocarbamol (ROBAXIN) 500 mg tablet, Take 1 tablet (500 mg total) by mouth daily at bedtime as needed for muscle spasms (Patient not taking: Reported on 4/17/2023), Disp: 20 tablet, Rfl: 0  •  naproxen (EC NAPROSYN) 500 MG EC tablet, Take 1 tablet (500 mg total) by mouth 2 (two) times a day with meals for 15 days (Patient not taking: Reported on 5/2/2023), Disp: 30 tablet, Rfl: 0  •  Ozempic, 1 MG/DOSE, 4 MG/3ML SOPN injection pen, , Disp: , Rfl:   •  pravastatin (PRAVACHOL) 40 mg tablet, Take 1 tablet (40 mg total) by mouth daily, Disp: 90 tablet, Rfl: 1  •  Semaglutide,0.25 or 0.5MG/DOS, 2 MG/1.5ML SOPN, Inject 0.5 mg under the skin (Patient not taking: Reported on 6/21/2022), Disp: , Rfl:   •  sertraline (ZOLOFT) 100 mg tablet, Take 1.5 tablets (150 mg total) by mouth daily, Disp: 135 tablet, Rfl: 1    Allergies   Allergen Reactions   • Metformin GI Intolerance   • Rosuvastatin GI Intolerance       Physical Exam:   Vitals:    09/07/23 0843   BP: 132/82   Pulse: 87   Resp: 18   Temp: 97.8 °F (36.6 °C)   SpO2: 98%     General: Awake, Alert, Oriented x 3, Mood and affect appropriate  Respiratory: Respirations even and unlabored  Cardiovascular: Peripheral pulses intact; no edema  Musculoskeletal Exam: pain with lumbar extension, lateral flexion    ASA Score: 2    Patient/Chart Verification  Patient ID Verified: Verbal  ID Band Applied: No  Consents Confirmed: Procedural, To be obtained in the Pre-Procedure area  H&P( within 30 days) Verified: To be obtained in the Pre-Procedure area  Interval H&P(within 24 hr) Complete (required for Outpatients and Surgery Admit only): To be obtained in the Pre-Procedure area  Allergies Reviewed: Yes  Anticoag/NSAID held?: NA  Currently on antibiotics?: No    Assessment:   1.  Lumbar spondylosis        Plan: RT L3-5 RFA

## 2023-09-07 NOTE — DISCHARGE INSTRUCTIONS

## 2023-09-08 NOTE — TELEPHONE ENCOUNTER
Nurse called pt. Pt still has bandaids on, advised to remove this morning, denies S/S of infection, denies fever,  minimal soreness and denies sunburn like sensation. Pain rating today:  1-2/10  Advised pt if he/she does get pain to take prescribed or OTC pain medications and/or use ice/heat and it will take 4-6 weeks to take full effect. Pt verbalized understanding. Confirmed next appt with pt.

## 2023-09-08 NOTE — TELEPHONE ENCOUNTER
Caller: Lalitha Demarco   Doctor/office: Dr Karlos Denise   #: [phone]    % of improvement: 90-95% Right side   Pain Scale (1-10):0/10

## 2023-09-16 ENCOUNTER — OFFICE VISIT (OUTPATIENT)
Dept: URGENT CARE | Facility: MEDICAL CENTER | Age: 63
End: 2023-09-16
Payer: COMMERCIAL

## 2023-09-16 VITALS
WEIGHT: 165 LBS | TEMPERATURE: 97.3 F | HEIGHT: 70 IN | HEART RATE: 82 BPM | DIASTOLIC BLOOD PRESSURE: 76 MMHG | BODY MASS INDEX: 23.62 KG/M2 | OXYGEN SATURATION: 98 % | RESPIRATION RATE: 20 BRPM | SYSTOLIC BLOOD PRESSURE: 150 MMHG

## 2023-09-16 DIAGNOSIS — S61.211A LACERATION OF LEFT INDEX FINGER, FOREIGN BODY PRESENCE UNSPECIFIED, NAIL DAMAGE STATUS UNSPECIFIED, INITIAL ENCOUNTER: Primary | ICD-10-CM

## 2023-09-16 PROCEDURE — G0381 LEV 2 HOSP TYPE B ED VISIT: HCPCS | Performed by: PHYSICIAN ASSISTANT

## 2023-09-16 RX ORDER — GINSENG 100 MG
1 CAPSULE ORAL ONCE
Status: DISCONTINUED | OUTPATIENT
Start: 2023-09-16 | End: 2023-09-16

## 2023-09-16 NOTE — PROGRESS NOTES
[facility] Now        NAME: Desirae Bravo is a 58 y.o. male  : 1960    MRN: [id number]  DATE: 2023  TIME: 11:00 AM    /76   Pulse 82   Temp (!) 97.3 °F (36.3 °C)   Resp 20   Ht 5' 10" (1.778 m)   Wt 74.8 kg (165 lb)   SpO2 98%   BMI 23.68 kg/m²     Assessment and Plan   Laceration of left index finger, foreign body presence unspecified, nail damage status unspecified, initial encounter [S61.211A]  1. Laceration of left index finger, foreign body presence unspecified, nail damage status unspecified, initial encounter  Tdap Vaccine greater than or equal to 8yo    Laceration repair    DISCONTINUED: bacitracin topical ointment 1 small application            Patient Instructions       Follow up with PCP in 3-5 days. Proceed to  ER if symptoms worsen. Chief Complaint     Chief Complaint   Patient presents with   • Finger Laceration     Patient was using a  and it hit his L index finger         History of Present Illness       Pt with grinding wheel laceration to left 2nd finger abotu 20 mins ago       Review of Systems   Review of Systems   Constitutional: Negative. HENT: Negative. Eyes: Negative. Respiratory: Negative. Cardiovascular: Negative. Gastrointestinal: Negative. Endocrine: Negative. Genitourinary: Negative. Musculoskeletal: Negative. Skin: Negative. Allergic/Immunologic: Negative. Neurological: Negative. Hematological: Negative. Psychiatric/Behavioral: Negative. All other systems reviewed and are negative.         Current Medications       Current Outpatient Medications:   •  aspirin 81 mg chewable tablet, Chew 1 tablet (81 mg total) daily, Disp: 30 tablet, Rfl: 11  •  metFORMIN (GLUCOPHAGE) 500 mg tablet, Take 2 tablets (1,000 mg total) by mouth 2 (two) times a day with meals, Disp: 360 tablet, Rfl: 1  •  pravastatin (PRAVACHOL) 40 mg tablet, Take 1 tablet (40 mg total) by mouth daily, Disp: 90 tablet, Rfl: 1  • sertraline (ZOLOFT) 100 mg tablet, Take 1.5 tablets (150 mg total) by mouth daily, Disp: 135 tablet, Rfl: 1  •  baclofen 10 mg tablet, Take 10 mg by mouth 3 (three) times a day as needed (Patient not taking: Reported on 8/2/2023), Disp: , Rfl:   •  buPROPion (WELLBUTRIN XL) 150 mg 24 hr tablet, Take 1 tablet by mouth every morning (Patient not taking: Reported on 8/2/2023), Disp: , Rfl:   •  Cholecalciferol (Vitamin D3) 1.25 MG (43001 UT) CAPS, , Disp: , Rfl:   •  clopidogrel (PLAVIX) 75 mg tablet, TAKE 1 TABLET BY MOUTH EVERY DAY (Patient not taking: Reported on 5/2/2023), Disp: 90 tablet, Rfl: 3  •  co-enzyme Q-10 30 MG capsule, Take 30 mg by mouth 3 (three) times a day (Patient not taking: Reported on 8/2/2023), Disp: , Rfl:   •  gabapentin (NEURONTIN) 100 mg capsule, Take 1 capsule (100 mg total) by mouth 3 (three) times a day (Patient not taking: Reported on 8/2/2023), Disp: 90 capsule, Rfl: 1  •  methocarbamol (ROBAXIN) 500 mg tablet, Take 1 tablet (500 mg total) by mouth daily at bedtime as needed for muscle spasms (Patient not taking: Reported on 4/17/2023), Disp: 20 tablet, Rfl: 0  •  naproxen (EC NAPROSYN) 500 MG EC tablet, Take 1 tablet (500 mg total) by mouth 2 (two) times a day with meals for 15 days (Patient not taking: Reported on 5/2/2023), Disp: 30 tablet, Rfl: 0  •  Ozempic, 1 MG/DOSE, 4 MG/3ML SOPN injection pen, , Disp: , Rfl:   •  Semaglutide,0.25 or 0.5MG/DOS, 2 MG/1.5ML SOPN, Inject 0.5 mg under the skin (Patient not taking: Reported on 6/21/2022), Disp: , Rfl:   No current facility-administered medications for this visit.     Current Allergies     Allergies as of 09/16/2023 - Reviewed 09/16/2023   Allergen Reaction Noted   • Metformin GI Intolerance 07/29/2019   • Rosuvastatin GI Intolerance 07/29/2019            The following portions of the patient's history were reviewed and updated as appropriate: allergies, current medications, past family history, past medical history, past social history, past surgical history and problem list.     Past Medical History:   Diagnosis Date   • Anxiety    • Arthritis    • Carpal tunnel syndrome, bilateral    • Coronary artery disease 7/13/20   • Depression    • Diabetes mellitus (HCC)    • GERD (gastroesophageal reflux disease)    • Heart attack ([address])    • Hyperlipidemia    • Myocardial infarction ([address]) 7/13/20   • OCD (obsessive compulsive disorder)        Past Surgical History:   Procedure Laterality Date   • CARDIAC SURGERY  Stent   • CARPECTOMY HAND Bilateral        Family History   Problem Relation Age of Onset   • Alcohol abuse Mother    • Arthritis Mother    • Alcohol abuse Father    • Coronary artery disease Father    • COPD Father          Medications have been verified. Objective   /76   Pulse 82   Temp (!) 97.3 °F (36.3 °C)   Resp 20   Ht 5' 10" (1.778 m)   Wt 74.8 kg (165 lb)   SpO2 98%   BMI 23.68 kg/m²        Physical Exam     Physical Exam  Vitals and nursing note reviewed. Constitutional:       Appearance: Normal appearance. He is normal weight. Comments: Pt declines antiobiotics    HENT:      Head: Normocephalic and atraumatic. Cardiovascular:      Rate and Rhythm: Normal rate and regular rhythm. Pulses: Normal pulses. Heart sounds: Normal heart sounds. Pulmonary:      Effort: Pulmonary effort is normal.      Breath sounds: Normal breath sounds. Abdominal:      General: Abdomen is flat. Bowel sounds are normal.      Palpations: Abdomen is soft. Musculoskeletal:         General: Normal range of motion. Cervical back: Normal range of motion and neck supple. Comments: Left 2nd finger, prox phalanx  . 5cm skin laceration avulsion   Distal neuro and vascular wnl  From all joints    Skin:     General: Skin is warm. Capillary Refill: Capillary refill takes less than 2 seconds. Neurological:      General: No focal deficit present. Mental Status: He is alert.    Psychiatric:         Mood and Affect: Mood normal.             Universal Protocol:  Consent: Verbal consent obtained. Written consent not obtained.   Consent given by: patient  Patient identity confirmed: verbally with patient    Laceration repair    Date/Time: 9/16/2023 10:30 AM    Performed by: Stephanie Decker PA-C  Authorized by: Stephanie Decker PA-C  Body area: upper extremity  Location details: left index finger  Laceration length: 0.5 cm  Foreign bodies: unknown  Tendon involvement: none  Nerve involvement: none  Vascular damage: no  Anesthesia: local infiltration    Anesthesia:  Local Anesthetic: lidocaine 1% without epinephrine  Anesthetic total: 2 mL    Sedation:  Patient sedated: no      Wound Dehiscence:  Superficial Wound Dehiscence: simple closure      Procedure Details:  Irrigation solution: saline  Irrigation method: syringe  Amount of cleaning: extensive  Debridement: none  Degree of undermining: none  Skin closure: 4-0 nylon  Number of sutures: 5  Technique: simple  Approximation: close  Approximation difficulty: simple  Dressing: 4x4 sterile gauze  Patient tolerance: patient tolerated the procedure well with no immediate complications

## 2023-09-18 ENCOUNTER — TELEPHONE (OUTPATIENT)
Age: 63
End: 2023-09-18

## 2023-09-18 NOTE — TELEPHONE ENCOUNTER
Caller: Mukul     Doctor: Dr Kitchen     Reason for call: Patient wife calling stating  was in care now cut Left Index finder was not prescribed any antibiotics but needed 5 stitches but received Titnius shot no infection please advise if patient can proceed with procedure.    Call back#: [phone]

## 2023-09-18 NOTE — TELEPHONE ENCOUNTER
S/W Kristi as per GREGORY.  Advised her of the same.  She stated he is not on antibiotics and he will come as scheduled.  She verbalized understanding.

## 2023-09-19 NOTE — TELEPHONE ENCOUNTER
S/w pts wife who had questions about steroid and outcome of proc w/o steroid.  RN advised that this is not a steroid dependent proc being that the RFA is the treatment and not the steroid like in an JOHNNY. RN advised that as long as pt has no SS of infec he may proceed w/ RFA on 9/21/23. Pts wife verbalized understanding and apprec of call.

## 2023-09-19 NOTE — TELEPHONE ENCOUNTER
Caller: Mukul Roldan    Doctor: Dr Ktichen     Reason for call: Patient returning call from RN    Call back#: [phone]

## 2023-09-21 ENCOUNTER — HOSPITAL ENCOUNTER (OUTPATIENT)
Dept: RADIOLOGY | Facility: MEDICAL CENTER | Age: 63
Discharge: HOME | End: 2023-09-21
Admitting: ANESTHESIOLOGY
Payer: COMMERCIAL

## 2023-09-21 ENCOUNTER — TELEPHONE (OUTPATIENT)
Dept: PAIN MEDICINE | Facility: MEDICAL CENTER | Age: 63
End: 2023-09-21

## 2023-09-21 VITALS
RESPIRATION RATE: 16 BRPM | HEART RATE: 68 BPM | SYSTOLIC BLOOD PRESSURE: 134 MMHG | DIASTOLIC BLOOD PRESSURE: 57 MMHG | TEMPERATURE: 98.4 F | OXYGEN SATURATION: 98 %

## 2023-09-21 DIAGNOSIS — M47.816 LUMBAR SPONDYLOSIS: ICD-10-CM

## 2023-09-21 PROCEDURE — 64635 DESTROY LUMB/SAC FACET JNT: CPT | Performed by: ANESTHESIOLOGY

## 2023-09-21 PROCEDURE — 64636 DESTROY L/S FACET JNT ADDL: CPT | Performed by: ANESTHESIOLOGY

## 2023-09-21 RX ORDER — BUPIVACAINE HCL/PF 2.5 MG/ML
2 VIAL (ML) INJECTION ONCE
Status: COMPLETED | OUTPATIENT
Start: 2023-09-21 | End: 2023-09-21

## 2023-09-21 RX ORDER — LIDOCAINE HYDROCHLORIDE 10 MG/ML
10 INJECTION, SOLUTION EPIDURAL; INFILTRATION; INTRACAUDAL; PERINEURAL ONCE
Status: COMPLETED | OUTPATIENT
Start: 2023-09-21 | End: 2023-09-21

## 2023-09-21 RX ORDER — METHYLPREDNISOLONE ACETATE 40 MG/ML
40 INJECTION, SUSPENSION INTRA-ARTICULAR; INTRALESIONAL; INTRAMUSCULAR; PARENTERAL; SOFT TISSUE ONCE
Status: DISCONTINUED | OUTPATIENT
Start: 2023-09-21 | End: 2023-09-21

## 2023-09-21 RX ADMIN — LIDOCAINE HYDROCHLORIDE 10 ML: 10 INJECTION, SOLUTION EPIDURAL; INFILTRATION; INTRACAUDAL at 10:39

## 2023-09-21 RX ADMIN — Medication 1.5 ML: at 10:47

## 2023-09-21 RX ADMIN — Medication 2 ML: at 10:52

## 2023-09-21 NOTE — H&P
History of Present Illness:  The patient is a 58 y.o. male who presents with complaints of back pain    Past Medical History:   Diagnosis Date   • Anxiety    • Arthritis    • Carpal tunnel syndrome, bilateral    • Coronary artery disease 7/13/20   • Depression    • Diabetes mellitus ([address])    • GERD (gastroesophageal reflux disease)    • Heart attack ([address])    • Hyperlipidemia    • Myocardial infarction ([address]) 7/13/20   • OCD (obsessive compulsive disorder)        Past Surgical History:   Procedure Laterality Date   • CARDIAC SURGERY  Stent   • CARPECTOMY HAND Bilateral          Current Outpatient Medications:   •  aspirin 81 mg chewable tablet, Chew 1 tablet (81 mg total) daily, Disp: 30 tablet, Rfl: 11  •  baclofen 10 mg tablet, Take 10 mg by mouth 3 (three) times a day as needed (Patient not taking: Reported on 8/2/2023), Disp: , Rfl:   •  buPROPion (WELLBUTRIN XL) 150 mg 24 hr tablet, Take 1 tablet by mouth every morning (Patient not taking: Reported on 8/2/2023), Disp: , Rfl:   •  Cholecalciferol (Vitamin D3) 1.25 MG (48761 UT) CAPS, , Disp: , Rfl:   •  clopidogrel (PLAVIX) 75 mg tablet, TAKE 1 TABLET BY MOUTH EVERY DAY (Patient not taking: Reported on 5/2/2023), Disp: 90 tablet, Rfl: 3  •  co-enzyme Q-10 30 MG capsule, Take 30 mg by mouth 3 (three) times a day (Patient not taking: Reported on 8/2/2023), Disp: , Rfl:   •  gabapentin (NEURONTIN) 100 mg capsule, Take 1 capsule (100 mg total) by mouth 3 (three) times a day (Patient not taking: Reported on 8/2/2023), Disp: 90 capsule, Rfl: 1  •  metFORMIN (GLUCOPHAGE) 500 mg tablet, Take 2 tablets (1,000 mg total) by mouth 2 (two) times a day with meals, Disp: 360 tablet, Rfl: 1  •  methocarbamol (ROBAXIN) 500 mg tablet, Take 1 tablet (500 mg total) by mouth daily at bedtime as needed for muscle spasms (Patient not taking: Reported on 4/17/2023), Disp: 20 tablet, Rfl: 0  •  naproxen (EC NAPROSYN) 500 MG EC tablet, Take 1 tablet (500 mg total) by mouth 2 (two) times a day with meals for 15 days (Patient not taking: Reported on 5/2/2023), Disp: 30 tablet, Rfl: 0  •  Ozempic, 1 MG/DOSE, 4 MG/3ML SOPN injection pen, , Disp: , Rfl:   •  pravastatin (PRAVACHOL) 40 mg tablet, Take 1 tablet (40 mg total) by mouth daily, Disp: 90 tablet, Rfl: 1  •  Semaglutide,0.25 or 0.5MG/DOS, 2 MG/1.5ML SOPN, Inject 0.5 mg under the skin (Patient not taking: Reported on 6/21/2022), Disp: , Rfl:   •  sertraline (ZOLOFT) 100 mg tablet, Take 1.5 tablets (150 mg total) by mouth daily, Disp: 135 tablet, Rfl: 1    Current Facility-Administered Medications:   •  bupivacaine (PF) (MARCAINE) 0.25 % injection 2 mL, 2 mL, Perineural, Once, Will Iman Correa MD  •  lidocaine (PF) (XYLOCAINE-MPF) 1 % injection 10 mL, 10 mL, Infiltration, Once, Will Iman Correa MD  •  lidocaine (PF) (XYLOCAINE-MPF) 2 % injection 1.5 mL, 1.5 mL, Perineural, Once, Will Iman Correa MD  •  methylPREDNISolone acetate (DEPO-MEDROL) injection 40 mg, 40 mg, Perineural, Once, Will Iman Correa MD    Allergies   Allergen Reactions   • Metformin GI Intolerance   • Rosuvastatin GI Intolerance       Physical Exam:   Vitals:    09/21/23 1029   BP: 127/79   Pulse: 68   Resp: 16   Temp: 98.4 °F (36.9 °C)   SpO2: 98%     General: Awake, Alert, Oriented x 3, Mood and affect appropriate  Respiratory: Respirations even and unlabored  Cardiovascular: Peripheral pulses intact; no edema  Musculoskeletal Exam: pain with lumbar extension, lateral flexion    ASA Score: 2    Patient/Chart Verification  Patient ID Verified: Verbal  Consents Confirmed: Procedural, To be obtained in the Pre-Procedure area  H&P( within 30 days) Verified: To be obtained in the Pre-Procedure area  Allergies Reviewed: Yes  Anticoag/NSAID held?: NA  Currently on antibiotics?: No  Pregnancy denied?: NA    Assessment:   1.  Lumbar spondylosis        Plan: LT L3-5 RFA

## 2023-09-21 NOTE — DISCHARGE INSTRUCTIONS

## 2023-09-22 NOTE — TELEPHONE ENCOUNTER
FYI-    Pt did well over night no s/s of infection or sunburn sensation. Aware it takes 2 weeks to notice pain relief and 4-6 weeks for full pain effect to be achieved. Pain rating as of today-    Aware to medicate as previous for discomfort and may use ice or heat. Confirmed next appt. Call if any questions or concerns prior to next appt.

## 2023-09-26 ENCOUNTER — OFFICE VISIT (OUTPATIENT)
Dept: URGENT CARE | Facility: MEDICAL CENTER | Age: 63
End: 2023-09-26
Payer: COMMERCIAL

## 2023-09-26 VITALS
DIASTOLIC BLOOD PRESSURE: 98 MMHG | BODY MASS INDEX: 23.68 KG/M2 | WEIGHT: 165 LBS | OXYGEN SATURATION: 99 % | SYSTOLIC BLOOD PRESSURE: 134 MMHG | TEMPERATURE: 98.8 F | HEART RATE: 74 BPM

## 2023-09-26 DIAGNOSIS — Z48.02 ENCOUNTER FOR REMOVAL OF SUTURES: Primary | ICD-10-CM

## 2023-09-26 PROCEDURE — G0382 LEV 3 HOSP TYPE B ED VISIT: HCPCS | Performed by: FAMILY MEDICINE

## 2023-09-26 PROCEDURE — G0381 LEV 2 HOSP TYPE B ED VISIT: HCPCS | Performed by: FAMILY MEDICINE

## 2023-09-26 NOTE — PATIENT INSTRUCTIONS
5 sutures removed from left index finger without complication. Patient tolerated procedure well. Band-Aid applied to the wound. Wound aftercare instruction given to patient. Stitches Removal   WHAT YOU NEED TO KNOW:   Stitches are usually removed within 14 days, depending on the location of the wound. Your healthcare provider will tell you when to return to have your stitches removed. Your provider will use sterile forceps or tweezers to  the knot of each stitch. He or she will cut the stitch with scissors and pull the stitch out. You may feel a slight tug as the stitch comes out. DISCHARGE INSTRUCTIONS:   Return to the emergency department if:   Your wound splits open or is starting to come apart. You suddenly cannot move your injured joint. You have sudden numbness around your wound. You see red streaks coming from your wound. Contact your healthcare provider if:   You have a fever and chills. Your wound is red, warm, swollen, or leaking pus. There is a bad smell coming from your wound. You have increased pain in the wound area. You have questions or concerns about your condition or care. Care for the area after the stitches are removed:   Do not pull medical tape off. Your provider may place small strips of medical tape across your wound after the stitches have been removed. These strips will peel and fall of on their own. Do not pull them off. Clean the area as directed. Carefully wash the area with soap and water. Pat the area dry with a clean towel. Check the area for signs of infection, such as redness, swelling, or pus. Also check that the wound is not coming apart. Protect your wound. Your wound can swell, bleed, or split open if it is stretched or bumped. You may need to wear a bandage that supports your wound until it is completely healed. Care for a scar. You may have a scar after the stitches are removed.  Use sunblock if the area is exposed to the sun. Apply it every day after the stitches are removed. This will help prevent skin discoloration. Talk to your healthcare provider about medicines you can use to make the scar less visible. Some medicines are available without a prescription. Follow up with your healthcare provider as directed: You may need to return in 3 to 5 days if the stitches are on your face. Stitches on your scalp need to be removed after 7 to 14 days. Stitches over joints may remain in place up to 14 days. Write down your questions so you remember to ask them during your visits. © Copyright [employer] 2023 Information is for End User's use only and may not be sold, redistributed or otherwise used for commercial purposes. The above information is an  only. It is not intended as medical advice for individual conditions or treatments. Talk to your doctor, nurse or pharmacist before following any medical regimen to see if it is safe and effective for you.

## 2023-09-26 NOTE — PROGRESS NOTES
[facility]        NAME: Jayden Lynn is a 58 y.o. male  : 1960    MRN: [id number]  DATE: 2023  TIME: 10:59 AM    Assessment and Plan   No primary diagnosis found. No diagnosis found. Patient Instructions       Follow up with PCP in 3-5 days. Proceed to  ER if symptoms worsen. Chief Complaint     Chief Complaint   Patient presents with   • Suture / Staple Removal     Patient here for suture removal from L index finger         History of Present Illness       76-year-old male here today for suture removal status post laceration repair of left index finger . He is 10 days post laceration repair. Wound seems to be healing nicely. Denies any signs of infection or wound dehiscence. Review of Systems   Review of Systems   Skin: Positive for wound.          Current Medications       Current Outpatient Medications:   •  metFORMIN (GLUCOPHAGE) 500 mg tablet, Take 2 tablets (1,000 mg total) by mouth 2 (two) times a day with meals, Disp: 360 tablet, Rfl: 1  •  pravastatin (PRAVACHOL) 40 mg tablet, Take 1 tablet (40 mg total) by mouth daily, Disp: 90 tablet, Rfl: 1  •  sertraline (ZOLOFT) 100 mg tablet, Take 1.5 tablets (150 mg total) by mouth daily, Disp: 135 tablet, Rfl: 1  •  aspirin 81 mg chewable tablet, Chew 1 tablet (81 mg total) daily, Disp: 30 tablet, Rfl: 11  •  buPROPion (WELLBUTRIN XL) 150 mg 24 hr tablet, Take 1 tablet by mouth every morning (Patient not taking: Reported on 2023), Disp: , Rfl:   •  Cholecalciferol (Vitamin D3) 1.25 MG (16396 UT) CAPS, , Disp: , Rfl:   •  clopidogrel (PLAVIX) 75 mg tablet, TAKE 1 TABLET BY MOUTH EVERY DAY (Patient not taking: Reported on 2023), Disp: 90 tablet, Rfl: 3  •  co-enzyme Q-10 30 MG capsule, Take 30 mg by mouth 3 (three) times a day (Patient not taking: Reported on 2023), Disp: , Rfl:   •  gabapentin (NEURONTIN) 100 mg capsule, Take 1 capsule (100 mg total) by mouth 3 (three) times a day (Patient not taking: Reported on 8/2/2023), Disp: 90 capsule, Rfl: 1  •  methocarbamol (ROBAXIN) 500 mg tablet, Take 1 tablet (500 mg total) by mouth daily at bedtime as needed for muscle spasms (Patient not taking: Reported on 4/17/2023), Disp: 20 tablet, Rfl: 0  •  naproxen (EC NAPROSYN) 500 MG EC tablet, Take 1 tablet (500 mg total) by mouth 2 (two) times a day with meals for 15 days (Patient not taking: Reported on 5/2/2023), Disp: 30 tablet, Rfl: 0  •  Ozempic, 1 MG/DOSE, 4 MG/3ML SOPN injection pen, , Disp: , Rfl:   •  Semaglutide,0.25 or 0.5MG/DOS, 2 MG/1.5ML SOPN, Inject 0.5 mg under the skin (Patient not taking: Reported on 6/21/2022), Disp: , Rfl:     Current Allergies     Allergies as of 09/26/2023 - Reviewed 09/26/2023   Allergen Reaction Noted   • Metformin GI Intolerance 07/29/2019   • Rosuvastatin GI Intolerance 07/29/2019            The following portions of the patient's history were reviewed and updated as appropriate: allergies, current medications, past family history, past medical history, past social history, past surgical history and problem list.     Past Medical History:   Diagnosis Date   • Anxiety    • Arthritis    • Carpal tunnel syndrome, bilateral    • Coronary artery disease 7/13/20   • Depression    • Diabetes mellitus ([address])    • GERD (gastroesophageal reflux disease)    • Heart attack ([address])    • Hyperlipidemia    • Myocardial infarction ([address]) 7/13/20   • OCD (obsessive compulsive disorder)        Past Surgical History:   Procedure Laterality Date   • CARDIAC SURGERY  Stent   • CARPECTOMY HAND Bilateral        Family History   Problem Relation Age of Onset   • Alcohol abuse Mother    • Arthritis Mother    • Alcohol abuse Father    • Coronary artery disease Father    • COPD Father          Medications have been verified. Objective   /98   Pulse 74   Temp 98.8 °F (37.1 °C)   Wt 74.8 kg (165 lb)   SpO2 99%   BMI 23.68 kg/m²   No LMP for male patient.        Physical Exam Physical Exam  Vitals and nursing note reviewed. Constitutional:       Appearance: Normal appearance. Skin:     Comments: Left hand: Left index finger dorsal aspect reveals well approximated 2.5 cm laceration tangential over the mid PIP joint held in place by five 4-0 nylon suture. No evidence of wound dehiscence or induration. Neurological:      Mental Status: He is alert.

## 2023-10-01 PROBLEM — Z12.5 PROSTATE CANCER SCREENING: Status: RESOLVED | Noted: 2023-08-02 | Resolved: 2023-10-01

## 2023-10-03 PROBLEM — Z00.00 ROUTINE ADULT HEALTH MAINTENANCE: Status: RESOLVED | Noted: 2023-08-04 | Resolved: 2023-10-03

## 2023-10-15 ENCOUNTER — OFFICE VISIT (OUTPATIENT)
Dept: URGENT CARE | Facility: MEDICAL CENTER | Age: 63
End: 2023-10-15
Payer: COMMERCIAL

## 2023-10-15 VITALS
OXYGEN SATURATION: 98 % | TEMPERATURE: 98.9 F | HEART RATE: 89 BPM | HEIGHT: 70 IN | RESPIRATION RATE: 20 BRPM | BODY MASS INDEX: 23.62 KG/M2 | WEIGHT: 165 LBS

## 2023-10-15 DIAGNOSIS — L03.211 CELLULITIS OF FACE: Primary | ICD-10-CM

## 2023-10-15 PROCEDURE — G0381 LEV 2 HOSP TYPE B ED VISIT: HCPCS | Performed by: PHYSICIAN ASSISTANT

## 2023-10-15 RX ORDER — DOXYCYCLINE 100 MG/1
100 TABLET ORAL 2 TIMES DAILY
Qty: 20 TABLET | Refills: 0 | Status: SHIPPED | OUTPATIENT
Start: 2023-10-15 | End: 2023-10-25

## 2023-10-15 NOTE — PROGRESS NOTES
[facility]        NAME: Wilhemina Hammans is a 58 y.o. male  : 1960    MRN: [id number]  DATE: October 15, 2023  TIME: 5:51 PM    Assessment and Plan   Cellulitis of face [L03.211]  1. Cellulitis of face  doxycycline (ADOXA) 100 MG tablet            Patient Instructions       Follow up with PCP as needed. Warm soaks. Finish antibiotic. Chief Complaint     Chief Complaint   Patient presents with    Facial Swelling     Patient states he has had swelling in the R side of his face. For 4-5 days. He thinks it is an ingrown hair on hi sface         History of Present Illness       Patient with 2-day history of right side of his face swelling and increased redness along the cheek. He states that it started as an ingrown hair. He complains of mild discomfort. No drainage. He has not been squeezing it. He has been using warm soaks. Review of Systems   Review of Systems   All other systems reviewed and are negative.         Current Medications       Current Outpatient Medications:     aspirin 81 mg chewable tablet, Chew 1 tablet (81 mg total) daily, Disp: 30 tablet, Rfl: 11    co-enzyme Q-10 30 MG capsule, Take 30 mg by mouth 3 (three) times a day, Disp: , Rfl:     doxycycline (ADOXA) 100 MG tablet, Take 1 tablet (100 mg total) by mouth 2 (two) times a day for 10 days, Disp: 20 tablet, Rfl: 0    metFORMIN (GLUCOPHAGE) 500 mg tablet, Take 2 tablets (1,000 mg total) by mouth 2 (two) times a day with meals, Disp: 360 tablet, Rfl: 1    pravastatin (PRAVACHOL) 40 mg tablet, Take 1 tablet (40 mg total) by mouth daily, Disp: 90 tablet, Rfl: 1    sertraline (ZOLOFT) 100 mg tablet, Take 1.5 tablets (150 mg total) by mouth daily, Disp: 135 tablet, Rfl: 1    buPROPion (WELLBUTRIN XL) 150 mg 24 hr tablet, Take 1 tablet by mouth every morning (Patient not taking: Reported on 2023), Disp: , Rfl:     Cholecalciferol (Vitamin D3) 1.25 MG (16057 UT) CAPS, , Disp: , Rfl:     clopidogrel (PLAVIX) 75 mg tablet, TAKE 1 TABLET BY MOUTH EVERY DAY (Patient not taking: Reported on 5/2/2023), Disp: 90 tablet, Rfl: 3    gabapentin (NEURONTIN) 100 mg capsule, Take 1 capsule (100 mg total) by mouth 3 (three) times a day (Patient not taking: Reported on 8/2/2023), Disp: 90 capsule, Rfl: 1    methocarbamol (ROBAXIN) 500 mg tablet, Take 1 tablet (500 mg total) by mouth daily at bedtime as needed for muscle spasms (Patient not taking: Reported on 4/17/2023), Disp: 20 tablet, Rfl: 0    naproxen (EC NAPROSYN) 500 MG EC tablet, Take 1 tablet (500 mg total) by mouth 2 (two) times a day with meals for 15 days (Patient not taking: Reported on 5/2/2023), Disp: 30 tablet, Rfl: 0    Ozempic, 1 MG/DOSE, 4 MG/3ML SOPN injection pen, , Disp: , Rfl:     Semaglutide,0.25 or 0.5MG/DOS, 2 MG/1.5ML SOPN, Inject 0.5 mg under the skin (Patient not taking: Reported on 6/21/2022), Disp: , Rfl:     Current Allergies     Allergies as of 10/15/2023 - Reviewed 10/15/2023   Allergen Reaction Noted    Metformin GI Intolerance 07/29/2019    Rosuvastatin GI Intolerance 07/29/2019            The following portions of the patient's history were reviewed and updated as appropriate: allergies, current medications, past family history, past medical history, past social history, past surgical history and problem list.     Past Medical History:   Diagnosis Date    Anxiety     Arthritis     Carpal tunnel syndrome, bilateral     Coronary artery disease 7/13/20    Depression     Diabetes mellitus (HCC)     GERD (gastroesophageal reflux disease)     Heart attack ([address])     Hyperlipidemia     Myocardial infarction ([address]) 7/13/20    OCD (obsessive compulsive disorder)        Past Surgical History:   Procedure Laterality Date    CARDIAC SURGERY  Stent    CARPECTOMY HAND Bilateral        Family History   Problem Relation Age of Onset    Alcohol abuse Mother     Arthritis Mother     Alcohol abuse Father     Coronary artery disease Father     COPD Father          Medications have been verified. Objective   Pulse 89   Temp 98.9 °F (37.2 °C)   Resp 20   Ht 5' 10" (1.778 m)   Wt 74.8 kg (165 lb)   SpO2 98%   BMI 23.68 kg/m²   No LMP for male patient. Physical Exam     Physical Exam  Vitals and nursing note reviewed. Constitutional:       Appearance: Normal appearance. He is normal weight. Cardiovascular:      Rate and Rhythm: Normal rate and regular rhythm. Pulses: Normal pulses. Heart sounds: Normal heart sounds. Pulmonary:      Effort: Pulmonary effort is normal.      Breath sounds: Normal breath sounds. Neurological:      Mental Status: He is alert.        Right cheek +1 edema with 2 inch area of erythema

## 2023-10-18 ENCOUNTER — APPOINTMENT (EMERGENCY)
Dept: RADIOLOGY | Facility: HOSPITAL | Age: 63
End: 2023-10-18
Payer: COMMERCIAL

## 2023-10-18 ENCOUNTER — HOSPITAL ENCOUNTER (EMERGENCY)
Facility: HOSPITAL | Age: 63
Discharge: HOME | End: 2023-10-19
Attending: EMERGENCY MEDICINE
Payer: COMMERCIAL

## 2023-10-18 DIAGNOSIS — R42 LIGHTHEADEDNESS: ICD-10-CM

## 2023-10-18 DIAGNOSIS — R73.9 HYPERGLYCEMIA: ICD-10-CM

## 2023-10-18 DIAGNOSIS — R06.00 DYSPNEA: Primary | ICD-10-CM

## 2023-10-18 LAB
ALBUMIN SERPL BCP-MCNC: 4 G/DL (ref 3.5–5)
ALP SERPL-CCNC: 70 U/L (ref 34–104)
ALT SERPL W P-5'-P-CCNC: 20 U/L (ref 7–52)
ANION GAP SERPL CALCULATED.3IONS-SCNC: 7 MMOL/L
APTT PPP: 24 SECONDS (ref 23–37)
AST SERPL W P-5'-P-CCNC: 14 U/L (ref 13–39)
BASOPHILS # BLD AUTO: 0.04 THOUSANDS/ÂΜL (ref 0–0.1)
BASOPHILS NFR BLD AUTO: 1 % (ref 0–1)
BILIRUB SERPL-MCNC: 0.32 MG/DL (ref 0.2–1)
BUN SERPL-MCNC: 20 MG/DL (ref 5–25)
CALCIUM SERPL-MCNC: 9.6 MG/DL (ref 8.4–10.2)
CARDIAC TROPONIN I PNL SERPL HS: 10 NG/L
CHLORIDE SERPL-SCNC: 96 MMOL/L (ref 96–108)
CO2 SERPL-SCNC: 28 MMOL/L (ref 21–32)
CREAT SERPL-MCNC: 1.03 MG/DL (ref 0.6–1.3)
EOSINOPHIL # BLD AUTO: 0.38 THOUSAND/ÂΜL (ref 0–0.61)
EOSINOPHIL NFR BLD AUTO: 6 % (ref 0–6)
ERYTHROCYTE [DISTWIDTH] IN BLOOD BY AUTOMATED COUNT: 11.4 % (ref 11.6–15.1)
GFR SERPL CREATININE-BSD FRML MDRD: 76 ML/MIN/1.73SQ M
GLUCOSE SERPL-MCNC: 540 MG/DL (ref 65–140)
HCT VFR BLD AUTO: 43.3 % (ref 36.5–49.3)
HGB BLD-MCNC: 14.8 G/DL (ref 12–17)
IMM GRANULOCYTES # BLD AUTO: 0.03 THOUSAND/UL (ref 0–0.2)
IMM GRANULOCYTES NFR BLD AUTO: 1 % (ref 0–2)
INR PPP: 0.96 (ref 0.84–1.19)
LYMPHOCYTES # BLD AUTO: 2.13 THOUSANDS/ÂΜL (ref 0.6–4.47)
LYMPHOCYTES NFR BLD AUTO: 33 % (ref 14–44)
MCH RBC QN AUTO: 30.4 PG (ref 26.8–34.3)
MCHC RBC AUTO-ENTMCNC: 34.2 G/DL (ref 31.4–37.4)
MCV RBC AUTO: 89 FL (ref 82–98)
MONOCYTES # BLD AUTO: 0.65 THOUSAND/ÂΜL (ref 0.17–1.22)
MONOCYTES NFR BLD AUTO: 10 % (ref 4–12)
NEUTROPHILS # BLD AUTO: 3.17 THOUSANDS/ÂΜL (ref 1.85–7.62)
NEUTS SEG NFR BLD AUTO: 49 % (ref 43–75)
NRBC BLD AUTO-RTO: 0 /100 WBCS
PLATELET # BLD AUTO: 144 THOUSANDS/UL (ref 149–390)
PMV BLD AUTO: 11.1 FL (ref 8.9–12.7)
POTASSIUM SERPL-SCNC: 3.9 MMOL/L (ref 3.5–5.3)
PROT SERPL-MCNC: 6.8 G/DL (ref 6.4–8.4)
PROTHROMBIN TIME: 12.8 SECONDS (ref 11.6–14.5)
RBC # BLD AUTO: 4.87 MILLION/UL (ref 3.88–5.62)
SODIUM SERPL-SCNC: 131 MMOL/L (ref 135–147)
WBC # BLD AUTO: 6.4 THOUSAND/UL (ref 4.31–10.16)

## 2023-10-18 PROCEDURE — 85610 PROTHROMBIN TIME: CPT | Performed by: EMERGENCY MEDICINE

## 2023-10-18 PROCEDURE — 80053 COMPREHEN METABOLIC PANEL: CPT | Performed by: EMERGENCY MEDICINE

## 2023-10-18 PROCEDURE — 36415 COLL VENOUS BLD VENIPUNCTURE: CPT | Performed by: EMERGENCY MEDICINE

## 2023-10-18 PROCEDURE — 84484 ASSAY OF TROPONIN QUANT: CPT | Performed by: EMERGENCY MEDICINE

## 2023-10-18 PROCEDURE — 85730 THROMBOPLASTIN TIME PARTIAL: CPT | Performed by: EMERGENCY MEDICINE

## 2023-10-18 PROCEDURE — 85025 COMPLETE CBC W/AUTO DIFF WBC: CPT | Performed by: EMERGENCY MEDICINE

## 2023-10-18 PROCEDURE — 93005 ELECTROCARDIOGRAM TRACING: CPT

## 2023-10-18 PROCEDURE — 71046 X-RAY EXAM CHEST 2 VIEWS: CPT

## 2023-10-19 VITALS
OXYGEN SATURATION: 97 % | WEIGHT: 170.64 LBS | TEMPERATURE: 98 F | BODY MASS INDEX: 24.48 KG/M2 | SYSTOLIC BLOOD PRESSURE: 135 MMHG | DIASTOLIC BLOOD PRESSURE: 78 MMHG | HEART RATE: 75 BPM | RESPIRATION RATE: 15 BRPM

## 2023-10-19 LAB
2HR DELTA HS TROPONIN: 0 NG/L
ATRIAL RATE: 76 BPM
ATRIAL RATE: 82 BPM
CARDIAC TROPONIN I PNL SERPL HS: 10 NG/L
P AXIS: 60 DEGREES
P AXIS: 60 DEGREES
PR INTERVAL: 154 MS
PR INTERVAL: 154 MS
QRS AXIS: 51 DEGREES
QRS AXIS: 66 DEGREES
QRSD INTERVAL: 84 MS
QRSD INTERVAL: 88 MS
QT INTERVAL: 366 MS
QT INTERVAL: 372 MS
QTC INTERVAL: 418 MS
QTC INTERVAL: 427 MS
T WAVE AXIS: -12 DEGREES
T WAVE AXIS: -13 DEGREES
VENTRICULAR RATE: 76 BPM
VENTRICULAR RATE: 82 BPM

## 2023-10-19 PROCEDURE — 84484 ASSAY OF TROPONIN QUANT: CPT | Performed by: EMERGENCY MEDICINE

## 2023-10-19 PROCEDURE — 93005 ELECTROCARDIOGRAM TRACING: CPT

## 2023-10-19 NOTE — ED PROVIDER NOTES
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 Approximately 1 hour prior to arrival, patient was walking up the steps and started to experience shortness of breath with diaphoresis. No associated chest pain. Feeling much better since onset. Of note, patient has cellulitis of the right maxillary area in which is much improved since starting antibiotics. Prior to Admission Medications   Prescriptions Last Dose Informant Patient Reported? Taking?    Cholecalciferol (Vitamin D3) 1.25 MG (74691 UT) CAPS  Self Yes No   Patient not taking: Reported on 8/2/2023   Ozempic, 1 MG/DOSE, 4 MG/3ML SOPN injection pen  Self Yes No   Patient not taking: Reported on 8/2/2023   Semaglutide,0.25 or 0.5MG/DOS, 2 MG/1.5ML SOPN  Self Yes No   Sig: Inject 0.5 mg under the skin   Patient not taking: Reported on 6/21/2022   aspirin 81 mg chewable tablet  Self No No   Sig: Chew 1 tablet (81 mg total) daily   buPROPion (WELLBUTRIN XL) 150 mg 24 hr tablet  Self Yes No   Sig: Take 1 tablet by mouth every morning   Patient not taking: Reported on 8/2/2023   clopidogrel (PLAVIX) 75 mg tablet  Self No No   Sig: TAKE 1 TABLET BY MOUTH EVERY DAY   Patient not taking: Reported on 5/2/2023   co-enzyme Q-10 30 MG capsule  Self Yes No   Sig: Take 30 mg by mouth 3 (three) times a day   doxycycline (ADOXA) 100 MG tablet   No No   Sig: Take 1 tablet (100 mg total) by mouth 2 (two) times a day for 10 days   gabapentin (NEURONTIN) 100 mg capsule   No No   Sig: Take 1 capsule (100 mg total) by mouth 3 (three) times a day   Patient not taking: Reported on 8/2/2023   metFORMIN (GLUCOPHAGE) 500 mg tablet   No No   Sig: Take 2 tablets (1,000 mg total) by mouth 2 (two) times a day with meals   methocarbamol (ROBAXIN) 500 mg tablet  Self No No   Sig: Take 1 tablet (500 mg total) by mouth daily at bedtime as needed for muscle spasms   Patient not taking: Reported on 4/17/2023   naproxen (EC NAPROSYN) 500 MG EC tablet  Self No No   Sig: Take 1 tablet (500 mg total) by mouth 2 (two) times a day with meals for 15 days   Patient not taking: Reported on 5/2/2023   pravastatin (PRAVACHOL) 40 mg tablet   No No   Sig: Take 1 tablet (40 mg total) by mouth daily   sertraline (ZOLOFT) 100 mg tablet   No No   Sig: Take 1.5 tablets (150 mg total) by mouth daily      Facility-Administered Medications: None       Past Medical History:   Diagnosis Date    Anxiety     Arthritis     Carpal tunnel syndrome, bilateral     Coronary artery disease 7/13/20    Depression     Diabetes mellitus (HCC)     GERD (gastroesophageal reflux disease)     Heart attack ([address])     Hyperlipidemia     Myocardial infarction ([address]) 7/13/20    OCD (obsessive compulsive disorder)        Past Surgical History:   Procedure Laterality Date    CARDIAC SURGERY  Stent    CARPECTOMY HAND Bilateral        Family History   Problem Relation Age of Onset    Alcohol abuse Mother     Arthritis Mother     Alcohol abuse Father     Coronary artery disease Father     COPD Father      I have reviewed and agree with the history as documented. E-Cigarette/Vaping    E-Cigarette Use Never User      E-Cigarette/Vaping Substances     Social History     Tobacco Use    Smoking status: Never    Smokeless tobacco: Never   Vaping Use    Vaping Use: Never used   Substance Use Topics    Alcohol use: Not Currently    Drug use: Never       Review of Systems   Constitutional:  Positive for diaphoresis. Negative for chills, fatigue and fever. HENT:  Negative for rhinorrhea, sore throat and trouble swallowing. Eyes:  Negative for photophobia and visual disturbance.    Respiratory:  Positive for shortness of breath. Negative for cough and chest tightness. Cardiovascular:  Negative for chest pain, palpitations and leg swelling. Gastrointestinal:  Negative for abdominal pain, blood in stool, diarrhea, nausea and vomiting. Endocrine: Negative for polyuria. Genitourinary:  Negative for dysuria, flank pain and hematuria. Musculoskeletal:  Negative for back pain and neck pain. Skin:  Negative for color change and rash. Allergic/Immunologic: Negative for immunocompromised state. Neurological:  Negative for dizziness, weakness, light-headedness, numbness and headaches. All other systems reviewed and are negative. Physical Exam  Physical Exam  Vitals and nursing note reviewed. Constitutional:       General: He is not in acute distress. Appearance: He is well-developed. HENT:      Head: Normocephalic and atraumatic. Mouth/Throat:      Lips: Pink. Mouth: Mucous membranes are moist.   Eyes:      General: Lids are normal.      Extraocular Movements: Extraocular movements intact. Conjunctiva/sclera: Conjunctivae normal.      Pupils: Pupils are equal, round, and reactive to light. Cardiovascular:      Rate and Rhythm: Normal rate and regular rhythm. Heart sounds: Normal heart sounds. No murmur heard. Pulmonary:      Effort: Pulmonary effort is normal.      Breath sounds: Normal breath sounds. Abdominal:      General: There is no distension. Palpations: Abdomen is soft. Tenderness: There is no abdominal tenderness. There is no guarding or rebound. Musculoskeletal:         General: No swelling. Cervical back: Full passive range of motion without pain, normal range of motion and neck supple. Skin:     General: Skin is warm. Capillary Refill: Capillary refill takes less than 2 seconds. Findings: No rash. Neurological:      General: No focal deficit present. Mental Status: He is alert.    Psychiatric:         Mood and Affect: Mood normal. Speech: Speech normal.         Behavior: Behavior normal.         Vital Signs  ED Triage Vitals [10/18/23 2202]   Temperature Pulse Respirations Blood Pressure SpO2   98 °F (36.7 °C) 77 19 (!) 186/97 100 %      Temp Source Heart Rate Source Patient Position - Orthostatic VS BP Location FiO2 (%)   Oral Monitor Sitting Right arm --      Pain Score       --           Vitals:    10/18/23 2202 10/19/23 0000   BP: (!) 186/97 135/78   Pulse: 77 75   Patient Position - Orthostatic VS: Sitting Sitting         Visual Acuity      ED Medications  Medications - No data to display    Diagnostic Studies  Results Reviewed       Procedure Component Value Units Date/Time    HS Troponin I 2hr [[id number]]  (Normal) Collected: 10/19/23 0006    Lab Status: Final result Specimen: Blood from Arm, Left Updated: 10/19/23 0034     hs TnI 2hr 10 ng/L      Delta 2hr hsTnI 0 ng/L     HS Troponin I 4hr [[id number]]     Lab Status: No result Specimen: Blood     Comprehensive metabolic panel [[id number]]  (Abnormal) Collected: 10/18/23 2217    Lab Status: Final result Specimen: Blood from Arm, Left Updated: 10/18/23 2249     Sodium 131 mmol/L      Potassium 3.9 mmol/L      Chloride 96 mmol/L      CO2 28 mmol/L      ANION GAP 7 mmol/L      BUN 20 mg/dL      Creatinine 1.03 mg/dL      Glucose 540 mg/dL      Calcium 9.6 mg/dL      AST 14 U/L      ALT 20 U/L      Alkaline Phosphatase 70 U/L      Total Protein 6.8 g/dL      Albumin 4.0 g/dL      Total Bilirubin 0.32 mg/dL      eGFR 76 ml/min/1.73sq m     Narrative:      [place] guidelines for Chronic Kidney Disease (CKD):     Stage 1 with normal or high GFR (GFR > 90 mL/min/1.73 square meters)    Stage 2 Mild CKD (GFR = 60-89 mL/min/1.73 square meters)    Stage 3A Moderate CKD (GFR = 45-59 mL/min/1.73 square meters)    Stage 3B Moderate CKD (GFR = 30-44 mL/min/1.73 square meters)    Stage 4 Severe CKD (GFR = 15-29 mL/min/1.73 square meters)    Stage 5 End Stage CKD (GFR <15 mL/min/1.73 square meters)  Note: GFR calculation is accurate only with a steady state creatinine    HS Troponin 0hr (reflex protocol) [[id number]]  (Normal) Collected: 10/18/23 2217    Lab Status: Final result Specimen: Blood from Arm, Left Updated: 10/18/23 2245     hs TnI 0hr 10 ng/L     Protime-INR [[id number]]  (Normal) Collected: 10/18/23 2217    Lab Status: Final result Specimen: Blood from Arm, Left Updated: 10/18/23 2239     Protime 12.8 seconds      INR 0.96    APTT [[id number]]  (Normal) Collected: 10/18/23 2217    Lab Status: Final result Specimen: Blood from Arm, Left Updated: 10/18/23 2239     PTT 24 seconds     CBC and differential [[id number]]  (Abnormal) Collected: 10/18/23 2217    Lab Status: Final result Specimen: Blood from Arm, Left Updated: 10/18/23 2223     WBC 6.40 Thousand/uL      RBC 4.87 Million/uL      Hemoglobin 14.8 g/dL      Hematocrit 43.3 %      MCV 89 fL      MCH 30.4 pg      MCHC 34.2 g/dL      RDW 11.4 %      MPV 11.1 fL      Platelets 583 Thousands/uL      nRBC 0 /100 WBCs      Neutrophils Relative 49 %      Immat GRANS % 1 %      Lymphocytes Relative 33 %      Monocytes Relative 10 %      Eosinophils Relative 6 %      Basophils Relative 1 %      Neutrophils Absolute 3.17 Thousands/µL      Immature Grans Absolute 0.03 Thousand/uL      Lymphocytes Absolute 2.13 Thousands/µL      Monocytes Absolute 0.65 Thousand/µL      Eosinophils Absolute 0.38 Thousand/µL      Basophils Absolute 0.04 Thousands/µL                    XR chest 2 views   ED Interpretation by Charito Newberry MD (10/18 2306)   No acute cardiopulmonary disease as interpreted by myself.                  Procedures  ECG 12 Lead Documentation Only    Date/Time: 10/18/2023 10:13 PM    Performed by: Charito Newberry MD  Authorized by: Charito Newberry MD    ECG reviewed by me, the ED Provider: yes    Patient location:  ED  Previous ECG:     Previous ECG:  Compared to current    Comparison ECG info:  7/12/20    Similarity: Changes noted    Comparison to cardiac monitor: Yes    Interpretation:     Interpretation: non-specific    Rate:     ECG rate:  82    ECG rate assessment: normal    Rhythm:     Rhythm: sinus rhythm    Ectopy:     Ectopy: none    QRS:     QRS axis:  Normal    QRS intervals:  Normal  Conduction:     Conduction: normal    ST segments:     ST segments:  Normal  T waves:     T waves: non-specific             ED Course  ED Course as of 10/19/23 0037   Thu Oct 19, 2023   0011 Repeat EKG interpreted by myself reveals normal sinus rhythm at 76 bpm.  Normal axis. Normal QRS and QT intervals. No ST elevation or depression. T waves unchanged from previous. Medical Decision Making  - Given patient's concerns, will do a cardiac workup. - Will do an EKG for arrythmia, strain; troponin for same as per protocol for evaluation of ACS. - CBC for anemia; CMP for kidney function and electrolytes. - Will check CXR for pneumonia, PTX, fluid overload    HEART score:  History 1=Moderate suspicious  ECG 0=Normal  Age 1= > 45 - <65 years  Risk Factors 2= > 3 risk factors, or history of atherosclerotic disease  Troponin 0= Less than or equal to 12 ng/L  Total 4      - Disposition per workup. Past Medical History:  No date: Anxiety  No date: Arthritis  No date: Carpal tunnel syndrome, bilateral  7/13/20: Coronary artery disease  No date: Depression  No date: Diabetes mellitus (HCC)  No date: GERD (gastroesophageal reflux disease)  No date: Heart attack ([address])  No date: Hyperlipidemia  7/13/20: Myocardial infarction ([address])  No date: OCD (obsessive compulsive disorder)     Problems Addressed:  Dyspnea: complicated acute illness or injury with systemic symptoms that poses a threat to life or bodily functions  Lightheadedness: acute illness or injury    Amount and/or Complexity of Data Reviewed  Labs: ordered. Radiology: ordered and independent interpretation performed.   ECG/medicine tests: ordered and independent interpretation performed. Disposition  Final diagnoses:   Dyspnea   Lightheadedness     Time reflects when diagnosis was documented in both MDM as applicable and the Disposition within this note       Time User Action Codes Description Comment    10/19/2023 12:36 AM Noelle ADAME Add [R06.00] Dyspnea     10/19/2023 12:36 AM Betsey Calzada Add [R42] Lightheadedness           ED Disposition       ED Disposition   Discharge    Condition   Stable    Date/Time   Thu Oct 19, 2023 12:36 AM    Comment   Glenn Roldan discharge to home/self care. Follow-up Information       Follow up With Specialties Details Why Contact Info Additional Information    Faye Mar MD Cardiology, Internal Medicine Schedule an appointment as soon as possible for a visit   [address] [address] Emergency Department Emergency Medicine Go to  If symptoms worsen [address] [address]  [address] [facility] Emergency Department, [address] [place] [address], [id number]            Patient's Medications   Discharge Prescriptions    No medications on file       No discharge procedures on file.     PDMP Review         Value Time User    PDMP Reviewed  Yes 8/2/2023  2:31 PM Ashkan Valadez MD            ED Provider  Electronically Signed by             Betsey Calzada MD  10/19/23 0111

## 2023-10-27 ENCOUNTER — PREP FOR PROCEDURE (OUTPATIENT)
Dept: CARDIOLOGY CLINIC | Facility: CLINIC | Age: 63
End: 2023-10-27

## 2023-10-27 ENCOUNTER — OFFICE VISIT (OUTPATIENT)
Dept: CARDIOLOGY CLINIC | Facility: CLINIC | Age: 63
End: 2023-10-27
Payer: COMMERCIAL

## 2023-10-27 ENCOUNTER — TELEPHONE (OUTPATIENT)
Dept: CARDIOLOGY CLINIC | Facility: CLINIC | Age: 63
End: 2023-10-27

## 2023-10-27 VITALS
SYSTOLIC BLOOD PRESSURE: 132 MMHG | WEIGHT: 163 LBS | BODY MASS INDEX: 23.34 KG/M2 | HEART RATE: 72 BPM | DIASTOLIC BLOOD PRESSURE: 78 MMHG | HEIGHT: 70 IN

## 2023-10-27 DIAGNOSIS — I25.119 ATHEROSCLEROSIS OF NATIVE CORONARY ARTERY OF NATIVE HEART WITH ANGINA PECTORIS (HCC): Primary | ICD-10-CM

## 2023-10-27 DIAGNOSIS — E78.2 MIXED DYSLIPIDEMIA: ICD-10-CM

## 2023-10-27 DIAGNOSIS — E78.2 MIXED HYPERLIPIDEMIA: ICD-10-CM

## 2023-10-27 DIAGNOSIS — R06.02 SOB (SHORTNESS OF BREATH): ICD-10-CM

## 2023-10-27 DIAGNOSIS — I25.10 CORONARY ARTERY DISEASE INVOLVING NATIVE CORONARY ARTERY OF NATIVE HEART WITHOUT ANGINA PECTORIS: Primary | ICD-10-CM

## 2023-10-27 DIAGNOSIS — Z98.61 HISTORY OF PERCUTANEOUS CORONARY INTERVENTION: ICD-10-CM

## 2023-10-27 PROCEDURE — 93000 ELECTROCARDIOGRAM COMPLETE: CPT | Performed by: INTERNAL MEDICINE

## 2023-10-27 PROCEDURE — 99214 OFFICE O/P EST MOD 30 MIN: CPT | Performed by: INTERNAL MEDICINE

## 2023-10-27 NOTE — PATIENT INSTRUCTIONS
CARDIOLOGY ASSESSMENT & PLAN     1. Coronary artery disease involving native coronary artery of native heart without angina pectoris  POCT ECG    metoprolol tartrate (LOPRESSOR) 25 mg tablet      2. Mixed dyslipidemia        3. History of percutaneous coronary intervention  metoprolol tartrate (LOPRESSOR) 25 mg tablet      4. SOB (shortness of breath)        5. Mixed hyperlipidemia          CAD, S/P MI June 2020, s/p PCI D1 July 2020  Mr. Keyshawn Sewell has been experiencing increased in frequency of his shortness of breath along with diaphoresis with exercise activity. He has known history of coronary artery disease and poorly controlled diabetes. Recently he had not been taking his aspirin therapy also. When he went to the emergency room recently he did have T wave inversions in the inferior leads which where new. He ruled out for acute coronary syndrome. ECG changes are now resolved on ECG today. He did have a nuclear stress test in May 2022 that identified normal myocardial perfusion. But hisexercise tolerance was very limited and he had Marked sinus tachycardia response to exercise. His last noted LV function is normal.    Given his declining exercise tolerance and angina occurrence of exertional shortness of breath and diaphoresis it would be reasonable to proceed with cardiac catheterization. I have discussed this procedure with him and his wife briefly including its possible associated risks and benefits. He is agreeable to proceed with cardiac catheterization. We also discussed the need for being compliant with aspirin therapy and if necessary clopidogrel and other medications. In addition I advised him that he has to work closely with his primary physician and if necessary endocrinologist to optimize his diabetes control. -- Cardiac catheterization will be scheduled in the next few days. -- Is advised to continue to take aspirin and Pravachol.   -- He is being restarted on metoprolol consistently. As he was having fatigue with the full pill of 25 mg twice daily I am advising him to take half a pill twice daily for now. -- He is advised to hold metformin the day before and the day of the cardiac catheterization. -- He is advised to follow-up with his primary care physician closely to optimize diabetes control. -- If symptoms occur at rest or if they are severe or he experiences profound diaphoresis and near syncope-like symptoms he is advised to go to the emergency room.   -- We will request the 2-month follow-up visit, earlier if he has a PCI following angiogram.

## 2023-10-27 NOTE — TELEPHONE ENCOUNTER
[facility] to [facility] dedicated provider line, Alice Soriano is not require for this member's policy for [id number] or [id number].   Call ref # [id number]

## 2023-10-27 NOTE — H&P (VIEW-ONLY)
[facility]  [address] [facility] [address] [facility] [place] [address]  Phone#  [phone]. Fax#  [phone]  *-*-*-*-*-*-*-*-*-*-*-*-*-*-*-*-*-*-*-*-*-*-*-*-*-*-*-*-*-*-*-*-*-*-*-*-*-*-*-*-*-*-*-*-*-*-*-*-*-*-*-*-*-*  ENCOUNTER DATE: 10/27/23 10:15 AM  PATIENT NAME: Jessi Roldan   1960    [id number]  AGE:63 y.o. SEX: male  ENCOUNTER PROVIDER:Faye Mar     PRIMARY CARE PHYSICIAN: Elyssa Gasca MD    DIAGNOSES:   1. One-vessel coronary artery disease status post ACS-NSTEMI July 2020, status post PCI of diagonal branch on July 13, 2020.   2. Normal left ventricular systolic function. 3. Diabetes mellitus with poor glycemic control with last hemoglobin see of 8.4   4. Dyslipidemia   5. Family history premature CAD with brother having MI at 48 years. 6. Remote history of ITP   7. History of trigger finger   8. History of OCD     REGADENOSON NUCLEAR STRESS TEST May 2022:  1. Negative regadenoson nuclear stress test for symptoms of angina pectoris and negative for ECG changes of ischemia. 2. Normal myocardial perfusion scan with no evidence of reversible or fixed perfusion abnormality. 3. Normal left ventricular systolic function and normal regional wall motion. Post-stress left ventricular ejection fraction was determined as 65%  4. Normal resting blood pressure and appropriate blood pressure response to exercise. 5. Nonspecific resting ECG abnormality. There was sinus tachycardia response to low-level physical activity suggesting possible physical deconditioning. 6. There was an incidental finding relatively nonfocal soft tissue uptake of tracer in left shoulder/axillary/upper back region. This may represent an inflammation or possible muscle strain. Clinical correlation is recommended. EXTENDED HOLTER MONITOR November 2021: Patient was monitored for 13 days 23 hours. Average heart rate was 87, minimum heart rate was 58. There was 1 run of ventricular tachycardia involving 6 beats. There were 14 runs of nonsustained SVT with longest run lasting 9 beats at 245 beats per minute. Overall ventricular ectopy burden was less than 1%. There were no diary entries but there were triggered events which correlated with sinus rhythm and sinus tachycardia. ECHOCARDIOGRAM May 11, 2021:   1. Mild concentric left ventricular hypertrophy, normal left ventricular systolic and diastolic function. EF around 64%. 2. Normal right ventricular size and systolic function. 3. Normal left and right atrial size. 4. Aortic valve sclerosis, no aortic valve stenosis or regurgitation. 5. Mild mitral valve sclerosis, trace to mild mitral valve regurgitation. 6. Trace tricuspid valve regurgitation. 7. No obvious pulmonary hypertension. 8. No pericardial effusion. CARDIAC CATHETERIZATION JULY 13, 2020: The coronary circulation is right dominant. -- There was 1-vessel coronary artery disease. -- Left main: Angiography showed minor luminal irregularities. -- LAD: Angiography showed minor luminal irregularities. -- 1st diagonal:   -- 1st diagonal: There was a 95 % stenosis. There was LEA grade 2 flow through the vessel (partial perfusion). This lesion is a likely culprit for the patient's recent myocardial infarction. It appears amenable to percutaneous   intervention. -- Circumflex: Angiography showed minor luminal irregularities. -- Proximal RCA: There was a 50 % stenosis. ECHOCARDIOGRAM JULY 13, 2020:   Technical quality: Good   Cardiac rhythm: Normal sinus     1. Normal left ventricular size and systolic and diastolic function, EF around 63%. 2. Normal right ventricular size and systolic function. 4. Normal left and right atrial size. 5. Mild aortic valve sclerosis, no aortic valve stenosis or regurgitation. 6. Trace mitral and tricuspid valve regurgitation. 7. No obvious pulmonary hypertension. 8. No pericardial effusion. Holter monitor July 2020:   1.  Holter monitor reveals the underlying rhythm is sinus rhythm with an average heart rate of 73 beats per minute, a minimum heart rate of 54 beats per minute and a maximum heart rate of 121 beats per minute. 2. There are rare ventricular ectopy comprised of isolated VPCs   3. There are rare supraventricular ectopy comprised of isolated APCs   4. There is no evidence of significant bradyarrhythmia or advanced heart block. The longest R to R was 1.4 seconds. 5. Patient diary did not report any symptoms. CURRENT ECG     Results for orders placed or performed in visit on 10/27/23   POCT ECG    Narrative    Sinus rhythm with no significant ST-T wave abnormalities. HR 73 bpm.  Leftward axis. Possible right atrial enlargement. Possible prior septal infarction. No significant ST-T wave normalities. T wave inversions in inferior leads noted during recent ER visit are resolved. ECG emergency room visit on 10/18/2023:       [address]     1. Coronary artery disease involving native coronary artery of native heart without angina pectoris  POCT ECG    metoprolol tartrate (LOPRESSOR) 25 mg tablet      2. Mixed dyslipidemia        3. History of percutaneous coronary intervention  metoprolol tartrate (LOPRESSOR) 25 mg tablet      4. SOB (shortness of breath)        5. Mixed hyperlipidemia          CAD, S/P MI June 2020, s/p PCI D1 July 2020  Mr. Ayo Bueno has been experiencing increased in frequency of his shortness of breath along with diaphoresis with exercise activity. He has known history of coronary artery disease and poorly controlled diabetes. Recently he had not been taking his aspirin therapy also. When he went to the emergency room recently he did have T wave inversions in the inferior leads which where new. He ruled out for acute coronary syndrome. ECG changes are now resolved on ECG today.   He did have a nuclear stress test in May 2022 that identified normal myocardial perfusion. But hisexercise tolerance was very limited and he had Marked sinus tachycardia response to exercise. His last noted LV function is normal.    Given his declining exercise tolerance and angina occurrence of exertional shortness of breath and diaphoresis it would be reasonable to proceed with cardiac catheterization. I have discussed this procedure with him and his wife briefly including its possible associated risks and benefits. He is agreeable to proceed with cardiac catheterization. We also discussed the need for being compliant with aspirin therapy and if necessary clopidogrel and other medications. In addition I advised him that he has to work closely with his primary physician and if necessary endocrinologist to optimize his diabetes control. -- Cardiac catheterization will be scheduled in the next few days. -- Is advised to continue to take aspirin and Pravachol. -- He is being restarted on metoprolol consistently. As he was having fatigue with the full pill of 25 mg twice daily I am advising him to take half a pill twice daily for now. -- He is advised to hold metformin the day before and the day of the cardiac catheterization. -- He is advised to follow-up with his primary care physician closely to optimize diabetes control. -- If symptoms occur at rest or if they are severe or he experiences profound diaphoresis and near syncope-like symptoms he is advised to go to the emergency room. -- We will request the 2-month follow-up visit, earlier if he has a PCI following angiogram.       [address] [place] [address] is here for follow-up regarding his cardiac comorbidities which include: Coronary artery disease with history of MI and PCI in 2020, dyslipidemia and other comorbidities. He is here for follow-up accompanied with his wife. He was last seen by me in June 2023.      He is here for an acute visit because h recently experienced an episode of shortness of breath and had to go to the emergency room. Symptom occurred on 10/18/2023. About a week before that he was treated for cellulitis of the face. On the day of event he experienced shortness of breath while walking and this was accompanied with diaphoresis. Symptoms was not accompanied with chest pain or tightness. Symptom lasted for approximately 30 minutes till he went to the emergency room. Today he went to the emergency room. In the ER he was noted to thank in the ER he was noted to have elevated blood pressure of 186/97, heart rate was 77 bpm and oxygen saturation was normal.  Blood work was significant for markedly elevated glucose of 541. High-sensitivity troponin was normal.  Other blood work was normal.  ECG showed T wave inversions in inferior leads. He was subsequently discharged to home after second troponin was also normal but he was advised to follow-up with us. Since coming home he has had no recurrence of similar symptoms but he does feel short of breath upon climbing stairs and feels very fatigued. Also reports intermittent pounding in chest.  Denies chest pain or chest tightness. Denies orthopnea or PND or worsening pedal edema. He had not been taking his aspirin and Plavix until about a week back. He says that he was taking statin regularly. He has had issues with medications giving him fatigue so had stopped taking the medications. He reports that he was having issues with metformin also but has restarted taking it at a dose of 500 mg 3 times daily and he is able to tolerate that. Denies any passing out or near passing out episodes. Mentions that he has also been dealing with back issues and has received injections to the back. Functional capacity status: Moderately decreased. (Excellent- >10 METs; Good: (7-10 METs); Moderate (4-7 METs);  Poor (<= 4 METs)    Any chronic stressors:  None   (feeling of poor health, financial problems, and social isolation etc). Tobacco or alcohol dependence:  He does not smoke. He does not drink. Current cardiac medications:    Pravastatin 40 mg daily, aspirin 81 mg daily. Blood work from 10/18/2023:  Sodium 131 potassium 3.9 chloride 96 bicarb 28 BUN 20 creatinin so you want me to schedule the angiogram.  e 1.03. Normal liver function test  High-sensitivity troponins negative x2  Hemoglobin 14.8 hematocrit 43.3 platelet count 668  Hemoglobin A1c 10.2 in August 2023  Last lipid testing is from January 2022: Total cholesterol was 181 HDL was 65 triglyceride was 105, calculated LDL was 96. REVIEW OF SYSTEMS   Positive for:  As noted above in HPI  Negative for: All remaining as reviewed below and in HPI. SYSTEM SYMPTOMS REVIEWED:  General--weight change, fever, night sweats  Respiratory--cough, wheezing, shortness of breath, sputum production  Cardiovascular--chest pain, syncope, dyspnea on exertion, edema, decline in exercise tolerance, claudication   Gastrointestinal--persistent vomiting, diarrhea, abdominal distention, blood in stool   Muscular or skeletal--joint pain or swelling   Neurologic--headaches, syncope, abnormal movement  Hematologic--history of easy bruising and bleeding   Endocrine--thyroid enlargement, heat or cold intolerance, polyuria   Psychiatric--anxiety, depression     *-*-*-*-*-*-*-*-*-*-*-*-*-*-*-*-*-*-*-*-*-*-*-*-*-*-*-*-*-*-*-*-*-*-*-*-*-*-*-*-*-*-*-*-*-*-*-*-*-*-*-*-*-*-  VITAL SIGNS     CURRENT VITAL SIGNS:   Vitals:    10/27/23 0932   BP: 132/78   Pulse: 72   Weight: 73.9 kg (163 lb)   Height: 5' 10" (1.778 m)       BMI: Body mass index is 23.39 kg/m².     WEIGHTS:   Wt Readings from Last 25 Encounters:   10/27/23 73.9 kg (163 lb)   10/18/23 77.4 kg (170 lb 10.2 oz)   10/15/23 74.8 kg (165 lb)   09/26/23 74.8 kg (165 lb)   09/16/23 74.8 kg (165 lb)   08/02/23 73.9 kg (163 lb)   07/14/23 73.5 kg (162 lb)   06/02/23 73.5 kg (162 lb)   05/03/23 73.5 kg (162 lb)   05/02/23 74.7 kg (164 lb 9.6 oz)   06/21/22 72.7 kg (160 lb 3.2 oz)   05/26/22 74.4 kg (164 lb)   05/10/22 74.4 kg (164 lb)   04/26/22 74.5 kg (164 lb 3.2 oz)   10/13/21 73.9 kg (163 lb)   08/18/21 73.4 kg (161 lb 12.8 oz)   10/20/20 71.5 kg (157 lb 9.6 oz)   07/21/20 73.5 kg (162 lb)   07/12/20 75.6 kg (166 lb 10.7 oz)   05/08/20 77.1 kg (170 lb)   02/13/19 77.1 kg (170 lb)   05/29/15 79.8 kg (176 lb)   05/06/15 79.6 kg (175 lb 6.1 oz)   10/16/14 80.7 kg (178 lb)   05/27/14 82.1 kg (181 lb)        *-*-*-*-*-*-*-*-*-*-*-*-*-*-*-*-*-*-*-*-*-*-*-*-*-*-*-*-*-*-*-*-*-*-*-*-*-*-*-*-*-*-*-*-*-*-*-*-*-*-*-*-*-*-  PHYSICAL EXAM     General Appearance:    Alert, cooperative, no distress, appears stated age   Head, Eyes, ENT:    No obvious abnormality, moist mucous mebranes. Neck:   Supple, no carotid bruit or JVD   Back:     Symmetric, no curvature. Lungs:     Respirations unlabored. Clear to auscultation bilaterally,    Chest wall:    No tenderness or deformity   Heart:    Regular rate and rhythm, S1 and S2 normal, no murmur, rub  or gallop. Abdomen:     Soft, non-tender   Extremities:   Extremities warm, no cyanosis or edema    Skin:   no venostatic changes in lower extremities. Normal skin color, texture, and turgor.  No rashes or lesions     *-*-*-*-*-*-*-*-*-*-*-*-*-*-*-*-*-*-*-*-*-*-*-*-*-*-*-*-*-*-*-*-*-*-*-*-*-*-*-*-*-*-*-*-*-*-*-*-*-*-*-*-*-*-  CURRENT MEDICATIONS LIST     Current Outpatient Medications:     aspirin 81 mg chewable tablet, Chew 1 tablet (81 mg total) daily, Disp: 30 tablet, Rfl: 11    co-enzyme Q-10 30 MG capsule, Take 30 mg by mouth 3 (three) times a day, Disp: , Rfl:     metFORMIN (GLUCOPHAGE) 500 mg tablet, Take 2 tablets (1,000 mg total) by mouth 2 (two) times a day with meals, Disp: 360 tablet, Rfl: 1    metoprolol tartrate (LOPRESSOR) 25 mg tablet, Take 0.5 tablets (12.5 mg total) by mouth every 12 (twelve) hours, Disp: 60 tablet, Rfl: 3    pravastatin (PRAVACHOL) 40 mg tablet, Take 1 tablet (40 mg total) by mouth daily, Disp: 90 tablet, Rfl: 1    sertraline (ZOLOFT) 100 mg tablet, Take 1.5 tablets (150 mg total) by mouth daily, Disp: 135 tablet, Rfl: 1       ALLERGIES     Allergies   Allergen Reactions    Rosuvastatin GI Intolerance       *-*-*-*-*-*-*-*-*-*-*-*-*-*-*-*-*-*-*-*-*-*-*-*-*-*-*-*-*-*-*-*-*-*-*-*-*-*-*-*-*-*-*-*-*-*-*-*-*-*-*-*-*-*-  LABORATORY DATA   No results found for: "NA"  Potassium   Date Value Ref Range Status   10/18/2023 3.9 3.5 - 5.3 mmol/L Final   08/19/2021 4.1 3.5 - 5.3 mmol/L Final   07/14/2020 4.0 3.5 - 5.3 mmol/L Final     Chloride   Date Value Ref Range Status   10/18/2023 96 96 - 108 mmol/L Final   08/19/2021 102 100 - 108 mmol/L Final   07/14/2020 100 100 - 108 mmol/L Final     CO2   Date Value Ref Range Status   10/18/2023 28 21 - 32 mmol/L Final   08/19/2021 26 21 - 32 mmol/L Final   07/14/2020 23 21 - 32 mmol/L Final     BUN   Date Value Ref Range Status   10/18/2023 20 5 - 25 mg/dL Final   08/19/2021 19 5 - 25 mg/dL Final   07/14/2020 14 5 - 25 mg/dL Final     Creatinine   Date Value Ref Range Status   10/18/2023 1.03 0.60 - 1.30 mg/dL Final     Comment:     Standardized to IDMS reference method   08/19/2021 1.15 0.60 - 1.30 mg/dL Final     Comment:     Standardized to IDMS reference method   07/14/2020 0.94 0.60 - 1.30 mg/dL Final     Comment:     Standardized to IDMS reference method     eGFR   Date Value Ref Range Status   10/18/2023 76 ml/min/1.73sq m Final   08/19/2021 68 ml/min/1.73sq m Final   07/14/2020 87 ml/min/1.73sq m Final     Calcium   Date Value Ref Range Status   10/18/2023 9.6 8.4 - 10.2 mg/dL Final   08/19/2021 9.0 8.3 - 10.1 mg/dL Final   07/14/2020 8.5 8.3 - 10.1 mg/dL Final     AST   Date Value Ref Range Status   10/18/2023 14 13 - 39 U/L Final   08/19/2021 18 5 - 45 U/L Final     Comment:     Specimen collection should occur prior to Sulfasalazine administration due to the potential for falsely depressed results.     07/13/2020 43 5 - 45 U/L Final     Comment: Specimen collection should occur prior to Sulfasalazine administration due to the potential for falsely depressed results. ALT   Date Value Ref Range Status   10/18/2023 20 7 - 52 U/L Final     Comment:     Specimen collection should occur prior to Sulfasalazine administration due to the potential for falsely depressed results. 08/19/2021 29 12 - 78 U/L Final     Comment:     Specimen collection should occur prior to Sulfasalazine administration due to the potential for falsely depressed results. 07/13/2020 34 12 - 78 U/L Final     Comment:       Specimen collection should occur prior to Sulfasalazine administration due to the potential for falsely depressed results.       Alkaline Phosphatase   Date Value Ref Range Status   10/18/2023 70 34 - 104 U/L Final   08/19/2021 82 46 - 116 U/L Final   07/13/2020 73 46 - 116 U/L Final     Magnesium   Date Value Ref Range Status   08/19/2021 2.1 1.6 - 2.6 mg/dL Final   07/13/2020 1.9 1.6 - 2.6 mg/dL Final     WBC   Date Value Ref Range Status   10/18/2023 6.40 4.31 - 10.16 Thousand/uL Final   06/02/2023 8.59 4.31 - 10.16 Thousand/uL Final   07/14/2020 8.22 4.31 - 10.16 Thousand/uL Final     Hemoglobin   Date Value Ref Range Status   10/18/2023 14.8 12.0 - 17.0 g/dL Final   06/02/2023 16.5 12.0 - 17.0 g/dL Final   07/14/2020 14.9 12.0 - 17.0 g/dL Final     Platelets   Date Value Ref Range Status   10/18/2023 144 (L) 149 - 390 Thousands/uL Final   06/02/2023 129 (L) 149 - 390 Thousands/uL Final   07/14/2020 122 (L) 149 - 390 Thousands/uL Final     PTT   Date Value Ref Range Status   10/18/2023 24 23 - 37 seconds Final     Comment:     Therapeutic Heparin Range =  60-90 seconds   07/13/2020 60 (H) 23 - 37 seconds Final     Comment:     Therapeutic Heparin Range =  60-90 seconds     INR   Date Value Ref Range Status   10/18/2023 0.96 0.84 - 1.19 Final   07/12/2020 0.90 0.84 - 1.19 Final     No results found for: "CKMB", "DIGOXIN"  No results found for: "TSH"  No results found for: "CHOL"   Hemoglobin A1C   Date Value Ref Range Status   2023 10.2 (A) 6.5 Final   2021 7.6 (H) Normal 3.8-5.6%; PreDiabetic 5.7-6.4%; Diabetic >=6.5%; Glycemic control for adults with diabetes <7.0% % Final     Hgb A1c   Date Value Ref Range Status   2020 7.9 (H) <5.7 % of total Hgb Final     Comment:     For someone without known diabetes, a hemoglobin A1c  value of 6.5% or greater indicates that they may have   diabetes and this should be confirmed with a follow-up   test.    For someone with known diabetes, a value <7% indicates   that their diabetes is well controlled and a value   greater than or equal to 7% indicates suboptimal   control. A1c targets should be individualized based on   duration of diabetes, age, comorbid conditions, and   other considerations. Currently, no consensus exists regarding use of  hemoglobin A1c for diagnosis of diabetes for children. No results found for: "BLOODCX", "SPUTUMCULTUR", "Digna Toluca", "[place] Budd", "WOUNDCULT", "BODYFLUIDCUL", "Shelvia Number", "INFLUAPCR", "INFLUBPCR", "RSVPCR", "Clifford Debra", "CDIFFTOXINB"    *-*-*-*-*-*-*-*-*-*-*-*-*-*-*-*-*-*-*-*-*-*-*-*-*-*-*-*-*-*-*-*-*-*-*-*-*-*-*-*-*-*-*-*-*-*-*-*-*-*-*-*-*-*-  PREVIOUS CARDIOLOGY & RADIOLOGY TEST RESULTS   I have personally reviewed pertinent results of cardiovascular tests noted below.     Results for orders placed during the hospital encounter of 21    Echo complete with contrast if indicated    Narrative  [address], [address] [facility]    Transthoracic Echocardiogram  2D, M-mode, Doppler, and Color Doppler    Study date:  11-May-2021    Patient: Lilliana Fermin  MR number: [id number]  Account number: [de-identified]  : 1960  Age: 63 years  Gender: Male  Status: Outpatient  Location: [place] [facility]  Height: 70 in  Weight: 149.6 lb  BP: 105/ 68 mmHg    Indications: CAD    Diagnoses: I25.10 - Atherosclerotic heart disease of native coronary artery without angina pectoris    Sonographer:  JAMAL San  Interpreting Physician:  Samantha Zheng MD  Referring Physician:  Smaantha Zheng MD  Group:  [facility]    IMPRESSIONS:  Technical quality: Good  Cardiac rhythm: Sinus    1. Mild concentric left ventricular hypertrophy, normal left ventricular systolic and diastolic function. EF around 64%. 2. Normal right ventricular size and systolic function. 3. Normal left and right atrial size. 4. Aortic valve sclerosis, no aortic valve stenosis or regurgitation. 5. Mild mitral valve sclerosis, trace to mild mitral valve regurgitation. 6. Trace tricuspid valve regurgitation. 7. No obvious pulmonary hypertension. 8. No pericardial effusion. Compared to report of previous echocardiogram from July 13, 2020 there is overall no significant change. SUMMARY    LEFT VENTRICLE:  Normal left ventricular cavity size, mild concentric left ventricular hypertrophy, normal left ventricular systolic function, normal regional wall motion. Ejection fraction is estimated as around 64%. Normal left ventricular diastolic  function. RIGHT VENTRICLE:  Normal right ventricular size and systolic function. Normal estimated right ventricular systolic pressure, 17 mmHg. LEFT ATRIUM:  Normal left atrial cavity size. Intact interatrial septum. RIGHT ATRIUM:  Normal right atrial cavity size. MITRAL VALVE:  Mild mitral valve leaflet thickening and sclerosis, adequate leaflet mobility. Trace to mild mitral valve regurgitation    AORTIC VALVE:  Tricuspid aortic valve with mild sclerosis, adequate cuspal separation. No aortic valve stenosis or regurgitation. TRICUSPID VALVE:  Normal tricuspid valve morphology and leaflet separation. Trace tricuspid valve regurgitation. PULMONIC VALVE:  No significant pulmonic valve regurgitation.     AORTA:  Aortic root and proximal ascending aorta are normal in size on 2D imaging. IVC, HEPATIC VEINS:  Inferior vena cava is normal in size and demonstrates appropriate respiratory phasic changes in diameter. PERICARDIUM:  No pericardial effusion. HISTORY: PRIOR HISTORY: Risk factors: diabetes. PROCEDURE: The study was performed in the [address] [place] [address] Dr OP. This was a routine study. The transthoracic approach was used. The study included complete 2D imaging, M-mode, complete spectral Doppler, and color Doppler. The heart rate was 79  bpm, at the start of the study. Images were obtained from the parasternal, apical, subcostal, and suprasternal notch acoustic windows. Image quality was adequate. LEFT VENTRICLE: Normal left ventricular cavity size, mild concentric left ventricular hypertrophy, normal left ventricular systolic function, normal regional wall motion. Ejection fraction is estimated as around 64%. Normal left  ventricular diastolic function. RIGHT VENTRICLE: Normal right ventricular size and systolic function. Normal estimated right ventricular systolic pressure, 17 mmHg. LEFT ATRIUM: Normal left atrial cavity size. Intact interatrial septum. RIGHT ATRIUM: Normal right atrial cavity size. MITRAL VALVE: Mild mitral valve leaflet thickening and sclerosis, adequate leaflet mobility. Trace to mild mitral valve regurgitation    AORTIC VALVE: Tricuspid aortic valve with mild sclerosis, adequate cuspal separation. No aortic valve stenosis or regurgitation. TRICUSPID VALVE: Normal tricuspid valve morphology and leaflet separation. Trace tricuspid valve regurgitation. PULMONIC VALVE: No significant pulmonic valve regurgitation. PERICARDIUM: No pericardial effusion. AORTA: Aortic root and proximal ascending aorta are normal in size on 2D imaging. SYSTEMIC VEINS: IVC: Inferior vena cava is normal in size and demonstrates appropriate respiratory phasic changes in diameter.     SYSTEM MEASUREMENT TABLES    2D  %FS: 36.9 %  Ao Diam: 2.86 cm  EDV(Teich): 46.93 ml  EF(Teich): 67.96 %  ESV(Teich): 15.04 ml  IVSd: 1.07 cm  LA Area: 15.62 cm2  LA Diam: 3.66 cm  LVEDV MOD A4C: 82.01 ml  LVEF MOD A4C: 63.87 %  LVESV MOD A4C: 29.63 ml  LVIDd: 3.39 cm  LVIDs: 2.14 cm  LVLd A4C: 8.44 cm  LVLs A4C: 6.89 cm  LVPWd: 1.14 cm  RA Area: 12.23 cm2  RVIDd: 2.91 cm  SV MOD A4C: 52.38 ml  SV(Teich): 31.9 ml    CW  TR Vmax: 1.38 m/s  TR maxP.57 mmHg    MM  TAPSE: 2.3 cm    PW  E' Sept: 0.08 m/s  E/E' Sept: 8.32  MV A Tha: 0.76 m/s  MV Dec King: 3.87 m/s2  MV DecT: 181.59 ms  MV E Tha: 0.7 m/s  MV E/A Ratio: 0.92  MV PHT: 52.66 ms  MVA By PHT: 4.18 cm2    [facility]    Prepared and electronically signed by    Jm Toney MD  Signed 11-May-2021 12:51:04    No results found for this or any previous visit. No results found for this or any previous visit. No results found for this or any previous visit. XR chest 2 views  Narrative: CHEST    INDICATION:   chest pain. COMPARISON: CXR 2020. EXAM PERFORMED/VIEWS:  XR CHEST PA & LATERAL. FINDINGS:    Cardiomediastinal silhouette normal. Cardiac stent. Lungs clear. No effusion or pneumothorax. Upper abdomen normal. Bones normal for age. Impression: No acute cardiopulmonary disease.     Workstation performed: [id number]        *-*-*-*-*-*-*-*-*-*-*-*-*-*-*-*-*-*-*-*-*-*-*-*-*-*-*-*-*-*-*-*-*-*-*-*-*-*-*-*-*-*-*-*-*-*-*-*-*-*-*-*-*-*-  SIGNATURES:   @HXF@   Faye Mar MD; A    *-*-*-*-*-*-*-*-*-*-*-*-*-*-*-*-*-*-*-*-*-*-*-*-*-*-*-*-*-*-*-*-*-*-*-*-*-*-*-*-*-*-*-*-*-*-*-*-*-*-*-*-*-*-  PAST MEDICAL HISTORY:  Past Medical History:   Diagnosis Date    Anxiety     Arthritis     Carpal tunnel syndrome, bilateral     Coronary artery disease 19    Depression     Diabetes mellitus ([address])     GERD (gastroesophageal reflux disease)     Heart attack ([address])     Hyperlipidemia     Myocardial infarction ([address]) 19    OCD (obsessive compulsive disorder)     PAST SURGICAL HISTORY:  Past Surgical History:   Procedure Laterality Date    CARDIAC SURGERY  Stent    CARPECTOMY HAND Bilateral          FAMILY HISTORY:  Family History   Problem Relation Age of Onset    Alcohol abuse Mother     Arthritis Mother     Alcohol abuse Father     Coronary artery disease Father     COPD Father     SOCIAL HISTORY:  Social History     Tobacco Use   Smoking Status Never   Smokeless Tobacco Never      Social History     Substance and Sexual Activity   Alcohol Use Not Currently     Social History     Substance and Sexual Activity   Drug Use Never    [unfilled]     *-*-*-*-*-*-*-*-*-*-*-*-*-*-*-*-*-*-*-*-*-*-*-*-*-*-*-*-*-*-*-*-*-*-*-*-*-*-*-*-*-*-*-*-*-*-*-*-*-*-*-*-*-*  ALLERGIES:  Allergies   Allergen Reactions    Rosuvastatin GI Intolerance    CURRENT SCHEDULED MEDICATIONS:    Current Outpatient Medications:     aspirin 81 mg chewable tablet, Chew 1 tablet (81 mg total) daily, Disp: 30 tablet, Rfl: 11    co-enzyme Q-10 30 MG capsule, Take 30 mg by mouth 3 (three) times a day, Disp: , Rfl:     metFORMIN (GLUCOPHAGE) 500 mg tablet, Take 2 tablets (1,000 mg total) by mouth 2 (two) times a day with meals, Disp: 360 tablet, Rfl: 1    metoprolol tartrate (LOPRESSOR) 25 mg tablet, Take 0.5 tablets (12.5 mg total) by mouth every 12 (twelve) hours, Disp: 60 tablet, Rfl: 3    pravastatin (PRAVACHOL) 40 mg tablet, Take 1 tablet (40 mg total) by mouth daily, Disp: 90 tablet, Rfl: 1    sertraline (ZOLOFT) 100 mg tablet, Take 1.5 tablets (150 mg total) by mouth daily, Disp: 135 tablet, Rfl: 1     *-*-*-*-*-*-*-*-*-*-*-*-*-*-*-*-*-*-*-*-*-*-*-*-*-*-*-*-*-*-*-*-*-*-*-*-*-*-*-*-*-*-*-*-*-*-*-*-*-*-*-*-*-*

## 2023-10-27 NOTE — PROGRESS NOTES
[facility]  [address], [facility] [address]  Phone#  [phone]. Fax#  [phone]  *-*-*-*-*-*-*-*-*-*-*-*-*-*-*-*-*-*-*-*-*-*-*-*-*-*-*-*-*-*-*-*-*-*-*-*-*-*-*-*-*-*-*-*-*-*-*-*-*-*-*-*-*-*  ENCOUNTER DATE: 10/27/23 10:15 AM  PATIENT NAME: Brittney Roldan   1960    [id number]  AGE:63 y.o. SEX: male  ENCOUNTER PROVIDER:Faye Mar     PRIMARY CARE PHYSICIAN: Daniela Santamaria MD    DIAGNOSES:   1. One-vessel coronary artery disease status post ACS-NSTEMI July 2020, status post PCI of diagonal branch on July 13, 2020.   2. Normal left ventricular systolic function. 3. Diabetes mellitus with poor glycemic control with last hemoglobin see of 8.4   4. Dyslipidemia   5. Family history premature CAD with brother having MI at 48 years. 6. Remote history of ITP   7. History of trigger finger   8. History of OCD     REGADENOSON NUCLEAR STRESS TEST May 2022:  1. Negative regadenoson nuclear stress test for symptoms of angina pectoris and negative for ECG changes of ischemia. 2. Normal myocardial perfusion scan with no evidence of reversible or fixed perfusion abnormality. 3. Normal left ventricular systolic function and normal regional wall motion. Post-stress left ventricular ejection fraction was determined as 65%  4. Normal resting blood pressure and appropriate blood pressure response to exercise. 5. Nonspecific resting ECG abnormality. There was sinus tachycardia response to low-level physical activity suggesting possible physical deconditioning. 6. There was an incidental finding relatively nonfocal soft tissue uptake of tracer in left shoulder/axillary/upper back region. This may represent an inflammation or possible muscle strain. Clinical correlation is recommended. EXTENDED HOLTER MONITOR November 2021: Patient was monitored for 13 days 23 hours. Average heart rate was 87, minimum heart rate was 58. There was 1 run of ventricular tachycardia involving 6 beats. There were 14 runs of nonsustained SVT with longest run lasting 9 beats at 245 beats per minute. Overall ventricular ectopy burden was less than 1%. There were no diary entries but there were triggered events which correlated with sinus rhythm and sinus tachycardia. ECHOCARDIOGRAM May 11, 2021:   1. Mild concentric left ventricular hypertrophy, normal left ventricular systolic and diastolic function. EF around 64%. 2. Normal right ventricular size and systolic function. 3. Normal left and right atrial size. 4. Aortic valve sclerosis, no aortic valve stenosis or regurgitation. 5. Mild mitral valve sclerosis, trace to mild mitral valve regurgitation. 6. Trace tricuspid valve regurgitation. 7. No obvious pulmonary hypertension. 8. No pericardial effusion. CARDIAC CATHETERIZATION JULY 13, 2020: The coronary circulation is right dominant. -- There was 1-vessel coronary artery disease. -- Left main: Angiography showed minor luminal irregularities. -- LAD: Angiography showed minor luminal irregularities. -- 1st diagonal:   -- 1st diagonal: There was a 95 % stenosis. There was LEA grade 2 flow through the vessel (partial perfusion). This lesion is a likely culprit for the patient's recent myocardial infarction. It appears amenable to percutaneous   intervention. -- Circumflex: Angiography showed minor luminal irregularities. -- Proximal RCA: There was a 50 % stenosis. ECHOCARDIOGRAM JULY 13, 2020:   Technical quality: Good   Cardiac rhythm: Normal sinus     1. Normal left ventricular size and systolic and diastolic function, EF around 63%. 2. Normal right ventricular size and systolic function. 4. Normal left and right atrial size. 5. Mild aortic valve sclerosis, no aortic valve stenosis or regurgitation. 6. Trace mitral and tricuspid valve regurgitation. 7. No obvious pulmonary hypertension. 8. No pericardial effusion. Holter monitor July 2020:   1.  Holter monitor reveals the underlying rhythm is sinus rhythm with an average heart rate of 73 beats per minute, a minimum heart rate of 54 beats per minute and a maximum heart rate of 121 beats per minute. 2. There are rare ventricular ectopy comprised of isolated VPCs   3. There are rare supraventricular ectopy comprised of isolated APCs   4. There is no evidence of significant bradyarrhythmia or advanced heart block. The longest R to R was 1.4 seconds. 5. Patient diary did not report any symptoms. CURRENT ECG     Results for orders placed or performed in visit on 10/27/23   POCT ECG    Narrative    Sinus rhythm with no significant ST-T wave abnormalities. HR 73 bpm.  Leftward axis. Possible right atrial enlargement. Possible prior septal infarction. No significant ST-T wave normalities. T wave inversions in inferior leads noted during recent ER visit are resolved. ECG emergency room visit on 10/18/2023:       [address]     1. Coronary artery disease involving native coronary artery of native heart without angina pectoris  POCT ECG    metoprolol tartrate (LOPRESSOR) 25 mg tablet      2. Mixed dyslipidemia        3. History of percutaneous coronary intervention  metoprolol tartrate (LOPRESSOR) 25 mg tablet      4. SOB (shortness of breath)        5. Mixed hyperlipidemia          CAD, S/P MI June 2020, s/p PCI D1 July 2020  Mr. Paige Middleton has been experiencing increased in frequency of his shortness of breath along with diaphoresis with exercise activity. He has known history of coronary artery disease and poorly controlled diabetes. Recently he had not been taking his aspirin therapy also. When he went to the emergency room recently he did have T wave inversions in the inferior leads which where new. He ruled out for acute coronary syndrome. ECG changes are now resolved on ECG today.   He did have a nuclear stress test in May 2022 that identified normal myocardial perfusion. But hisexercise tolerance was very limited and he had Marked sinus tachycardia response to exercise. His last noted LV function is normal.    Given his declining exercise tolerance and angina occurrence of exertional shortness of breath and diaphoresis it would be reasonable to proceed with cardiac catheterization. I have discussed this procedure with him and his wife briefly including its possible associated risks and benefits. He is agreeable to proceed with cardiac catheterization. We also discussed the need for being compliant with aspirin therapy and if necessary clopidogrel and other medications. In addition I advised him that he has to work closely with his primary physician and if necessary endocrinologist to optimize his diabetes control. -- Cardiac catheterization will be scheduled in the next few days. -- Is advised to continue to take aspirin and Pravachol. -- He is being restarted on metoprolol consistently. As he was having fatigue with the full pill of 25 mg twice daily I am advising him to take half a pill twice daily for now. -- He is advised to hold metformin the day before and the day of the cardiac catheterization. -- He is advised to follow-up with his primary care physician closely to optimize diabetes control. -- If symptoms occur at rest or if they are severe or he experiences profound diaphoresis and near syncope-like symptoms he is advised to go to the emergency room. -- We will request the 2-month follow-up visit, earlier if he has a PCI following angiogram.       [address] [place] [address] is here for follow-up regarding his cardiac comorbidities which include: Coronary artery disease with history of MI and PCI in 2020, dyslipidemia and other comorbidities. He is here for follow-up accompanied with his wife. He was last seen by me in June 2023.      He is here for an acute visit because h recently experienced an episode of shortness of breath and had to go to the emergency room. Symptom occurred on 10/18/2023. About a week before that he was treated for cellulitis of the face. On the day of event he experienced shortness of breath while walking and this was accompanied with diaphoresis. Symptoms was not accompanied with chest pain or tightness. Symptom lasted for approximately 30 minutes till he went to the emergency room. Today he went to the emergency room. In the ER he was noted to thank in the ER he was noted to have elevated blood pressure of 186/97, heart rate was 77 bpm and oxygen saturation was normal.  Blood work was significant for markedly elevated glucose of 541. High-sensitivity troponin was normal.  Other blood work was normal.  ECG showed T wave inversions in inferior leads. He was subsequently discharged to home after second troponin was also normal but he was advised to follow-up with us. Since coming home he has had no recurrence of similar symptoms but he does feel short of breath upon climbing stairs and feels very fatigued. Also reports intermittent pounding in chest.  Denies chest pain or chest tightness. Denies orthopnea or PND or worsening pedal edema. He had not been taking his aspirin and Plavix until about a week back. He says that he was taking statin regularly. He has had issues with medications giving him fatigue so had stopped taking the medications. He reports that he was having issues with metformin also but has restarted taking it at a dose of 500 mg 3 times daily and he is able to tolerate that. Denies any passing out or near passing out episodes. Mentions that he has also been dealing with back issues and has received injections to the back. Functional capacity status: Moderately decreased. (Excellent- >10 METs; Good: (7-10 METs); Moderate (4-7 METs);  Poor (<= 4 METs)    Any chronic stressors:  None   (feeling of poor health, financial problems, and social isolation etc). Tobacco or alcohol dependence:  He does not smoke. He does not drink. Current cardiac medications:    Pravastatin 40 mg daily, aspirin 81 mg daily. Blood work from 10/18/2023:  Sodium 131 potassium 3.9 chloride 96 bicarb 28 BUN 20 creatinin so you want me to schedule the angiogram.  e 1.03. Normal liver function test  High-sensitivity troponins negative x2  Hemoglobin 14.8 hematocrit 43.3 platelet count 884  Hemoglobin A1c 10.2 in August 2023  Last lipid testing is from January 2022: Total cholesterol was 181 HDL was 65 triglyceride was 105, calculated LDL was 96. REVIEW OF SYSTEMS   Positive for:  As noted above in HPI  Negative for: All remaining as reviewed below and in HPI. SYSTEM SYMPTOMS REVIEWED:  General--weight change, fever, night sweats  Respiratory--cough, wheezing, shortness of breath, sputum production  Cardiovascular--chest pain, syncope, dyspnea on exertion, edema, decline in exercise tolerance, claudication   Gastrointestinal--persistent vomiting, diarrhea, abdominal distention, blood in stool   Muscular or skeletal--joint pain or swelling   Neurologic--headaches, syncope, abnormal movement  Hematologic--history of easy bruising and bleeding   Endocrine--thyroid enlargement, heat or cold intolerance, polyuria   Psychiatric--anxiety, depression     *-*-*-*-*-*-*-*-*-*-*-*-*-*-*-*-*-*-*-*-*-*-*-*-*-*-*-*-*-*-*-*-*-*-*-*-*-*-*-*-*-*-*-*-*-*-*-*-*-*-*-*-*-*-  VITAL SIGNS     CURRENT VITAL SIGNS:   Vitals:    10/27/23 0932   BP: 132/78   Pulse: 72   Weight: 73.9 kg (163 lb)   Height: 5' 10" (1.778 m)       BMI: Body mass index is 23.39 kg/m².     WEIGHTS:   Wt Readings from Last 25 Encounters:   10/27/23 73.9 kg (163 lb)   10/18/23 77.4 kg (170 lb 10.2 oz)   10/15/23 74.8 kg (165 lb)   09/26/23 74.8 kg (165 lb)   09/16/23 74.8 kg (165 lb)   08/02/23 73.9 kg (163 lb)   07/14/23 73.5 kg (162 lb)   06/02/23 73.5 kg (162 lb)   05/03/23 73.5 kg (162 lb)   05/02/23 74.7 kg (164 lb 9.6 oz)   06/21/22 72.7 kg (160 lb 3.2 oz)   05/26/22 74.4 kg (164 lb)   05/10/22 74.4 kg (164 lb)   04/26/22 74.5 kg (164 lb 3.2 oz)   10/13/21 73.9 kg (163 lb)   08/18/21 73.4 kg (161 lb 12.8 oz)   10/20/20 71.5 kg (157 lb 9.6 oz)   07/21/20 73.5 kg (162 lb)   07/12/20 75.6 kg (166 lb 10.7 oz)   05/08/20 77.1 kg (170 lb)   02/13/19 77.1 kg (170 lb)   05/29/15 79.8 kg (176 lb)   05/06/15 79.6 kg (175 lb 6.1 oz)   10/16/14 80.7 kg (178 lb)   05/27/14 82.1 kg (181 lb)        *-*-*-*-*-*-*-*-*-*-*-*-*-*-*-*-*-*-*-*-*-*-*-*-*-*-*-*-*-*-*-*-*-*-*-*-*-*-*-*-*-*-*-*-*-*-*-*-*-*-*-*-*-*-  PHYSICAL EXAM     General Appearance:    Alert, cooperative, no distress, appears stated age   Head, Eyes, ENT:    No obvious abnormality, moist mucous mebranes. Neck:   Supple, no carotid bruit or JVD   Back:     Symmetric, no curvature. Lungs:     Respirations unlabored. Clear to auscultation bilaterally,    Chest wall:    No tenderness or deformity   Heart:    Regular rate and rhythm, S1 and S2 normal, no murmur, rub  or gallop. Abdomen:     Soft, non-tender   Extremities:   Extremities warm, no cyanosis or edema    Skin:   no venostatic changes in lower extremities. Normal skin color, texture, and turgor.  No rashes or lesions     *-*-*-*-*-*-*-*-*-*-*-*-*-*-*-*-*-*-*-*-*-*-*-*-*-*-*-*-*-*-*-*-*-*-*-*-*-*-*-*-*-*-*-*-*-*-*-*-*-*-*-*-*-*-  CURRENT MEDICATIONS LIST     Current Outpatient Medications:     aspirin 81 mg chewable tablet, Chew 1 tablet (81 mg total) daily, Disp: 30 tablet, Rfl: 11    co-enzyme Q-10 30 MG capsule, Take 30 mg by mouth 3 (three) times a day, Disp: , Rfl:     metFORMIN (GLUCOPHAGE) 500 mg tablet, Take 2 tablets (1,000 mg total) by mouth 2 (two) times a day with meals, Disp: 360 tablet, Rfl: 1    metoprolol tartrate (LOPRESSOR) 25 mg tablet, Take 0.5 tablets (12.5 mg total) by mouth every 12 (twelve) hours, Disp: 60 tablet, Rfl: 3    pravastatin (PRAVACHOL) 40 mg tablet, Take 1 tablet (40 mg total) by mouth daily, Disp: 90 tablet, Rfl: 1    sertraline (ZOLOFT) 100 mg tablet, Take 1.5 tablets (150 mg total) by mouth daily, Disp: 135 tablet, Rfl: 1       ALLERGIES     Allergies   Allergen Reactions    Rosuvastatin GI Intolerance       *-*-*-*-*-*-*-*-*-*-*-*-*-*-*-*-*-*-*-*-*-*-*-*-*-*-*-*-*-*-*-*-*-*-*-*-*-*-*-*-*-*-*-*-*-*-*-*-*-*-*-*-*-*-  LABORATORY DATA   No results found for: "NA"  Potassium   Date Value Ref Range Status   10/18/2023 3.9 3.5 - 5.3 mmol/L Final   08/19/2021 4.1 3.5 - 5.3 mmol/L Final   07/14/2020 4.0 3.5 - 5.3 mmol/L Final     Chloride   Date Value Ref Range Status   10/18/2023 96 96 - 108 mmol/L Final   08/19/2021 102 100 - 108 mmol/L Final   07/14/2020 100 100 - 108 mmol/L Final     CO2   Date Value Ref Range Status   10/18/2023 28 21 - 32 mmol/L Final   08/19/2021 26 21 - 32 mmol/L Final   07/14/2020 23 21 - 32 mmol/L Final     BUN   Date Value Ref Range Status   10/18/2023 20 5 - 25 mg/dL Final   08/19/2021 19 5 - 25 mg/dL Final   07/14/2020 14 5 - 25 mg/dL Final     Creatinine   Date Value Ref Range Status   10/18/2023 1.03 0.60 - 1.30 mg/dL Final     Comment:     Standardized to IDMS reference method   08/19/2021 1.15 0.60 - 1.30 mg/dL Final     Comment:     Standardized to IDMS reference method   07/14/2020 0.94 0.60 - 1.30 mg/dL Final     Comment:     Standardized to IDMS reference method     eGFR   Date Value Ref Range Status   10/18/2023 76 ml/min/1.73sq m Final   08/19/2021 68 ml/min/1.73sq m Final   07/14/2020 87 ml/min/1.73sq m Final     Calcium   Date Value Ref Range Status   10/18/2023 9.6 8.4 - 10.2 mg/dL Final   08/19/2021 9.0 8.3 - 10.1 mg/dL Final   07/14/2020 8.5 8.3 - 10.1 mg/dL Final     AST   Date Value Ref Range Status   10/18/2023 14 13 - 39 U/L Final   08/19/2021 18 5 - 45 U/L Final     Comment:     Specimen collection should occur prior to Sulfasalazine administration due to the potential for falsely depressed results.     07/13/2020 43 5 - 45 U/L Final     Comment: Specimen collection should occur prior to Sulfasalazine administration due to the potential for falsely depressed results. ALT   Date Value Ref Range Status   10/18/2023 20 7 - 52 U/L Final     Comment:     Specimen collection should occur prior to Sulfasalazine administration due to the potential for falsely depressed results. 08/19/2021 29 12 - 78 U/L Final     Comment:     Specimen collection should occur prior to Sulfasalazine administration due to the potential for falsely depressed results. 07/13/2020 34 12 - 78 U/L Final     Comment:       Specimen collection should occur prior to Sulfasalazine administration due to the potential for falsely depressed results.       Alkaline Phosphatase   Date Value Ref Range Status   10/18/2023 70 34 - 104 U/L Final   08/19/2021 82 46 - 116 U/L Final   07/13/2020 73 46 - 116 U/L Final     Magnesium   Date Value Ref Range Status   08/19/2021 2.1 1.6 - 2.6 mg/dL Final   07/13/2020 1.9 1.6 - 2.6 mg/dL Final     WBC   Date Value Ref Range Status   10/18/2023 6.40 4.31 - 10.16 Thousand/uL Final   06/02/2023 8.59 4.31 - 10.16 Thousand/uL Final   07/14/2020 8.22 4.31 - 10.16 Thousand/uL Final     Hemoglobin   Date Value Ref Range Status   10/18/2023 14.8 12.0 - 17.0 g/dL Final   06/02/2023 16.5 12.0 - 17.0 g/dL Final   07/14/2020 14.9 12.0 - 17.0 g/dL Final     Platelets   Date Value Ref Range Status   10/18/2023 144 (L) 149 - 390 Thousands/uL Final   06/02/2023 129 (L) 149 - 390 Thousands/uL Final   07/14/2020 122 (L) 149 - 390 Thousands/uL Final     PTT   Date Value Ref Range Status   10/18/2023 24 23 - 37 seconds Final     Comment:     Therapeutic Heparin Range =  60-90 seconds   07/13/2020 60 (H) 23 - 37 seconds Final     Comment:     Therapeutic Heparin Range =  60-90 seconds     INR   Date Value Ref Range Status   10/18/2023 0.96 0.84 - 1.19 Final   07/12/2020 0.90 0.84 - 1.19 Final     No results found for: "CKMB", "DIGOXIN"  No results found for: "TSH"  No results found for: "CHOL"   Hemoglobin A1C   Date Value Ref Range Status   2023 10.2 (A) 6.5 Final   2021 7.6 (H) Normal 3.8-5.6%; PreDiabetic 5.7-6.4%; Diabetic >=6.5%; Glycemic control for adults with diabetes <7.0% % Final     Hgb A1c   Date Value Ref Range Status   2020 7.9 (H) <5.7 % of total Hgb Final     Comment:     For someone without known diabetes, a hemoglobin A1c  value of 6.5% or greater indicates that they may have   diabetes and this should be confirmed with a follow-up   test.    For someone with known diabetes, a value <7% indicates   that their diabetes is well controlled and a value   greater than or equal to 7% indicates suboptimal   control. A1c targets should be individualized based on   duration of diabetes, age, comorbid conditions, and   other considerations. Currently, no consensus exists regarding use of  hemoglobin A1c for diagnosis of diabetes for children. No results found for: "BLOODCX", "SPUTUMCULTUR", "Suni Brightly", "Russ Nice", "WOUNDCULT", "BODYFLUIDCUL", "[place]", "INFLUAPCR", "INFLUBPCR", "RSVPCR", "Italian Masoud", "CDIFFTOXINB"    *-*-*-*-*-*-*-*-*-*-*-*-*-*-*-*-*-*-*-*-*-*-*-*-*-*-*-*-*-*-*-*-*-*-*-*-*-*-*-*-*-*-*-*-*-*-*-*-*-*-*-*-*-*-  PREVIOUS CARDIOLOGY & RADIOLOGY TEST RESULTS   I have personally reviewed pertinent results of cardiovascular tests noted below.     Results for orders placed during the hospital encounter of 21    Echo complete with contrast if indicated    Narrative  [address], [address] [place] [address]    Transthoracic Echocardiogram  2D, M-mode, Doppler, and Color Doppler    Study date:  11-May-2021    Patient: Ayo Bueno  MR number: [id number]  Account number: [de-identified]  : 1960  Age: 63 years  Gender: Male  Status: Outpatient  Location: [facility]  Height: 70 in  Weight: 149.6 lb  BP: 105/ 68 mmHg    Indications: CAD    Diagnoses: I25.10 - Atherosclerotic heart disease of native coronary artery without angina pectoris    Sonographer:  JAMAL Rodriguez  Interpreting Physician:  Coby Goldstein MD  Referring Physician:  Coby Goldstein MD  Group:  [facility]    IMPRESSIONS:  Technical quality: Good  Cardiac rhythm: Sinus    1. Mild concentric left ventricular hypertrophy, normal left ventricular systolic and diastolic function. EF around 64%. 2. Normal right ventricular size and systolic function. 3. Normal left and right atrial size. 4. Aortic valve sclerosis, no aortic valve stenosis or regurgitation. 5. Mild mitral valve sclerosis, trace to mild mitral valve regurgitation. 6. Trace tricuspid valve regurgitation. 7. No obvious pulmonary hypertension. 8. No pericardial effusion. Compared to report of previous echocardiogram from July 13, 2020 there is overall no significant change. SUMMARY    LEFT VENTRICLE:  Normal left ventricular cavity size, mild concentric left ventricular hypertrophy, normal left ventricular systolic function, normal regional wall motion. Ejection fraction is estimated as around 64%. Normal left ventricular diastolic  function. RIGHT VENTRICLE:  Normal right ventricular size and systolic function. Normal estimated right ventricular systolic pressure, 17 mmHg. LEFT ATRIUM:  Normal left atrial cavity size. Intact interatrial septum. RIGHT ATRIUM:  Normal right atrial cavity size. MITRAL VALVE:  Mild mitral valve leaflet thickening and sclerosis, adequate leaflet mobility. Trace to mild mitral valve regurgitation    AORTIC VALVE:  Tricuspid aortic valve with mild sclerosis, adequate cuspal separation. No aortic valve stenosis or regurgitation. TRICUSPID VALVE:  Normal tricuspid valve morphology and leaflet separation. Trace tricuspid valve regurgitation. PULMONIC VALVE:  No significant pulmonic valve regurgitation.     AORTA:  Aortic root and proximal ascending aorta are normal in size on 2D imaging. IVC, HEPATIC VEINS:  Inferior vena cava is normal in size and demonstrates appropriate respiratory phasic changes in diameter. PERICARDIUM:  No pericardial effusion. HISTORY: PRIOR HISTORY: Risk factors: diabetes. PROCEDURE: The study was performed in the [facility]. This was a routine study. The transthoracic approach was used. The study included complete 2D imaging, M-mode, complete spectral Doppler, and color Doppler. The heart rate was 79  bpm, at the start of the study. Images were obtained from the parasternal, apical, subcostal, and suprasternal notch acoustic windows. Image quality was adequate. LEFT VENTRICLE: Normal left ventricular cavity size, mild concentric left ventricular hypertrophy, normal left ventricular systolic function, normal regional wall motion. Ejection fraction is estimated as around 64%. Normal left  ventricular diastolic function. RIGHT VENTRICLE: Normal right ventricular size and systolic function. Normal estimated right ventricular systolic pressure, 17 mmHg. LEFT ATRIUM: Normal left atrial cavity size. Intact interatrial septum. RIGHT ATRIUM: Normal right atrial cavity size. MITRAL VALVE: Mild mitral valve leaflet thickening and sclerosis, adequate leaflet mobility. Trace to mild mitral valve regurgitation    AORTIC VALVE: Tricuspid aortic valve with mild sclerosis, adequate cuspal separation. No aortic valve stenosis or regurgitation. TRICUSPID VALVE: Normal tricuspid valve morphology and leaflet separation. Trace tricuspid valve regurgitation. PULMONIC VALVE: No significant pulmonic valve regurgitation. PERICARDIUM: No pericardial effusion. AORTA: Aortic root and proximal ascending aorta are normal in size on 2D imaging. SYSTEMIC VEINS: IVC: Inferior vena cava is normal in size and demonstrates appropriate respiratory phasic changes in diameter.     SYSTEM MEASUREMENT TABLES    2D  %FS: 36.9 %  Ao Diam: 2.86 cm  EDV(Teich): 46.93 ml  EF(Teich): 67.96 %  ESV(Teich): 15.04 ml  IVSd: 1.07 cm  LA Area: 15.62 cm2  LA Diam: 3.66 cm  LVEDV MOD A4C: 82.01 ml  LVEF MOD A4C: 63.87 %  LVESV MOD A4C: 29.63 ml  LVIDd: 3.39 cm  LVIDs: 2.14 cm  LVLd A4C: 8.44 cm  LVLs A4C: 6.89 cm  LVPWd: 1.14 cm  RA Area: 12.23 cm2  RVIDd: 2.91 cm  SV MOD A4C: 52.38 ml  SV(Teich): 31.9 ml    CW  TR Vmax: 1.38 m/s  TR maxP.57 mmHg    MM  TAPSE: 2.3 cm    PW  E' Sept: 0.08 m/s  E/E' Sept: 8.32  MV A Tha: 0.76 m/s  MV Dec [place]: 3.87 m/s2  MV DecT: 181.59 ms  MV E Tha: 0.7 m/s  MV E/A Ratio: 0.92  MV PHT: 52.66 ms  MVA By PHT: 4.18 cm2    [facility]    Prepared and electronically signed by    Brodie Quan MD  Signed 11-May-2021 12:51:04    No results found for this or any previous visit. No results found for this or any previous visit. No results found for this or any previous visit. XR chest 2 views  Narrative: CHEST    INDICATION:   chest pain. COMPARISON: CXR 2020. EXAM PERFORMED/VIEWS:  XR CHEST PA & LATERAL. FINDINGS:    Cardiomediastinal silhouette normal. Cardiac stent. Lungs clear. No effusion or pneumothorax. Upper abdomen normal. Bones normal for age. Impression: No acute cardiopulmonary disease.     Workstation performed: [id number]        *-*-*-*-*-*-*-*-*-*-*-*-*-*-*-*-*-*-*-*-*-*-*-*-*-*-*-*-*-*-*-*-*-*-*-*-*-*-*-*-*-*-*-*-*-*-*-*-*-*-*-*-*-*-  SIGNATURES:   @KTW@   [facility] MD Zaid; MHA    *-*-*-*-*-*-*-*-*-*-*-*-*-*-*-*-*-*-*-*-*-*-*-*-*-*-*-*-*-*-*-*-*-*-*-*-*-*-*-*-*-*-*-*-*-*-*-*-*-*-*-*-*-*-  PAST MEDICAL HISTORY:  Past Medical History:   Diagnosis Date    Anxiety     Arthritis     Carpal tunnel syndrome, bilateral     Coronary artery disease 20    Depression     Diabetes mellitus ([address])     GERD (gastroesophageal reflux disease)     Heart attack ([address])     Hyperlipidemia     Myocardial infarction ([address]) 20    OCD (obsessive compulsive disorder)     PAST SURGICAL HISTORY:  Past Surgical History:   Procedure Laterality Date    CARDIAC SURGERY  Stent    CARPECTOMY HAND Bilateral          FAMILY HISTORY:  Family History   Problem Relation Age of Onset    Alcohol abuse Mother     Arthritis Mother     Alcohol abuse Father     Coronary artery disease Father     COPD Father     SOCIAL HISTORY:  Social History     Tobacco Use   Smoking Status Never   Smokeless Tobacco Never      Social History     Substance and Sexual Activity   Alcohol Use Not Currently     Social History     Substance and Sexual Activity   Drug Use Never    [unfilled]     *-*-*-*-*-*-*-*-*-*-*-*-*-*-*-*-*-*-*-*-*-*-*-*-*-*-*-*-*-*-*-*-*-*-*-*-*-*-*-*-*-*-*-*-*-*-*-*-*-*-*-*-*-*  ALLERGIES:  Allergies   Allergen Reactions    Rosuvastatin GI Intolerance    CURRENT SCHEDULED MEDICATIONS:    Current Outpatient Medications:     aspirin 81 mg chewable tablet, Chew 1 tablet (81 mg total) daily, Disp: 30 tablet, Rfl: 11    co-enzyme Q-10 30 MG capsule, Take 30 mg by mouth 3 (three) times a day, Disp: , Rfl:     metFORMIN (GLUCOPHAGE) 500 mg tablet, Take 2 tablets (1,000 mg total) by mouth 2 (two) times a day with meals, Disp: 360 tablet, Rfl: 1    metoprolol tartrate (LOPRESSOR) 25 mg tablet, Take 0.5 tablets (12.5 mg total) by mouth every 12 (twelve) hours, Disp: 60 tablet, Rfl: 3    pravastatin (PRAVACHOL) 40 mg tablet, Take 1 tablet (40 mg total) by mouth daily, Disp: 90 tablet, Rfl: 1    sertraline (ZOLOFT) 100 mg tablet, Take 1.5 tablets (150 mg total) by mouth daily, Disp: 135 tablet, Rfl: 1     *-*-*-*-*-*-*-*-*-*-*-*-*-*-*-*-*-*-*-*-*-*-*-*-*-*-*-*-*-*-*-*-*-*-*-*-*-*-*-*-*-*-*-*-*-*-*-*-*-*-*-*-*-*

## 2023-10-27 NOTE — TELEPHONE ENCOUNTER
This pt needs a left heart cath. Its per Dr Krystian Zepeda done at [facility] on November 10th by Dr Mikel Shea Blood work was done on October 18th 2023.  Dr Krystian Zepeda already told the pt to hold his metformin the day before the cath and the day of the cath

## 2023-10-27 NOTE — ASSESSMENT & PLAN NOTE
Mr. Misa Mack has been experiencing increased in frequency of his shortness of breath along with diaphoresis with exercise activity. He has known history of coronary artery disease and poorly controlled diabetes. Recently he had not been taking his aspirin therapy also. When he went to the emergency room recently he did have T wave inversions in the inferior leads which where new. He ruled out for acute coronary syndrome. ECG changes are now resolved on ECG today. He did have a nuclear stress test in May 2022 that identified normal myocardial perfusion. But hisexercise tolerance was very limited and he had Marked sinus tachycardia response to exercise. His last noted LV function is normal.    Given his declining exercise tolerance and angina occurrence of exertional shortness of breath and diaphoresis it would be reasonable to proceed with cardiac catheterization. I have discussed this procedure with him and his wife briefly including its possible associated risks and benefits. He is agreeable to proceed with cardiac catheterization. We also discussed the need for being compliant with aspirin therapy and if necessary clopidogrel and other medications. In addition I advised him that he has to work closely with his primary physician and if necessary endocrinologist to optimize his diabetes control. -- Cardiac catheterization will be scheduled in the next few days. -- Is advised to continue to take aspirin and Pravachol. -- He is being restarted on metoprolol consistently. As he was having fatigue with the full pill of 25 mg twice daily I am advising him to take half a pill twice daily for now. -- He is advised to hold metformin the day before and the day of the cardiac catheterization. -- He is advised to follow-up with his primary care physician closely to optimize diabetes control.   -- If symptoms occur at rest or if they are severe or he experiences profound diaphoresis and near syncope-like symptoms he is advised to go to the emergency room.   -- We will request the 2-month follow-up visit, earlier if he has a PCI following angiogram.

## 2023-11-02 LAB — COLOGUARD RESULT REPORTABLE: NEGATIVE

## 2023-11-03 ENCOUNTER — OFFICE VISIT (OUTPATIENT)
Dept: PAIN MEDICINE | Facility: CLINIC | Age: 63
End: 2023-11-03
Payer: COMMERCIAL

## 2023-11-03 VITALS
HEIGHT: 70 IN | BODY MASS INDEX: 23.34 KG/M2 | WEIGHT: 163 LBS | SYSTOLIC BLOOD PRESSURE: 112 MMHG | DIASTOLIC BLOOD PRESSURE: 50 MMHG

## 2023-11-03 DIAGNOSIS — M54.16 LUMBAR RADICULITIS: ICD-10-CM

## 2023-11-03 DIAGNOSIS — G89.4 CHRONIC PAIN SYNDROME: ICD-10-CM

## 2023-11-03 DIAGNOSIS — M47.816 LUMBAR SPONDYLOSIS: Primary | ICD-10-CM

## 2023-11-03 DIAGNOSIS — M48.061 SPINAL STENOSIS OF LUMBAR REGION, UNSPECIFIED WHETHER NEUROGENIC CLAUDICATION PRESENT: ICD-10-CM

## 2023-11-03 PROCEDURE — 99214 OFFICE O/P EST MOD 30 MIN: CPT | Performed by: ANESTHESIOLOGY

## 2023-11-03 NOTE — PROGRESS NOTES
Assessment:  1. Lumbar spondylosis    2. Spinal stenosis of lumbar region, unspecified whether neurogenic claudication present    3. Lumbar radiculitis    4. Chronic pain syndrome          Patient presenting with chronic low back for 2+ years, worsening over the past several months. During today's evaluation patient is demonstrating pain that is likely stemming from lumbar spondylosis, lumbar spinal stenosis, lumbar radiculitis. Symptoms are accompanied by pain >7/10 on the pain scale with inability to participate in IADLs for >6 weeks. Denies any bowel or bladder symptoms, saddle anesthesia. Patient has tried chiropractic therapy and massage therapy. Patient was previously referred to physical therapy but states that he is financially unable to attend 80 dollars per session. Has been taking NSAIDs, baclofen with minimal benefit. Patient also previously tried gabapentin with no benefit. Reviewed and interpreted pertinent imaging studies - specifically lumbar MRI and discussed the results and clinical significance with the patient. Patient does have facet arthropathy throughout both lumbar spine with congenital narrowing and varying degrees of disc bulges resulting in moderate canal stenosis at L2-3 and L4-5.     -Patient is previously had a L4-5 LESI with no reported benefit. He then underwent successful diagnostic lumbar medial branch blocks and ultimately bilateral lumbar radiofrequency ablation. Today he reports that unfortunately he continues to have 8 out of 10 pain symptoms in the lower back. I discussed that the options going forward would be to repeat another lumbar interlaminar steroid injection targeting L3-4 or proceeding with a spinal cord stimulator trial.    Patient does want to reconsider epidural steroid injection but has a cardiac catheterization planned next Friday.   He will let us know the results of that catheterization if he will need to be placed on prescription anticoagulation. Pending patient update, we will plan for L3-4 interlaminar JOHNNY. He was also provided with information on the [employer] spinal cord stimulator system. Reviewed external notes from the relevant aspects of the patient's medical record, specifically primary care physician, orthopedic sports medicine office notes in regards to current and prior treatments tried (as mentioned in history of present illness). Reviewed pertinent laboratory studies, specifically renal function, hemoglobin A1c, CBC, coagulation studies, prior to initiating the recommended medication therapies/interventional treatment options. [place] Prescription Drug Monitoring Program report was reviewed and was appropriate      My impressions and treatment recommendations were discussed in detail with the patient who verbalized understanding and had no further questions. Discharge instructions were provided. I personally saw and examined the patient and I agree with the above discussed plan of care. No orders of the defined types were placed in this encounter. No orders of the defined types were placed in this encounter. History of Present Illness:  Harrison Fermin is a 58 y.o. male who presents for a follow up office visit in regards to Back Pain. The patient’s current symptoms include continue low back pain symptoms that is rated an 8/10 and described as a constant sharp, cramping pain worse in the morning and evening. I have personally reviewed and/or updated the patient's past medical history, past surgical history, family history, social history, current medications, allergies, and vital signs today. Review of Systems   Constitutional:  Negative for chills and fever. HENT:  Negative for ear pain and sore throat. Eyes:  Negative for pain and visual disturbance. Respiratory:  Positive for shortness of breath. Negative for cough.     Cardiovascular:  Negative for chest pain and palpitations. Gastrointestinal:  Negative for abdominal pain and vomiting. Genitourinary:  Negative for dysuria and hematuria. Musculoskeletal:  Positive for back pain and gait problem. Negative for arthralgias. Skin:  Negative for color change and rash. Neurological:  Negative for seizures and syncope. All other systems reviewed and are negative.       Patient Active Problem List   Diagnosis    Trigger finger, left index finger    Labral tear of long head of biceps tendon, right, initial encounter    Diabetes ([address])    Mixed dyslipidemia    History of percutaneous coronary intervention    CAD, S/P MI June 2020, s/p PCI D1 July 2020    Chronic fatigue    SOB (shortness of breath)    Lumbar radiculopathy    Allergic rhinitis    Allergic rhinitis due to pollen    Anxiety attack    Depression    Gastroesophageal reflux disease without esophagitis    Generalized osteoarthritis    Hyperlipidemia    Lower back pain    Obsessive compulsive disorder    Pure hypercholesterolemia    Lumbosacral spondylosis without myelopathy    Lumbar spondylosis       Past Medical History:   Diagnosis Date    Anxiety     Arthritis     Carpal tunnel syndrome, bilateral     Coronary artery disease 7/13/20    Depression     Diabetes mellitus (HCC)     GERD (gastroesophageal reflux disease)     Heart attack ([address])     Hyperlipidemia     Myocardial infarction ([address]) 7/13/20    OCD (obsessive compulsive disorder)        Past Surgical History:   Procedure Laterality Date    CARDIAC SURGERY  Stent    CARPECTOMY HAND Bilateral        Family History   Problem Relation Age of Onset    Alcohol abuse Mother     Arthritis Mother     Alcohol abuse Father     Coronary artery disease Father     COPD Father        Social History     Occupational History    Occupation: retired   Tobacco Use    Smoking status: Never    Smokeless tobacco: Never   Vaping Use    Vaping Use: Never used   Substance and Sexual Activity    Alcohol use: Not Currently    Drug use: Never    Sexual activity: Yes     Partners: Female     Birth control/protection: Male Sterilization       Current Outpatient Medications on File Prior to Visit   Medication Sig    aspirin 81 mg chewable tablet Chew 1 tablet (81 mg total) daily    co-enzyme Q-10 30 MG capsule Take 30 mg by mouth 3 (three) times a day    metFORMIN (GLUCOPHAGE) 500 mg tablet Take 2 tablets (1,000 mg total) by mouth 2 (two) times a day with meals    metoprolol tartrate (LOPRESSOR) 25 mg tablet Take 0.5 tablets (12.5 mg total) by mouth every 12 (twelve) hours    pravastatin (PRAVACHOL) 40 mg tablet Take 1 tablet (40 mg total) by mouth daily    sertraline (ZOLOFT) 100 mg tablet Take 1.5 tablets (150 mg total) by mouth daily    [DISCONTINUED] atorvastatin (LIPITOR) 40 mg tablet Take 1 tablet (40 mg total) by mouth daily with dinner     No current facility-administered medications on file prior to visit. Allergies   Allergen Reactions    Rosuvastatin GI Intolerance       Physical Exam:    /50   Ht 5' 10" (1.778 m)   Wt 73.9 kg (163 lb)   BMI 23.39 kg/m²     Constitutional:normal, well developed, well nourished, alert, in no distress and non-toxic and no overt pain behavior. Eyes:anicteric  HEENT:grossly intact  Neck:supple, symmetric, trachea midline and no masses   Pulmonary:even and unlabored  Cardiovascular:No edema or pitting edema present  Skin:Normal without rashes or lesions and well hydrated  Psychiatric:Mood and affect appropriate  Neurologic: Motor function is grossly intact with no focal neurologic deficits  Musculoskeletal: Gait is slow. Limited lumbar spine range of motion. Imaging  MRI LUMBAR SPINE WITHOUT CONTRAST     INDICATION: M47.816: Spondylosis without myelopathy or radiculopathy, lumbar region  M54.50: Low back pain, unspecified  G89.29: Other chronic pain. COMPARISON: Relevant examinations including lumbar spine x-rays 4/17/2023.      TECHNIQUE:  Multiplanar, multisequence imaging of the lumbar spine was performed. IMAGE QUALITY:  Diagnostic     FINDINGS:     VERTEBRAL SEGMENT AND DISC SPACES:  Normal lumbar segmentation. T11-T12:  Only included on sagittal images without the aid of axial images. There is no significant spinal canal or neural foraminal narrowing evident. Moderate disc degeneration with annular fissuring and mixed Modic type I and Modic type II signal   alteration. T12-L1 and L1-L2: No significant abnormality evident. Mild disc degeneration and arthropathy. L2-L3: Evidence of moderate narrowing of the spinal canal with crowding of the nerve rootlets of the cauda equina and neural foraminal narrowing of a mild degree due to disc bulge, congenitally narrow spinal canal, in combination with hypertrophic   degenerative change of the facet joints and ligamenta flava. Mild to moderate disc degeneration with annular fissuring. L3-L4: Evidence of moderate narrowing of the spinal canal and neural foraminal narrowing of a mild degree due to congenitally narrowed spinal canal, disc bulge, left central disc extrusion with mild cephalad extent, in combination with hypertrophic   degenerative change of the facet joints and ligamenta flava. Mild disc degeneration with annular fissuring. L4-L5: Evidence of mild to moderate narrowing of the spinal canal and neural foraminal narrowing of a mild to moderate degree bilaterally due to disc bulge in combination with hypertrophic degenerative change of the facet joints and ligamenta flava. Mild   disc degeneration with annular fissuring. L5-S1: No significant spinal canal or neural foraminal narrowing evident. Mild disc degeneration with disc bulge and annular fissuring. Mild facet arthropathy. SPINAL CORD: No spinal cord abnormality evident. EXTRASPINAL STRUCTURES: No significant abnormality evident. MARROW SIGNAL: Discogenic Modic signal alteration. No other significant abnormality evident.      IMPRESSION:  Lumbar spondylosis associated with some spinal canal and neuroforaminal narrowing detailed level by level above, and most notable at L2-L3, L3-L4 and L4-L5.

## 2023-11-06 ENCOUNTER — LAB (OUTPATIENT)
Dept: LAB | Facility: HOSPITAL | Age: 63
End: 2023-11-06
Payer: COMMERCIAL

## 2023-11-06 DIAGNOSIS — E78.2 MIXED DYSLIPIDEMIA: ICD-10-CM

## 2023-11-06 DIAGNOSIS — E11.9 TYPE 2 DIABETES MELLITUS WITHOUT COMPLICATION, WITHOUT LONG-TERM CURRENT USE OF INSULIN (HCC): ICD-10-CM

## 2023-11-06 DIAGNOSIS — I25.10 CORONARY ARTERY DISEASE INVOLVING NATIVE CORONARY ARTERY OF NATIVE HEART WITHOUT ANGINA PECTORIS: ICD-10-CM

## 2023-11-06 DIAGNOSIS — M54.16 LUMBAR RADICULOPATHY: ICD-10-CM

## 2023-11-06 DIAGNOSIS — Z98.61 HISTORY OF PERCUTANEOUS CORONARY INTERVENTION: ICD-10-CM

## 2023-11-06 DIAGNOSIS — Z12.5 PROSTATE CANCER SCREENING: ICD-10-CM

## 2023-11-06 LAB
ALBUMIN SERPL BCP-MCNC: 4.2 G/DL (ref 3.5–5)
ALP SERPL-CCNC: 55 U/L (ref 34–104)
ALT SERPL W P-5'-P-CCNC: 26 U/L (ref 7–52)
ANION GAP SERPL CALCULATED.3IONS-SCNC: 6 MMOL/L
AST SERPL W P-5'-P-CCNC: 16 U/L (ref 13–39)
BASOPHILS # BLD AUTO: 0.04 THOUSANDS/ÂΜL (ref 0–0.1)
BASOPHILS NFR BLD AUTO: 1 % (ref 0–1)
BILIRUB SERPL-MCNC: 0.56 MG/DL (ref 0.2–1)
BILIRUB UR QL STRIP: NEGATIVE
BUN SERPL-MCNC: 18 MG/DL (ref 5–25)
CALCIUM SERPL-MCNC: 9.1 MG/DL (ref 8.4–10.2)
CHLORIDE SERPL-SCNC: 101 MMOL/L (ref 96–108)
CHOLEST SERPL-MCNC: 130 MG/DL
CLARITY UR: CLEAR
CO2 SERPL-SCNC: 28 MMOL/L (ref 21–32)
COLOR UR: ABNORMAL
CREAT SERPL-MCNC: 0.92 MG/DL (ref 0.6–1.3)
CREAT UR-MCNC: 127.7 MG/DL
EOSINOPHIL # BLD AUTO: 0.43 THOUSAND/ÂΜL (ref 0–0.61)
EOSINOPHIL NFR BLD AUTO: 6 % (ref 0–6)
ERYTHROCYTE [DISTWIDTH] IN BLOOD BY AUTOMATED COUNT: 11.1 % (ref 11.6–15.1)
GFR SERPL CREATININE-BSD FRML MDRD: 87 ML/MIN/1.73SQ M
GLUCOSE P FAST SERPL-MCNC: 227 MG/DL (ref 65–99)
GLUCOSE UR STRIP-MCNC: ABNORMAL MG/DL
HCT VFR BLD AUTO: 45.8 % (ref 36.5–49.3)
HDLC SERPL-MCNC: 50 MG/DL
HGB BLD-MCNC: 15.5 G/DL (ref 12–17)
HGB UR QL STRIP.AUTO: NEGATIVE
IMM GRANULOCYTES # BLD AUTO: 0.02 THOUSAND/UL (ref 0–0.2)
IMM GRANULOCYTES NFR BLD AUTO: 0 % (ref 0–2)
KETONES UR STRIP-MCNC: NEGATIVE MG/DL
LDLC SERPL CALC-MCNC: 56 MG/DL (ref 0–100)
LEUKOCYTE ESTERASE UR QL STRIP: NEGATIVE
LYMPHOCYTES # BLD AUTO: 1.97 THOUSANDS/ÂΜL (ref 0.6–4.47)
LYMPHOCYTES NFR BLD AUTO: 29 % (ref 14–44)
MCH RBC QN AUTO: 29.6 PG (ref 26.8–34.3)
MCHC RBC AUTO-ENTMCNC: 33.8 G/DL (ref 31.4–37.4)
MCV RBC AUTO: 87 FL (ref 82–98)
MICROALBUMIN UR-MCNC: <7 MG/L
MICROALBUMIN/CREAT 24H UR: <5 MG/G CREATININE (ref 0–30)
MONOCYTES # BLD AUTO: 0.44 THOUSAND/ÂΜL (ref 0.17–1.22)
MONOCYTES NFR BLD AUTO: 6 % (ref 4–12)
NEUTROPHILS # BLD AUTO: 3.97 THOUSANDS/ÂΜL (ref 1.85–7.62)
NEUTS SEG NFR BLD AUTO: 58 % (ref 43–75)
NITRITE UR QL STRIP: NEGATIVE
NRBC BLD AUTO-RTO: 0 /100 WBCS
PH UR STRIP.AUTO: 5.5 [PH]
PLATELET # BLD AUTO: 146 THOUSANDS/UL (ref 149–390)
PMV BLD AUTO: 10.9 FL (ref 8.9–12.7)
POTASSIUM SERPL-SCNC: 4.2 MMOL/L (ref 3.5–5.3)
PROT SERPL-MCNC: 6.7 G/DL (ref 6.4–8.4)
PROT UR STRIP-MCNC: NEGATIVE MG/DL
PSA SERPL-MCNC: 0.92 NG/ML (ref 0–4)
RBC # BLD AUTO: 5.24 MILLION/UL (ref 3.88–5.62)
SODIUM SERPL-SCNC: 135 MMOL/L (ref 135–147)
SP GR UR STRIP.AUTO: 1.02 (ref 1–1.03)
TRIGL SERPL-MCNC: 122 MG/DL
TSH SERPL DL<=0.05 MIU/L-ACNC: 1.48 UIU/ML (ref 0.45–4.5)
UROBILINOGEN UR STRIP-ACNC: <2 MG/DL
WBC # BLD AUTO: 6.87 THOUSAND/UL (ref 4.31–10.16)

## 2023-11-06 PROCEDURE — 36415 COLL VENOUS BLD VENIPUNCTURE: CPT

## 2023-11-06 PROCEDURE — 80053 COMPREHEN METABOLIC PANEL: CPT

## 2023-11-06 PROCEDURE — G0103 PSA SCREENING: HCPCS

## 2023-11-06 PROCEDURE — 84443 ASSAY THYROID STIM HORMONE: CPT

## 2023-11-06 PROCEDURE — 80061 LIPID PANEL: CPT

## 2023-11-06 PROCEDURE — 85025 COMPLETE CBC W/AUTO DIFF WBC: CPT

## 2023-11-07 NOTE — RESULT ENCOUNTER NOTE
Please call patient. Labs are stable except for elevated sugar. Will discuss more fully at next visit.

## 2023-11-08 ENCOUNTER — OFFICE VISIT (OUTPATIENT)
Dept: FAMILY MEDICINE CLINIC | Facility: CLINIC | Age: 63
End: 2023-11-08
Payer: COMMERCIAL

## 2023-11-08 VITALS
HEIGHT: 70 IN | SYSTOLIC BLOOD PRESSURE: 110 MMHG | WEIGHT: 164 LBS | OXYGEN SATURATION: 97 % | DIASTOLIC BLOOD PRESSURE: 60 MMHG | HEART RATE: 72 BPM | TEMPERATURE: 97.2 F | BODY MASS INDEX: 23.48 KG/M2

## 2023-11-08 DIAGNOSIS — Z98.61 S/P PTCA (PERCUTANEOUS TRANSLUMINAL CORONARY ANGIOPLASTY): ICD-10-CM

## 2023-11-08 DIAGNOSIS — K21.9 GASTROESOPHAGEAL REFLUX DISEASE WITHOUT ESOPHAGITIS: ICD-10-CM

## 2023-11-08 DIAGNOSIS — E78.2 MIXED HYPERLIPIDEMIA: ICD-10-CM

## 2023-11-08 DIAGNOSIS — I25.10 CORONARY ARTERY DISEASE INVOLVING NATIVE HEART WITHOUT ANGINA PECTORIS, UNSPECIFIED VESSEL OR LESION TYPE: ICD-10-CM

## 2023-11-08 DIAGNOSIS — E11.9 TYPE 2 DIABETES MELLITUS WITHOUT COMPLICATION, WITHOUT LONG-TERM CURRENT USE OF INSULIN (HCC): Primary | ICD-10-CM

## 2023-11-08 DIAGNOSIS — I25.10 CORONARY ARTERY DISEASE INVOLVING NATIVE CORONARY ARTERY OF NATIVE HEART WITHOUT ANGINA PECTORIS: ICD-10-CM

## 2023-11-08 PROCEDURE — 99214 OFFICE O/P EST MOD 30 MIN: CPT | Performed by: FAMILY MEDICINE

## 2023-11-10 ENCOUNTER — HOSPITAL ENCOUNTER (OUTPATIENT)
Facility: HOSPITAL | Age: 63
Setting detail: OUTPATIENT SURGERY
Discharge: HOME | End: 2023-11-10
Attending: INTERNAL MEDICINE | Admitting: INTERNAL MEDICINE
Payer: COMMERCIAL

## 2023-11-10 VITALS
HEART RATE: 76 BPM | DIASTOLIC BLOOD PRESSURE: 69 MMHG | SYSTOLIC BLOOD PRESSURE: 123 MMHG | BODY MASS INDEX: 23.09 KG/M2 | RESPIRATION RATE: 16 BRPM | TEMPERATURE: 97.9 F | OXYGEN SATURATION: 96 % | WEIGHT: 160.94 LBS

## 2023-11-10 DIAGNOSIS — I25.119 ATHEROSCLEROSIS OF NATIVE CORONARY ARTERY OF NATIVE HEART WITH ANGINA PECTORIS (HCC): ICD-10-CM

## 2023-11-10 DIAGNOSIS — Z98.61 S/P PTCA (PERCUTANEOUS TRANSLUMINAL CORONARY ANGIOPLASTY): Primary | ICD-10-CM

## 2023-11-10 PROBLEM — I25.10 CORONARY ARTERY DISEASE: Status: ACTIVE | Noted: 2023-11-10

## 2023-11-10 LAB
ATRIAL RATE: 83 BPM
GLUCOSE SERPL-MCNC: 241 MG/DL (ref 65–140)
KCT BLD-ACNC: 341 SEC (ref 89–137)
P AXIS: 79 DEGREES
PR INTERVAL: 164 MS
QRS AXIS: -26 DEGREES
QRSD INTERVAL: 84 MS
QT INTERVAL: 362 MS
QTC INTERVAL: 425 MS
SPECIMEN SOURCE: ABNORMAL
T WAVE AXIS: 39 DEGREES
VENTRICULAR RATE: 83 BPM

## 2023-11-10 PROCEDURE — 85347 COAGULATION TIME ACTIVATED: CPT

## 2023-11-10 PROCEDURE — 92979 ENDOLUMINL IVUS OCT C EA: CPT | Performed by: INTERNAL MEDICINE

## 2023-11-10 PROCEDURE — NC001 PR NO CHARGE: Performed by: INTERNAL MEDICINE

## 2023-11-10 PROCEDURE — 93454 CORONARY ARTERY ANGIO S&I: CPT | Performed by: INTERNAL MEDICINE

## 2023-11-10 PROCEDURE — 99152 MOD SED SAME PHYS/QHP 5/>YRS: CPT | Performed by: INTERNAL MEDICINE

## 2023-11-10 PROCEDURE — 93005 ELECTROCARDIOGRAM TRACING: CPT

## 2023-11-10 PROCEDURE — C1874 STENT, COATED/COV W/DEL SYS: HCPCS | Performed by: INTERNAL MEDICINE

## 2023-11-10 PROCEDURE — 82948 REAGENT STRIP/BLOOD GLUCOSE: CPT

## 2023-11-10 PROCEDURE — C9600 PERC DRUG-EL COR STENT SING: HCPCS | Performed by: INTERNAL MEDICINE

## 2023-11-10 PROCEDURE — C1769 GUIDE WIRE: HCPCS | Performed by: INTERNAL MEDICINE

## 2023-11-10 PROCEDURE — 92978 ENDOLUMINL IVUS OCT C 1ST: CPT | Performed by: INTERNAL MEDICINE

## 2023-11-10 PROCEDURE — NC001 PR NO CHARGE: Performed by: PHYSICIAN ASSISTANT

## 2023-11-10 PROCEDURE — C1887 CATHETER, GUIDING: HCPCS | Performed by: INTERNAL MEDICINE

## 2023-11-10 PROCEDURE — C1894 INTRO/SHEATH, NON-LASER: HCPCS | Performed by: INTERNAL MEDICINE

## 2023-11-10 PROCEDURE — C1753 CATH, INTRAVAS ULTRASOUND: HCPCS | Performed by: INTERNAL MEDICINE

## 2023-11-10 PROCEDURE — 99153 MOD SED SAME PHYS/QHP EA: CPT | Performed by: INTERNAL MEDICINE

## 2023-11-10 PROCEDURE — 93010 ELECTROCARDIOGRAM REPORT: CPT | Performed by: INTERNAL MEDICINE

## 2023-11-10 PROCEDURE — 92928 PRQ TCAT PLMT NTRAC ST 1 LES: CPT | Performed by: INTERNAL MEDICINE

## 2023-11-10 DEVICE — XIENCE SKYPOINT™ EVEROLIMUS ELUTING CORONARY STENT SYSTEM 3.00 MM X 15 MM / RAPID-EXCHANGE
Type: IMPLANTABLE DEVICE | Site: CORONARY | Status: FUNCTIONAL
Brand: XIENCE SKYPOINT™

## 2023-11-10 RX ORDER — VERAPAMIL HYDROCHLORIDE 2.5 MG/ML
INJECTION, SOLUTION INTRAVENOUS CODE/TRAUMA/SEDATION MEDICATION
Status: DISCONTINUED | OUTPATIENT
Start: 2023-11-10 | End: 2023-11-10 | Stop reason: HOSPADM

## 2023-11-10 RX ORDER — LIDOCAINE HYDROCHLORIDE 10 MG/ML
INJECTION, SOLUTION EPIDURAL; INFILTRATION; INTRACAUDAL; PERINEURAL CODE/TRAUMA/SEDATION MEDICATION
Status: DISCONTINUED | OUTPATIENT
Start: 2023-11-10 | End: 2023-11-10 | Stop reason: HOSPADM

## 2023-11-10 RX ORDER — ACETAMINOPHEN 325 MG/1
650 TABLET ORAL EVERY 4 HOURS PRN
Refills: 0
Start: 2023-11-10 | End: 2023-11-12

## 2023-11-10 RX ORDER — NITROGLYCERIN 0.4 MG/1
0.4 TABLET SUBLINGUAL
Status: DISCONTINUED | OUTPATIENT
Start: 2023-11-10 | End: 2023-11-10 | Stop reason: HOSPADM

## 2023-11-10 RX ORDER — ACETAMINOPHEN 325 MG/1
650 TABLET ORAL EVERY 4 HOURS PRN
Status: DISCONTINUED | OUTPATIENT
Start: 2023-11-10 | End: 2023-11-10 | Stop reason: HOSPADM

## 2023-11-10 RX ORDER — CLOPIDOGREL BISULFATE 75 MG/1
75 TABLET ORAL DAILY
Qty: 30 TABLET | Refills: 3 | Status: SHIPPED | OUTPATIENT
Start: 2023-11-11

## 2023-11-10 RX ORDER — NITROGLYCERIN 20 MG/100ML
INJECTION INTRAVENOUS CODE/TRAUMA/SEDATION MEDICATION
Status: DISCONTINUED | OUTPATIENT
Start: 2023-11-10 | End: 2023-11-10 | Stop reason: HOSPADM

## 2023-11-10 RX ORDER — SODIUM CHLORIDE 9 MG/ML
200 INJECTION, SOLUTION INTRAVENOUS CONTINUOUS
Status: DISPENSED | OUTPATIENT
Start: 2023-11-10 | End: 2023-11-10

## 2023-11-10 RX ORDER — HEPARIN SODIUM 1000 [USP'U]/ML
INJECTION, SOLUTION INTRAVENOUS; SUBCUTANEOUS CODE/TRAUMA/SEDATION MEDICATION
Status: DISCONTINUED | OUTPATIENT
Start: 2023-11-10 | End: 2023-11-10 | Stop reason: HOSPADM

## 2023-11-10 RX ORDER — PRASUGREL 10 MG/1
TABLET, FILM COATED ORAL CODE/TRAUMA/SEDATION MEDICATION
Status: DISCONTINUED | OUTPATIENT
Start: 2023-11-10 | End: 2023-11-10 | Stop reason: HOSPADM

## 2023-11-10 RX ORDER — MIDAZOLAM HYDROCHLORIDE 2 MG/2ML
INJECTION, SOLUTION INTRAMUSCULAR; INTRAVENOUS CODE/TRAUMA/SEDATION MEDICATION
Status: DISCONTINUED | OUTPATIENT
Start: 2023-11-10 | End: 2023-11-10 | Stop reason: HOSPADM

## 2023-11-10 RX ORDER — ASPIRIN 81 MG/1
81 TABLET, CHEWABLE ORAL DAILY
Status: DISCONTINUED | OUTPATIENT
Start: 2023-11-11 | End: 2023-11-10 | Stop reason: HOSPADM

## 2023-11-10 RX ORDER — FENTANYL CITRATE 50 UG/ML
INJECTION, SOLUTION INTRAMUSCULAR; INTRAVENOUS CODE/TRAUMA/SEDATION MEDICATION
Status: DISCONTINUED | OUTPATIENT
Start: 2023-11-10 | End: 2023-11-10 | Stop reason: HOSPADM

## 2023-11-10 RX ORDER — CLOPIDOGREL BISULFATE 75 MG/1
75 TABLET ORAL DAILY
Status: DISCONTINUED | OUTPATIENT
Start: 2023-11-10 | End: 2023-11-10 | Stop reason: CLARIF

## 2023-11-10 RX ORDER — SODIUM CHLORIDE 9 MG/ML
125 INJECTION, SOLUTION INTRAVENOUS CONTINUOUS
Status: DISCONTINUED | OUTPATIENT
Start: 2023-11-10 | End: 2023-11-10 | Stop reason: HOSPADM

## 2023-11-10 RX ORDER — ONDANSETRON 2 MG/ML
4 INJECTION INTRAMUSCULAR; INTRAVENOUS EVERY 6 HOURS PRN
Status: DISCONTINUED | OUTPATIENT
Start: 2023-11-10 | End: 2023-11-10 | Stop reason: HOSPADM

## 2023-11-10 RX ORDER — ASPIRIN 81 MG/1
324 TABLET, CHEWABLE ORAL ONCE
Status: COMPLETED | OUTPATIENT
Start: 2023-11-10 | End: 2023-11-10

## 2023-11-10 RX ADMIN — ASPIRIN 81 MG CHEWABLE TABLET 324 MG: 81 TABLET CHEWABLE at 06:43

## 2023-11-10 RX ADMIN — SODIUM CHLORIDE 125 ML/HR: 0.9 INJECTION, SOLUTION INTRAVENOUS at 06:43

## 2023-11-10 NOTE — DISCHARGE SUMMARY
Discharge Note    Sandeep Acosta 58 y.o. male MRN: [id number]    Unit/Bed#: [facility] CATH LAB ROOM Encounter: [id number]      Admission Date: 11/10/2023   Discharge Date: 11/10/2023      Admitting Diagnosis: Atherosclerosis of native coronary artery of native heart with angina pectoris [facility]) [I25.119]  Discharge Diagnosis: Active Problems:    Coronary artery disease    S/P PTCA (percutaneous transluminal coronary angioplasty)       Condition at Discharge: stable   Disposition: Home  Planned Readmission: No      Exam Hospital Course:   Patient of [facility] cardiologist Dr. Aaron Mar recently seen on 10/27 with complaints of exercise related dyspnea and diaphoresis and recent ED evaluation of same. Coronary angiography was advised. Today, 11/10/2023, the patient came to the hospital for elective angiography as mentioned above. This was performed by Dr. Astrid Stein showing three-vessel disease as delineated below. The mid LAD had a obstructive 90% stenosis which was successfully treated with PCI and stenting. In light of uncomplicated single-vessel PCI without atherectomy, or upon the left main, without any hemodynamic perturbation or other instability/complication, and as per institutional protocol, he was discharged home specifically noting the following:  Uncomplicated procedure completed before noon, in patient less than 80years of age with preserved LVEF and no significant valvulopathy, with creatinine less than 1.5 and controlled BP. They were observed for not less than 4 hours following their procedure, live within 30 miles of our institution, will not be alone, and were agreeable to be discharged. Exam   General:  Alert normally conversant and comfortable-appearing  Heart:  Regular with controlled rate and no pathologic murmur or rub  Rt Wrist: no ecchymosis or hematoma.  Normal capillary refill and pulse  Lungs:  Clear throughout  Lower Limbs:  No edema        Procedures Performed:   Orders Placed This Encounter   Procedures    Cardiac catheterization       Complications: NONE    Other Significant Findings/Treatment:  Catheterization 11/10/2023     Angiography  Left Main   Ost LM lesion is 40% stenosed. Not the culprit lesion. LEA flow is 3. The lesion is type C and eccentric. Ultrasound (IVUS) was performed on the Ost LM. Minimum lumen area: 13 mm². Left Anterior Descending   The vessel is large. There is moderate diffuse disease throughout the vessel. Mid LAD lesion is 90% stenosed. Culprit lesion. Culprit for clinical presentation. LEA flow is 3. The lesion is non high-C, eccentric and focal. Ultrasound (IVUS) was performed on the Mid LAD. Moderate plaque burden was detected. IVUS has determined that the lesion is eccentric and fibrinous. First Diagonal Branch   1st Diag-1 lesion is 50% stenosed. Previously placed 1st Diag-2 stent of unknown type is widely patent. Right Coronary Artery   Prox RCA lesion is 50% stenosed. Intervention  Mid LAD lesion   Stent   Drug-eluting stent was successfully placed. The stent used was a STENT XIENCE SKYPOINT 3 X 15MM. Stent was deployed by way of balloon expansion. Maximum pressure: 12 thien. Inflation time: 12 sec. Supplies used: GUIDEWIRE RUNTHROUGH 180CM; CATH GUIDE LAUNCHER 6FR EBU 3.5   Post-Intervention Lesion Assessment   The intervention was successful. Post-intervention LEA flow is 3. There were no complications. There is a 0% residual stenosis post intervention. Discharge Medications:  See after visit summary for reconciled discharge medications provided to patient and family. Discharge instructions/Information to patient and family:   See after visit summary for information provided to patient and family. Provisions for Follow-Up Care:  See after visit summary for information related to follow-up care and any pertinent home health orders.

## 2023-11-10 NOTE — Clinical Note
PT CALLED SAYING SHE IS JUST A LITTLE CONFUSED, DOESN'T KNOW IF SHE IS SUPPOSE TO CONTINUE TAKING HER BP MEDS: lisinopril (PRINIVIL,ZESTRIL) 10 MG tablet [[id number]]  PT SAYS THE DR DID NOT PRESCRIBE A REFILL OR ANYTHING BUT PT WANTED TO MAKE SURE IF SHE IS SUPPOSE TO TAKE THIS MEDICATION OR NOT. CAN BE REACHED [phone]   Removed intact.

## 2023-11-10 NOTE — Clinical Note
Plan same day discharge. Patient without complaint.  Stable post PCI> TR band intact with hand warm/wdl cap refill

## 2023-11-10 NOTE — INTERVAL H&P NOTE
/69   Pulse 87   Temp 97.9 °F (36.6 °C) (Temporal)   Resp 18   Wt 73 kg (160 lb 15 oz)   SpO2 97%   BMI 23.09 kg/m²     H&P reviewed. After examining the patient, I find no changed to the H&P since it had been written. Patient re-evaluated.  Accept as history and physical.    Danish Elliott MD/November 10, 2023/7:24 AM

## 2023-11-10 NOTE — DISCHARGE INSTR - AVS FIRST PAGE
Do not take metformin for the next 48 hours. During this time avoid simple sugars and try to limit your intake of carbohydrates. -----------------------------------------------------------------------------------------------------------------------------------------------------------------------------    BLOOD TEST  Please have chemistry (BMP) blood test done - hopefully tomorrow or Sunday. An electronic prescription has been sent to the [facility] lab for it. You do not need to fast for this test    -----------------------------------------------------------------------------------------------------------------------------------------------------------------------------  SITE CARE  1. Please see the post cardiac catheterization dishcarge instructions. No lifting greater than 10 lbs. or strenuous activity for 48 hrs. 2. Remove band aid tomorrow. Shower and wash area (wrist) gently with soap and water- beginning tomorrow. Rinse and pat dry. Apply new water seal band aid. Repeat this process for 5 days. No powders, creams lotions or antibiotic ointments for 5 days. No tub baths, hot tubs/sauna or swimming for 5 days. 3. Please call our office ([phone]) if you have any fever, redness, swelling, discharge from your wrist/access site. 4. No driving for 1 day       -----------------------------------------------------------------------------------------------------------------------------------------------------------------------------  PROCEDURE INFORMATION  LEFT HEART CATHETERIZATION      WHAT YOU NEED TO KNOW:   A left heart catheterization is a procedure to look at your heart and its arteries. You may need this procedure if you have chest pain, heart disease, or your heart is not working as it should. DISCHARGE INSTRUCTIONS:   Follow up with your healthcare provider as directed:  Write down your questions so you remember to ask them during your visits.   Limit activity as directed: Avoid unnecessary stair climbing for 48 hours, if a catheter was put in your groin. Do not place pressure on your arm, hand, or wrist, if the catheter was placed in your wrist. Avoid pushing, pulling, or heavy lifting with that arm. If you need to cough, support the area where the catheter was inserted with your hand. Ask your healthcare provider how long you need to limit movement and avoid certain activities. You may feel like resting more after your procedure. Slowly start to do more each day. Rest when you feel it is needed. Drink liquids as directed:  Liquids help flush the dye used for your procedure out of your body. Ask your healthcare provider how much liquid to drink each day, and which liquids to drink. Some foods, such as soup and fruit, also provide liquid. Wound care:  Ask your healthcare provider about how to care for your incision wound. Ask when you can get into a tub, shower, or pool. Contact your healthcare provider if:   You have a fever. The skin around your wound is red, swollen, or has pus coming from it. You have trouble breathing, or your skin is itchy, swollen, or has a rash. You have questions or concerns about your condition or care. Seek care immediately or call 911 if: The area where the catheter was placed is swollen and filled with blood or is bleeding. The leg or arm used for the procedure becomes numb or turns white or blue. You feel lightheaded, short of breath, and have chest pain. You cough up blood. You have any of the following signs of a heart attack:      Squeezing, pressure, or pain in your chest that lasts longer than 5 minutes or returns    Discomfort or pain in your back, neck, jaw, stomach, or arm     Trouble breathing    Nausea or vomiting    Lightheadedness or a sudden cold sweat, especially with chest pain or trouble breathing    Your arm or leg feels warm, tender, and painful. It may look swollen and red.     You have any of the following signs of a stroke:     Part of your face droops or is numb    Weakness in an arm or leg    Confusion or difficulty speaking    Dizziness, a severe headache, or vision loss  © 2017 5 [facility] Information is for End User's use only and may not be sold, redistributed or otherwise used for commercial purposes. All illustrations and images included in CareNotes® are the copyrighted property of [employer] or [employer]. The above information is an  only. It is not intended as medical advice for individual conditions or treatments. Talk to your doctor, nurse or pharmacist before following any medical regimen to see if it is safe and effective for you. Coronary Intravascular Stent Placement, Ambulatory Care     GENERAL INFORMATION:   What do I need to know about coronary intravascular stent placement? Coronary intravascular stent placement is a procedure to place a stent in an artery of your heart that has plaque buildup. Plaque is a mixture of fat and cholesterol. A stent is a small mesh tube made of metal that helps keep your artery open. Your healthcare provider may place a bare metal stent or a drug-eluting stent (JENNY) in your artery. A JENNY is coated with medicine that is slowly released and helps prevent more plaque buildup in the area where the stent is placed. The stent remains in your artery for life. You may need more than one stent. How do I prepare for coronary intravascular stent placement? Your healthcare provider will talk to you about how to prepare for surgery. He may tell you not to eat or drink anything after midnight on the day of your surgery. He will tell you what medicines to take or not take on the day of your surgery. You may need blood tests and a stress test before your procedure. Talk to your healthcare provider about these or other tests you may need.  Contrast dye will be used during your procedure to help healthcare providers see your heart better. Tell the healthcare provider if you have ever had an allergic reaction to contrast dye. What will happen during coronary intravascular stent placement? You will be given medicine in your IV to help you relax or make you drowsy. You will also get local anesthesia that will numb the area where the catheter will be placed. You will be awake during the procedure so that your healthcare providers can give you instructions. You may be asked to cough, hold your breath, or to tell them how you feel during the procedure. A catheter (long, thin tube) will be put into an artery, in your wrist or groin. The catheter will be guided through this artery to your heart and into the narrowed or blocked artery. Healthcare providers will use x-rays and dye to find the area where the stent needs to be placed. You may feel warm as the dye is put into the catheter. A guidewire is then placed into the catheter. The balloon catheter is guided into the narrowed or blocked artery using the guidewire. Healthcare providers inflate the balloon several times for short periods. The inflated balloon pushes the plaque against the artery walls. This opens them and allows more blood flow to your heart. Another balloon catheter with a stent is then inserted into the artery. The balloon is inflated. This expands the stent and pushes it into place against the artery wall. The stent will be left in your artery to help keep it open. What are the risks of coronary intravascular stent placement? You may develop a hematoma (swelling caused by collection of blood) or bleed more than expected from your catheter site. The dye used during this procedure may cause an allergic reaction or kidney problems. You may develop an infection. Your artery may be injured when the catheter is inserted. During or after surgery, blood clots may form, or plaque may break off.  The blood clot or plaque may block your artery and cause a heart attack or stroke. The stent could collapse, or a clot could form on the stent. This could cause the artery to become blocked again. You may need another procedure to open your artery. CARE AGREEMENT:   You have the right to help plan your care. Learn about your health condition and how it may be treated. Discuss treatment options with your caregivers to decide what care you want to receive. You always have the right to refuse treatment. The above information is an  only. It is not intended as medical advice for individual conditions or treatments. Talk to your doctor, nurse or pharmacist before following any medical regimen to see if it is safe and effective for you. © 2014 [facility] is for End User's use only and may not be sold, redistributed or otherwise used for commercial purposes. All illustrations and images included in CareNotes® are the copyrighted property of [employer] or [employer]. the copyrighted property of [employer] or [employer].

## 2023-11-13 ENCOUNTER — APPOINTMENT (OUTPATIENT)
Dept: LAB | Facility: MEDICAL CENTER | Age: 63
End: 2023-11-13
Payer: COMMERCIAL

## 2023-11-13 ENCOUNTER — OFFICE VISIT (OUTPATIENT)
Dept: URGENT CARE | Facility: MEDICAL CENTER | Age: 63
End: 2023-11-13
Payer: COMMERCIAL

## 2023-11-13 VITALS
SYSTOLIC BLOOD PRESSURE: 128 MMHG | TEMPERATURE: 99.4 F | WEIGHT: 156 LBS | RESPIRATION RATE: 20 BRPM | BODY MASS INDEX: 22.33 KG/M2 | DIASTOLIC BLOOD PRESSURE: 77 MMHG | HEIGHT: 70 IN | HEART RATE: 78 BPM | OXYGEN SATURATION: 98 %

## 2023-11-13 DIAGNOSIS — B96.89 ACUTE BACTERIAL SINUSITIS: Primary | ICD-10-CM

## 2023-11-13 DIAGNOSIS — J01.90 ACUTE BACTERIAL SINUSITIS: Primary | ICD-10-CM

## 2023-11-13 DIAGNOSIS — H10.022 OTHER MUCOPURULENT CONJUNCTIVITIS OF LEFT EYE: ICD-10-CM

## 2023-11-13 DIAGNOSIS — Z98.61 S/P PTCA (PERCUTANEOUS TRANSLUMINAL CORONARY ANGIOPLASTY): ICD-10-CM

## 2023-11-13 LAB
ANION GAP SERPL CALCULATED.3IONS-SCNC: 14 MMOL/L
BUN SERPL-MCNC: 18 MG/DL (ref 5–25)
CALCIUM SERPL-MCNC: 9.9 MG/DL (ref 8.4–10.2)
CHLORIDE SERPL-SCNC: 96 MMOL/L (ref 96–108)
CO2 SERPL-SCNC: 26 MMOL/L (ref 21–32)
CREAT SERPL-MCNC: 1.1 MG/DL (ref 0.6–1.3)
GFR SERPL CREATININE-BSD FRML MDRD: 70 ML/MIN/1.73SQ M
GLUCOSE P FAST SERPL-MCNC: 311 MG/DL (ref 65–99)
POTASSIUM SERPL-SCNC: 4 MMOL/L (ref 3.5–5.3)
SODIUM SERPL-SCNC: 136 MMOL/L (ref 135–147)

## 2023-11-13 PROCEDURE — 80048 BASIC METABOLIC PNL TOTAL CA: CPT

## 2023-11-13 PROCEDURE — G0382 LEV 3 HOSP TYPE B ED VISIT: HCPCS | Performed by: PHYSICIAN ASSISTANT

## 2023-11-13 PROCEDURE — 36415 COLL VENOUS BLD VENIPUNCTURE: CPT

## 2023-11-13 RX ORDER — TOBRAMYCIN AND DEXAMETHASONE 3; 1 MG/ML; MG/ML
1 SUSPENSION/ DROPS OPHTHALMIC
Qty: 5 ML | Refills: 0 | Status: SHIPPED | OUTPATIENT
Start: 2023-11-13 | End: 2023-11-20

## 2023-11-13 RX ORDER — FLUTICASONE PROPIONATE 50 MCG
2 SPRAY, SUSPENSION (ML) NASAL DAILY
Qty: 16 G | Refills: 0 | Status: SHIPPED | OUTPATIENT
Start: 2023-11-13

## 2023-11-13 RX ORDER — LIDOCAINE HYDROCHLORIDE 20 MG/ML
10 SOLUTION OROPHARYNGEAL 4 TIMES DAILY PRN
Qty: 100 ML | Refills: 0 | Status: SHIPPED | OUTPATIENT
Start: 2023-11-13

## 2023-11-13 RX ORDER — AMOXICILLIN 875 MG/1
875 TABLET, COATED ORAL 2 TIMES DAILY
Qty: 20 TABLET | Refills: 0 | Status: SHIPPED | OUTPATIENT
Start: 2023-11-13 | End: 2023-11-23

## 2023-11-13 NOTE — PROGRESS NOTES
Assessment/Plan:    59 y/o male with: CAD s/p PCA, DM2, GERD and HLD. Will continue meds. And add SGLT-2 inhibitor. Discussed supportive care and return parameters. No problem-specific Assessment & Plan notes found for this encounter. Diagnoses and all orders for this visit:    Type 2 diabetes mellitus without complication, without long-term current use of insulin ([facility])  -     Empagliflozin (JARDIANCE) 10 MG TABS tablet; Take 1 tablet (10 mg total) by mouth daily    Gastroesophageal reflux disease without esophagitis    Coronary artery disease involving native coronary artery of native heart without angina pectoris    Coronary artery disease involving native heart without angina pectoris, unspecified vessel or lesion type    Mixed hyperlipidemia    S/P PTCA (percutaneous transluminal coronary angioplasty)          Subjective:     Chief Complaint   Patient presents with    Follow-up     Med management , pt having sore throat x3 days,  review labs         Patient ID: Wilhemina Hammans is a 58 y.o. male. Patient is a 59 y/o male who presents for follow-up on CAD s/p PCA, DM2, GERD and HLD. Pt admits being stable on meds. Though cardiologist was suggesting SGLT-2 inhibitor no fevers chills nausea or vomiting. The following portions of the patient's history were reviewed and updated as appropriate: allergies, current medications, past family history, past medical history, past social history, past surgical history and problem list.    Review of Systems   Constitutional: Negative. HENT: Negative. Eyes: Negative. Respiratory: Negative. Cardiovascular: Negative. Gastrointestinal: Negative. Endocrine: Negative. Genitourinary: Negative. Musculoskeletal: Negative. Allergic/Immunologic: Negative. Neurological: Negative. Hematological: Negative. Psychiatric/Behavioral: Negative. All other systems reviewed and are negative.         Objective:      /60   Pulse 72   Temp (!) 97.2 °F (36.2 °C)   Ht 5' 10" (1.778 m)   Wt 74.4 kg (164 lb)   SpO2 97%   BMI 23.53 kg/m²          Physical Exam  Constitutional:       Appearance: He is well-developed. HENT:      Head: Atraumatic. Right Ear: External ear normal.      Left Ear: External ear normal.   Eyes:      Conjunctiva/sclera: Conjunctivae normal.      Pupils: Pupils are equal, round, and reactive to light. Cardiovascular:      Rate and Rhythm: Normal rate and regular rhythm. Heart sounds: Normal heart sounds. Pulmonary:      Effort: Pulmonary effort is normal. No respiratory distress. Breath sounds: Normal breath sounds. Abdominal:      General: There is no distension. Palpations: Abdomen is soft. Tenderness: There is no abdominal tenderness. There is no guarding or rebound. Musculoskeletal:         General: Normal range of motion. Cervical back: Normal range of motion. Skin:     General: Skin is warm and dry. Neurological:      Mental Status: He is alert and oriented to person, place, and time. Cranial Nerves: No cranial nerve deficit. Psychiatric:         Behavior: Behavior normal.         Thought Content:  Thought content normal.         Judgment: Judgment normal.

## 2023-11-13 NOTE — PROGRESS NOTES
[facility]        NAME: Ayo Bueno is a 58 y.o. male  : 1960    MRN: [id number]  DATE: 2023  TIME: 9:29 AM    Assessment and Plan   Acute bacterial sinusitis [J01.90, B96.89]  1. Acute bacterial sinusitis  amoxicillin (AMOXIL) 875 mg tablet    Lidocaine Viscous HCl (XYLOCAINE) 2 % mucosal solution    fluticasone (FLONASE) 50 mcg/act nasal spray      2. Other mucopurulent conjunctivitis of left eye  tobramycin-dexamethasone (TOBRADEX) ophthalmic suspension            Patient Instructions       Follow up with PCP as needed. Increase fluids. Finish antibiotic. Chief Complaint     Chief Complaint   Patient presents with    Sore Throat     Patient states he started last we4k with sore throat and cough. He was seen by his PCP and told his throat looked fine. His throat has gotten worse over the weekend and he has white exudate on his tonsils. He is also coughing up green and yellow mucous. He is yesica having drainage from his L eye which is new         History of Present Illness       Patient had a procedure done later in the week and since then his throat discomfort has increased. Sinusitis  This is a new problem. The current episode started 1 to 4 weeks ago. The problem has been gradually worsening since onset. There has been no fever. The pain is mild. Associated symptoms include congestion, sinus pressure, a sore throat and swollen glands. Pertinent negatives include no chills, coughing, diaphoresis, ear pain, headaches, hoarse voice, neck pain, shortness of breath or sneezing. Past treatments include acetaminophen. The treatment provided no relief. Review of Systems   Review of Systems   Constitutional:  Negative for chills and diaphoresis. HENT:  Positive for congestion, sinus pressure and sore throat. Negative for ear pain, hoarse voice and sneezing. Respiratory:  Negative for cough and shortness of breath. Musculoskeletal:  Negative for neck pain. Neurological:  Negative for headaches. All other systems reviewed and are negative.         Current Medications       Current Outpatient Medications:     amoxicillin (AMOXIL) 875 mg tablet, Take 1 tablet (875 mg total) by mouth 2 (two) times a day for 10 days, Disp: 20 tablet, Rfl: 0    fluticasone (FLONASE) 50 mcg/act nasal spray, 2 sprays into each nostril daily, Disp: 16 g, Rfl: 0    Lidocaine Viscous HCl (XYLOCAINE) 2 % mucosal solution, Swish and spit 10 mL 4 (four) times a day as needed for mouth pain or discomfort Swish and swallow, Disp: 100 mL, Rfl: 0    tobramycin-dexamethasone (TOBRADEX) ophthalmic suspension, Administer 1 drop into the left eye every 4 (four) hours while awake for 7 days, Disp: 5 mL, Rfl: 0    aspirin 81 mg chewable tablet, Chew 1 tablet (81 mg total) daily, Disp: 30 tablet, Rfl: 11    clopidogrel (Plavix) 75 mg tablet, Take 1 tablet (75 mg total) by mouth daily Do not start before November 11, 2023., Disp: 30 tablet, Rfl: 3    co-enzyme Q-10 30 MG capsule, Take 30 mg by mouth 3 (three) times a day, Disp: , Rfl:     Empagliflozin (JARDIANCE) 10 MG TABS tablet, Take 1 tablet (10 mg total) by mouth daily, Disp: 90 tablet, Rfl: 1    metFORMIN (GLUCOPHAGE) 500 mg tablet, Take 2 tablets (1,000 mg total) by mouth 2 (two) times a day with meals, Disp: 360 tablet, Rfl: 1    metoprolol tartrate (LOPRESSOR) 25 mg tablet, Take 0.5 tablets (12.5 mg total) by mouth every 12 (twelve) hours, Disp: 60 tablet, Rfl: 3    pravastatin (PRAVACHOL) 40 mg tablet, Take 1 tablet (40 mg total) by mouth daily, Disp: 90 tablet, Rfl: 1    sertraline (ZOLOFT) 100 mg tablet, Take 1.5 tablets (150 mg total) by mouth daily, Disp: 135 tablet, Rfl: 1    Current Allergies     Allergies as of 11/13/2023 - Reviewed 11/13/2023   Allergen Reaction Noted    Rosuvastatin GI Intolerance 07/29/2019            The following portions of the patient's history were reviewed and updated as appropriate: allergies, current medications, past family history, past medical history, past social history, past surgical history and problem list.     Past Medical History:   Diagnosis Date    Allergic     Anxiety     Arthritis     Carpal tunnel syndrome, bilateral     Coronary artery disease 7/13/20    Depression     Diabetes mellitus ([address])     GERD (gastroesophageal reflux disease)     Heart attack ([address])     Hyperlipidemia     Hypertension     Myocardial infarction ([address]) 7/13/20    OCD (obsessive compulsive disorder)     S/P PTCA (percutaneous transluminal coronary angioplasty)     elective cath 11/10/23 w/ PCI/JENNY - mLAD       Past Surgical History:   Procedure Laterality Date    BACK SURGERY      CARDIAC SURGERY  Stent    CARPECTOMY HAND Bilateral     KNEE SURGERY Right        Family History   Problem Relation Age of Onset    Alcohol abuse Mother     Arthritis Mother     Alcohol abuse Father     Coronary artery disease Father     COPD Father          Medications have been verified. Objective   /77   Pulse 78   Temp 99.4 °F (37.4 °C)   Resp 20   Ht 5' 10" (1.778 m)   Wt 70.8 kg (156 lb)   SpO2 98%   BMI 22.38 kg/m²   No LMP for male patient. Physical Exam     Physical Exam  Vitals and nursing note reviewed. Constitutional:       Appearance: He is well-developed and normal weight. HENT:      Right Ear: Ear canal and external ear normal.      Left Ear: Ear canal and external ear normal.      Nose: Congestion and rhinorrhea present. Mouth/Throat:      Mouth: Mucous membranes are moist.      Pharynx: Oropharyngeal exudate and posterior oropharyngeal erythema present. Eyes:      General:         Left eye: Discharge present. Extraocular Movements: Extraocular movements intact. Pupils: Pupils are equal, round, and reactive to light. Cardiovascular:      Rate and Rhythm: Normal rate and regular rhythm. Pulses: Normal pulses. Heart sounds: Normal heart sounds.    Pulmonary:      Effort: Pulmonary effort is normal.      Breath sounds: Normal breath sounds. Lymphadenopathy:      Cervical: Cervical adenopathy (Left anterior) present. Neurological:      Mental Status: He is alert. Psychiatric:         Mood and Affect: Mood normal.         Behavior: Behavior normal.         TMs bulging bilaterally. Nasal mucosa on the left is 90% closed and 40% on the right.   Left conjunctiva is erythematous and draining purulent material.

## 2023-11-13 NOTE — PATIENT INSTRUCTIONS

## 2023-11-14 ENCOUNTER — OFFICE VISIT (OUTPATIENT)
Dept: CARDIOLOGY CLINIC | Facility: CLINIC | Age: 63
End: 2023-11-14
Payer: COMMERCIAL

## 2023-11-14 VITALS
BODY MASS INDEX: 22.96 KG/M2 | SYSTOLIC BLOOD PRESSURE: 120 MMHG | HEART RATE: 71 BPM | DIASTOLIC BLOOD PRESSURE: 76 MMHG | WEIGHT: 160 LBS

## 2023-11-14 DIAGNOSIS — I25.10 CORONARY ARTERY DISEASE INVOLVING NATIVE CORONARY ARTERY OF NATIVE HEART WITHOUT ANGINA PECTORIS: Primary | ICD-10-CM

## 2023-11-14 DIAGNOSIS — E78.00 PURE HYPERCHOLESTEROLEMIA: ICD-10-CM

## 2023-11-14 DIAGNOSIS — Z91.89 AT RISK FOR MEDICATION NONCOMPLIANCE: ICD-10-CM

## 2023-11-14 DIAGNOSIS — E78.2 MIXED DYSLIPIDEMIA: ICD-10-CM

## 2023-11-14 DIAGNOSIS — E11.9 TYPE 2 DIABETES MELLITUS WITHOUT COMPLICATION, WITHOUT LONG-TERM CURRENT USE OF INSULIN (HCC): ICD-10-CM

## 2023-11-14 DIAGNOSIS — Z98.890 STATUS POST CARDIAC CATHETERIZATION: ICD-10-CM

## 2023-11-14 PROCEDURE — 99214 OFFICE O/P EST MOD 30 MIN: CPT | Performed by: INTERNAL MEDICINE

## 2023-11-14 PROCEDURE — 93000 ELECTROCARDIOGRAM COMPLETE: CPT | Performed by: INTERNAL MEDICINE

## 2023-11-14 NOTE — PROGRESS NOTES
[facility]  [address] [facility] [address] [address]  Phone#  [phone]. Fax#  [phone]  *-*-*-*-*-*-*-*-*-*-*-*-*-*-*-*-*-*-*-*-*-*-*-*-*-*-*-*-*-*-*-*-*-*-*-*-*-*-*-*-*-*-*-*-*-*-*-*-*-*-*-*-*-*  ENCOUNTER DATE: 11/14/23 11:11 AM  PATIENT NAME: Griselda Roldan   1960    [id number]  AGE:63 y.o. SEX: male  ENCOUNTER PROVIDER:Faye Mar     PRIMARY CARE PHYSICIAN: Ysabel Vides MD    DIAGNOSES:   1. One-vessel coronary artery disease status post ACS-NSTEMI July 2020, status post PCI of diagonal branch on July 13, 2020, status post PCI of mid LAD 11/10/2023  2. Normal left ventricular systolic function. 3. Diabetes mellitus with poor glycemic control   4. Dyslipidemia   5. Family history premature CAD with brother having MI at 48 years. 6. Remote history of ITP   7. History of trigger finger   8. History of OCD  9. Medication noncompliance history. CARDIAC CATHETERIZATION 11/10/2023:  Ostial left main 40% stenosis with LEA-3 flow with luminal area 13 mm²  LAD: Moderate diffuse disease throughout vessel. 90% focal lesion in mid LAD with moderate plaque burden. IVUS showed that lesion was eccentric and fibrinous. Underwent drug-eluting stent placement. First diagonal branch 50% stenosis. Widely patent previous 2 stents. RCA: Proximal RCA 50% stenosis    REGADENOSON NUCLEAR STRESS TEST May 2022:  1. Negative regadenoson nuclear stress test for symptoms of angina pectoris and negative for ECG changes of ischemia. 2. Normal myocardial perfusion scan with no evidence of reversible or fixed perfusion abnormality. 3. Normal left ventricular systolic function and normal regional wall motion. Post-stress left ventricular ejection fraction was determined as 65%  4. Normal resting blood pressure and appropriate blood pressure response to exercise. 5. Nonspecific resting ECG abnormality.  There was sinus tachycardia response to low-level physical activity suggesting possible physical deconditioning. 6. There was an incidental finding relatively nonfocal soft tissue uptake of tracer in left shoulder/axillary/upper back region. This may represent an inflammation or possible muscle strain. Clinical correlation is recommended. EXTENDED HOLTER MONITOR November 2021: Patient was monitored for 13 days 23 hours. Average heart rate was 87, minimum heart rate was 58. There was 1 run of ventricular tachycardia involving 6 beats. There were 14 runs of nonsustained SVT with longest run lasting 9 beats at 245 beats per minute. Overall ventricular ectopy burden was less than 1%. There were no diary entries but there were triggered events which correlated with sinus rhythm and sinus tachycardia. CARDIAC CATHETERIZATION JULY 13, 2020: The coronary circulation is right dominant. -- There was 1-vessel coronary artery disease. -- Left main: Angiography showed minor luminal irregularities. -- LAD: Angiography showed minor luminal irregularities. -- 1st diagonal:   -- 1st diagonal: There was a 95 % stenosis. There was LEA grade 2 flow through the vessel (partial perfusion). This lesion is a likely culprit for the patient's recent myocardial infarction. It appears amenable to percutaneous   intervention. -- Circumflex: Angiography showed minor luminal irregularities. -- Proximal RCA: There was a 50 % stenosis. ECHOCARDIOGRAM JULY 13, 2020:   Technical quality: Good   Cardiac rhythm: Normal sinus     1. Normal left ventricular size and systolic and diastolic function, EF around 63%. 2. Normal right ventricular size and systolic function. 4. Normal left and right atrial size. 5. Mild aortic valve sclerosis, no aortic valve stenosis or regurgitation. 6. Trace mitral and tricuspid valve regurgitation. 7. No obvious pulmonary hypertension. 8. No pericardial effusion. Holter monitor July 2020:   1.  Holter monitor reveals the underlying rhythm is sinus rhythm with an average heart rate of 73 beats per minute, a minimum heart rate of 54 beats per minute and a maximum heart rate of 121 beats per minute. 2. There are rare ventricular ectopy comprised of isolated VPCs   3. There are rare supraventricular ectopy comprised of isolated APCs   4. There is no evidence of significant bradyarrhythmia or advanced heart block. The longest R to R was 1.4 seconds. 5. Patient diary did not report any symptoms. CURRENT ECG     Results for orders placed or performed in visit on 11/14/23   POCT ECG    Narrative    Sinus rhythm, HR 71 bpm, leftward axis, normal intervals, no significant chamber hypertrophy or enlargement. No definite evidence of prior infarction, no ischemia. CARDIOLOGY ASSESSMENT & PLAN     1. Coronary artery disease involving native coronary artery of native heart without angina pectoris        2. Type 2 diabetes mellitus without complication, without long-term current use of insulin ([address])        3. Mixed dyslipidemia        4. Status post cardiac catheterization  POCT ECG      5. At risk for medication noncompliance        6. Pure hypercholesterolemia            CAD, S/P MI June 2020, s/p PCI D1 July 2020,S/P PCI of mid LAD 11/10/2023 with residual disease in left main and diagonal branches of LAD and in RCA  Mr. Jayden Lynn is currently clinically stable following his recent cardiac catheterization and PCI. A significant reason for his recurrence of CAD is medication noncompliance in the past.  He now reports that he has been taking his medications regularly. He had previously discontinued medications due to various reasons including feeling fatigued and not having failure medications. Physical examination is overall unremarkable. He does have poorly controlled diabetes. He says now he is compliant with diabetes medications also. Today we reviewed findings of the cardiac catheterization.   I stressed upon him the importance of medication compliance. -- He is advised to continue current medications. -- Advising him to work closely with his primary physician to optimize diabetes control. -- I will arrange for him to be seen back in 3 months time. We will replace his pravastatin with atorvastatin at that time. -- Dietary and medical compliance are reinforced. -- He is advised  to report any concerning symptoms such as chest pain, shortness of breath, decline in exercise tolerance or presyncope/syncope. INTERVAL HISTORY & HISTORY OF PRESENT ILLNESS     Mukul Guy is here for follow-up regarding his cardiac comorbidities which include: Coronary artery disease with history of MI and PCI in 2020, dyslipidemia and other comorbidities. He is here for follow-up accompanied with his wife. He was recently seen by me on October 27, 2023. He had been to the emergency room recently with symptoms that were consistent with angina. He did have ECG changes but enzymes were negative. At last visit we had recommended cardiac catheterization which has since been completed. He was found to have new blockages and underwent stent placement to the mid LAD. He is here for follow-up visit accompanied with his wife. He mentions that he developed upper respiratory tract symptoms over the weekend following the intervention with incessant cough and rhinorrhea. He is now taking an antibiotic and is feeling better. Denies any fevers or chills. Denies any chest pain or shortness of breath or dizziness or lightheadedness. Denies any orthopnea or PND or worsening pedal edema. Reports being compliant with all medications now. Reports that his blood sugars are better controlled now since reinitiating Jardiance therapy. Functional capacity status: Moderately decreased. (Excellent- >10 METs; Good: (7-10 METs); Moderate (4-7 METs);  Poor (<= 4 METs)    Any chronic stressors:  None   (feeling of poor health, financial problems, and social isolation etc).    Tobacco or alcohol dependence:  He does not smoke. He does not drink. Current cardiac medications:    Metoprolol titrate 12.5 mg twice daily, pravastatin 40 mg daily, aspirin 81 mg daily, clopidogrel 75 mg daily, Jardiance 10 mg daily    Blood work 11/13/2023: Sodium 136 potassium 4.0 chloride 96 bicarb 26 BUN 14 creatinine 1.10 GFR 70  Glucose 311  Other blood work 10/18/2023:  Normal liver function test  High-sensitivity troponins negative x2  Hemoglobin 14.8 hematocrit 43.3 platelet count 237  Hemoglobin A1c 10.2 in August 2023  Lipid profile 11/6/2023 total cholesterol 130 triglycerides 122 HDL 50 calculated LDL 56      Cardiac catheterization results      REVIEW OF SYSTEMS   Positive for:  As noted above in HPI  Negative for: All remaining as reviewed below and in HPI. SYSTEM SYMPTOMS REVIEWED:  General--weight change, fever, night sweats  Respiratory--cough, wheezing, shortness of breath, sputum production  Cardiovascular--chest pain, syncope, dyspnea on exertion, edema, decline in exercise tolerance, claudication   Gastrointestinal--persistent vomiting, diarrhea, abdominal distention, blood in stool   Muscular or skeletal--joint pain or swelling   Neurologic--headaches, syncope, abnormal movement  Hematologic--history of easy bruising and bleeding   Endocrine--thyroid enlargement, heat or cold intolerance, polyuria   Psychiatric--anxiety, depression     *-*-*-*-*-*-*-*-*-*-*-*-*-*-*-*-*-*-*-*-*-*-*-*-*-*-*-*-*-*-*-*-*-*-*-*-*-*-*-*-*-*-*-*-*-*-*-*-*-*-*-*-*-*-  VITAL SIGNS     CURRENT VITAL SIGNS:   Vitals:    11/14/23 1045   BP: 120/76   BP Location: Left arm   Patient Position: Sitting   Cuff Size: Standard   Pulse: 71   Weight: 72.6 kg (160 lb)         BMI: Body mass index is 22.96 kg/m².     WEIGHTS:   Wt Readings from Last 25 Encounters:   11/14/23 72.6 kg (160 lb)   11/13/23 70.8 kg (156 lb)   11/10/23 73 kg (160 lb 15 oz)   11/08/23 74.4 kg (164 lb)   11/03/23 73.9 kg (163 lb)   10/27/23 73.9 kg (163 lb)   10/18/23 77.4 kg (170 lb 10.2 oz)   10/15/23 74.8 kg (165 lb)   09/26/23 74.8 kg (165 lb)   09/16/23 74.8 kg (165 lb)   08/02/23 73.9 kg (163 lb)   07/14/23 73.5 kg (162 lb)   06/02/23 73.5 kg (162 lb)   05/03/23 73.5 kg (162 lb)   05/02/23 74.7 kg (164 lb 9.6 oz)   06/21/22 72.7 kg (160 lb 3.2 oz)   05/26/22 74.4 kg (164 lb)   05/10/22 74.4 kg (164 lb)   04/26/22 74.5 kg (164 lb 3.2 oz)   10/13/21 73.9 kg (163 lb)   08/18/21 73.4 kg (161 lb 12.8 oz)   10/20/20 71.5 kg (157 lb 9.6 oz)   07/21/20 73.5 kg (162 lb)   07/12/20 75.6 kg (166 lb 10.7 oz)   05/08/20 77.1 kg (170 lb)        *-*-*-*-*-*-*-*-*-*-*-*-*-*-*-*-*-*-*-*-*-*-*-*-*-*-*-*-*-*-*-*-*-*-*-*-*-*-*-*-*-*-*-*-*-*-*-*-*-*-*-*-*-*-  PHYSICAL EXAM     General Appearance:    Alert, cooperative, no distress, appears stated age   Head, Eyes, ENT:    No obvious abnormality, moist mucous mebranes. Neck:   Supple, no carotid bruit or JVD   Back:     Symmetric, no curvature. Lungs:     Respirations unlabored. Clear to auscultation bilaterally,    Chest wall:    No tenderness or deformity   Heart:    Regular rate and rhythm, S1 and S2 normal, no murmur, rub  or gallop. Abdomen:     Soft, non-tender   Extremities:   Extremities warm, no cyanosis or edema    Skin:   no venostatic changes in lower extremities. Normal skin color, texture, and turgor.  No rashes or lesions     *-*-*-*-*-*-*-*-*-*-*-*-*-*-*-*-*-*-*-*-*-*-*-*-*-*-*-*-*-*-*-*-*-*-*-*-*-*-*-*-*-*-*-*-*-*-*-*-*-*-*-*-*-*-  CURRENT MEDICATIONS LIST     Current Outpatient Medications:     amoxicillin (AMOXIL) 875 mg tablet, Take 1 tablet (875 mg total) by mouth 2 (two) times a day for 10 days, Disp: 20 tablet, Rfl: 0    aspirin 81 mg chewable tablet, Chew 1 tablet (81 mg total) daily, Disp: 30 tablet, Rfl: 11    clopidogrel (Plavix) 75 mg tablet, Take 1 tablet (75 mg total) by mouth daily Do not start before November 11, 2023., Disp: 30 tablet, Rfl: 3    co-enzyme Q-10 30 MG capsule, Take 30 mg by mouth daily, Disp: , Rfl:     Empagliflozin (JARDIANCE) 10 MG TABS tablet, Take 1 tablet (10 mg total) by mouth daily, Disp: 90 tablet, Rfl: 1    fluticasone (FLONASE) 50 mcg/act nasal spray, 2 sprays into each nostril daily, Disp: 16 g, Rfl: 0    metFORMIN (GLUCOPHAGE) 500 mg tablet, Take 2 tablets (1,000 mg total) by mouth 2 (two) times a day with meals, Disp: 360 tablet, Rfl: 1    metoprolol tartrate (LOPRESSOR) 25 mg tablet, Take 0.5 tablets (12.5 mg total) by mouth every 12 (twelve) hours, Disp: 60 tablet, Rfl: 3    pravastatin (PRAVACHOL) 40 mg tablet, Take 1 tablet (40 mg total) by mouth daily, Disp: 90 tablet, Rfl: 1    sertraline (ZOLOFT) 100 mg tablet, Take 1.5 tablets (150 mg total) by mouth daily, Disp: 135 tablet, Rfl: 1    tobramycin-dexamethasone (TOBRADEX) ophthalmic suspension, Administer 1 drop into the left eye every 4 (four) hours while awake for 7 days, Disp: 5 mL, Rfl: 0    Lidocaine Viscous HCl (XYLOCAINE) 2 % mucosal solution, Swish and spit 10 mL 4 (four) times a day as needed for mouth pain or discomfort Swish and swallow (Patient not taking: Reported on 11/14/2023), Disp: 100 mL, Rfl: 0       ALLERGIES     Allergies   Allergen Reactions    Rosuvastatin GI Intolerance       *-*-*-*-*-*-*-*-*-*-*-*-*-*-*-*-*-*-*-*-*-*-*-*-*-*-*-*-*-*-*-*-*-*-*-*-*-*-*-*-*-*-*-*-*-*-*-*-*-*-*-*-*-*-  LABORATORY DATA   No results found for: "NA"  Potassium   Date Value Ref Range Status   11/13/2023 4.0 3.5 - 5.3 mmol/L Final   11/06/2023 4.2 3.5 - 5.3 mmol/L Final   10/18/2023 3.9 3.5 - 5.3 mmol/L Final     Chloride   Date Value Ref Range Status   11/13/2023 96 96 - 108 mmol/L Final   11/06/2023 101 96 - 108 mmol/L Final   10/18/2023 96 96 - 108 mmol/L Final     CO2   Date Value Ref Range Status   11/13/2023 26 21 - 32 mmol/L Final   11/06/2023 28 21 - 32 mmol/L Final   10/18/2023 28 21 - 32 mmol/L Final     BUN   Date Value Ref Range Status   11/13/2023 18 5 - 25 mg/dL Final   11/06/2023 18 5 - 25 mg/dL Final   10/18/2023 20 5 - 25 mg/dL Final     Creatinine   Date Value Ref Range Status   11/13/2023 1.10 0.60 - 1.30 mg/dL Final     Comment:     Standardized to IDMS reference method   11/06/2023 0.92 0.60 - 1.30 mg/dL Final     Comment:     Standardized to IDMS reference method   10/18/2023 1.03 0.60 - 1.30 mg/dL Final     Comment:     Standardized to IDMS reference method     eGFR   Date Value Ref Range Status   11/13/2023 70 ml/min/1.73sq m Final   11/06/2023 87 ml/min/1.73sq m Final   10/18/2023 76 ml/min/1.73sq m Final     Calcium   Date Value Ref Range Status   11/13/2023 9.9 8.4 - 10.2 mg/dL Final   11/06/2023 9.1 8.4 - 10.2 mg/dL Final   10/18/2023 9.6 8.4 - 10.2 mg/dL Final     AST   Date Value Ref Range Status   11/06/2023 16 13 - 39 U/L Final   10/18/2023 14 13 - 39 U/L Final   08/19/2021 18 5 - 45 U/L Final     Comment:     Specimen collection should occur prior to Sulfasalazine administration due to the potential for falsely depressed results. ALT   Date Value Ref Range Status   11/06/2023 26 7 - 52 U/L Final     Comment:     Specimen collection should occur prior to Sulfasalazine administration due to the potential for falsely depressed results. 10/18/2023 20 7 - 52 U/L Final     Comment:     Specimen collection should occur prior to Sulfasalazine administration due to the potential for falsely depressed results. 08/19/2021 29 12 - 78 U/L Final     Comment:     Specimen collection should occur prior to Sulfasalazine administration due to the potential for falsely depressed results.       Alkaline Phosphatase   Date Value Ref Range Status   11/06/2023 55 34 - 104 U/L Final   10/18/2023 70 34 - 104 U/L Final   08/19/2021 82 46 - 116 U/L Final     Magnesium   Date Value Ref Range Status   08/19/2021 2.1 1.6 - 2.6 mg/dL Final   07/13/2020 1.9 1.6 - 2.6 mg/dL Final     WBC   Date Value Ref Range Status   11/06/2023 6.87 4.31 - 10.16 Thousand/uL Final   10/18/2023 6.40 4.31 - 10.16 Thousand/uL Final   06/02/2023 8.59 4.31 - 10.16 Thousand/uL Final     Hemoglobin   Date Value Ref Range Status   11/06/2023 15.5 12.0 - 17.0 g/dL Final   10/18/2023 14.8 12.0 - 17.0 g/dL Final   06/02/2023 16.5 12.0 - 17.0 g/dL Final     Platelets   Date Value Ref Range Status   11/06/2023 146 (L) 149 - 390 Thousands/uL Final   10/18/2023 144 (L) 149 - 390 Thousands/uL Final   06/02/2023 129 (L) 149 - 390 Thousands/uL Final     PTT   Date Value Ref Range Status   10/18/2023 24 23 - 37 seconds Final     Comment:     Therapeutic Heparin Range =  60-90 seconds   07/13/2020 60 (H) 23 - 37 seconds Final     Comment:     Therapeutic Heparin Range =  60-90 seconds     INR   Date Value Ref Range Status   10/18/2023 0.96 0.84 - 1.19 Final   07/12/2020 0.90 0.84 - 1.19 Final     No results found for: "CKMB", "DIGOXIN"  No results found for: "TSH"  No results found for: "CHOL"   Hemoglobin A1C   Date Value Ref Range Status   08/02/2023 10.2 (A) 6.5 Final   08/19/2021 7.6 (H) Normal 3.8-5.6%; PreDiabetic 5.7-6.4%; Diabetic >=6.5%; Glycemic control for adults with diabetes <7.0% % Final     Hgb A1c   Date Value Ref Range Status   08/26/2020 7.9 (H) <5.7 % of total Hgb Final     Comment:     For someone without known diabetes, a hemoglobin A1c  value of 6.5% or greater indicates that they may have   diabetes and this should be confirmed with a follow-up   test.    For someone with known diabetes, a value <7% indicates   that their diabetes is well controlled and a value   greater than or equal to 7% indicates suboptimal   control. A1c targets should be individualized based on   duration of diabetes, age, comorbid conditions, and   other considerations. Currently, no consensus exists regarding use of  hemoglobin A1c for diagnosis of diabetes for children.          No results found for: "BLOODCX", "SPUTUMCULTUR", "GRAMSTAIN", "Huger Lauth", "WOUNDCULT", "BODYFLUIDCUL", "Benjaman Clines", "INFLUAPCR", "INFLUBPCR", "RSVPCR", "Almita Hiramhemant", "CDIFFTOXINB"    *-*-*-*-*-*-*-*-*-*-*-*-*-*-*-*-*-*-*-*-*-*-*-*-*-*-*-*-*-*-*-*-*-*-*-*-*-*-*-*-*-*-*-*-*-*-*-*-*-*-*-*-*-*-  PREVIOUS CARDIOLOGY & RADIOLOGY TEST RESULTS   I have personally reviewed pertinent results of cardiovascular tests noted below. Results for orders placed during the hospital encounter of 21    Echo complete with contrast if indicated    Narrative  [address] [place] [address], [address] [place] [address]    Transthoracic Echocardiogram  2D, M-mode, Doppler, and Color Doppler    Study date:  11-May-2021    Patient: Courtney Lazar  MR number: [id number]  Account number: [de-identified]  : 1960  Age: 63 years  Gender: Male  Status: Outpatient  Location: [facility]  Height: 70 in  Weight: 149.6 lb  BP: 105/ 68 mmHg    Indications: CAD    Diagnoses: I25.10 - Atherosclerotic heart disease of native coronary artery without angina pectoris    Sonographer:  JAMAL Hale  Interpreting Physician:  Edmundo Méndez MD  Referring Physician:  Edmundo Méndez MD  Group:  [facility]    IMPRESSIONS:  Technical quality: Good  Cardiac rhythm: Sinus    1. Mild concentric left ventricular hypertrophy, normal left ventricular systolic and diastolic function. EF around 64%. 2. Normal right ventricular size and systolic function. 3. Normal left and right atrial size. 4. Aortic valve sclerosis, no aortic valve stenosis or regurgitation. 5. Mild mitral valve sclerosis, trace to mild mitral valve regurgitation. 6. Trace tricuspid valve regurgitation. 7. No obvious pulmonary hypertension. 8. No pericardial effusion. Compared to report of previous echocardiogram from 2020 there is overall no significant change. SUMMARY    LEFT VENTRICLE:  Normal left ventricular cavity size, mild concentric left ventricular hypertrophy, normal left ventricular systolic function, normal regional wall motion.  Ejection fraction is estimated as around 64%. Normal left ventricular diastolic  function. RIGHT VENTRICLE:  Normal right ventricular size and systolic function. Normal estimated right ventricular systolic pressure, 17 mmHg. LEFT ATRIUM:  Normal left atrial cavity size. Intact interatrial septum. RIGHT ATRIUM:  Normal right atrial cavity size. MITRAL VALVE:  Mild mitral valve leaflet thickening and sclerosis, adequate leaflet mobility. Trace to mild mitral valve regurgitation    AORTIC VALVE:  Tricuspid aortic valve with mild sclerosis, adequate cuspal separation. No aortic valve stenosis or regurgitation. TRICUSPID VALVE:  Normal tricuspid valve morphology and leaflet separation. Trace tricuspid valve regurgitation. PULMONIC VALVE:  No significant pulmonic valve regurgitation. AORTA:  Aortic root and proximal ascending aorta are normal in size on 2D imaging. IVC, HEPATIC VEINS:  Inferior vena cava is normal in size and demonstrates appropriate respiratory phasic changes in diameter. PERICARDIUM:  No pericardial effusion. HISTORY: PRIOR HISTORY: Risk factors: diabetes. PROCEDURE: The study was performed in the [address] [place] [address] OP. This was a routine study. The transthoracic approach was used. The study included complete 2D imaging, M-mode, complete spectral Doppler, and color Doppler. The heart rate was 79  bpm, at the start of the study. Images were obtained from the parasternal, apical, subcostal, and suprasternal notch acoustic windows. Image quality was adequate. LEFT VENTRICLE: Normal left ventricular cavity size, mild concentric left ventricular hypertrophy, normal left ventricular systolic function, normal regional wall motion. Ejection fraction is estimated as around 64%. Normal left  ventricular diastolic function. RIGHT VENTRICLE: Normal right ventricular size and systolic function. Normal estimated right ventricular systolic pressure, 17 mmHg. LEFT ATRIUM: Normal left atrial cavity size. Intact interatrial septum. RIGHT ATRIUM: Normal right atrial cavity size. MITRAL VALVE: Mild mitral valve leaflet thickening and sclerosis, adequate leaflet mobility. Trace to mild mitral valve regurgitation    AORTIC VALVE: Tricuspid aortic valve with mild sclerosis, adequate cuspal separation. No aortic valve stenosis or regurgitation. TRICUSPID VALVE: Normal tricuspid valve morphology and leaflet separation. Trace tricuspid valve regurgitation. PULMONIC VALVE: No significant pulmonic valve regurgitation. PERICARDIUM: No pericardial effusion. AORTA: Aortic root and proximal ascending aorta are normal in size on 2D imaging. SYSTEMIC VEINS: IVC: Inferior vena cava is normal in size and demonstrates appropriate respiratory phasic changes in diameter. SYSTEM MEASUREMENT TABLES    2D  %FS: 36.9 %  Ao Diam: 2.86 cm  EDV(Teich): 46.93 ml  EF(Teich): 67.96 %  ESV(Teich): 15.04 ml  IVSd: 1.07 cm  LA Area: 15.62 cm2  LA Diam: 3.66 cm  LVEDV MOD A4C: 82.01 ml  LVEF MOD A4C: 63.87 %  LVESV MOD A4C: 29.63 ml  LVIDd: 3.39 cm  LVIDs: 2.14 cm  LVLd A4C: 8.44 cm  LVLs A4C: 6.89 cm  LVPWd: 1.14 cm  RA Area: 12.23 cm2  RVIDd: 2.91 cm  SV MOD A4C: 52.38 ml  SV(Teich): 31.9 ml    CW  TR Vmax: 1.38 m/s  TR maxP.57 mmHg    MM  TAPSE: 2.3 cm    PW  E' Sept: 0.08 m/s  E/E' Sept: 8.32  MV A Tha: 0.76 m/s  MV Dec Norman: 3.87 m/s2  MV DecT: 181.59 ms  MV E Tha: 0.7 m/s  MV E/A Ratio: 0.92  MV PHT: 52.66 ms  MVA By PHT: 4.18 cm2    [employer] Accredited Echocardiography Laboratory    Prepared and electronically signed by    Nina Douglas MD  Signed 11-May-2021 12:51:04    No results found for this or any previous visit. No results found for this or any previous visit. No results found for this or any previous visit. Cardiac catheterization    1st Diag-1 lesion is 50% stenosed. Prox RCA lesion is 50% stenosed. Ost LM lesion is 40% stenosed. Mid LAD lesion is 90% stenosed.     2v CAD, mild LM        *-*-*-*-*-*-*-*-*-*-*-*-*-*-*-*-*-*-*-*-*-*-*-*-*-*-*-*-*-*-*-*-*-*-*-*-*-*-*-*-*-*-*-*-*-*-*-*-*-*-*-*-*-*-  SIGNATURES:   @[employer]@   Faye Mar MD; MHA    *-*-*-*-*-*-*-*-*-*-*-*-*-*-*-*-*-*-*-*-*-*-*-*-*-*-*-*-*-*-*-*-*-*-*-*-*-*-*-*-*-*-*-*-*-*-*-*-*-*-*-*-*-*-  PAST MEDICAL HISTORY:  Past Medical History:   Diagnosis Date    Allergic     Anxiety     Arthritis     Carpal tunnel syndrome, bilateral     Coronary artery disease 7/13/20    Depression     Diabetes mellitus ([address])     GERD (gastroesophageal reflux disease)     Heart attack ([address])     Hyperlipidemia     Hypertension     Myocardial infarction ([address]) 7/13/20    OCD (obsessive compulsive disorder)     S/P PTCA (percutaneous transluminal coronary angioplasty)     elective cath 11/10/23 w/ PCI/JENNY - mLAD    PAST SURGICAL HISTORY:  Past Surgical History:   Procedure Laterality Date    BACK SURGERY      CARDIAC CATHETERIZATION Left 11/10/2023    Procedure: Cardiac Left Heart Cath;  Surgeon: Elsi Reeder MD;  Location: [facility];   Service: Cardiology    CARDIAC SURGERY  Stent    CARPECTOMY HAND Bilateral     KNEE SURGERY Right          FAMILY HISTORY:  Family History   Problem Relation Age of Onset    Alcohol abuse Mother     Arthritis Mother     Alcohol abuse Father     Coronary artery disease Father     COPD Father     SOCIAL HISTORY:  Social History     Tobacco Use   Smoking Status Never   Smokeless Tobacco Never      Social History     Substance and Sexual Activity   Alcohol Use Not Currently     Social History     Substance and Sexual Activity   Drug Use Never    [id number]     *-*-*-*-*-*-*-*-*-*-*-*-*-*-*-*-*-*-*-*-*-*-*-*-*-*-*-*-*-*-*-*-*-*-*-*-*-*-*-*-*-*-*-*-*-*-*-*-*-*-*-*-*-*  ALLERGIES:  Allergies   Allergen Reactions    Rosuvastatin GI Intolerance    CURRENT SCHEDULED MEDICATIONS:    Current Outpatient Medications:     amoxicillin (AMOXIL) 875 mg tablet, Take 1 tablet (875 mg total) by mouth 2 (two) times a day for 10 days, Disp: 20 tablet, Rfl: 0    aspirin 81 mg chewable tablet, Chew 1 tablet (81 mg total) daily, Disp: 30 tablet, Rfl: 11    clopidogrel (Plavix) 75 mg tablet, Take 1 tablet (75 mg total) by mouth daily Do not start before November 11, 2023., Disp: 30 tablet, Rfl: 3    co-enzyme Q-10 30 MG capsule, Take 30 mg by mouth daily, Disp: , Rfl:     Empagliflozin (JARDIANCE) 10 MG TABS tablet, Take 1 tablet (10 mg total) by mouth daily, Disp: 90 tablet, Rfl: 1    fluticasone (FLONASE) 50 mcg/act nasal spray, 2 sprays into each nostril daily, Disp: 16 g, Rfl: 0    metFORMIN (GLUCOPHAGE) 500 mg tablet, Take 2 tablets (1,000 mg total) by mouth 2 (two) times a day with meals, Disp: 360 tablet, Rfl: 1    metoprolol tartrate (LOPRESSOR) 25 mg tablet, Take 0.5 tablets (12.5 mg total) by mouth every 12 (twelve) hours, Disp: 60 tablet, Rfl: 3    pravastatin (PRAVACHOL) 40 mg tablet, Take 1 tablet (40 mg total) by mouth daily, Disp: 90 tablet, Rfl: 1    sertraline (ZOLOFT) 100 mg tablet, Take 1.5 tablets (150 mg total) by mouth daily, Disp: 135 tablet, Rfl: 1    tobramycin-dexamethasone (TOBRADEX) ophthalmic suspension, Administer 1 drop into the left eye every 4 (four) hours while awake for 7 days, Disp: 5 mL, Rfl: 0    Lidocaine Viscous HCl (XYLOCAINE) 2 % mucosal solution, Swish and spit 10 mL 4 (four) times a day as needed for mouth pain or discomfort Swish and swallow (Patient not taking: Reported on 11/14/2023), Disp: 100 mL, Rfl: 0     *-*-*-*-*-*-*-*-*-*-*-*-*-*-*-*-*-*-*-*-*-*-*-*-*-*-*-*-*-*-*-*-*-*-*-*-*-*-*-*-*-*-*-*-*-*-*-*-*-*-*-*-*-*

## 2023-11-14 NOTE — ASSESSMENT & PLAN NOTE
Mr. Ave Hanson is currently clinically stable following his recent cardiac catheterization and PCI. A significant reason for his recurrence of CAD is medication noncompliance in the past.  He now reports that he has been taking his medications regularly. He had previously discontinued medications due to various reasons including feeling fatigued and not having failure medications. Physical examination is overall unremarkable. He does have poorly controlled diabetes. He says now he is compliant with diabetes medications also. Today we reviewed findings of the cardiac catheterization. I stressed upon him the importance of medication compliance. -- He is advised to continue current medications. -- Advising him to work closely with his primary physician to optimize diabetes control. -- I will arrange for him to be seen back in 3 months time. We will replace his pravastatin with atorvastatin at that time. -- Dietary and medical compliance are reinforced. -- He is advised  to report any concerning symptoms such as chest pain, shortness of breath, decline in exercise tolerance or presyncope/syncope.

## 2023-11-14 NOTE — PATIENT INSTRUCTIONS
CARDIOLOGY ASSESSMENT & PLAN     1. Coronary artery disease involving native coronary artery of native heart without angina pectoris        2. Type 2 diabetes mellitus without complication, without long-term current use of insulin ([address])        3. Mixed dyslipidemia        4. Status post cardiac catheterization  POCT ECG      5. At risk for medication noncompliance        6. Pure hypercholesterolemia            CAD, S/P MI June 2020, s/p PCI D1 July 2020,S/P PCI of mid LAD 11/10/2023 with residual disease in left main and diagonal branches of LAD and in RCA  Mr. Ave Hanson is currently clinically stable following his recent cardiac catheterization and PCI. A significant reason for his recurrence of CAD is medication noncompliance in the past.  He now reports that he has been taking his medications regularly. He had previously discontinued medications due to various reasons including feeling fatigued and not having failure medications. Physical examination is overall unremarkable. He does have poorly controlled diabetes. He says now he is compliant with diabetes medications also. Today we reviewed findings of the cardiac catheterization. I stressed upon him the importance of medication compliance. -- He is advised to continue current medications. -- Advising him to work closely with his primary physician to optimize diabetes control. -- I will arrange for him to be seen back in 3 months time. We will replace his pravastatin with atorvastatin at that time. -- Dietary and medical compliance are reinforced. -- He is advised  to report any concerning symptoms such as chest pain, shortness of breath, decline in exercise tolerance or presyncope/syncope.

## 2023-11-21 ENCOUNTER — CLINICAL SUPPORT (OUTPATIENT)
Dept: CARDIAC REHAB | Facility: CLINIC | Age: 63
End: 2023-11-21
Payer: COMMERCIAL

## 2023-11-21 DIAGNOSIS — Z98.61 S/P PTCA (PERCUTANEOUS TRANSLUMINAL CORONARY ANGIOPLASTY): Primary | ICD-10-CM

## 2023-11-21 PROCEDURE — 93797 PHYS/QHP OP CAR RHAB WO ECG: CPT

## 2023-11-21 NOTE — PROGRESS NOTES
CARDIAC REHAB ASSESSMENT    Today's date: 2023  Patient name: Apolinar Hickey     : 1960       MRN: [id number]  PCP: Richard Meredith MD  Referring Physician: Shashi Pantoja MD  Cardiologist: Mora Tate MD    Dx:   Encounter Diagnosis   Name Primary?     S/P PTCA (percutaneous transluminal coronary angioplasty)        Date of onset: 11/10/23  Cultural needs: None    Weight    Wt Readings from Last 1 Encounters:   23 72.6 kg (160 lb)      Height:   Ht Readings from Last 1 Encounters:   23 5' 10" (1.778 m)     Medical History:   Past Medical History:   Diagnosis Date    Allergic     Anxiety     Arthritis     Carpal tunnel syndrome, bilateral     Coronary artery disease 20    Depression     Diabetes mellitus (HCC)     GERD (gastroesophageal reflux disease)     Heart attack ([address])     Hyperlipidemia     Hypertension     Myocardial infarction ([address]) 20    OCD (obsessive compulsive disorder)     S/P PTCA (percutaneous transluminal coronary angioplasty)     elective cath 11/10/23 w/ PCI/JENNY - mLAD         Physical Limitations: chronic low back pain - was unable to walk for months     Fall Risk: Low   Comments: Ambulates with a steady gait with no assist device and Denies a fall in the past 6 months    Anginal Equivalent: Dyspnea   NTG use: No prescription    Risk Factors   Cholesterol: Yes  Smoking: Never used  HTN: No  DM: Type 2   average -300  diet controlled  No insulin  Obesity: No   Inactivity: Yes  Stress:  perceived  stress: 3/10   Stressors: health condition    Family History:  Family History   Problem Relation Age of Onset    Alcohol abuse Mother     Arthritis Mother     Alcohol abuse Father     Coronary artery disease Father     COPD Father        Allergies: Rosuvastatin  ETOH:   Social History     Substance and Sexual Activity   Alcohol Use Not Currently         Current Medications:   Current Outpatient Medications   Medication Sig Dispense Refill amoxicillin (AMOXIL) 875 mg tablet Take 1 tablet (875 mg total) by mouth 2 (two) times a day for 10 days 20 tablet 0    aspirin 81 mg chewable tablet Chew 1 tablet (81 mg total) daily 30 tablet 11    clopidogrel (Plavix) 75 mg tablet Take 1 tablet (75 mg total) by mouth daily Do not start before November 11, 2023. 30 tablet 3    co-enzyme Q-10 30 MG capsule Take 30 mg by mouth daily      Empagliflozin (JARDIANCE) 10 MG TABS tablet Take 1 tablet (10 mg total) by mouth daily 90 tablet 1    fluticasone (FLONASE) 50 mcg/act nasal spray 2 sprays into each nostril daily 16 g 0    Lidocaine Viscous HCl (XYLOCAINE) 2 % mucosal solution Swish and spit 10 mL 4 (four) times a day as needed for mouth pain or discomfort Swish and swallow (Patient not taking: Reported on 11/14/2023) 100 mL 0    metFORMIN (GLUCOPHAGE) 500 mg tablet Take 2 tablets (1,000 mg total) by mouth 2 (two) times a day with meals 360 tablet 1    metoprolol tartrate (LOPRESSOR) 25 mg tablet Take 0.5 tablets (12.5 mg total) by mouth every 12 (twelve) hours 60 tablet 3    pravastatin (PRAVACHOL) 40 mg tablet Take 1 tablet (40 mg total) by mouth daily 90 tablet 1    sertraline (ZOLOFT) 100 mg tablet Take 1.5 tablets (150 mg total) by mouth daily 135 tablet 1    tobramycin-dexamethasone (TOBRADEX) ophthalmic suspension Administer 1 drop into the left eye every 4 (four) hours while awake for 7 days 5 mL 0     No current facility-administered medications for this visit.          Functional Status Prior to Diagnosis for Treatment   Occupation: retired last yr -   Recreation: car shows  ADL’s: limited by Dyspnea  [place]: Capable of performing light to moderate ADLs limited by Dyspnea able to perform self-care resumed driving  Exercise: None    Current Functional Status  Occupation: retired  Recreation: car shows with [address]  ADL’s:resumed all ADLs - limited by [employer]: resumed all ADLs able to perform self-care resumed driving  Exercise: walking 2 miles 3 days/week (2-3 months now)    Patient Specific Goals:  increased energy    Short Term Program Goals: dietary modifications increased strength improved energy/stamina with ADLs exercise 120-150 mins/wk    Long Term Goals: Improved functional capacity based on initial fitness assessment  improved exercise tolerance  Improved Quality of Life - [facility] score reduced    Ability to reach goals/rehabilitation potential:  Very Good     Projected return to function: 12 weeks  Objective tests: sub-max TM ETT      Nutritional   Reviewed details of Rate your Plate. Discussed key elements of heart healthy eating. Reviewed patient goals for dietary modifications and their clinical implications. Reviewed most recent lipid profile.      Goals for dietary modification based on Rate Your Plate Dietary Assessment:  low fat dairy   reduced fat cheese  low sodium  improved snack choices  reduce sweets/frozen desserts  heathier choices while dining out      Emotional/Social  Patient has a history of depression  Patient has a history of anxiety   compliant with medical therapy for depression/anxiety    Marital status:     Domestic Violence Screening: No

## 2023-11-21 NOTE — PROGRESS NOTES
Cardiac Rehabilitation Plan of Care   Initial Care Plan        Today's date: 2023   # of Exercise Sessions Completed: 1  Patient name: Von Lei      : 1960  Age: 58 y.o. MRN: [id number]  Referring Physician: Garth Valentino MD  Cardiologist: Isael Tate MD  Provider: [facility]  Clinician: Kayode Santana MS, CEP    Dx:   Encounter Diagnosis   Name Primary? S/P PTCA (percutaneous transluminal coronary angioplasty)      Date of onset: 11/10/2023      SUMMARY OF PROGRESS:  Today is Mukul's initial evaluation to begin Cardiac Rehab now 2 wks post PTCA of 90% stenosis of midLAD. He has remaining stenosis of 50% midRCA and 1stdiag, 40% ostLM. Jerald Marin also has a history of HLD, DM II, trigger finger, OCD, and chronic low back pain for which he follows with pain management. He reports history of steroid injections and conversations of possible stimulator placement. He  does currently follow a formal exercise program at home, including walking 2 miles 3 days/week with occasional and intermittent POTTS. Today, Jerald Marin completed an initial submaximal TM ETT. He completed 1:40 minutes of stage 3 (4.3METs) with test termination of RPE 6. Resting HR 75 bpm and resting BP of 96/58 with appropriate hemodynamic response to exercise; BP reaching 116/60 - 156/70 and peak HR of 110 bpm. Mukul denied cardiac symptoms during exercise, reporting 3/10 back pain at rest and exercise. Telemetry revealed NSR with rare PACs. Jerald Marin was counseled on exercise guidelines and the goal to achieve a minimum of 150 mins/wk of moderate intensity (RPE 4-6) exercise per CSF - [place] guidelines. We also discussed current dietary habits and goals of heart healthy eating. Mukul monitors home BG 1 times per day reporting average -300. He has started closely watching his BG and diet for 2 weeks due to recent elevated BG into the 300's.  RYP score of 46 reported today and heart healthy eating will continue to be reviewed with Mukul. Depression and anxiety were assessed with PHQ-9 and ADX-7 questionnaires with scores of 7 and 14 respectively, suggesting  5-9 = Mild Depression and  10-14 = Moderate anxiety. When addressed, Flora Botello admits to having depression/anxiety symptoms and history of depression/anxiety for which his is compliant with medical therapy. Information to begin using [address] was provided as well as contact information for counseling through [facility]. PHQ-9 score will be reassessed in 30-60 days. His main goal for cardiac rehab is increased energy. Flora Botello will attend group education classes on heart healthy eating, reading food labels, stress management, risk factor reduction, understanding heart disease and common heart medications. He will attend 35 monitored exercise sessions, 3x/wk for 12-18 weeks beginning Friday, November 24. Medication compliance: Yes   Comments: Pt reports to be compliant with medications  Fall Risk: Low   Comments: Ambulates with a steady gait with no assist device and Denies a fall in the past 6 months    EKG Interpretation: NSR, rare PAC      EXERCISE ASSESSMENT and PLAN    Exercise Prescription:      Frequency: 3 days/week   Supplement with home walking  2+ days/wk as tolerated       Minutes: 30-40         METS: 2.8-4.5            HR:    RPE: 3-5         Modalities: Treadmill, UBE, NuStep, and Recumbent bike (Adjusting modalities to accommodate for chronic low back pain)     30 Day Goals for Exercise Progression:    Frequency: 3 days/week of cardiac rehab       Supplement with home exercise 2+ days/wk as tolerated    Minutes: 40                              >150 mins/wk of moderate intensity exercise   METS: 2.8-4.5   HR: 120-132 (50-70% HRR)    RPE: 4-6   Modalities: Treadmill, UBE, Lifecycle, NuStep, and Recumbent bike    Strength training:   Will be added following 2-3 weeks of monitored exercise sessions   Modalities: Leg Press, Chest Press, Pull Price, Arm Extension, Arm Curl, Upright Rows, Front Raises, Shoulder Shrugs, and Calf Raises    Home Exercise: Type: Walking, Frequency: 3 days/week, Distance 2 miles    Goals: 10% improvement in functional capacity - based on max METs achieved in fitness assessment, Reduced dyspnea with physical activity  0-1/10, improved DASI score by 10%, Increase in exercise capacity by 40% - based on peak METs tolerated in cardiac rehab exercise session, Exercise 5 days/wk, >150mins/wk of moderate intensity exercise, Resume ADLs with increased strength, Attend Rehab regularly, and start a home exercise program    Progression Toward Goals:  Reviewed Pt goals and determined plan of care, Pt is progressing and showing improvement  toward the following goals:  walking 3 days/week for 2 miles. , Will continue to educate and progress as tolerated. Education: benefit of exercise for CAD risk factors, AHA guidelines to achieve >150 mins/wk of moderate exercise, and RPE scale   Plan:education on home exercise guidelines, home exercise 30+ mins 2 days opposite CR, and Education class: Risk Factors for Heart Disease  Readiness to change: Action:  (Changing behavior)      NUTRITION ASSESSMENT AND PLAN    Weight control:    Starting weight: 160 lb   Current weight:     Waist circumference:    Startin.5 in   Current:      Diabetes: T2D, Patient reported fasting -300, no insulin  A1c: 10.2%    last measured: 2023    Lipid management: Discussed diet and lipid management and Last lipid profile 2023  Chol 130    HDL 50  LDL 56    Goals:fasting BG , improved A1c  < 7.0%, and Improved Rate Your Plate score  >41    Measurable goals were based Rate Your Plate Dietary Self-Assessment. These are the areas in which the patient could score higher on the assessment. Goals include recommendations for a heart healthy diet based on American Heart Association.     Progression Toward Goals: Reviewed Pt goals and determined plan of care, Pt is progressing and showing improvement  toward the following goals:  pt reports already making dietary changes for improved BG control.  , Will continue to educate and progress as tolerated. Education: heart healthy eating  nutrition for Improved BG control  exercise and diabetes management   Plan: Education class: Reading Food Labels, Education Class: Heart Healthy Eating, replace refined flours with whole grains, increase daily intake of fruits and vegetables, rarely eat frozen desserts, eat healthy snacks like fruit, pretzels, and low fat crackers, and choose low sodium canned, frozen, packaged foods or rarely eat these foods  Readiness to change: Action:  (Changing behavior)      PSYCHOSOCIAL ASSESSMENT AND PLAN    Emotional:  Depression assessment:  PHQ-9 = 7  5-9 = Mild Depression            Anxiety measure:  DAX-7 = 14  10-14 = Moderate anxiety  Self-reported stress level:  3  Social support: Excellent    Goals:  PHQ-9 - reduced severity by one level, continue medical therapy, Feelings in [place] Score < 3, Physical Fitness in [place] Score < 3, Pain in [place] Score < 3, improved positive thoughts of well being, and increased energy    Progression Toward Goals: Reviewed Pt goals and determined plan of care, Pt is progressing and showing improvement  toward the following goals:  compliant with medical therapy.  , Will continue to educate and progress as tolerated.     Education: benefits of a positive support system, benefits of enrolling in [employer], and depression and CAD  Plan: Class: Stress and Your Health and Exercise  Readiness to change: Action:  (Changing behavior)      OTHER CORE COMPONENTS     Tobacco:   Social History     Tobacco Use   Smoking Status Never   Smokeless Tobacco Never       Tobacco Use Intervention:   N/A:  Patient is a non-smoker     Anginal Symptoms:  POTTS   NTG use: No prescription    Blood pressure:    Restin/58   Exercise: 116/60 - 156/70    Goals: moderate intensity exercise >150 mins/wk and medication compliance    Progression Toward Goals: Reviewed Pt goals and determined plan of care, Will continue to educate and progress as tolerated.     Education:  understanding high blood pressure and it's relationship to CAD  Plan: Class: Understanding Heart Disease, Class: Common Heart Medications, medication compliance, engage in regular exercise, and monitor home BP  Readiness to change: Preparation:  (Getting ready to change)

## 2023-11-24 ENCOUNTER — CLINICAL SUPPORT (OUTPATIENT)
Dept: CARDIAC REHAB | Facility: CLINIC | Age: 63
End: 2023-11-24
Payer: COMMERCIAL

## 2023-11-24 DIAGNOSIS — Z98.61 S/P PTCA (PERCUTANEOUS TRANSLUMINAL CORONARY ANGIOPLASTY): Primary | ICD-10-CM

## 2023-11-24 PROCEDURE — 93798 PHYS/QHP OP CAR RHAB W/ECG: CPT

## 2023-11-27 ENCOUNTER — CLINICAL SUPPORT (OUTPATIENT)
Dept: CARDIAC REHAB | Facility: CLINIC | Age: 63
End: 2023-11-27
Payer: COMMERCIAL

## 2023-11-27 DIAGNOSIS — Z98.61 S/P PTCA (PERCUTANEOUS TRANSLUMINAL CORONARY ANGIOPLASTY): Primary | ICD-10-CM

## 2023-11-27 PROCEDURE — 93798 PHYS/QHP OP CAR RHAB W/ECG: CPT

## 2023-11-29 ENCOUNTER — CLINICAL SUPPORT (OUTPATIENT)
Dept: CARDIAC REHAB | Facility: CLINIC | Age: 63
End: 2023-11-29
Payer: COMMERCIAL

## 2023-11-29 DIAGNOSIS — Z98.61 S/P PTCA (PERCUTANEOUS TRANSLUMINAL CORONARY ANGIOPLASTY): Primary | ICD-10-CM

## 2023-11-29 PROCEDURE — 93798 PHYS/QHP OP CAR RHAB W/ECG: CPT

## 2023-11-30 DIAGNOSIS — Z98.61 S/P PTCA (PERCUTANEOUS TRANSLUMINAL CORONARY ANGIOPLASTY): ICD-10-CM

## 2023-11-30 RX ORDER — CLOPIDOGREL BISULFATE 75 MG/1
75 TABLET ORAL DAILY
Qty: 90 TABLET | Refills: 2 | Status: SHIPPED | OUTPATIENT
Start: 2023-11-30

## 2023-12-01 ENCOUNTER — CLINICAL SUPPORT (OUTPATIENT)
Dept: CARDIAC REHAB | Facility: CLINIC | Age: 63
End: 2023-12-01
Payer: COMMERCIAL

## 2023-12-01 DIAGNOSIS — Z98.61 S/P PTCA (PERCUTANEOUS TRANSLUMINAL CORONARY ANGIOPLASTY): Primary | ICD-10-CM

## 2023-12-01 PROCEDURE — 93798 PHYS/QHP OP CAR RHAB W/ECG: CPT

## 2023-12-04 ENCOUNTER — CLINICAL SUPPORT (OUTPATIENT)
Dept: CARDIAC REHAB | Facility: CLINIC | Age: 63
End: 2023-12-04
Payer: COMMERCIAL

## 2023-12-04 DIAGNOSIS — Z98.61 S/P PTCA (PERCUTANEOUS TRANSLUMINAL CORONARY ANGIOPLASTY): Primary | ICD-10-CM

## 2023-12-04 DIAGNOSIS — E78.2 MIXED HYPERLIPIDEMIA: ICD-10-CM

## 2023-12-04 PROCEDURE — 93798 PHYS/QHP OP CAR RHAB W/ECG: CPT

## 2023-12-04 RX ORDER — PRAVASTATIN SODIUM 40 MG
40 TABLET ORAL DAILY
Qty: 90 TABLET | Refills: 1 | Status: SHIPPED | OUTPATIENT
Start: 2023-12-04

## 2023-12-06 ENCOUNTER — APPOINTMENT (OUTPATIENT)
Dept: CARDIAC REHAB | Facility: CLINIC | Age: 63
End: 2023-12-06
Payer: COMMERCIAL

## 2023-12-08 ENCOUNTER — APPOINTMENT (OUTPATIENT)
Dept: CARDIAC REHAB | Facility: CLINIC | Age: 63
End: 2023-12-08
Payer: COMMERCIAL

## 2023-12-11 ENCOUNTER — APPOINTMENT (OUTPATIENT)
Dept: CARDIAC REHAB | Facility: CLINIC | Age: 63
End: 2023-12-11
Payer: COMMERCIAL

## 2023-12-13 ENCOUNTER — CLINICAL SUPPORT (OUTPATIENT)
Dept: CARDIAC REHAB | Facility: CLINIC | Age: 63
End: 2023-12-13
Payer: COMMERCIAL

## 2023-12-13 DIAGNOSIS — Z98.61 S/P PTCA (PERCUTANEOUS TRANSLUMINAL CORONARY ANGIOPLASTY): Primary | ICD-10-CM

## 2023-12-13 PROCEDURE — 93798 PHYS/QHP OP CAR RHAB W/ECG: CPT

## 2023-12-15 ENCOUNTER — CLINICAL SUPPORT (OUTPATIENT)
Dept: CARDIAC REHAB | Facility: CLINIC | Age: 63
End: 2023-12-15
Payer: COMMERCIAL

## 2023-12-15 DIAGNOSIS — Z98.61 S/P PTCA (PERCUTANEOUS TRANSLUMINAL CORONARY ANGIOPLASTY): Primary | ICD-10-CM

## 2023-12-15 PROCEDURE — 93798 PHYS/QHP OP CAR RHAB W/ECG: CPT

## 2023-12-18 ENCOUNTER — CLINICAL SUPPORT (OUTPATIENT)
Dept: CARDIAC REHAB | Facility: CLINIC | Age: 63
End: 2023-12-18
Payer: COMMERCIAL

## 2023-12-18 DIAGNOSIS — Z98.61 S/P PTCA (PERCUTANEOUS TRANSLUMINAL CORONARY ANGIOPLASTY): Primary | ICD-10-CM

## 2023-12-18 PROCEDURE — 93798 PHYS/QHP OP CAR RHAB W/ECG: CPT

## 2023-12-20 ENCOUNTER — CLINICAL SUPPORT (OUTPATIENT)
Dept: CARDIAC REHAB | Facility: CLINIC | Age: 63
End: 2023-12-20
Payer: COMMERCIAL

## 2023-12-20 DIAGNOSIS — Z98.61 S/P PTCA (PERCUTANEOUS TRANSLUMINAL CORONARY ANGIOPLASTY): Primary | ICD-10-CM

## 2023-12-20 PROCEDURE — 93798 PHYS/QHP OP CAR RHAB W/ECG: CPT

## 2023-12-20 NOTE — PROGRESS NOTES
Cardiac Rehabilitation Plan of Care   30 Day Reassessment      Today's date: 2023   # of Exercise Sessions Completed: 10  Patient name: Mukul Roldan      : 1960  Age: 63 y.o.       MRN: [id number]  Referring Physician: Joshua Diaz PA-C  Cardiologist: Faye Mar MD  Provider: [place]  Clinician: Lianna Fields MS, CEP    Dx:   Encounter Diagnosis   Name Primary?    S/P PTCA (percutaneous transluminal coronary angioplasty) Yes     Date of onset: 11/10/2023      SUMMARY OF PROGRESS:  Mukul is compliant with cardiac rehab 3 days/week. He tolerates 40-45 min of exercise at 2.9-3.9 METs without complaint. At home, Mukul reports dyspnea with any activity that just goes away on its own. He has reported increased energy levels, however. Exercise intensity will be increased as tolerated with his main limitation of back pain in mind to help Mukul handle activity with less POTTS as well as pursed lip breathing taught and encouraged at home. He denies SOB with exercise in cardiac rehab. Mukul walks most days of the week with his wife for up to 3 miles. NSR noted on telemetry. Resting HR 75-85 bpm with increased HR to  bpm during exercise. Normal resting /58 - 112/72 with variable response to exercise reaching 94-58 - 164/64. Recovery BP 96-60 - 110/60. Mukul reports eating significantly less snacks and has lost 5  lbs since starting cardiac rehab. He will add weight training today to help maintain muscle mass. Avg -170 (significant improvement). Mukul does have history of OCD and depression. He is compliant with medication and feels that his medications keep him stable. PHQ-9 score will be reassessed in 30 days. His main goal for cardiac rehab is increased energy.       Medication compliance: Yes   Comments: Pt reports to be compliant with medications  Fall Risk: Low   Comments: Ambulates with a steady gait with no assist device and Denies a fall in the past 6 months    EKG  Interpretation: NSR      EXERCISE ASSESSMENT and PLAN    Exercise Prescription:      Frequency: 3 days/week   Supplement with home walking  2+ days/wk as tolerated       Minutes: 40-45         METS: 2.9-3.9           HR:    RPE: 4-6         Modalities: Treadmill, UBE, NuStep, and Recumbent bike    30 Day Goals for Exercise Progression:    Frequency: 3 days/week of cardiac rehab       Supplement with home walking  2+ days/wk as tolerated    Minutes: 40-45                              >150 mins/wk of moderate intensity exercise   METS: 2.8-4.5   HR: 120-132 (50-70% HRR)    RPE: 4-6   Modalities: Treadmill, UBE, Lifecycle, NuStep, and Recumbent bike    Strength trainin-3 days / week  12-15 repetitions  1-2 sets per modality    Modalities: Leg Press, Chest Press, Pull Downs, Arm Extension, Arm Curl, Upright Rows, Front Raises, Shoulder Shrugs, and Calf Raises    Home Exercise: Type: Walking, Frequency: 4-7 days/week, Distance 2-3 miles    Goals: 10% improvement in functional capacity - based on max METs achieved in fitness assessment, Reduced dyspnea with physical activity  0-1/10, improved DASI score by 10%, Increase in exercise capacity by 40% - based on peak METs tolerated in cardiac rehab exercise session, Exercise 5 days/wk, >150mins/wk of moderate intensity exercise, Resume ADLs with increased strength, Attend Rehab regularly, and start a home exercise program    Progression Toward Goals:  Pt is progressing and showing improvement  toward the following goals:  increased home walking duration, increased exercise duration and intensity in cardiac rehab.  , Will continue to educate and progress as tolerated.    Education: benefit of exercise for CAD risk factors, home exercise guidelines, AHA guidelines to achieve >150 mins/wk of moderate exercise, RPE scale, and class: Risk Factors for Heart Disease   Plan:education on home exercise guidelines and home exercise 30+ mins 2 days opposite CR  Readiness to  change: Action:  (Changing behavior)      NUTRITION ASSESSMENT AND PLAN    Weight control:    Starting weight: 160 lb   Current weight:   155 lb  Waist circumference:    Startin.5 in   Current:      Diabetes: T2D, Patient reported fasting -300, no insulin  A1c: 10.2%    last measured: 2023    Lipid management: Discussed diet and lipid management and Last lipid profile 2023  Chol 130    HDL 50  LDL 56    Goals:fasting BG , improved A1c  < 7.0%, and Improved Rate Your Plate score  >64    Progression Toward Goals: Pt is progressing and showing improvement  toward the following goals:  reduced snack intake, lost 5 lbs, improved BG control.  , Will continue to educate and progress as tolerated., Goals met: weight 155 lb.    Education: heart healthy eating  nutrition for Improved BG control  exercise and diabetes management   Plan: Education class: Reading Food Labels, Education Class: Heart Healthy Eating, replace refined flours with whole grains, increase daily intake of fruits and vegetables, rarely eat frozen desserts, eat healthy snacks like fruit, pretzels, and low fat crackers, and choose low sodium canned, frozen, packaged foods or rarely eat these foods  Readiness to change: Action:  (Changing behavior)      PSYCHOSOCIAL ASSESSMENT AND PLAN    Emotional:  Depression assessment:  PHQ-9 = 7  5-9 = Mild Depression            Anxiety measure:  DAX-7 = 14  10-14 = Moderate anxiety  Self-reported stress level:  3  Social support: Excellent    Goals:  PHQ-9 - reduced severity by one level, continue medical therapy, Feelings in [place] Score < 3, Physical Fitness in [place] Score < 3, Pain in [place] Score < 3, improved positive thoughts of well being, and increased energy    Progression Toward Goals: Pt is progressing and showing improvement  toward the following goals:  compliant with medical therapy and feels stable with that.  , Will continue to educate and progress as  tolerated.    Education: benefits of a positive support system, benefits of enrolling in [employer], depression and CAD, and class:  Stress and Your Health   Plan: Practice relaxation techniques, Exercise, Spend time outdoors, and Keep a positive mindset  Readiness to change: Action:  (Changing behavior)      OTHER CORE COMPONENTS     Tobacco:   Social History     Tobacco Use   Smoking Status Never   Smokeless Tobacco Never       Tobacco Use Intervention:   N/A:  Patient is a non-smoker     Anginal Symptoms:  POTTS   NTG use: No prescription    Blood pressure:    Restin/58 - 112/72   Exercise: 94/58 - 124/64   Recovery: 96/60 - 110/60    Goals: moderate intensity exercise >150 mins/wk and medication compliance    Progression Toward Goals: Pt is progressing and showing improvement  toward the following goals:  normal resting BP.  , Goals met: moderate intensity exercise >150 mins/wk and medication compliance.    Education:  understanding high blood pressure and it's relationship to CAD and Education class:  Common Heart Medications  Plan: Class: Understanding Heart Disease, medication compliance, engage in regular exercise, and monitor home BP  Readiness to change: Action:  (Changing behavior)

## 2023-12-22 ENCOUNTER — CLINICAL SUPPORT (OUTPATIENT)
Dept: CARDIAC REHAB | Facility: CLINIC | Age: 63
End: 2023-12-22
Payer: COMMERCIAL

## 2023-12-22 DIAGNOSIS — Z98.61 S/P PTCA (PERCUTANEOUS TRANSLUMINAL CORONARY ANGIOPLASTY): Primary | ICD-10-CM

## 2023-12-22 PROCEDURE — 93798 PHYS/QHP OP CAR RHAB W/ECG: CPT

## 2023-12-26 ENCOUNTER — CLINICAL SUPPORT (OUTPATIENT)
Dept: CARDIAC REHAB | Facility: CLINIC | Age: 63
End: 2023-12-26
Payer: COMMERCIAL

## 2023-12-26 DIAGNOSIS — Z98.61 S/P PTCA (PERCUTANEOUS TRANSLUMINAL CORONARY ANGIOPLASTY): Primary | ICD-10-CM

## 2023-12-26 PROCEDURE — 93798 PHYS/QHP OP CAR RHAB W/ECG: CPT

## 2023-12-27 ENCOUNTER — CLINICAL SUPPORT (OUTPATIENT)
Dept: CARDIAC REHAB | Facility: CLINIC | Age: 63
End: 2023-12-27
Payer: COMMERCIAL

## 2023-12-27 DIAGNOSIS — Z98.61 S/P PTCA (PERCUTANEOUS TRANSLUMINAL CORONARY ANGIOPLASTY): Primary | ICD-10-CM

## 2023-12-27 PROCEDURE — 93798 PHYS/QHP OP CAR RHAB W/ECG: CPT

## 2023-12-29 ENCOUNTER — CLINICAL SUPPORT (OUTPATIENT)
Dept: CARDIAC REHAB | Facility: CLINIC | Age: 63
End: 2023-12-29
Payer: COMMERCIAL

## 2023-12-29 DIAGNOSIS — Z98.61 S/P PTCA (PERCUTANEOUS TRANSLUMINAL CORONARY ANGIOPLASTY): Primary | ICD-10-CM

## 2023-12-29 PROCEDURE — 93798 PHYS/QHP OP CAR RHAB W/ECG: CPT

## 2023-12-29 NOTE — PROGRESS NOTES
Cardiac Rehabilitation Plan of Care   Discharge      Today's date: 2023   # of Exercise Sessions Completed: 14  Patient name: Mukul Roldan      : 1960  Age: 63 y.o.       MRN: [id number]  Referring Physician: Joshua Diaz PA-C  Cardiologist: Faye Mar MD  Provider: [place]  Clinician: Sonal Bustamante MS, CEP    Dx:   Encounter Diagnosis   Name Primary?    S/P PTCA (percutaneous transluminal coronary angioplasty) Yes     Date of onset: 11/10/2023      SUMMARY OF PROGRESS: Discharge note for Mukul. He has completed 14 sessions of cardiac rehab and is being discharged due to high deductible renewal in the new year. He is all set up at the [facility] to continue his exercise. He completed a final submax TM ETT, reaching peak METs of 4.3, which is no change from initial. Mukul was mostly limited by his back pain, which he rated 5/10. Mukul's max METs tolerated in rehab increased from 3.4 to 4.5. Mukul has lost 5 lbs since his initial evaluation. No final patient questionnaires were completed.   He tolerates 40-42 min of exercise at 3.0-4.5 METs without complaint. Mukul was introduced to weight training but reports significant increase in back pain and was not interested in completing again. NSR noted on telemetry. Resting HR 67-85 bpm with increased HR to 95-125bpm during exercise. Normal resting //72 with increased response to exercise reaching 118//66. Avg -170 (significant improvement). Mukul does have history of OCD and depression. He is compliant with medication and feels that his medications keep him stable.       Medication compliance: Yes   Comments: Pt reports to be compliant with medications  Fall Risk: Low   Comments: Ambulates with a steady gait with no assist device and Denies a fall in the past 6 months    EKG Interpretation: NSR      EXERCISE ASSESSMENT and PLAN    Exercise Prescription:      Frequency: 3 days/week   Supplement with  home walking  2+ days/wk as tolerated       Minutes: 40-42         METS: 3.0-4.5          HR:    RPE: 4-6         Modalities: Treadmill, UBE, NuStep, and Recumbent bike    Exercise Progression after Discharge:    Frequency: 3-5 days of gym or home exercise   Minutes: 30-50  >150 mins/wk of moderate intensity exercise   METS: 3.0 - 5.0   HR:     RPE: 4-6   Modalities: Treadmill, UBE, Lifecycle, Elliptical, NuStep, and Recumbent bike     Strength training:   was introduced but could not tolerate due to back pain   Modalities: Chest Press, Arm Curl, and Shoulder Shrugs    Home Exercise: Type: Walking, Frequency: 4-7 days/week, Distance 2-3 miles    Goals: 10% improvement in functional capacity - based on max METs achieved in fitness assessment, Reduced dyspnea with physical activity  0-1/10, improved DASI score by 10%, Increase in exercise capacity by 40% - based on peak METs tolerated in cardiac rehab exercise session, Exercise 5 days/wk, >150mins/wk of moderate intensity exercise, Resume ADLs with increased strength, Attend Rehab regularly, and start a home exercise program    Progression Toward Goals:  Goals met: discharged from cardiac rehab to continue exercise at the [facility]; increased max METs tolerated in rehab., Patient will be encouraged to focus on lifestyle modifications following discharge.    Education: benefit of exercise for CAD risk factors, home exercise guidelines, AHA guidelines to achieve >150 mins/wk of moderate exercise, RPE scale, class: Risk Factors for Heart Disease, and exercise instructions/guidelines for discharge    Plan: continue regular exercise at [facility]  Readiness to change: Maintenance: (Maintaining the behavior change)      NUTRITION ASSESSMENT AND PLAN    Weight control:    Starting weight: 160 lb   Current weight: 155 lb  Waist circumference:    Startin.5 in    Diabetes: T2D, Patient reported fasting -300, no insulin  A1c:  10.2%    last measured: 8/2/2023    Lipid management: Discussed diet and lipid management and Last lipid profile 11/6/2023  Chol 130    HDL 50  LDL 56    Goals:fasting BG , improved A1c  < 7.0%, and Improved Rate Your Plate score  >64    Progression Toward Goals: Goals met: weight 155lbs; improved BG.    Education: heart healthy eating  nutrition for Improved BG control  exercise and diabetes management   Plan: replace refined flours with whole grains, increase daily intake of fruits and vegetables, rarely eat frozen desserts, eat healthy snacks like fruit, pretzels, and low fat crackers, and choose low sodium canned, frozen, packaged foods or rarely eat these foods  Readiness to change: Maintenance: (Maintaining the behavior change)      PSYCHOSOCIAL ASSESSMENT AND PLAN    Emotional:  Depression assessment:  PHQ-9 = 7  5-9 = Mild Depression            Anxiety measure:  DAX-7 = 14  10-14 = Moderate anxiety  Self-reported stress level:  3  Social support: Excellent    Goals:  PHQ-9 - reduced severity by one level, continue medical therapy, Feelings in [place] Score < 3, Physical Fitness in [place] Score < 3, Pain in [place] Score < 3, improved positive thoughts of well being, and increased energy    Progression Toward Goals: Pt is progressing and showing improvement  toward the following goals:  compliant with medical therapy and feels stable with that.      Education: benefits of a positive support system, benefits of enrolling in [employer], depression and CAD, and class:  Stress and Your Health   Plan: Practice relaxation techniques, Exercise, Spend time outdoors, and Keep a positive mindset  Readiness to change: Maintenance: (Maintaining the behavior change)      OTHER CORE COMPONENTS     Tobacco:   Social History     Tobacco Use   Smoking Status Never   Smokeless Tobacco Never       Tobacco Use Intervention:   N/A:  Patient is a non-smoker     Anginal Symptoms:  POTTS   NTG use: No  prescription    Blood pressure:    Restin//72   Exercise: 118//66    Goals: moderate intensity exercise >150 mins/wk and medication compliance    Progression Toward Goals: Goals met: moderate intensity exercise >150 mins/wk and medication compliance.    Education:  understanding high blood pressure and it's relationship to CAD and Education class:  Common Heart Medications  Plan: medication compliance, engage in regular exercise, and monitor home BP  Readiness to change: Maintenance: (Maintaining the behavior change)

## 2024-02-09 ENCOUNTER — OFFICE VISIT (OUTPATIENT)
Dept: FAMILY MEDICINE CLINIC | Facility: CLINIC | Age: 64
End: 2024-02-09
Payer: COMMERCIAL

## 2024-02-09 VITALS
SYSTOLIC BLOOD PRESSURE: 119 MMHG | WEIGHT: 161.6 LBS | TEMPERATURE: 98.1 F | DIASTOLIC BLOOD PRESSURE: 77 MMHG | OXYGEN SATURATION: 98 % | HEART RATE: 88 BPM | HEIGHT: 70 IN | BODY MASS INDEX: 23.13 KG/M2

## 2024-02-09 DIAGNOSIS — E11.9 TYPE 2 DIABETES MELLITUS WITHOUT COMPLICATION, WITHOUT LONG-TERM CURRENT USE OF INSULIN (HCC): Primary | ICD-10-CM

## 2024-02-09 DIAGNOSIS — E78.2 MIXED HYPERLIPIDEMIA: ICD-10-CM

## 2024-02-09 DIAGNOSIS — Z98.61 S/P PTCA (PERCUTANEOUS TRANSLUMINAL CORONARY ANGIOPLASTY): ICD-10-CM

## 2024-02-09 DIAGNOSIS — I25.10 CORONARY ARTERY DISEASE INVOLVING NATIVE CORONARY ARTERY OF NATIVE HEART WITHOUT ANGINA PECTORIS: ICD-10-CM

## 2024-02-09 LAB — SL AMB POCT HEMOGLOBIN AIC: 8.6 (ref ?–6.5)

## 2024-02-09 PROCEDURE — 83036 HEMOGLOBIN GLYCOSYLATED A1C: CPT | Performed by: FAMILY MEDICINE

## 2024-02-09 PROCEDURE — 99214 OFFICE O/P EST MOD 30 MIN: CPT | Performed by: FAMILY MEDICINE

## 2024-02-13 NOTE — PATIENT INSTRUCTIONS

## 2024-02-13 NOTE — PROGRESS NOTES
Assessment/Plan:    62 y/o male with: DM2, CAD s/p PCI and HLD. Continue meds. Discussed supportive care and return parameters. Will increase jardiance. Follow-up in 3 mo.    No problem-specific Assessment & Plan notes found for this encounter.       Diagnoses and all orders for this visit:    Type 2 diabetes mellitus without complication, without long-term current use of insulin (HCC)  -     POCT hemoglobin A1c  -     metFORMIN (GLUCOPHAGE) 1000 MG tablet; Take 1 tablet (1,000 mg total) by mouth 2 (two) times a day with meals  -     Empagliflozin 25 MG TABS; Take 1 tablet (25 mg total) by mouth daily    Coronary artery disease involving native coronary artery of native heart without angina pectoris    S/P PTCA (percutaneous transluminal coronary angioplasty)    Mixed hyperlipidemia          Subjective:     Chief Complaint   Patient presents with    Follow-up     3 month follow up appointment, A1c done at today's visit (8.6) no further concerns, ng        Patient ID: Mukul Roldan is a 63 y.o. male.    Patient is a 62 y/o male who presents for follow-up on DM2, CAD s/p PCI and HLD. Pt admits being stable on meds. And denies acute complaints.        The following portions of the patient's history were reviewed and updated as appropriate: allergies, current medications, past family history, past medical history, past social history, past surgical history and problem list.    Review of Systems   Constitutional: Negative.    HENT: Negative.     Eyes: Negative.    Respiratory: Negative.     Cardiovascular: Negative.    Gastrointestinal: Negative.    Endocrine: Negative.    Genitourinary: Negative.    Musculoskeletal: Negative.    Allergic/Immunologic: Negative.    Neurological: Negative.    Hematological: Negative.    Psychiatric/Behavioral: Negative.     All other systems reviewed and are negative.        Objective:      /77 (BP Location: Right arm, Patient Position: Sitting, Cuff Size: Standard)   Pulse 88  "  Temp 98.1 °F (36.7 °C) (Temporal)   Ht 5' 10\" (1.778 m)   Wt 73.3 kg (161 lb 9.6 oz)   SpO2 98%   BMI 23.19 kg/m²          Physical Exam  Constitutional:       Appearance: He is well-developed.   HENT:      Head: Atraumatic.      Right Ear: External ear normal.      Left Ear: External ear normal.   Eyes:      Conjunctiva/sclera: Conjunctivae normal.      Pupils: Pupils are equal, round, and reactive to light.   Cardiovascular:      Rate and Rhythm: Normal rate and regular rhythm.      Heart sounds: Normal heart sounds.   Pulmonary:      Effort: Pulmonary effort is normal. No respiratory distress.      Breath sounds: Normal breath sounds.   Abdominal:      General: There is no distension.      Palpations: Abdomen is soft.      Tenderness: There is no abdominal tenderness. There is no guarding or rebound.   Musculoskeletal:         General: Normal range of motion.      Cervical back: Normal range of motion.   Skin:     General: Skin is warm and dry.   Neurological:      Mental Status: He is alert and oriented to person, place, and time.      Cranial Nerves: No cranial nerve deficit.   Psychiatric:         Behavior: Behavior normal.         Thought Content: Thought content normal.         Judgment: Judgment normal.         Depression Screening and Follow-up Plan: Patient's depression screening was positive with a PHQ-9 score of 11. Patient assessed for underlying major depression. Brief counseling provided and recommend additional follow-up/re-evaluation next office visit.       "

## 2024-02-22 ENCOUNTER — OFFICE VISIT (OUTPATIENT)
Dept: CARDIOLOGY CLINIC | Facility: CLINIC | Age: 64
End: 2024-02-22
Payer: COMMERCIAL

## 2024-02-22 VITALS
HEART RATE: 105 BPM | BODY MASS INDEX: 23.13 KG/M2 | DIASTOLIC BLOOD PRESSURE: 80 MMHG | WEIGHT: 161.2 LBS | SYSTOLIC BLOOD PRESSURE: 120 MMHG

## 2024-02-22 DIAGNOSIS — Z98.61 S/P PTCA (PERCUTANEOUS TRANSLUMINAL CORONARY ANGIOPLASTY): ICD-10-CM

## 2024-02-22 DIAGNOSIS — E78.2 MIXED DYSLIPIDEMIA: ICD-10-CM

## 2024-02-22 DIAGNOSIS — I25.10 CORONARY ARTERY DISEASE INVOLVING NATIVE CORONARY ARTERY OF NATIVE HEART WITHOUT ANGINA PECTORIS: Primary | ICD-10-CM

## 2024-02-22 PROCEDURE — 99214 OFFICE O/P EST MOD 30 MIN: CPT | Performed by: INTERNAL MEDICINE

## 2024-02-22 NOTE — PROGRESS NOTES
[facility]  [address] [place] [address]  Phone#  [phone]. Fax#  [phone]  *-*-*-*-*-*-*-*-*-*-*-*-*-*-*-*-*-*-*-*-*-*-*-*-*-*-*-*-*-*-*-*-*-*-*-*-*-*-*-*-*-*-*-*-*-*-*-*-*-*-*-*-*-*  ENCOUNTER DATE: 02/22/24 5:13 PM  PATIENT NAME: Mukul Roldan   1960    [id number]  AGE:63 y.o.      SEX: male  ENCOUNTER PROVIDER:Faye Mar MD     PRIMARY CARE PHYSICIAN: Abdiaziz Gayle MD    DIAGNOSES:   1. One-vessel coronary artery disease status post ACS-NSTEMI July 2020, status post PCI of diagonal branch on July 13, 2020, status post PCI of mid LAD 11/10/2023  2. Normal left ventricular systolic function.   3. Diabetes mellitus with poor glycemic control   4. Dyslipidemia   5. Family history premature CAD with brother having MI at 50 years.   6. Remote history of ITP   7. History of trigger finger   8. History of OCD  9.  Medication noncompliance history.    CARDIAC CATHETERIZATION 11/10/2023:  Ostial left main 40% stenosis with LEA-3 flow with luminal area 13 mm²  LAD: Moderate diffuse disease throughout vessel.  90% focal lesion in mid LAD with moderate plaque burden.  IVUS showed that lesion was eccentric and fibrinous.  Underwent drug-eluting stent placement.  First diagonal branch 50% stenosis.  Widely patent previous 2 stents.  RCA: Proximal RCA 50% stenosis    REGADENOSON NUCLEAR STRESS TEST May 2022:  1. Negative regadenoson nuclear stress test for symptoms of angina pectoris and negative for ECG changes of ischemia.  2. Normal myocardial perfusion scan with no evidence of reversible or fixed perfusion abnormality.  3. Normal left ventricular systolic function and normal regional wall motion. Post-stress left ventricular ejection fraction was determined as 65%  4. Normal resting blood pressure and appropriate blood pressure response to exercise.  5. Nonspecific resting ECG abnormality. There was sinus tachycardia response to low-level physical activity suggesting  possible physical deconditioning.   6. There was an incidental finding relatively nonfocal soft tissue uptake of tracer in left shoulder/axillary/upper back region. This may represent an inflammation or possible muscle strain. Clinical correlation is recommended.    EXTENDED HOLTER MONITOR November 2021: Patient was monitored for 13 days 23 hours. Average heart rate was 87, minimum heart rate was 58. There was 1 run of ventricular tachycardia involving 6 beats. There were 14 runs of nonsustained SVT with longest run lasting 9 beats at 245 beats per minute. Overall ventricular ectopy burden was less than 1%. There were no diary entries but there were triggered events which correlated with sinus rhythm and sinus tachycardia.     CARDIAC CATHETERIZATION JULY 13, 2020:   The coronary circulation is right dominant.   -- There was 1-vessel coronary artery disease.   -- Left main: Angiography showed minor luminal irregularities.   -- LAD: Angiography showed minor luminal irregularities.   -- 1st diagonal:   -- 1st diagonal: There was a 95 % stenosis. There was LEA grade 2 flow through the vessel (partial perfusion). This lesion is a likely culprit for the patient's recent myocardial infarction. It appears amenable to percutaneous   intervention.   -- Circumflex: Angiography showed minor luminal irregularities.   -- Proximal RCA: There was a 50 % stenosis.     ECHOCARDIOGRAM JULY 13, 2020:   Technical quality: Good   Cardiac rhythm: Normal sinus     1. Normal left ventricular size and systolic and diastolic function, EF around 63%.   2. Normal right ventricular size and systolic function. 4. Normal left and right atrial size.   5. Mild aortic valve sclerosis, no aortic valve stenosis or regurgitation.   6. Trace mitral and tricuspid valve regurgitation.   7. No obvious pulmonary hypertension.   8. No pericardial effusion.       Holter monitor July 2020:   1. Holter monitor reveals the underlying rhythm is sinus rhythm with  an average heart rate of 73 beats per minute, a minimum heart rate of 54 beats per minute and a maximum heart rate of 121 beats per minute.   2. There are rare ventricular ectopy comprised of isolated VPCs   3. There are rare supraventricular ectopy comprised of isolated APCs   4. There is no evidence of significant bradyarrhythmia or advanced heart block. The longest R to R was 1.4 seconds.   5. Patient diary did not report any symptoms.       CURRENT ECG   No results found for this visit on 02/22/24.        CARDIOLOGY ASSESSMENT & PLAN     1. Coronary artery disease involving native coronary artery of native heart without angina pectoris  metoprolol tartrate (LOPRESSOR) 25 mg tablet      2. S/P PTCA (percutaneous transluminal coronary angioplasty)        3. Mixed dyslipidemia          CAD, S/P MI June 2020, s/p PCI D1 July 2020,S/P PCI of mid LAD 11/10/2023 with residual disease in left main and diagonal branches of LAD and in RCA  Mr. Mukul Roldan is well from cardiac perspective with no recent symptoms that suggest an decompensated heart failure.  His blood pressure is reasonably well-controlled.  He is on good medical regimen including moderate intensity statin.  He did not tolerate high intensity statin in the past.    -- At this time advising to continue current medications.  -- He will need to follow-up lipid profile within the next 6 months.  -- Regarding plan for the surgical implant in his back for I am okay with that if it is preferably done after May 2024.  I would recommend continuing the aspirin medication while holding Plavix 4 days before the surgery.  -- He will need routine preoperative testing prior to the surgery.  -- I am advising him to continue to work with his primary physician to optimize diabetes control.  -- I will plan to see him back in office in 6 months time but if he is feeling good in 6 months then appointment can be changed to 8 months to a year.  -- Dietary and medical  compliance are reinforced.  -- He will is advised  to report any concerning symptoms such as chest pain, shortness of breath, decline in exercise tolerance or presyncope/syncope.         INTERVAL HISTORY, HISTORY OF PRESENT ILLNESS & COMPREHENSIVE VISIT INFORMATION     Mukul Roldan is here for follow-up regarding his cardiac comorbidities which include: Coronary artery disease with history of MI and PCI in 2020, dyslipidemia and other comorbidities.  He was seen by me in November 2023 following his intervention to mid LAD.  He is here for follow-up visit accompanied with his wife.  He mentions that since last visit he has had no new diagnosis or hospitalizations or significant illnesses.  From a symptom perspective he mentions that he has been overall all right.  He denies any recent typical anginal-like chest pain or shortness of breath.  He reports that previously reported fatigue is much better.  He completed cardiac rehab and later joined the gym.  He has been dealing with chronic back pain and is considering implant for spinal cord stimulator which he thinks may be done sometime after May when he gets Medicare benefits.  He denies any orthopnea PND or pedal edema.  Denies any presyncope/syncope.  Mentions that blood sugars are better controlled since he has been put on Jardiance.  Mentions that at times he feels his heart rate becoming slow especially lying down.  He is also experienced occasional skipped heartbeats.    Functional capacity status:  Moderately decreased.    (Excellent- >10 METs; Good: (7-10 METs); Moderate (4-7 METs); Poor (<= 4 METs)    Any chronic stressors:  None   (feeling of poor health, financial problems, and social isolation etc).    Tobacco or alcohol dependence:  He does not smoke.  He does not drink.    Current cardiac medications:    Metoprolol titrate 12.5 mg twice daily, pravastatin 40 mg daily, aspirin 81 mg daily, clopidogrel 75 mg daily, Jardiance 10 mg daily    Blood  work 11/13/2023: Sodium 136 potassium 4.0 chloride 96 bicarb 26 BUN 14 creatinine 1.10 GFR 70  Glucose 311  Other blood work 10/18/2023:  Normal liver function test  High-sensitivity troponins negative x2  Hemoglobin 14.8 hematocrit 43.3 platelet count 144  Hemoglobin A1c 10.2 in August 2023  Lipid profile 11/6/2023 total cholesterol 130 triglycerides 122 HDL 50 calculated LDL 56    REVIEW OF SYSTEMS   Positive for:  As noted above in HPI  Negative for: All remaining as reviewed below and in HPI.    SYSTEM SYMPTOMS REVIEWED:  General--weight change, fever, night sweats  Respiratory--cough, wheezing, shortness of breath, sputum production  Cardiovascular--chest pain, syncope, dyspnea on exertion, edema, decline in exercise tolerance, claudication   Gastrointestinal--persistent vomiting, diarrhea, abdominal distention, blood in stool   Muscular or skeletal--joint pain or swelling   Neurologic--headaches, syncope, abnormal movement  Hematologic--history of easy bruising and bleeding   Endocrine--thyroid enlargement, heat or cold intolerance, polyuria   Psychiatric--anxiety, depression     *-*-*-*-*-*-*-*-*-*-*-*-*-*-*-*-*-*-*-*-*-*-*-*-*-*-*-*-*-*-*-*-*-*-*-*-*-*-*-*-*-*-*-*-*-*-*-*-*-*-*-*-*-*-  VITAL SIGNS     CURRENT VITAL SIGNS:   Vitals:    02/22/24 1627   BP: 120/80   BP Location: Right arm   Patient Position: Sitting   Cuff Size: Standard   Pulse: 105   Weight: 73.1 kg (161 lb 3.2 oz)       BMI: Body mass index is 23.13 kg/m².    WEIGHTS:   Wt Readings from Last 25 Encounters:   02/22/24 73.1 kg (161 lb 3.2 oz)   02/09/24 73.3 kg (161 lb 9.6 oz)   11/14/23 72.6 kg (160 lb)   11/13/23 70.8 kg (156 lb)   11/10/23 73 kg (160 lb 15 oz)   11/08/23 74.4 kg (164 lb)   11/03/23 73.9 kg (163 lb)   10/27/23 73.9 kg (163 lb)   10/18/23 77.4 kg (170 lb 10.2 oz)   10/15/23 74.8 kg (165 lb)   09/26/23 74.8 kg (165 lb)   09/16/23 74.8 kg (165 lb)   08/02/23 73.9 kg (163 lb)   07/14/23 73.5 kg (162 lb)   06/02/23 73.5 kg (162  lb)   05/03/23 73.5 kg (162 lb)   05/02/23 74.7 kg (164 lb 9.6 oz)   06/21/22 72.7 kg (160 lb 3.2 oz)   05/26/22 74.4 kg (164 lb)   05/10/22 74.4 kg (164 lb)   04/26/22 74.5 kg (164 lb 3.2 oz)   10/13/21 73.9 kg (163 lb)   08/18/21 73.4 kg (161 lb 12.8 oz)   10/20/20 71.5 kg (157 lb 9.6 oz)   07/21/20 73.5 kg (162 lb)        *-*-*-*-*-*-*-*-*-*-*-*-*-*-*-*-*-*-*-*-*-*-*-*-*-*-*-*-*-*-*-*-*-*-*-*-*-*-*-*-*-*-*-*-*-*-*-*-*-*-*-*-*-*-  PHYSICAL EXAM     General Appearance:    Alert, cooperative, no distress, appears stated age   Head, Eyes, ENT:    No obvious abnormality, moist mucous mebranes.   Neck:   Supple, no carotid bruit or JVD   Back:     Symmetric, no curvature.   Lungs:     Respirations unlabored. Clear to auscultation bilaterally,    Chest wall:    No tenderness or deformity   Heart:    Regular rate and rhythm, S1 and S2 normal, no murmur, rub  or gallop.   Abdomen:     Soft, non-tender, No obvious masses, or organomegaly   Extremities:   Extremities warm, no cyanosis or edema    Skin:   No venostatic changes in lower extremities. Normal skin color, texture, and turgor. No rashes or lesions     *-*-*-*-*-*-*-*-*-*-*-*-*-*-*-*-*-*-*-*-*-*-*-*-*-*-*-*-*-*-*-*-*-*-*-*-*-*-*-*-*-*-*-*-*-*-*-*-*-*-*-*-*-*-  CURRENT MEDICATIONS LIST     Current Outpatient Medications:     aspirin 81 mg chewable tablet, Chew 1 tablet (81 mg total) daily, Disp: 30 tablet, Rfl: 11    clopidogrel (Plavix) 75 mg tablet, Take 1 tablet (75 mg total) by mouth daily, Disp: 90 tablet, Rfl: 2    co-enzyme Q-10 30 MG capsule, Take 30 mg by mouth daily, Disp: , Rfl:     Empagliflozin 25 MG TABS, Take 1 tablet (25 mg total) by mouth daily, Disp: 90 tablet, Rfl: 1    metFORMIN (GLUCOPHAGE) 1000 MG tablet, Take 1 tablet (1,000 mg total) by mouth 2 (two) times a day with meals, Disp: 180 tablet, Rfl: 1    metoprolol tartrate (LOPRESSOR) 25 mg tablet, Take 0.5 tablets (12.5 mg total) by mouth every 12 (twelve) hours, Disp: 90 tablet, Rfl: 3     "pravastatin (PRAVACHOL) 40 mg tablet, TAKE 1 TABLET BY MOUTH EVERY DAY, Disp: 90 tablet, Rfl: 1    sertraline (ZOLOFT) 100 mg tablet, Take 1.5 tablets (150 mg total) by mouth daily, Disp: 135 tablet, Rfl: 1    tobramycin-dexamethasone (TOBRADEX) ophthalmic suspension, Administer 1 drop into the left eye every 4 (four) hours while awake for 7 days, Disp: 5 mL, Rfl: 0       ALLERGIES     Allergies   Allergen Reactions    Rosuvastatin GI Intolerance       *-*-*-*-*-*-*-*-*-*-*-*-*-*-*-*-*-*-*-*-*-*-*-*-*-*-*-*-*-*-*-*-*-*-*-*-*-*-*-*-*-*-*-*-*-*-*-*-*-*-*-*-*-*-  LABORATORY DATA   No results found for: \"NA\"  Potassium   Date Value Ref Range Status   11/13/2023 4.0 3.5 - 5.3 mmol/L Final   11/06/2023 4.2 3.5 - 5.3 mmol/L Final   10/18/2023 3.9 3.5 - 5.3 mmol/L Final   04/14/2022 3.8 3.5 - 5.3 mmol/L Final   01/12/2022 4.0 3.5 - 5.3 mmol/L Final   08/26/2020 4.1 3.5 - 5.3 mmol/L Final     Chloride   Date Value Ref Range Status   11/13/2023 96 96 - 108 mmol/L Final   11/06/2023 101 96 - 108 mmol/L Final   10/18/2023 96 96 - 108 mmol/L Final   04/14/2022 100 98 - 110 mmol/L Final   01/12/2022 102 98 - 110 mmol/L Final   08/26/2020 103 98 - 110 mmol/L Final     Carbon Dioxide   Date Value Ref Range Status   04/14/2022 27 20 - 32 mmol/L Final   01/12/2022 21 20 - 32 mmol/L Final   08/26/2020 26 20 - 32 mmol/L Final     CO2   Date Value Ref Range Status   11/13/2023 26 21 - 32 mmol/L Final   11/06/2023 28 21 - 32 mmol/L Final   10/18/2023 28 21 - 32 mmol/L Final     BUN   Date Value Ref Range Status   11/13/2023 18 5 - 25 mg/dL Final   11/06/2023 18 5 - 25 mg/dL Final   10/18/2023 20 5 - 25 mg/dL Final   04/14/2022 19 7 - 25 mg/dL Final   01/12/2022 17 7 - 25 mg/dL Final   08/26/2020 17 7 - 25 mg/dL Final     Creatinine   Date Value Ref Range Status   11/13/2023 1.10 0.60 - 1.30 mg/dL Final     Comment:     Standardized to IDMS reference method   11/06/2023 0.92 0.60 - 1.30 mg/dL Final     Comment:     Standardized to IDMS " reference method   10/18/2023 1.03 0.60 - 1.30 mg/dL Final     Comment:     Standardized to IDMS reference method   04/14/2022 1.00 0.70 - 1.25 mg/dL Final     Comment:     For patients >49 years of age, the reference limit  for Creatinine is approximately 13% higher for people  identified as -American.   01/12/2022 0.95 0.70 - 1.25 mg/dL Final     Comment:     For patients >49 years of age, the reference limit  for Creatinine is approximately 13% higher for people  identified as -American.   08/26/2020 1.03 0.70 - 1.25 mg/dL Final     Comment:     For patients >49 years of age, the reference limit  for Creatinine is approximately 13% higher for people  identified as -American.     eGFR   Date Value Ref Range Status   11/13/2023 70 ml/min/1.73sq m Final   11/06/2023 87 ml/min/1.73sq m Final   10/18/2023 76 ml/min/1.73sq m Final     Calcium   Date Value Ref Range Status   11/13/2023 9.9 8.4 - 10.2 mg/dL Final   11/06/2023 9.1 8.4 - 10.2 mg/dL Final   10/18/2023 9.6 8.4 - 10.2 mg/dL Final   04/14/2022 9.7 8.6 - 10.3 mg/dL Final   01/12/2022 9.6 8.6 - 10.3 mg/dL Final   08/26/2020 9.2 8.6 - 10.3 mg/dL Final     AST   Date Value Ref Range Status   11/06/2023 16 13 - 39 U/L Final   10/18/2023 14 13 - 39 U/L Final   04/14/2022 20 10 - 35 U/L Final   01/12/2022 22 10 - 35 U/L Final   08/19/2021 18 5 - 45 U/L Final     Comment:     Specimen collection should occur prior to Sulfasalazine administration due to the potential for falsely depressed results.    08/26/2020 20 10 - 35 U/L Final     ALT   Date Value Ref Range Status   11/06/2023 26 7 - 52 U/L Final     Comment:     Specimen collection should occur prior to Sulfasalazine administration due to the potential for falsely depressed results.    10/18/2023 20 7 - 52 U/L Final     Comment:     Specimen collection should occur prior to Sulfasalazine administration due to the potential for falsely depressed results.    04/14/2022 31 9 - 46 U/L Final  "  01/12/2022 33 9 - 46 U/L Final   08/19/2021 29 12 - 78 U/L Final     Comment:     Specimen collection should occur prior to Sulfasalazine administration due to the potential for falsely depressed results.    08/26/2020 38 9 - 46 U/L Final     Alkaline Phosphatase   Date Value Ref Range Status   11/06/2023 55 34 - 104 U/L Final   10/18/2023 70 34 - 104 U/L Final   04/14/2022 69 35 - 144 U/L Final   01/12/2022 79 35 - 144 U/L Final   08/19/2021 82 46 - 116 U/L Final   08/26/2020 72 35 - 144 U/L Final     Magnesium   Date Value Ref Range Status   08/19/2021 2.1 1.6 - 2.6 mg/dL Final   07/13/2020 1.9 1.6 - 2.6 mg/dL Final     WBC   Date Value Ref Range Status   11/06/2023 6.87 4.31 - 10.16 Thousand/uL Final   10/18/2023 6.40 4.31 - 10.16 Thousand/uL Final   06/02/2023 8.59 4.31 - 10.16 Thousand/uL Final     Hemoglobin   Date Value Ref Range Status   11/06/2023 15.5 12.0 - 17.0 g/dL Final   10/18/2023 14.8 12.0 - 17.0 g/dL Final   06/02/2023 16.5 12.0 - 17.0 g/dL Final     Platelets   Date Value Ref Range Status   11/06/2023 146 (L) 149 - 390 Thousands/uL Final   10/18/2023 144 (L) 149 - 390 Thousands/uL Final   06/02/2023 129 (L) 149 - 390 Thousands/uL Final     PTT   Date Value Ref Range Status   10/18/2023 24 23 - 37 seconds Final     Comment:     Therapeutic Heparin Range =  60-90 seconds   07/13/2020 60 (H) 23 - 37 seconds Final     Comment:     Therapeutic Heparin Range =  60-90 seconds     INR   Date Value Ref Range Status   10/18/2023 0.96 0.84 - 1.19 Final   07/12/2020 0.90 0.84 - 1.19 Final     No results found for: \"CKMB\", \"DIGOXIN\"  No results found for: \"TSH\"  No results found for: \"CHOL\"   Hemoglobin A1C   Date Value Ref Range Status   02/09/2024 8.6 (A) 6.5 Final     Hgb A1c   Date Value Ref Range Status   04/14/2022 8.1 (H) <5.7 % of total Hgb Final     Comment:     For someone without known diabetes, a hemoglobin A1c  value of 6.5% or greater indicates that they may have   diabetes and this should be " "confirmed with a follow-up   test.    For someone with known diabetes, a value <7% indicates   that their diabetes is well controlled and a value   greater than or equal to 7% indicates suboptimal   control. A1c targets should be individualized based on   duration of diabetes, age, comorbid conditions, and   other considerations.    Currently, no consensus exists regarding use of  hemoglobin A1c for diagnosis of diabetes for children.         No results found for: \"BLOODCX\", \"SPUTUMCULTUR\", \"GRAMSTAIN\", \"URINECX\", \"WOUNDCULT\", \"BODYFLUIDCUL\", \"MRSACULTURE\", \"INFLUAPCR\", \"INFLUBPCR\", \"RSVPCR\", \"LEGIONELLAUR\", \"CDIFFTOXINB\"    *-*-*-*-*-*-*-*-*-*-*-*-*-*-*-*-*-*-*-*-*-*-*-*-*-*-*-*-*-*-*-*-*-*-*-*-*-*-*-*-*-*-*-*-*-*-*-*-*-*-*-*-*-*-  PREVIOUS CARDIOLOGY & RADIOLOGY TEST RESULTS   I have personally reviewed pertinent results of cardiovascular tests noted below.    Results for orders placed during the hospital encounter of 21    Echo complete with contrast if indicated    [place] [address]    Transthoracic Echocardiogram  2D, M-mode, Doppler, and Color Doppler    Study date:  11-May-2021    Patient: CHENTE ALEMAN  MR number: [id number]  Account number: [id number]  : 1960  Age: 60 years  Gender: Male  Status: Outpatient  Location: [facility]  Height: 70 in  Weight: 149.6 lb  BP: 105/ 68 mmHg    Indications: CAD    Diagnoses: I25.10 - Atherosclerotic heart disease of native coronary artery without angina pectoris    Sonographer:  JAMAL Brady  Interpreting Physician:  Faye Mar MD  Referring Physician:  Faye Mar MD  Group:  [facility]    IMPRESSIONS:  Technical quality: Good  Cardiac rhythm: Sinus    1. Mild concentric left ventricular hypertrophy, normal left ventricular systolic and diastolic function. EF around 64%.  2. Normal right ventricular size and systolic function.  3. Normal " left and right atrial size.  4. Aortic valve sclerosis, no aortic valve stenosis or regurgitation.  5. Mild mitral valve sclerosis, trace to mild mitral valve regurgitation.  6. Trace tricuspid valve regurgitation.  7. No obvious pulmonary hypertension.  8. No pericardial effusion.    Compared to report of previous echocardiogram from July 13, 2020 there is overall no significant change.    SUMMARY    LEFT VENTRICLE:  Normal left ventricular cavity size, mild concentric left ventricular hypertrophy, normal left ventricular systolic function, normal regional wall motion. Ejection fraction is estimated as around 64%. Normal left ventricular diastolic  function.    RIGHT VENTRICLE:  Normal right ventricular size and systolic function. Normal estimated right ventricular systolic pressure, 17 mmHg.    LEFT ATRIUM:  Normal left atrial cavity size. Intact interatrial septum.    RIGHT ATRIUM:  Normal right atrial cavity size.    MITRAL VALVE:  Mild mitral valve leaflet thickening and sclerosis, adequate leaflet mobility. Trace to mild mitral valve regurgitation    AORTIC VALVE:  Tricuspid aortic valve with mild sclerosis, adequate cuspal separation. No aortic valve stenosis or regurgitation.    TRICUSPID VALVE:  Normal tricuspid valve morphology and leaflet separation. Trace tricuspid valve regurgitation.    PULMONIC VALVE:  No significant pulmonic valve regurgitation.    AORTA:  Aortic root and proximal ascending aorta are normal in size on 2D imaging.    IVC, HEPATIC VEINS:  Inferior vena cava is normal in size and demonstrates appropriate respiratory phasic changes in diameter.    PERICARDIUM:  No pericardial effusion.    HISTORY: PRIOR HISTORY: Risk factors: diabetes.    PROCEDURE: The study was performed in the [facility]. This was a routine study. The transthoracic approach was used. The study included complete 2D imaging, M-mode, complete spectral Doppler, and color Doppler. The heart rate was 79  bpm, at the  start of the study. Images were obtained from the parasternal, apical, subcostal, and suprasternal notch acoustic windows. Image quality was adequate.    LEFT VENTRICLE: Normal left ventricular cavity size, mild concentric left ventricular hypertrophy, normal left ventricular systolic function, normal regional wall motion. Ejection fraction is estimated as around 64%. Normal left  ventricular diastolic function.    RIGHT VENTRICLE: Normal right ventricular size and systolic function. Normal estimated right ventricular systolic pressure, 17 mmHg.    LEFT ATRIUM: Normal left atrial cavity size. Intact interatrial septum.    RIGHT ATRIUM: Normal right atrial cavity size.    MITRAL VALVE: Mild mitral valve leaflet thickening and sclerosis, adequate leaflet mobility. Trace to mild mitral valve regurgitation    AORTIC VALVE: Tricuspid aortic valve with mild sclerosis, adequate cuspal separation. No aortic valve stenosis or regurgitation.    TRICUSPID VALVE: Normal tricuspid valve morphology and leaflet separation. Trace tricuspid valve regurgitation.    PULMONIC VALVE: No significant pulmonic valve regurgitation.    PERICARDIUM: No pericardial effusion.    AORTA: Aortic root and proximal ascending aorta are normal in size on 2D imaging.    SYSTEMIC VEINS: IVC: Inferior vena cava is normal in size and demonstrates appropriate respiratory phasic changes in diameter.    SYSTEM MEASUREMENT TABLES    2D  %FS: 36.9 %  Ao Diam: 2.86 cm  EDV(Teich): 46.93 ml  EF(Teich): 67.96 %  ESV(Teich): 15.04 ml  IVSd: 1.07 cm  LA Area: 15.62 cm2  LA Diam: 3.66 cm  LVEDV MOD A4C: 82.01 ml  LVEF MOD A4C: 63.87 %  LVESV MOD A4C: 29.63 ml  LVIDd: 3.39 cm  LVIDs: 2.14 cm  LVLd A4C: 8.44 cm  LVLs A4C: 6.89 cm  LVPWd: 1.14 cm  RA Area: 12.23 cm2  RVIDd: 2.91 cm  SV MOD A4C: 52.38 ml  SV(Teich): 31.9 ml    CW  TR Vmax: 1.38 m/s  TR maxP.57 mmHg    MM  TAPSE: 2.3 cm    PW  E' Sept: 0.08 m/s  E/E' Sept: 8.32  MV A Tha: 0.76 m/s  MV Dec [place]: 3.87  m/s2  MV DecT: 181.59 ms  MV E Tha: 0.7 m/s  MV E/A Ratio: 0.92  MV PHT: 52.66 ms  MVA By PHT: 4.18 cm2    [employer] Accredited Echocardiography Laboratory    Prepared and electronically signed by    Faye Mar MD  Signed 11-May-2021 12:51:04    No results found for this or any previous visit.    No results found for this or any previous visit.    No results found for this or any previous visit.    Cardiac catheterization    1st Diag-1 lesion is 50% stenosed.    Prox RCA lesion is 50% stenosed.    Ost LM lesion is 40% stenosed.    Mid LAD lesion is 90% stenosed.    2v CAD, mild LM        *-*-*-*-*-*-*-*-*-*-*-*-*-*-*-*-*-*-*-*-*-*-*-*-*-*-*-*-*-*-*-*-*-*-*-*-*-*-*-*-*-*-*-*-*-*-*-*-*-*-*-*-*-*-  SIGNATURES:   @SIG@   Faye Mar MD; A    *-*-*-*-*-*-*-*-*-*-*-*-*-*-*-*-*-*-*-*-*-*-*-*-*-*-*-*-*-*-*-*-*-*-*-*-*-*-*-*-*-*-*-*-*-*-*-*-*-*-*-*-*-*-  PAST MEDICAL HISTORY:  Past Medical History:   Diagnosis Date    Allergic     Anxiety     Arthritis     Carpal tunnel syndrome, bilateral     Coronary artery disease 7/13/20    Depression     Diabetes mellitus (HCC)     GERD (gastroesophageal reflux disease)     Heart attack (HCC)     Hyperlipidemia     Hypertension     Myocardial infarction (HCC) 7/13/20    OCD (obsessive compulsive disorder)     S/P PTCA (percutaneous transluminal coronary angioplasty)     elective cath 11/10/23 w/ PCI/JENYN - mLAD    PAST SURGICAL HISTORY:  Past Surgical History:   Procedure Laterality Date    BACK SURGERY      CARDIAC CATHETERIZATION Left 11/10/2023    Procedure: Cardiac Left Heart Cath;  Surgeon: Gregorio Ojeda MD;  Location: [facility];  Service: Cardiology    CARDIAC SURGERY  Stent    CARPECTOMY HAND Bilateral     KNEE SURGERY Right          FAMILY HISTORY:  Family History   Problem Relation Age of Onset    Alcohol abuse Mother     Arthritis Mother     Alcohol abuse Father     Coronary artery disease Father     COPD Father     SOCIAL HISTORY:  Social  History     Tobacco Use   Smoking Status Never   Smokeless Tobacco Never      Social History     Substance and Sexual Activity   Alcohol Use Not Currently     Social History     Substance and Sexual Activity   Drug Use Never    [unfilled]     *-*-*-*-*-*-*-*-*-*-*-*-*-*-*-*-*-*-*-*-*-*-*-*-*-*-*-*-*-*-*-*-*-*-*-*-*-*-*-*-*-*-*-*-*-*-*-*-*-*-*-*-*-*  ALLERGIES:  Allergies   Allergen Reactions    Rosuvastatin GI Intolerance    CURRENT SCHEDULED MEDICATIONS:    Current Outpatient Medications:     aspirin 81 mg chewable tablet, Chew 1 tablet (81 mg total) daily, Disp: 30 tablet, Rfl: 11    clopidogrel (Plavix) 75 mg tablet, Take 1 tablet (75 mg total) by mouth daily, Disp: 90 tablet, Rfl: 2    co-enzyme Q-10 30 MG capsule, Take 30 mg by mouth daily, Disp: , Rfl:     Empagliflozin 25 MG TABS, Take 1 tablet (25 mg total) by mouth daily, Disp: 90 tablet, Rfl: 1    metFORMIN (GLUCOPHAGE) 1000 MG tablet, Take 1 tablet (1,000 mg total) by mouth 2 (two) times a day with meals, Disp: 180 tablet, Rfl: 1    metoprolol tartrate (LOPRESSOR) 25 mg tablet, Take 0.5 tablets (12.5 mg total) by mouth every 12 (twelve) hours, Disp: 90 tablet, Rfl: 3    pravastatin (PRAVACHOL) 40 mg tablet, TAKE 1 TABLET BY MOUTH EVERY DAY, Disp: 90 tablet, Rfl: 1    sertraline (ZOLOFT) 100 mg tablet, Take 1.5 tablets (150 mg total) by mouth daily, Disp: 135 tablet, Rfl: 1    tobramycin-dexamethasone (TOBRADEX) ophthalmic suspension, Administer 1 drop into the left eye every 4 (four) hours while awake for 7 days, Disp: 5 mL, Rfl: 0     *-*-*-*-*-*-*-*-*-*-*-*-*-*-*-*-*-*-*-*-*-*-*-*-*-*-*-*-*-*-*-*-*-*-*-*-*-*-*-*-*-*-*-*-*-*-*-*-*-*-*-*-*-*

## 2024-02-22 NOTE — ASSESSMENT & PLAN NOTE
Mr. Mukul Roldan is well from cardiac perspective with no recent symptoms that suggest an decompensated heart failure.  His blood pressure is reasonably well-controlled.  He is on good medical regimen including moderate intensity statin.  He did not tolerate high intensity statin in the past.    -- At this time advising to continue current medications.  -- He will need to follow-up lipid profile within the next 6 months.  -- Regarding plan for the surgical implant in his back for I am okay with that if it is preferably done after May 2024.  I would recommend continuing the aspirin medication while holding Plavix 4 days before the surgery.  -- He will need routine preoperative testing prior to the surgery.  -- I am advising him to continue to work with his primary physician to optimize diabetes control.  -- I will plan to see him back in office in 6 months time but if he is feeling good in 6 months then appointment can be changed to 8 months to a year.  -- Dietary and medical compliance are reinforced.  -- He will is advised  to report any concerning symptoms such as chest pain, shortness of breath, decline in exercise tolerance or presyncope/syncope.

## 2024-02-22 NOTE — PATIENT INSTRUCTIONS
CARDIOLOGY ASSESSMENT & PLAN     1. Coronary artery disease involving native coronary artery of native heart without angina pectoris  metoprolol tartrate (LOPRESSOR) 25 mg tablet      2. S/P PTCA (percutaneous transluminal coronary angioplasty)        3. Mixed dyslipidemia          CAD, S/P MI June 2020, s/p PCI D1 July 2020,S/P PCI of mid LAD 11/10/2023 with residual disease in left main and diagonal branches of LAD and in RCA  Mr. Mukul Roldan is well from cardiac perspective with no recent symptoms that suggest an decompensated heart failure.  His blood pressure is reasonably well-controlled.  He is on good medical regimen including moderate intensity statin.  He did not tolerate high intensity statin in the past.    -- At this time advising to continue current medications.  -- He will need to follow-up lipid profile within the next 6 months.  -- Regarding plan for the surgical implant in his back for I am okay with that if it is preferably done after May 2024.  I would recommend continuing the aspirin medication while holding Plavix 4 days before the surgery.  -- He will need routine preoperative testing prior to the surgery.  -- I am advising him to continue to work with his primary physician to optimize diabetes control.  -- I will plan to see him back in office in 6 months time but if he is feeling good in 6 months then appointment can be changed to 8 months to a year.  -- Dietary and medical compliance are reinforced.  -- He will is advised  to report any concerning symptoms such as chest pain, shortness of breath, decline in exercise tolerance or presyncope/syncope.

## 2024-02-26 DIAGNOSIS — F32.9 MAJOR DEPRESSIVE DISORDER, REMISSION STATUS UNSPECIFIED, UNSPECIFIED WHETHER RECURRENT: ICD-10-CM

## 2024-02-26 RX ORDER — SERTRALINE HYDROCHLORIDE 100 MG/1
TABLET, FILM COATED ORAL
Qty: 135 TABLET | Refills: 1 | Status: SHIPPED | OUTPATIENT
Start: 2024-02-26

## 2024-03-04 NOTE — TELEPHONE ENCOUNTER
Requested medication(s) are due for refill today: Yes  Patient has already received a courtesy refill: No  Other reason request has been forwarded to provider: failed protocol
36.4

## 2024-05-09 ENCOUNTER — TELEPHONE (OUTPATIENT)
Dept: PAIN MEDICINE | Facility: CLINIC | Age: 64
End: 2024-05-09

## 2024-05-09 NOTE — TELEPHONE ENCOUNTER
Caller: Mukul    Doctor: Fidelina    Reason for call: calling with [employer] a/b info ID # [id number]    [facility] # [id number]

## 2024-05-10 ENCOUNTER — OFFICE VISIT (OUTPATIENT)
Dept: FAMILY MEDICINE CLINIC | Facility: CLINIC | Age: 64
End: 2024-05-10
Payer: MEDICARE

## 2024-05-10 VITALS
HEIGHT: 70 IN | TEMPERATURE: 97.6 F | OXYGEN SATURATION: 99 % | DIASTOLIC BLOOD PRESSURE: 72 MMHG | BODY MASS INDEX: 23.34 KG/M2 | SYSTOLIC BLOOD PRESSURE: 120 MMHG | WEIGHT: 163 LBS | HEART RATE: 80 BPM

## 2024-05-10 DIAGNOSIS — D69.6 PLATELETS DECREASED (HCC): ICD-10-CM

## 2024-05-10 DIAGNOSIS — Z00.00 MEDICARE WELCOME EXAM: ICD-10-CM

## 2024-05-10 DIAGNOSIS — I25.119 ATHEROSCLEROSIS OF NATIVE CORONARY ARTERY OF NATIVE HEART WITH ANGINA PECTORIS (HCC): ICD-10-CM

## 2024-05-10 DIAGNOSIS — F32.4 MAJOR DEPRESSIVE DISORDER WITH SINGLE EPISODE, IN PARTIAL REMISSION (HCC): Primary | ICD-10-CM

## 2024-05-10 DIAGNOSIS — E11.9 TYPE 2 DIABETES MELLITUS WITHOUT COMPLICATION, WITHOUT LONG-TERM CURRENT USE OF INSULIN (HCC): ICD-10-CM

## 2024-05-10 LAB — SL AMB POCT HEMOGLOBIN AIC: 9.8 (ref ?–6.5)

## 2024-05-10 PROCEDURE — 99214 OFFICE O/P EST MOD 30 MIN: CPT | Performed by: FAMILY MEDICINE

## 2024-05-10 PROCEDURE — G0402 INITIAL PREVENTIVE EXAM: HCPCS | Performed by: FAMILY MEDICINE

## 2024-05-10 PROCEDURE — 83036 HEMOGLOBIN GLYCOSYLATED A1C: CPT | Performed by: FAMILY MEDICINE

## 2024-05-10 RX ORDER — GLIMEPIRIDE 2 MG/1
2 TABLET ORAL
Qty: 90 TABLET | Refills: 1 | Status: SHIPPED | OUTPATIENT
Start: 2024-05-10

## 2024-05-10 NOTE — PROGRESS NOTES
Assessment and Plan:     Problem List Items Addressed This Visit          Cardiovascular and Mediastinum    Atherosclerosis of native coronary artery of native heart with angina pectoris (HCC)    Relevant Orders    Comprehensive metabolic panel    CBC and differential    Albumin / creatinine urine ratio    Lipid Panel with Direct LDL reflex    TSH, 3rd generation with Free T4 reflex       Endocrine    Diabetes (HCC)    Relevant Medications    glimepiride (AMARYL) 2 mg tablet    Other Relevant Orders    POCT hemoglobin A1c (Completed)    Comprehensive metabolic panel    CBC and differential    Albumin / creatinine urine ratio    Lipid Panel with Direct LDL reflex    TSH, 3rd generation with Free T4 reflex       Behavioral Health    Major depressive disorder with single episode, in partial remission (HCC) - Primary    Relevant Orders    Comprehensive metabolic panel    CBC and differential    Albumin / creatinine urine ratio    Lipid Panel with Direct LDL reflex    TSH, 3rd generation with Free T4 reflex       Blood    Platelets decreased (HCC)    Relevant Orders    Comprehensive metabolic panel    CBC and differential    Albumin / creatinine urine ratio    Lipid Panel with Direct LDL reflex    TSH, 3rd generation with Free T4 reflex       Other    Medicare welcome exam    Relevant Orders    Comprehensive metabolic panel    CBC and differential    Albumin / creatinine urine ratio    Lipid Panel with Direct LDL reflex    TSH, 3rd generation with Free T4 reflex     62 y/o male with: DM2, CAD, MDD, thrombocytopenia along with Welcome to Medicare Visit. Will continue meds. And add Glimepiride. Discussed supportive care and return parameters.     Regarding Welcome to Medicare visit. Discussed various safety and health maintenance issues including healthy diet like the Mediterranean diet, exercise, ample sleep, stress reduction, and healthy weight as tolerated. Discussed supportive care and return parameters.         Preventive health issues were discussed with patient, and age appropriate screening tests were ordered as noted in patient's After Visit Summary.  Personalized health advice and appropriate referrals for health education or preventive services given if needed, as noted in patient's After Visit Summary.     History of Present Illness:     Patient presents for a Medicare Wellness Visit    Patient is a 64 y/o male who presents for follow-up on DM2, CAD, MDD, thrombocytopenia. Pt admits being stable on meds. And denies acute complaints no fevers chills nausea or vomiting. Pt also here for Medicare welcome visit admits being active eats and sleeps well.       Patient Care Team:  Abdiaziz Gayle MD as PCP - General (Family Medicine)  Sim Sawyer MD     Review of Systems:     Review of Systems   Constitutional: Negative.    HENT: Negative.     Eyes: Negative.    Respiratory: Negative.     Cardiovascular: Negative.    Gastrointestinal: Negative.    Endocrine: Negative.    Genitourinary: Negative.    Musculoskeletal: Negative.    Allergic/Immunologic: Negative.    Neurological: Negative.    Hematological: Negative.    Psychiatric/Behavioral: Negative.     All other systems reviewed and are negative.     Problem List:     Patient Active Problem List   Diagnosis    Trigger finger, left index finger    Labral tear of long head of biceps tendon, right, initial encounter    Diabetes (HCC)    Mixed dyslipidemia    History of percutaneous coronary intervention    CAD, S/P MI June 2020, s/p PCI D1 July 2020,S/P PCI of mid LAD 11/10/2023 with residual disease in left main and diagonal branches of LAD and in RCA    Chronic fatigue    SOB (shortness of breath)    Lumbar radiculopathy    Allergic rhinitis    Allergic rhinitis due to pollen    Anxiety attack    Depression    Gastroesophageal reflux disease without esophagitis    Generalized osteoarthritis    Hyperlipidemia    Lower back pain    Obsessive compulsive  disorder    Pure hypercholesterolemia    Lumbosacral spondylosis without myelopathy    Lumbar spondylosis    At risk for medication noncompliance    Major depressive disorder with single episode, in partial remission (HCC)    Platelets decreased (HCC)    Atherosclerosis of native coronary artery of native heart with angina pectoris (HCC)    Medicare welcome exam      Past Medical and Surgical History:     Past Medical History:   Diagnosis Date    Allergic     Anxiety     Arthritis     Carpal tunnel syndrome, bilateral     Coronary artery disease 7/13/20    Depression     Diabetes mellitus (HCC)     GERD (gastroesophageal reflux disease)     Heart attack (HCC)     Hyperlipidemia     Hypertension     Myocardial infarction (HCC) 7/13/20    OCD (obsessive compulsive disorder)     S/P PTCA (percutaneous transluminal coronary angioplasty)     elective cath 11/10/23 w/ PCI/JENNY - mLAD     Past Surgical History:   Procedure Laterality Date    BACK SURGERY      CARDIAC CATHETERIZATION Left 11/10/2023    Procedure: Cardiac Left Heart Cath;  Surgeon: Gregorio Ojeda MD;  Location: [facility];  Service: Cardiology    CARDIAC SURGERY  Stent    CARPECTOMY HAND Bilateral     KNEE SURGERY Right       Family History:     Family History   Problem Relation Age of Onset    Alcohol abuse Mother     Arthritis Mother     Alcohol abuse Father     Coronary artery disease Father     COPD Father       Social History:     Social History     Socioeconomic History    Marital status: /Civil Union     Spouse name: None    Number of children: None    Years of education: None    Highest education level: None   Occupational History    Occupation: retired   Tobacco Use    Smoking status: Never    Smokeless tobacco: Never   Vaping Use    Vaping status: Never Used   Substance and Sexual Activity    Alcohol use: Not Currently    Drug use: Never    Sexual activity: Yes     Partners: Female     Birth control/protection: Male Sterilization    Other Topics Concern    None   Social History Narrative    None     Social Determinants of Health     Financial Resource Strain: Not on file   Food Insecurity: Not on file   Transportation Needs: Not on file   Physical Activity: Not on file   Stress: Not on file   Social Connections: Not on file   Intimate Partner Violence: Not on file   Housing Stability: Not on file      Medications and Allergies:     Current Outpatient Medications   Medication Sig Dispense Refill    aspirin 81 mg chewable tablet Chew 1 tablet (81 mg total) daily 30 tablet 11    clopidogrel (Plavix) 75 mg tablet Take 1 tablet (75 mg total) by mouth daily 90 tablet 2    co-enzyme Q-10 30 MG capsule Take 30 mg by mouth daily      Empagliflozin 25 MG TABS Take 1 tablet (25 mg total) by mouth daily 90 tablet 1    glimepiride (AMARYL) 2 mg tablet Take 1 tablet (2 mg total) by mouth daily with breakfast 90 tablet 1    metFORMIN (GLUCOPHAGE) 1000 MG tablet Take 1 tablet (1,000 mg total) by mouth 2 (two) times a day with meals 180 tablet 1    metoprolol tartrate (LOPRESSOR) 25 mg tablet Take 0.5 tablets (12.5 mg total) by mouth every 12 (twelve) hours 90 tablet 3    pravastatin (PRAVACHOL) 40 mg tablet TAKE 1 TABLET BY MOUTH EVERY DAY 90 tablet 1    sertraline (ZOLOFT) 100 mg tablet TAKE 1.5 TABLETS (150MG TOTAL) BY MOUTH DAILY 135 tablet 1     No current facility-administered medications for this visit.     Allergies   Allergen Reactions    Rosuvastatin GI Intolerance      Immunizations:     Immunization History   Administered Date(s) Administered    COVID-19 PFIZER VACCINE 0.3 ML IM 07/22/2021, 08/12/2021    Tdap 09/16/2023      Health Maintenance:         Topic Date Due    Hepatitis C Screening  Never done    HIV Screening  Never done    Colorectal Cancer Screening  10/25/2026         Topic Date Due    Pneumococcal Vaccine: Pediatrics (0 to 5 Years) and At-Risk Patients (6 to 64 Years) (1 of 2 - PCV) Never done    COVID-19 Vaccine (3 - 2023-24  season) 09/01/2023      Medicare Screening Tests and Risk Assessments:     Mukul is here for his Welcome to Medicare visit. Last Medicare Wellness visit information reviewed, patient interviewed and updates made to the record today.      Health Risk Assessment:   Patient rates overall health as good. Patient feels that their physical health rating is same. Patient is satisfied with their life. Eyesight was rated as same. Hearing was rated as same. Patient feels that their emotional and mental health rating is same. Patients states they are never, rarely angry. Patient states they are never, rarely unusually tired/fatigued. Pain experienced in the last 7 days has been some. Patient's pain rating has been 9/10. Patient states that he has experienced no weight loss or gain in last 6 months.     Depression Screening:   PHQ-9 Score: 0      Fall Risk Screening:   In the past year, patient has experienced: history of falling in past year    Number of falls: 1  Injured during fall?: No    Feels unsteady when standing or walking?: No    Worried about falling?: No      Home Safety:  Patient does not have trouble with stairs inside or outside of their home. Patient has working smoke alarms and has working carbon monoxide detector. Home safety hazards include: none.     Nutrition:   Current diet is Diabetic.     Medications:   Patient is currently taking over-the-counter supplements. OTC medications include: MVI, CoQ10. Patient is able to manage medications.     Activities of Daily Living (ADLs)/Instrumental Activities of Daily Living (IADLs):   Walk and transfer into and out of bed and chair?: Yes  Dress and groom yourself?: Yes    Bathe or shower yourself?: Yes    Feed yourself? Yes  Do your laundry/housekeeping?: Yes  Manage your money, pay your bills and track your expenses?: Yes  Make your own meals?: Yes    Do your own shopping?: Yes    Previous Hospitalizations:   Any hospitalizations or ED visits within the last 12  "months?: Yes    How many hospitalizations have you had in the last year?: 1-2    Hospitalization Comments: Cardiac cath nov. 2023    Advance Care Planning:   Living will: Yes    Durable POA for healthcare: Yes    Advanced directive: Yes      Cognitive Screening:   Provider or family/friend/caregiver concerned regarding cognition?: No    PREVENTIVE SCREENINGS      Cardiovascular Screening:    General: Screening Not Indicated and History Lipid Disorder      Diabetes Screening:     General: Screening Not Indicated and History Diabetes      Colorectal Cancer Screening:     General: Screening Current      Prostate Cancer Screening:    General: Screening Current      Osteoporosis Screening:    General: Screening Not Indicated      Abdominal Aortic Aneurysm (AAA) Screening:        General: Screening Not Indicated      Lung Cancer Screening:     General: Screening Not Indicated      Hepatitis C Screening:    General: Screening Not Indicated    Screening, Brief Intervention, and Referral to Treatment (SBIRT)    Screening      Single Item Drug Screening:  How often have you used an illegal drug (including marijuana) or a prescription medication for non-medical reasons in the past year? never    Single Item Drug Screen Score: 0  Interpretation: Negative screen for possible drug use disorder    No results found.     Physical Exam:     /72 (BP Location: Right arm, Patient Position: Sitting, Cuff Size: Standard)   Pulse 80   Temp 97.6 °F (36.4 °C) (Temporal)   Ht 5' 10\" (1.778 m)   Wt 73.9 kg (163 lb)   SpO2 99%   BMI 23.39 kg/m²     Physical Exam  Vitals reviewed.   Constitutional:       General: He is not in acute distress.     Appearance: He is well-developed. He is not diaphoretic.   HENT:      Head: Normocephalic and atraumatic.      Right Ear: External ear normal.      Left Ear: External ear normal.      Nose: Nose normal.   Eyes:      General: No scleral icterus.        Right eye: No discharge.         Left " eye: No discharge.      Conjunctiva/sclera: Conjunctivae normal.      Pupils: Pupils are equal, round, and reactive to light.   Neck:      Thyroid: No thyromegaly.      Trachea: No tracheal deviation.   Cardiovascular:      Rate and Rhythm: Normal rate and regular rhythm.      Heart sounds: Normal heart sounds. No murmur heard.     No friction rub.   Pulmonary:      Effort: Pulmonary effort is normal. No respiratory distress.      Breath sounds: Normal breath sounds. No stridor. No wheezing or rales.   Abdominal:      General: There is no distension.      Palpations: Abdomen is soft. There is no mass.      Tenderness: There is no abdominal tenderness. There is no guarding or rebound.   Musculoskeletal:         General: Normal range of motion.      Cervical back: Normal range of motion and neck supple.   Lymphadenopathy:      Cervical: No cervical adenopathy.   Skin:     General: Skin is warm.   Neurological:      Mental Status: He is alert and oriented to person, place, and time.      Cranial Nerves: No cranial nerve deficit.   Psychiatric:         Behavior: Behavior normal.         Thought Content: Thought content normal.         Judgment: Judgment normal.          Abdiaziz Gayle MD

## 2024-05-15 ENCOUNTER — OFFICE VISIT (OUTPATIENT)
Dept: PAIN MEDICINE | Facility: CLINIC | Age: 64
End: 2024-05-15
Payer: MEDICARE

## 2024-05-15 VITALS — HEIGHT: 70 IN | WEIGHT: 163 LBS | BODY MASS INDEX: 23.34 KG/M2

## 2024-05-15 DIAGNOSIS — M47.817 LUMBOSACRAL SPONDYLOSIS WITHOUT MYELOPATHY: ICD-10-CM

## 2024-05-15 DIAGNOSIS — M48.061 SPINAL STENOSIS OF LUMBAR REGION, UNSPECIFIED WHETHER NEUROGENIC CLAUDICATION PRESENT: ICD-10-CM

## 2024-05-15 DIAGNOSIS — M54.16 LUMBAR RADICULITIS: Primary | ICD-10-CM

## 2024-05-15 PROCEDURE — G2211 COMPLEX E/M VISIT ADD ON: HCPCS | Performed by: ANESTHESIOLOGY

## 2024-05-15 PROCEDURE — 99214 OFFICE O/P EST MOD 30 MIN: CPT | Performed by: ANESTHESIOLOGY

## 2024-05-15 NOTE — PROGRESS NOTES
Assessment:  1. Lumbar radiculitis    2. Spinal stenosis of lumbar region, unspecified whether neurogenic claudication present    3. Lumbosacral spondylosis without myelopathy         Patient presenting for follow-up visit.  He has a history of with chronic low back and bilateral leg cramping and numbness for 2+ years.    Pain is likely associated with multiple generators including lumbar spondylosis, lumbar spinal stenosis, lumbar radiculitis.  Symptoms are accompanied by pain 8/10 on the pain scale with inability to participate in IADLs for >6 weeks.  Denies any bowel or bladder symptoms, saddle anesthesia.     Patient has previously tried chiropractic therapy and massage therapy. Patient was previously referred to physical therapy but states that he is financially unable to attend 80 dollars per session. Has tried taking NSAIDs, baclofen with minimal benefit.  Patient also previously tried gabapentin at 100 mg with no benefit.     Reviewed and interpreted lumbar MRI.  This shows facet arthropathy throughout both lumbar spine with congenital narrowing and varying degrees of disc bulges resulting in moderate canal stenosis at L2-3 and L4-5.     Patient  previously had a L4-5 LESI with no reported benefit.  He then underwent successful diagnostic lumbar medial branch blocks and ultimately bilateral lumbar radiofrequency ablation.  After the lumbar radiofrequency ablation he still continues to note persistent low back pain symptoms     Plan:    I discussed that the options going forward would be to repeat another lumbar interlaminar steroid injection targeting L3-4 or L2-3, referring to chiropractic therapy or starting another membrane stabilizing medication trial.    We did previously discuss a spinal cord stimulator, however he is now back on dual antiplatelet therapy which would contraindicate SCS as an option in terms of risk reduction ratio.    Patient opted to start trial of Lyrica-will prescribe 50 mg 3 times  daily.    Will place referral to chiropractic therapy.    He does note occasionally that his legs give out.  If he continues to have motor symptoms of the lower extremities, I did recommend referring him to consult with the spine surgeon.    Patient will follow-up in 2 months for reevaluation.     Patient does want to reconsider epidural steroid injection but has a cardiac catheterization planned next Friday.  He will let us know the results of that catheterization if he will need to be placed on prescription anticoagulation.     Reviewed external notes from cardiology, primary care physician,  in regards to current and prior treatments tried.     Reviewed pertinent laboratory studies, specifically renal function, hemoglobin A1c, CBC, coagulation studies, prior to initiating the recommended medication therapies/interventional treatment options.     [place] Prescription Drug Monitoring Program report was reviewed and was appropriate      My impressions and treatment recommendations were discussed in detail with the patient who verbalized understanding and had no further questions.  Discharge instructions were provided. I personally saw and examined the patient and I agree with the above discussed plan of care.    Orders Placed This Encounter   Procedures    Ambulatory referral to Chiropractic     Standing Status:   Future     Standing Expiration Date:   5/15/2025     Referral Priority:   Routine     Referral Type:   Chiropractic     Referral Reason:   Specialty Services Required     Requested Specialty:   Chiropractic Medicine     Number of Visits Requested:   1     Expiration Date:   5/15/2025     No orders of the defined types were placed in this encounter.      History of Present Illness:  Mukul Roldan is a 63 y.o. male who presents for a follow up office visit in regards to Back Pain.   The patient’s current symptoms include continued low back pain associated with bilateral leg cramping symptoms.   Pain is rated 8 out of 10 described as a constant aching, sharp, cramping, pressure-like, shooting pain with pins-and-needles.    I have personally reviewed and/or updated the patient's past medical history, past surgical history, family history, social history, current medications, allergies, and vital signs today.     Review of Systems   Constitutional:  Negative for chills and fever.   HENT:  Negative for ear pain and sore throat.    Eyes:  Negative for pain and visual disturbance.   Respiratory:  Negative for cough and shortness of breath.    Cardiovascular:  Negative for chest pain and palpitations.   Gastrointestinal:  Negative for abdominal pain and vomiting.   Genitourinary:  Negative for dysuria and hematuria.   Musculoskeletal:  Positive for back pain, gait problem and myalgias. Negative for arthralgias.   Skin:  Negative for color change and rash.   Neurological:  Positive for weakness. Negative for seizures and syncope.   All other systems reviewed and are negative.      Patient Active Problem List   Diagnosis    Trigger finger, left index finger    Labral tear of long head of biceps tendon, right, initial encounter    Diabetes (HCC)    Mixed dyslipidemia    History of percutaneous coronary intervention    CAD, S/P MI June 2020, s/p PCI D1 July 2020,S/P PCI of mid LAD 11/10/2023 with residual disease in left main and diagonal branches of LAD and in RCA    Chronic fatigue    SOB (shortness of breath)    Lumbar radiculopathy    Allergic rhinitis    Allergic rhinitis due to pollen    Anxiety attack    Depression    Gastroesophageal reflux disease without esophagitis    Generalized osteoarthritis    Hyperlipidemia    Lower back pain    Obsessive compulsive disorder    Pure hypercholesterolemia    Lumbosacral spondylosis without myelopathy    Lumbar spondylosis    At risk for medication noncompliance    Major depressive disorder with single episode, in partial remission (HCC)    Platelets decreased ([facility])     Atherosclerosis of native coronary artery of native heart with angina pectoris (HCC)    Medicare welcome exam       Past Medical History:   Diagnosis Date    Allergic     Anxiety     Arthritis     Carpal tunnel syndrome, bilateral     Coronary artery disease 7/13/20    Depression     Diabetes mellitus (HCC)     GERD (gastroesophageal reflux disease)     Heart attack (HCC)     Hyperlipidemia     Hypertension     Myocardial infarction (HCC) 7/13/20    OCD (obsessive compulsive disorder)     S/P PTCA (percutaneous transluminal coronary angioplasty)     elective cath 11/10/23 w/ PCI/JENNY - mLAD       Past Surgical History:   Procedure Laterality Date    BACK SURGERY      CARDIAC CATHETERIZATION Left 11/10/2023    Procedure: Cardiac Left Heart Cath;  Surgeon: Gregorio Ojeda MD;  Location: [facility];  Service: Cardiology    CARDIAC SURGERY  Stent    CARPECTOMY HAND Bilateral     KNEE SURGERY Right        Family History   Problem Relation Age of Onset    Alcohol abuse Mother     Arthritis Mother     Alcohol abuse Father     Coronary artery disease Father     COPD Father        Social History     Occupational History    Occupation: retired   Tobacco Use    Smoking status: Never    Smokeless tobacco: Never   Vaping Use    Vaping status: Never Used   Substance and Sexual Activity    Alcohol use: Not Currently    Drug use: Never    Sexual activity: Yes     Partners: Female     Birth control/protection: Male Sterilization       Current Outpatient Medications on File Prior to Visit   Medication Sig    aspirin 81 mg chewable tablet Chew 1 tablet (81 mg total) daily    clopidogrel (Plavix) 75 mg tablet Take 1 tablet (75 mg total) by mouth daily    co-enzyme Q-10 30 MG capsule Take 30 mg by mouth daily    Empagliflozin 25 MG TABS Take 1 tablet (25 mg total) by mouth daily    glimepiride (AMARYL) 2 mg tablet Take 1 tablet (2 mg total) by mouth daily with breakfast    metFORMIN (GLUCOPHAGE) 1000 MG tablet Take 1 tablet  "(1,000 mg total) by mouth 2 (two) times a day with meals    metoprolol tartrate (LOPRESSOR) 25 mg tablet Take 0.5 tablets (12.5 mg total) by mouth every 12 (twelve) hours    pravastatin (PRAVACHOL) 40 mg tablet TAKE 1 TABLET BY MOUTH EVERY DAY    sertraline (ZOLOFT) 100 mg tablet TAKE 1.5 TABLETS (150MG TOTAL) BY MOUTH DAILY    [DISCONTINUED] atorvastatin (LIPITOR) 40 mg tablet Take 1 tablet (40 mg total) by mouth daily with dinner     No current facility-administered medications on file prior to visit.       Allergies   Allergen Reactions    Rosuvastatin GI Intolerance       Physical Exam:    Ht 5' 10\" (1.778 m)   Wt 73.9 kg (163 lb)   BMI 23.39 kg/m²     Constitutional:normal, well developed, well nourished, alert, in no distress and non-toxic and no overt pain behavior.  Eyes:anicteric  HEENT:grossly intact  Neck:supple, symmetric, trachea midline and no masses   Pulmonary:even and unlabored  Cardiovascular:No edema or pitting edema present  Skin:Normal without rashes or lesions and well hydrated  Psychiatric:Mood and affect appropriate  Neurologic: Motor function is grossly intact with no focal neurologic deficits   Musculoskeletal: Limited lumbar spine region.  Patient ambulating with slight forward flexion.    Imaging    "

## 2024-05-16 ENCOUNTER — TELEPHONE (OUTPATIENT)
Age: 64
End: 2024-05-16

## 2024-05-16 DIAGNOSIS — M54.16 LUMBAR RADICULOPATHY: Primary | ICD-10-CM

## 2024-05-16 RX ORDER — PREGABALIN 50 MG/1
50 CAPSULE ORAL 3 TIMES DAILY
Qty: 90 CAPSULE | Refills: 0 | Status: SHIPPED | OUTPATIENT
Start: 2024-05-16

## 2024-05-16 NOTE — TELEPHONE ENCOUNTER
Caller: beth Gilman    Doctor: Fidelina    Reason for call: pt called stating the pharmacy never received his script for lyrica.  50 mg 3 x per day according of OV notes.  Please send to [address] in [place]    Also please let the pt know when the script has been sent to the pharmacy    Call back#: [phone]

## 2024-05-17 ENCOUNTER — TELEPHONE (OUTPATIENT)
Age: 64
End: 2024-05-17

## 2024-05-17 NOTE — TELEPHONE ENCOUNTER
Caller: Mukul    Doctor: Chiropractor    Reason for call: Patient is being referred by Dr. Kitchen from [facility], and he wanted to double check that medicare would cover him to be seen for Spinal Stenosis- They thought they read that medicare will not cover that. They also have medicare part G. Patient is aware of first office visit would not be covered     Call back#: [phone]

## 2024-05-20 ENCOUNTER — CONSULT (OUTPATIENT)
Age: 64
End: 2024-05-20

## 2024-05-20 VITALS
HEIGHT: 70 IN | BODY MASS INDEX: 23.34 KG/M2 | DIASTOLIC BLOOD PRESSURE: 60 MMHG | SYSTOLIC BLOOD PRESSURE: 114 MMHG | WEIGHT: 163 LBS | OXYGEN SATURATION: 97 % | HEART RATE: 70 BPM

## 2024-05-20 DIAGNOSIS — E11.9 TYPE 2 DIABETES MELLITUS WITHOUT COMPLICATION, WITHOUT LONG-TERM CURRENT USE OF INSULIN (HCC): ICD-10-CM

## 2024-05-20 DIAGNOSIS — M48.061 SPINAL STENOSIS OF LUMBAR REGION, UNSPECIFIED WHETHER NEUROGENIC CLAUDICATION PRESENT: ICD-10-CM

## 2024-05-20 DIAGNOSIS — M99.03 SEGMENTAL DYSFUNCTION OF LUMBAR REGION: Primary | ICD-10-CM

## 2024-05-20 DIAGNOSIS — M99.04 SEGMENTAL DYSFUNCTION OF SACRAL REGION: ICD-10-CM

## 2024-05-20 DIAGNOSIS — M47.817 LUMBOSACRAL SPONDYLOSIS WITHOUT MYELOPATHY: ICD-10-CM

## 2024-05-20 DIAGNOSIS — M99.02 SEGMENTAL DYSFUNCTION OF THORACIC REGION: ICD-10-CM

## 2024-05-20 PROCEDURE — 98941 CHIROPRACT MANJ 3-4 REGIONS: CPT | Performed by: CHIROPRACTOR

## 2024-05-20 PROCEDURE — 99202 OFFICE O/P NEW SF 15 MIN: CPT | Performed by: CHIROPRACTOR

## 2024-05-20 NOTE — PROGRESS NOTES
Initial date of service:5/20/24    Diagnoses and all orders for this visit:    Segmental dysfunction of lumbar region    Lumbosacral spondylosis without myelopathy  -     Ambulatory referral to Chiropractic    Spinal stenosis of lumbar region, unspecified whether neurogenic claudication present  -     Ambulatory referral to Chiropractic    Segmental dysfunction of sacral region    Segmental dysfunction of thoracic region       ASSESSMENT:  No red flags, radiculopathy or neurologic deficit appreciated clinically. Pt's symptoms and exam findings consistent with mechanical lbp secondary to repetitive st/sp injury, exacerbated by postural/ergonomic stressors. Pt responded well to flexion biased stretches and manual mobilization of the affected spinal and myofascial tissues with increased ROM; trial of conservative tx recommended consisting of stretching, graded mobilization/manipulation of the affected spinal and myofascial jt dysfunction, postural/ergonomic education and take home stretches/exercises. If symptoms fail to improve with short trial of conservative care, appropriate imaging and referral will be coordinated.  Spent greater than 29 min c pt discussing hx, pe, ddx, tx options and reviewing notes/imaging    PROCEDURE CODES: 68544-TS, [id number]    TREATMENT:  Fear avoidance behavior discussion; encouraged and reassured pt that natural course of condition is to improve over time with adherence to tx plan and home care strategies. Home care recommendations: avoid bed rest, walk (but avoid trails and uneven surfaces), gradual return to activity to tolerance (avoid anything that peripheralizes symptoms), call if symptoms peripheralize, worsen, or neurologic deficit progresses. Ther-ex: IASTM; discussed post procedure soreness and/or ecchymosis for up to 36 hrs, applied to affected mm hypertonicities; supine hamstring stretch, supine gluteal stretch, side laying QL stretch, single knee to chest stretch, hip flexor  pin-and-stretch, alternating prone hip extension, glute bridge, transitional mvmt education, abdominal bracing; greater than 15 min spent performing above mentioned ther-ex to improve ROM/flexibility. Thoracic mobilization/manipulation: prone P-A mob,; Lumbar mobilization/manipulation: diversified side laying graded HVLA, flexion-traction; SIJ Manipulation/Mobilization: R/L SIJ HVLA - long axis distraction, galvan drop table maneuver to affected SIJ    HPI:  Mukul Roldan is a 64 y.o. male  Chief Complaint   Patient presents with   • Back Pain     Lower back pain-10     The patient presents to the office with lower back pain that started about 2 years ago without incident. The patient reports it got really bad about a year ago and then stayed bad. Most of time no pain in the legs but the past week the cramping in the posterior legs.  for 30 years. No PT, injections with pain management and RFA with little improvement. Walking immediately causes pain.     Back Pain  Pertinent negatives include no chest pain, fever, headaches, numbness or weakness.     Past Medical History:   Diagnosis Date   • Allergic    • Anxiety    • Arthritis    • Carpal tunnel syndrome, bilateral    • Coronary artery disease 7/13/20   • Depression    • Diabetes mellitus (HCC)    • GERD (gastroesophageal reflux disease)    • Heart attack (HCC)    • Hyperlipidemia    • Hypertension    • Myocardial infarction (HCC) 7/13/20   • OCD (obsessive compulsive disorder)    • S/P PTCA (percutaneous transluminal coronary angioplasty)     elective cath 11/10/23 w/ PCI/JENNY - mLAD      Past Surgical History:   Procedure Laterality Date   • BACK SURGERY     • CARDIAC CATHETERIZATION Left 11/10/2023    Procedure: Cardiac Left Heart Cath;  Surgeon: Gregorio Ojeda MD;  Location: [facility];  Service: Cardiology   • CARDIAC SURGERY  Stent   • CARPECTOMY HAND Bilateral    • KNEE SURGERY Right      The following portions of the patient's  history were reviewed and updated as appropriate: allergies, past family history, past medical history, past social history, past surgical history, and problem list.  Review of Systems   Constitutional:  Negative for activity change, fatigue, fever and unexpected weight change.   HENT:  Negative for ear pain, hearing loss, sinus pressure, sinus pain, sore throat and tinnitus.    Respiratory:  Negative for chest tightness, shortness of breath, wheezing and stridor.    Cardiovascular:  Negative for chest pain.   Genitourinary:  Negative for flank pain and frequency.   Musculoskeletal:  Positive for back pain and myalgias. Negative for joint swelling, neck pain and neck stiffness.   Skin:  Negative for color change and pallor.   Neurological:  Negative for dizziness, speech difficulty, weakness, numbness and headaches.   Psychiatric/Behavioral:  Negative for agitation and sleep disturbance. The patient is not nervous/anxious.      Physical Exam  Constitutional:       General: He is not in acute distress.     Appearance: Normal appearance.   HENT:      Head: Normocephalic.      Mouth/Throat:      Mouth: Mucous membranes are moist.   Eyes:      Extraocular Movements: Extraocular movements intact.      Conjunctiva/sclera: Conjunctivae normal.      Pupils: Pupils are equal, round, and reactive to light.   Neck:      Vascular: No carotid bruit.   Pulmonary:      Effort: Pulmonary effort is normal.   Chest:      Chest wall: No tenderness.   Abdominal:      General: Abdomen is flat.      Palpations: Abdomen is soft.   Musculoskeletal:         General: Tenderness present. No swelling, deformity or signs of injury. Normal range of motion.      Cervical back: Normal range of motion. No rigidity or tenderness.        Back:       Right lower leg: No edema.      Left lower leg: No edema.   Lymphadenopathy:      Cervical: No cervical adenopathy.   Skin:     General: Skin is warm.      Coloration: Skin is not jaundiced or pale.       Findings: No bruising or erythema.   Neurological:      Mental Status: He is alert and oriented to person, place, and time.      Cranial Nerves: No cranial nerve deficit.      Sensory: No sensory deficit.      Motor: No weakness.      Gait: Gait is intact.      Deep Tendon Reflexes: Reflexes are normal and symmetric.   Psychiatric:         Attention and Perception: Attention normal.         Mood and Affect: Mood and affect normal.         Speech: Speech normal.         Behavior: Behavior normal. Behavior is cooperative.         Thought Content: Thought content normal.         Cognition and Memory: Cognition normal.         Judgment: Judgment normal.       SOFT TISSUE ASSESSMENT Hypertonicity and tenderness palpated B T10-S1 erector spinae, hip flexor, glute med/min, QL, hamstring JOINT RESTRICTIONS: T12-S1 and R/L SIJ ORTHO: SI jt point tenderness: +; Francesca unremarkable for centralization/peripheralization; bean's, iliac compression, thigh thrust elicit lbp in R/L SIJ; prone femoral nerve stretch neg for upper lumbar neural tension, elicits R/L SIJ stiffness; sitting root elicits no lbp on R/L; slump test elicits no neural tension R/L    Return in about 1 week (around 5/27/2024) for Recheck.

## 2024-05-20 NOTE — TELEPHONE ENCOUNTER
Reason for call:   [x] Refill   [] Prior Auth  [] Other:     Office:   [x] PCP/Provider - Irwin  [] Specialty/Provider -     Medication: Jardiance 25mg    Dose/Frequency: 1 tab daily    Quantity: 90    Pharmacy: [id number] [place] - [address] [facility] [phone]     Does the patient have enough for 3 days?   [] Yes   [x] No - Send as HP to POD

## 2024-06-03 ENCOUNTER — PROCEDURE VISIT (OUTPATIENT)
Age: 64
End: 2024-06-03
Payer: MEDICARE

## 2024-06-03 ENCOUNTER — TELEPHONE (OUTPATIENT)
Dept: PAIN MEDICINE | Facility: CLINIC | Age: 64
End: 2024-06-03

## 2024-06-03 VITALS
WEIGHT: 163 LBS | DIASTOLIC BLOOD PRESSURE: 70 MMHG | OXYGEN SATURATION: 98 % | HEIGHT: 70 IN | HEART RATE: 73 BPM | SYSTOLIC BLOOD PRESSURE: 118 MMHG | BODY MASS INDEX: 23.34 KG/M2

## 2024-06-03 DIAGNOSIS — M99.04 SEGMENTAL DYSFUNCTION OF SACRAL REGION: ICD-10-CM

## 2024-06-03 DIAGNOSIS — M99.03 SEGMENTAL DYSFUNCTION OF LUMBAR REGION: Primary | ICD-10-CM

## 2024-06-03 DIAGNOSIS — M99.02 SEGMENTAL DYSFUNCTION OF THORACIC REGION: ICD-10-CM

## 2024-06-03 DIAGNOSIS — M47.817 LUMBOSACRAL SPONDYLOSIS WITHOUT MYELOPATHY: ICD-10-CM

## 2024-06-03 DIAGNOSIS — M48.061 SPINAL STENOSIS OF LUMBAR REGION, UNSPECIFIED WHETHER NEUROGENIC CLAUDICATION PRESENT: ICD-10-CM

## 2024-06-03 PROCEDURE — 98941 CHIROPRACT MANJ 3-4 REGIONS: CPT | Performed by: CHIROPRACTOR

## 2024-06-03 NOTE — TELEPHONE ENCOUNTER
S/W pt.  Pt is taking Lyrica 50 mg 3x/day.  He started it 2 weeks ago and it is not helping at all.  Please advise.    Call was disconnected and RN called back and no answer.

## 2024-06-03 NOTE — TELEPHONE ENCOUNTER
Caller: Mukul    Doctor: Fidelina    Reason for call: patient states Lyrica is not helping for pain please prescribe something else.     Call back#: [phone]

## 2024-06-03 NOTE — TELEPHONE ENCOUNTER
Caller: pt Mukul     Doctor: Fidelina    Reason for call: pt stated he was speaking to the nurse and the call was disconnected. I did advise pt per chart notes from nurse, Dr. Kitchen was advised that the Lyrica 50 mg is not helping at all, and we are just waiting on the next steps from Dr. Kitchen    Call back#: [phone]

## 2024-06-03 NOTE — PROGRESS NOTES
Initial date of service:5/20/24    Diagnoses and all orders for this visit:    Segmental dysfunction of lumbar region    Lumbosacral spondylosis without myelopathy    Spinal stenosis of lumbar region, unspecified whether neurogenic claudication present    Segmental dysfunction of sacral region    Segmental dysfunction of thoracic region       ASSESSMENT:  No red flags, radiculopathy or neurologic deficit appreciated clinically. Pt's symptoms and exam findings consistent with mechanical lbp secondary to repetitive st/sp injury, exacerbated by postural/ergonomic stressors. Pt responded well to flexion biased stretches and manual mobilization of the affected spinal and myofascial tissues with increased ROM; trial of conservative tx recommended consisting of stretching, graded mobilization/manipulation of the affected spinal and myofascial jt dysfunction, postural/ergonomic education and take home stretches/exercises. If symptoms fail to improve with short trial of conservative care, appropriate imaging and referral will be coordinated.  - Unchanged status    PROCEDURE CODES: 04537-[id number]    TREATMENT:  Fear avoidance behavior discussion; encouraged and reassured pt that natural course of condition is to improve over time with adherence to tx plan and home care strategies. Home care recommendations: avoid bed rest, walk (but avoid trails and uneven surfaces), gradual return to activity to tolerance (avoid anything that peripheralizes symptoms), call if symptoms peripheralize, worsen, or neurologic deficit progresses. Ther-ex: IASTM; discussed post procedure soreness and/or ecchymosis for up to 36 hrs, applied to affected mm hypertonicities; supine hamstring stretch, supine gluteal stretch, side laying QL stretch, single knee to chest stretch, hip flexor pin-and-stretch, alternating prone hip extension, glute bridge, transitional mvmt education, abdominal bracing; greater than 15 min spent performing above mentioned ther-ex  to improve ROM/flexibility. Thoracic mobilization/manipulation: prone P-A mob,; Lumbar mobilization/manipulation: diversified side laying graded HVLA, flexion-traction; SIJ Manipulation/Mobilization: R/L SIJ HVLA - long axis distraction, galvan drop table maneuver to affected SIJ    HPI:  Mukul Roldan is a 64 y.o. male  Chief Complaint   Patient presents with   • Back Pain     Lower back pain-8     The patient presents to the office with lower back pain that started about 2 years ago without incident. The patient reports it got really bad about a year ago and then stayed bad. Most of time no pain in the legs but the past week the cramping in the posterior legs.  for 30 years. No PT, injections with pain management and RFA with little improvement. Walking immediately causes pain.   6/3- The patient is feeling the same today, 8/10.    Back Pain      Past Medical History:   Diagnosis Date   • Allergic    • Anxiety    • Arthritis    • Carpal tunnel syndrome, bilateral    • Coronary artery disease 7/13/20   • Depression    • Diabetes mellitus (HCC)    • GERD (gastroesophageal reflux disease)    • Heart attack (HCC)    • Hyperlipidemia    • Hypertension    • Myocardial infarction (HCC) 7/13/20   • OCD (obsessive compulsive disorder)    • S/P PTCA (percutaneous transluminal coronary angioplasty)     elective cath 11/10/23 w/ PCI/JENNY - mLAD      Past Surgical History:   Procedure Laterality Date   • BACK SURGERY     • CARDIAC CATHETERIZATION Left 11/10/2023    Procedure: Cardiac Left Heart Cath;  Surgeon: Gregorio Ojeda MD;  Location: [facility];  Service: Cardiology   • CARDIAC SURGERY  Stent   • CARPECTOMY HAND Bilateral    • KNEE SURGERY Right      The following portions of the patient's history were reviewed and updated as appropriate: allergies, past family history, past medical history, past social history, past surgical history, and problem list.  Review of Systems   Musculoskeletal:   Positive for back pain and myalgias.     Physical Exam  Musculoskeletal:         General: Tenderness present.      Cervical back: Normal range of motion.        Back:    Neurological:      Gait: Gait is intact.      Deep Tendon Reflexes: Reflexes are normal and symmetric.       SOFT TISSUE ASSESSMENT Hypertonicity and tenderness palpated B T10-S1 erector spinae, hip flexor, glute med/min, QL, hamstring JOINT RESTRICTIONS: T12-S1 and R/L SIJ ORTHO: SI jt point tenderness: +; Francesca unremarkable for centralization/peripheralization; bean's, iliac compression, thigh thrust elicit lbp in R/L SIJ; prone femoral nerve stretch neg for upper lumbar neural tension, elicits R/L SIJ stiffness; sitting root elicits no lbp on R/L; slump test elicits no neural tension R/L    Return in about 1 week (around 6/10/2024) for Recheck.

## 2024-06-05 DIAGNOSIS — M54.16 LUMBAR RADICULOPATHY: ICD-10-CM

## 2024-06-06 RX ORDER — PREGABALIN 100 MG/1
100 CAPSULE ORAL 3 TIMES DAILY
Qty: 90 CAPSULE | Refills: 0 | Status: SHIPPED | OUTPATIENT
Start: 2024-06-06

## 2024-06-09 PROBLEM — Z00.00 MEDICARE WELCOME EXAM: Status: RESOLVED | Noted: 2024-05-10 | Resolved: 2024-06-09

## 2024-06-11 ENCOUNTER — PROCEDURE VISIT (OUTPATIENT)
Age: 64
End: 2024-06-11
Payer: MEDICARE

## 2024-06-11 VITALS
WEIGHT: 163 LBS | HEIGHT: 70 IN | HEART RATE: 73 BPM | DIASTOLIC BLOOD PRESSURE: 73 MMHG | BODY MASS INDEX: 23.34 KG/M2 | SYSTOLIC BLOOD PRESSURE: 107 MMHG

## 2024-06-11 DIAGNOSIS — M99.02 SEGMENTAL DYSFUNCTION OF THORACIC REGION: ICD-10-CM

## 2024-06-11 DIAGNOSIS — M99.03 SEGMENTAL DYSFUNCTION OF LUMBAR REGION: Primary | ICD-10-CM

## 2024-06-11 DIAGNOSIS — M48.061 SPINAL STENOSIS OF LUMBAR REGION, UNSPECIFIED WHETHER NEUROGENIC CLAUDICATION PRESENT: ICD-10-CM

## 2024-06-11 DIAGNOSIS — M47.817 LUMBOSACRAL SPONDYLOSIS WITHOUT MYELOPATHY: ICD-10-CM

## 2024-06-11 DIAGNOSIS — M99.04 SEGMENTAL DYSFUNCTION OF SACRAL REGION: ICD-10-CM

## 2024-06-11 PROCEDURE — 98941 CHIROPRACT MANJ 3-4 REGIONS: CPT | Performed by: CHIROPRACTOR

## 2024-06-11 NOTE — PROGRESS NOTES
Initial date of service:5/20/24    Diagnoses and all orders for this visit:    Segmental dysfunction of lumbar region    Lumbosacral spondylosis without myelopathy    Spinal stenosis of lumbar region, unspecified whether neurogenic claudication present    Segmental dysfunction of sacral region    Segmental dysfunction of thoracic region       ASSESSMENT:  No red flags, radiculopathy or neurologic deficit appreciated clinically. Pt's symptoms and exam findings consistent with mechanical lbp secondary to repetitive st/sp injury, exacerbated by postural/ergonomic stressors. Pt responded well to flexion biased stretches and manual mobilization of the affected spinal and myofascial tissues with increased ROM; trial of conservative tx recommended consisting of stretching, graded mobilization/manipulation of the affected spinal and myofascial jt dysfunction, postural/ergonomic education and take home stretches/exercises. If symptoms fail to improve with short trial of conservative care, appropriate imaging and referral will be coordinated.  - Unchanged status    PROCEDURE CODES: 91086-MS    TREATMENT:  Fear avoidance behavior discussion; encouraged and reassured pt that natural course of condition is to improve over time with adherence to tx plan and home care strategies. Home care recommendations: avoid bed rest, walk (but avoid trails and uneven surfaces), gradual return to activity to tolerance (avoid anything that peripheralizes symptoms), call if symptoms peripheralize, worsen, or neurologic deficit progresses. Ther-ex: IASTM; discussed post procedure soreness and/or ecchymosis for up to 36 hrs, applied to affected mm hypertonicities; supine hamstring stretch, supine gluteal stretch, side laying QL stretch, single knee to chest stretch, hip flexor pin-and-stretch, alternating prone hip extension, glute bridge, transitional mvmt education, abdominal bracing; greater than 15 min spent performing above mentioned ther-ex  to improve ROM/flexibility. Thoracic mobilization/manipulation: prone P-A mob,; Lumbar mobilization/manipulation: diversified side laying graded HVLA, flexion-traction; SIJ Manipulation/Mobilization: R/L SIJ HVLA - long axis distraction, galvan drop table maneuver to affected SIJ    HPI:  Mukul Roldan is a 64 y.o. male  Chief Complaint   Patient presents with   • Back Pain     Low back feeling worse states its a 10 bilateral      The patient presents to the office with lower back pain that started about 2 years ago without incident. The patient reports it got really bad about a year ago and then stayed bad. Most of time no pain in the legs but the past week the cramping in the posterior legs.  for 30 years. No PT, injections with pain management and RFA with little improvement. Walking immediately causes pain.   6/3- The patient is feeling the same today, 8/10.  6/11- The patient is feeling the same. 10.    Back Pain      Past Medical History:   Diagnosis Date   • Allergic    • Anxiety    • Arthritis    • Carpal tunnel syndrome, bilateral    • Coronary artery disease 7/13/20   • Depression    • Diabetes mellitus (HCC)    • GERD (gastroesophageal reflux disease)    • Heart attack (HCC)    • Hyperlipidemia    • Hypertension    • Myocardial infarction (HCC) 7/13/20   • OCD (obsessive compulsive disorder)    • S/P PTCA (percutaneous transluminal coronary angioplasty)     elective cath 11/10/23 w/ PCI/JENNY - mLAD      Past Surgical History:   Procedure Laterality Date   • BACK SURGERY     • CARDIAC CATHETERIZATION Left 11/10/2023    Procedure: Cardiac Left Heart Cath;  Surgeon: Gregorio Ojeda MD;  Location: [facility];  Service: Cardiology   • CARDIAC SURGERY  Stent   • CARPECTOMY HAND Bilateral    • KNEE SURGERY Right      The following portions of the patient's history were reviewed and updated as appropriate: allergies, past family history, past medical history, past social history, past  surgical history, and problem list.  Review of Systems   Musculoskeletal:  Positive for back pain and myalgias.     Physical Exam  Musculoskeletal:         General: Tenderness present.      Cervical back: Normal range of motion.        Back:    Neurological:      Gait: Gait is intact.      Deep Tendon Reflexes: Reflexes are normal and symmetric.       SOFT TISSUE ASSESSMENT Hypertonicity and tenderness palpated B T10-S1 erector spinae, hip flexor, glute med/min, QL, hamstring JOINT RESTRICTIONS: T12-S1 and R/L SIJ ORTHO: SI jt point tenderness: +; Francesca unremarkable for centralization/peripheralization; bean's, iliac compression, thigh thrust elicit lbp in R/L SIJ; prone femoral nerve stretch neg for upper lumbar neural tension, elicits R/L SIJ stiffness; sitting root elicits no lbp on R/L; slump test elicits no neural tension R/L    Return in about 1 week (around 6/18/2024) for Recheck.

## 2024-06-17 ENCOUNTER — PROCEDURE VISIT (OUTPATIENT)
Age: 64
End: 2024-06-17
Payer: MEDICARE

## 2024-06-17 VITALS
HEIGHT: 70 IN | DIASTOLIC BLOOD PRESSURE: 62 MMHG | WEIGHT: 163 LBS | BODY MASS INDEX: 23.34 KG/M2 | HEART RATE: 94 BPM | SYSTOLIC BLOOD PRESSURE: 110 MMHG | OXYGEN SATURATION: 96 %

## 2024-06-17 DIAGNOSIS — M99.02 SEGMENTAL DYSFUNCTION OF THORACIC REGION: ICD-10-CM

## 2024-06-17 DIAGNOSIS — M99.04 SEGMENTAL DYSFUNCTION OF SACRAL REGION: ICD-10-CM

## 2024-06-17 DIAGNOSIS — M48.061 SPINAL STENOSIS OF LUMBAR REGION, UNSPECIFIED WHETHER NEUROGENIC CLAUDICATION PRESENT: ICD-10-CM

## 2024-06-17 DIAGNOSIS — M99.03 SEGMENTAL DYSFUNCTION OF LUMBAR REGION: Primary | ICD-10-CM

## 2024-06-17 DIAGNOSIS — M47.817 LUMBOSACRAL SPONDYLOSIS WITHOUT MYELOPATHY: ICD-10-CM

## 2024-06-17 PROCEDURE — 98941 CHIROPRACT MANJ 3-4 REGIONS: CPT | Performed by: CHIROPRACTOR

## 2024-06-17 NOTE — PROGRESS NOTES
Initial date of service:5/20/24    Diagnoses and all orders for this visit:    Segmental dysfunction of lumbar region    Lumbosacral spondylosis without myelopathy    Spinal stenosis of lumbar region, unspecified whether neurogenic claudication present    Segmental dysfunction of sacral region    Segmental dysfunction of thoracic region       ASSESSMENT:  t's symptoms and exam findings consistent with mechanical lbp secondary to repetitive st/sp injury, exacerbated by postural/ergonomic stressors. Pt responded well to flexion biased stretches and manual mobilization of the affected spinal and myofascial tissues with increased ROM; trial of conservative tx recommended consisting of stretching, graded mobilization/manipulation of the affected spinal and myofascial jt dysfunction, postural/ergonomic education and take home stretches/exercises. If symptoms fail to improve with short trial of conservative care, appropriate imaging and referral will be coordinated.  - Unchanged status    PROCEDURE CODES: 68082-TY    TREATMENT:  Fear avoidance behavior discussion; encouraged and reassured pt that natural course of condition is to improve over time with adherence to tx plan and home care strategies. Home care recommendations: avoid bed rest, walk (but avoid trails and uneven surfaces), gradual return to activity to tolerance (avoid anything that peripheralizes symptoms), call if symptoms peripheralize, worsen, or neurologic deficit progresses. Ther-ex: IASTM; discussed post procedure soreness and/or ecchymosis for up to 36 hrs, applied to affected mm hypertonicities; supine hamstring stretch, supine gluteal stretch, side laying QL stretch, single knee to chest stretch, hip flexor pin-and-stretch, alternating prone hip extension, glute bridge, transitional mvmt education, abdominal bracing; greater than 15 min spent performing above mentioned ther-ex to improve ROM/flexibility. Thoracic mobilization/manipulation: prone P-A  mob,; Lumbar mobilization/manipulation: diversified side laying graded HVLA, flexion-traction; SIJ Manipulation/Mobilization: R/L SIJ HVLA - long axis distraction, galvan drop table maneuver to affected SIJ    HPI:  Mukul Roldan is a 64 y.o. male  Chief Complaint   Patient presents with   • Back Pain     Lower back pain-8     The patient presents to the office with lower back pain that started about 2 years ago without incident. The patient reports it got really bad about a year ago and then stayed bad. Most of time no pain in the legs but the past week the cramping in the posterior legs.  for 30 years. No PT, injections with pain management and RFA with little improvement. Walking immediately causes pain.   6/3- The patient is feeling the same today, 8/10.  6/11- The patient is feeling the same. 10.  6/17- The patient feels the same today    Back Pain      Past Medical History:   Diagnosis Date   • Allergic    • Anxiety    • Arthritis    • Carpal tunnel syndrome, bilateral    • Coronary artery disease 7/13/20   • Depression    • Diabetes mellitus (HCC)    • GERD (gastroesophageal reflux disease)    • Heart attack (HCC)    • Hyperlipidemia    • Hypertension    • Myocardial infarction (HCC) 7/13/20   • OCD (obsessive compulsive disorder)    • S/P PTCA (percutaneous transluminal coronary angioplasty)     elective cath 11/10/23 w/ PCI/JENNY - mLAD      Past Surgical History:   Procedure Laterality Date   • BACK SURGERY     • CARDIAC CATHETERIZATION Left 11/10/2023    Procedure: Cardiac Left Heart Cath;  Surgeon: Gregorio Ojeda MD;  Location: [facility];  Service: Cardiology   • CARDIAC SURGERY  Stent   • CARPECTOMY HAND Bilateral    • KNEE SURGERY Right      The following portions of the patient's history were reviewed and updated as appropriate: allergies, past family history, past medical history, past social history, past surgical history, and problem list.  Review of Systems    Musculoskeletal:  Positive for back pain and myalgias.     Physical Exam  Musculoskeletal:         General: Tenderness present.      Cervical back: Normal range of motion.        Back:    Neurological:      Gait: Gait is intact.      Deep Tendon Reflexes: Reflexes are normal and symmetric.       SOFT TISSUE ASSESSMENT Hypertonicity and tenderness palpated B T10-S1 erector spinae, hip flexor, glute med/min, QL, hamstring JOINT RESTRICTIONS: T12-S1 and R/L SIJ ORTHO: SI jt point tenderness: +; Francesca unremarkable for centralization/peripheralization; bean's, iliac compression, thigh thrust elicit lbp in R/L SIJ; prone femoral nerve stretch neg for upper lumbar neural tension, elicits R/L SIJ stiffness; sitting root elicits no lbp on R/L; slump test elicits no neural tension R/L    Return in about 1 week (around 6/24/2024) for Recheck.

## 2024-06-25 ENCOUNTER — PROCEDURE VISIT (OUTPATIENT)
Age: 64
End: 2024-06-25
Payer: MEDICARE

## 2024-06-25 VITALS
HEIGHT: 70 IN | HEART RATE: 67 BPM | DIASTOLIC BLOOD PRESSURE: 63 MMHG | WEIGHT: 163 LBS | SYSTOLIC BLOOD PRESSURE: 102 MMHG | BODY MASS INDEX: 23.34 KG/M2

## 2024-06-25 DIAGNOSIS — M47.817 LUMBOSACRAL SPONDYLOSIS WITHOUT MYELOPATHY: ICD-10-CM

## 2024-06-25 DIAGNOSIS — M99.03 SEGMENTAL DYSFUNCTION OF LUMBAR REGION: Primary | ICD-10-CM

## 2024-06-25 DIAGNOSIS — M48.061 SPINAL STENOSIS OF LUMBAR REGION, UNSPECIFIED WHETHER NEUROGENIC CLAUDICATION PRESENT: ICD-10-CM

## 2024-06-25 DIAGNOSIS — M99.04 SEGMENTAL DYSFUNCTION OF SACRAL REGION: ICD-10-CM

## 2024-06-25 DIAGNOSIS — M99.02 SEGMENTAL DYSFUNCTION OF THORACIC REGION: ICD-10-CM

## 2024-06-25 PROCEDURE — 98941 CHIROPRACT MANJ 3-4 REGIONS: CPT | Performed by: CHIROPRACTOR

## 2024-06-25 NOTE — PROGRESS NOTES
Initial date of service:5/20/24    Diagnoses and all orders for this visit:    Segmental dysfunction of lumbar region    Lumbosacral spondylosis without myelopathy  -     Ambulatory Referral to Neurosurgery; Future    Spinal stenosis of lumbar region, unspecified whether neurogenic claudication present    Segmental dysfunction of sacral region    Segmental dysfunction of thoracic region       ASSESSMENT:  t's symptoms and exam findings consistent with mechanical lbp secondary to repetitive st/sp injury, exacerbated by postural/ergonomic stressors. Pt responded well to flexion biased stretches and manual mobilization of the affected spinal and myofascial tissues with increased ROM; trial of conservative tx recommended consisting of stretching, graded mobilization/manipulation of the affected spinal and myofascial jt dysfunction, postural/ergonomic education and take home stretches/exercises. If symptoms fail to improve with short trial of conservative care, appropriate imaging and referral will be coordinated.  - Unchanged status, If no improvements with 6th week of treatment then recommend referral for MRI of Lumbar. In the mean time I referred patient to Neurosurgery in order to shorten scheduling times since the patient continues to be in such severe pain.    PROCEDURE CODES: 51820-OP    TREATMENT:  Fear avoidance behavior discussion; encouraged and reassured pt that natural course of condition is to improve over time with adherence to tx plan and home care strategies. Home care recommendations: avoid bed rest, walk (but avoid trails and uneven surfaces), gradual return to activity to tolerance (avoid anything that peripheralizes symptoms), call if symptoms peripheralize, worsen, or neurologic deficit progresses. Ther-ex: IASTM; discussed post procedure soreness and/or ecchymosis for up to 36 hrs, applied to affected mm hypertonicities; supine hamstring stretch, supine gluteal stretch, side laying QL stretch,  single knee to chest stretch, hip flexor pin-and-stretch, alternating prone hip extension, glute bridge, transitional mvmt education, abdominal bracing; greater than 15 min spent performing above mentioned ther-ex to improve ROM/flexibility. Thoracic mobilization/manipulation: prone P-A mob,; Lumbar mobilization/manipulation: diversified side laying graded HVLA, flexion-traction; SIJ Manipulation/Mobilization: R/L SIJ HVLA - long axis distraction, galvan drop table maneuver to affected SIJ    HPI:  Mukul Roldan is a 64 y.o. male  Chief Complaint   Patient presents with   • Back Pain     Lower lumbar pain that is stabbing pain. Pain score 8     The patient presents to the office with lower back pain that started about 2 years ago without incident. The patient reports it got really bad about a year ago and then stayed bad. Most of time no pain in the legs but the past week the cramping in the posterior legs.  for 30 years. No PT, injections with pain management and RFA with little improvement. Walking immediately causes pain.   6/3- The patient is feeling the same today, 8/10.  6/11- The patient is feeling the same. 10.  6/17- The patient feels the same today  6/25- The patient feels the same. Severe pain this morning.     Back Pain      Past Medical History:   Diagnosis Date   • Allergic    • Anxiety    • Arthritis    • Carpal tunnel syndrome, bilateral    • Coronary artery disease 7/13/20   • Depression    • Diabetes mellitus (HCC)    • GERD (gastroesophageal reflux disease)    • Heart attack (HCC)    • Hyperlipidemia    • Hypertension    • Myocardial infarction (HCC) 7/13/20   • OCD (obsessive compulsive disorder)    • S/P PTCA (percutaneous transluminal coronary angioplasty)     elective cath 11/10/23 w/ PCI/JENNY - mLAD      Past Surgical History:   Procedure Laterality Date   • BACK SURGERY     • CARDIAC CATHETERIZATION Left 11/10/2023    Procedure: Cardiac Left Heart Cath;  Surgeon: Gregorio MOYER  MD Lynette;  Location: [facility];  Service: Cardiology   • CARDIAC SURGERY  Stent   • CARPECTOMY HAND Bilateral    • KNEE SURGERY Right      The following portions of the patient's history were reviewed and updated as appropriate: allergies, past family history, past medical history, past social history, past surgical history, and problem list.  Review of Systems   Musculoskeletal:  Positive for back pain and myalgias.     Physical Exam  Musculoskeletal:         General: Tenderness present.      Cervical back: Normal range of motion.        Back:    Neurological:      Gait: Gait is intact.      Deep Tendon Reflexes: Reflexes are normal and symmetric.       SOFT TISSUE ASSESSMENT Hypertonicity and tenderness palpated B T10-S1 erector spinae, hip flexor, glute med/min, QL, hamstring JOINT RESTRICTIONS: T12-S1 and R/L SIJ ORTHO: SI jt point tenderness: +; Francesca unremarkable for centralization/peripheralization; bean's, iliac compression, thigh thrust elicit lbp in R/L SIJ; prone femoral nerve stretch neg for upper lumbar neural tension, elicits R/L SIJ stiffness; sitting root elicits no lbp on R/L; slump test elicits no neural tension R/L    Return in about 1 week (around 7/2/2024) for Recheck.

## 2024-07-02 ENCOUNTER — PROCEDURE VISIT (OUTPATIENT)
Age: 64
End: 2024-07-02
Payer: MEDICARE

## 2024-07-02 VITALS
BODY MASS INDEX: 23.34 KG/M2 | SYSTOLIC BLOOD PRESSURE: 119 MMHG | WEIGHT: 163 LBS | HEIGHT: 70 IN | HEART RATE: 94 BPM | DIASTOLIC BLOOD PRESSURE: 73 MMHG

## 2024-07-02 DIAGNOSIS — M99.04 SEGMENTAL DYSFUNCTION OF SACRAL REGION: ICD-10-CM

## 2024-07-02 DIAGNOSIS — M99.02 SEGMENTAL DYSFUNCTION OF THORACIC REGION: ICD-10-CM

## 2024-07-02 DIAGNOSIS — M99.03 SEGMENTAL DYSFUNCTION OF LUMBAR REGION: Primary | ICD-10-CM

## 2024-07-02 DIAGNOSIS — M47.817 LUMBOSACRAL SPONDYLOSIS WITHOUT MYELOPATHY: ICD-10-CM

## 2024-07-02 DIAGNOSIS — M48.061 SPINAL STENOSIS OF LUMBAR REGION, UNSPECIFIED WHETHER NEUROGENIC CLAUDICATION PRESENT: ICD-10-CM

## 2024-07-02 PROCEDURE — 98941 CHIROPRACT MANJ 3-4 REGIONS: CPT | Performed by: CHIROPRACTOR

## 2024-07-02 NOTE — PROGRESS NOTES
Initial date of service:5/20/24    Diagnoses and all orders for this visit:    Segmental dysfunction of lumbar region    Lumbosacral spondylosis without myelopathy    Spinal stenosis of lumbar region, unspecified whether neurogenic claudication present    Segmental dysfunction of sacral region    Segmental dysfunction of thoracic region       ASSESSMENT:  t's symptoms and exam findings consistent with mechanical lbp secondary to repetitive st/sp injury, exacerbated by postural/ergonomic stressors. Pt responded well to flexion biased stretches and manual mobilization of the affected spinal and myofascial tissues with increased ROM; trial of conservative tx recommended consisting of stretching, graded mobilization/manipulation of the affected spinal and myofascial jt dysfunction, postural/ergonomic education and take home stretches/exercises. If symptoms fail to improve with short trial of conservative care, appropriate imaging and referral will be coordinated.  - Unchanged status, If no improvements with 6th week of treatment then recommend referral for MRI of Lumbar. In the mean time I referred patient to Neurosurgery in order to shorten scheduling times since the patient continues to be in such severe pain.    PROCEDURE CODES: 76271-[id number]    TREATMENT:  Fear avoidance behavior discussion; encouraged and reassured pt that natural course of condition is to improve over time with adherence to tx plan and home care strategies. Home care recommendations: avoid bed rest, walk (but avoid trails and uneven surfaces), gradual return to activity to tolerance (avoid anything that peripheralizes symptoms), call if symptoms peripheralize, worsen, or neurologic deficit progresses. Ther-ex: IASTM; discussed post procedure soreness and/or ecchymosis for up to 36 hrs, applied to affected mm hypertonicities; supine hamstring stretch, supine gluteal stretch, side laying QL stretch, single knee to chest stretch, hip flexor  pin-and-stretch, alternating prone hip extension, glute bridge, transitional mvmt education, abdominal bracing; greater than 15 min spent performing above mentioned ther-ex to improve ROM/flexibility. Thoracic mobilization/manipulation: prone P-A mob,; Lumbar mobilization/manipulation: diversified side laying graded HVLA, flexion-traction; SIJ Manipulation/Mobilization: R/L SIJ HVLA - long axis distraction, galvan drop table maneuver to affected SIJ    HPI:  Mukul Roldan is a 64 y.o. male  Chief Complaint   Patient presents with   • Back Pain     Lower lumbar is shooting and dull pain. Pain score 8     The patient presents to the office with lower back pain that started about 2 years ago without incident. The patient reports it got really bad about a year ago and then stayed bad. Most of time no pain in the legs but the past week the cramping in the posterior legs.  for 30 years. No PT, injections with pain management and RFA with little improvement. Walking immediately causes pain.   6/3- The patient is feeling the same today, 8/10.  6/11- The patient is feeling the same. 10.  6/17- The patient feels the same today  6/25- The patient feels the same. Severe pain this morning.   7/2- The patient feels the same today.     Back Pain      Past Medical History:   Diagnosis Date   • Allergic    • Anxiety    • Arthritis    • Carpal tunnel syndrome, bilateral    • Coronary artery disease 7/13/20   • Depression    • Diabetes mellitus (HCC)    • GERD (gastroesophageal reflux disease)    • Heart attack (HCC)    • Hyperlipidemia    • Hypertension    • Myocardial infarction (HCC) 7/13/20   • OCD (obsessive compulsive disorder)    • S/P PTCA (percutaneous transluminal coronary angioplasty)     elective cath 11/10/23 w/ PCI/JENNY - mLAD      Past Surgical History:   Procedure Laterality Date   • BACK SURGERY     • CARDIAC CATHETERIZATION Left 11/10/2023    Procedure: Cardiac Left Heart Cath;  Surgeon: Gregorio MOYER  MD Lynette;  Location: [facility];  Service: Cardiology   • CARDIAC SURGERY  Stent   • CARPECTOMY HAND Bilateral    • KNEE SURGERY Right      The following portions of the patient's history were reviewed and updated as appropriate: allergies, past family history, past medical history, past social history, past surgical history, and problem list.  Review of Systems   Musculoskeletal:  Positive for back pain and myalgias.     Physical Exam  Musculoskeletal:         General: Tenderness present.      Cervical back: Normal range of motion.        Back:    Neurological:      Gait: Gait is intact.      Deep Tendon Reflexes: Reflexes are normal and symmetric.       SOFT TISSUE ASSESSMENT Hypertonicity and tenderness palpated B T10-S1 erector spinae, hip flexor, glute med/min, QL, hamstring JOINT RESTRICTIONS: T12-S1 and R/L SIJ ORTHO: SI jt point tenderness: +; Francesca unremarkable for centralization/peripheralization; bean's, iliac compression, thigh thrust elicit lbp in R/L SIJ; prone femoral nerve stretch neg for upper lumbar neural tension, elicits R/L SIJ stiffness; sitting root elicits no lbp on R/L; slump test elicits no neural tension R/L    Return in about 1 week (around 7/9/2024) for Recheck.

## 2024-07-03 ENCOUNTER — TELEPHONE (OUTPATIENT)
Age: 64
End: 2024-07-03

## 2024-07-03 NOTE — TELEPHONE ENCOUNTER
Caller: Kristi (wife)     Doctor/Office: [facility]     Call regarding :  Wants to make appointment with [facility]      Call was transferred to: Ortho

## 2024-07-17 ENCOUNTER — OFFICE VISIT (OUTPATIENT)
Dept: PAIN MEDICINE | Facility: CLINIC | Age: 64
End: 2024-07-17
Payer: MEDICARE

## 2024-07-17 VITALS
BODY MASS INDEX: 23.34 KG/M2 | HEIGHT: 70 IN | SYSTOLIC BLOOD PRESSURE: 122 MMHG | WEIGHT: 163 LBS | DIASTOLIC BLOOD PRESSURE: 70 MMHG

## 2024-07-17 DIAGNOSIS — M54.16 LUMBAR RADICULOPATHY: Primary | ICD-10-CM

## 2024-07-17 PROCEDURE — 99214 OFFICE O/P EST MOD 30 MIN: CPT | Performed by: ANESTHESIOLOGY

## 2024-07-17 PROCEDURE — G2211 COMPLEX E/M VISIT ADD ON: HCPCS | Performed by: ANESTHESIOLOGY

## 2024-07-17 NOTE — PROGRESS NOTES
Assessment:  1. Lumbar radiculopathy      Patient presenting for follow-up visit.  He has a history of with chronic low back and bilateral buttock> leg cramping and numbness for 2+ years.     Pain is likely associated with lumbar spondylosis, lumbar spinal stenosis, lumbar radiculitis.  Symptoms are accompanied by pain 8/10 on the pain scale with inability to participate in IADLs for >6 weeks.  Denies any bowel or bladder symptoms, saddle anesthesia.     Patient has previously tried chiropractic therapy and massage therapy. Has tried taking NSAIDs, baclofen, gabapentin, Lyrica with minimal benefit.       Reviewed and interpreted lumbar MRI.  This shows facet arthropathy throughout both lumbar spine with congenital narrowing and varying degrees of disc bulges resulting in moderate canal stenosis at L2-3 and L4-5.     Patient interlaminar epidural steroid injection previously had a L4-5 with no reported benefit.  He then underwent successful diagnostic lumbar medial branch blocks and ultimately bilateral lumbar radiofrequency ablation.  After the lumbar radiofrequency ablation he still continues to note persistent low back pain symptoms that radiates to the buttocks.     Plan:     At this time discussed and offered repeat epidural steroid injection with a L3-4 interlaminar approach.  Previously we targeted L4-5.  Discussed that he has worse findings on imaging at L3-4, so it would be reasonable to proceed with an alternate approach targeting a different level at this time.    Will request Plavix hold for 7 days prior to a neuraxial intervention.    No benefit with Lyrica at 100 mg 3 times daily.  I instructed him to wean down to 100 mg twice daily for 1 week, then 100 and nightly for 1 week then discontinuing the medication.     Reviewed external notes from chiropractic therapy, cardiology, primary care physician offices in regards to recent and prior relevant medical histories, treatment recommendations, medication  and/or interventional treatment responses.    Reviewed hemoglobin A1c, renal function, CBC and/or PT/INR prior to discussing/offering interventional modalities.      [place] Prescription Drug Monitoring Program report was reviewed and was appropriate     My impressions and treatment recommendations were discussed in detail with the patient who verbalized understanding and had no further questions.  Discharge instructions were provided. I personally saw and examined the patient and I agree with the above discussed plan of care.    Orders Placed This Encounter   Procedures    [place] spine and pain procedure     Standing Status:   Future     Standing Expiration Date:   7/17/2028     Order Specific Question:   Reason for Exam:     Answer:   L3-4 LESI     Order Specific Question:   Anticoagulant hold needed?     Answer:   Plavix x7 days     No orders of the defined types were placed in this encounter.      History of Present Illness:  Mukul Roldan is a 64 y.o. male who presents for a follow up office visit in regards to Back Pain.   The patient’s current symptoms include continuing lower back pain that occasionally radiates to the buttocks bilaterally.  Pain is rated 8 out of 10 on pain scale today describes as a constant aching, sharp, cramping, pressure-like, shooting pain with pins-and-needles throughout the entire day.    I have personally reviewed and/or updated the patient's past medical history, past surgical history, family history, social history, current medications, allergies, and vital signs today.     Review of Systems   Constitutional:  Negative for chills and fever.   HENT:  Negative for ear pain and sore throat.    Eyes:  Negative for pain and visual disturbance.   Respiratory:  Negative for cough and shortness of breath.    Cardiovascular:  Negative for chest pain and palpitations.   Gastrointestinal:  Negative for abdominal pain and vomiting.   Genitourinary:  Negative for dysuria and hematuria.    Musculoskeletal:  Positive for back pain, gait problem and myalgias. Negative for arthralgias.   Skin:  Negative for color change and rash.   Neurological:  Positive for weakness. Negative for seizures and syncope.   All other systems reviewed and are negative.      Patient Active Problem List   Diagnosis    Trigger finger, left index finger    Labral tear of long head of biceps tendon, right, initial encounter    Diabetes (HCC)    Mixed dyslipidemia    History of percutaneous coronary intervention    CAD, S/P MI June 2020, s/p PCI D1 July 2020,S/P PCI of mid LAD 11/10/2023 with residual disease in left main and diagonal branches of LAD and in RCA    Chronic fatigue    SOB (shortness of breath)    Lumbar radiculopathy    Allergic rhinitis    Allergic rhinitis due to pollen    Anxiety attack    Depression    Gastroesophageal reflux disease without esophagitis    Generalized osteoarthritis    Hyperlipidemia    Lower back pain    Obsessive compulsive disorder    Pure hypercholesterolemia    Lumbosacral spondylosis without myelopathy    Lumbar spondylosis    At risk for medication noncompliance    Major depressive disorder with single episode, in partial remission (HCC)    Platelets decreased (HCC)    Atherosclerosis of native coronary artery of native heart with angina pectoris (HCC)       Past Medical History:   Diagnosis Date    Allergic     Anxiety     Arthritis     Carpal tunnel syndrome, bilateral     Coronary artery disease 7/13/20    Depression     Diabetes mellitus (HCC)     GERD (gastroesophageal reflux disease)     Heart attack (HCC)     Hyperlipidemia     Hypertension     Myocardial infarction (HCC) 7/13/20    OCD (obsessive compulsive disorder)     S/P PTCA (percutaneous transluminal coronary angioplasty)     elective cath 11/10/23 w/ PCI/JENNY - mLAD       Past Surgical History:   Procedure Laterality Date    BACK SURGERY      CARDIAC CATHETERIZATION Left 11/10/2023    Procedure: Cardiac Left Heart Cath;   "Surgeon: Gregorio Ojeda MD;  Location: [facility];  Service: Cardiology    CARDIAC SURGERY  Stent    CARPECTOMY HAND Bilateral     KNEE SURGERY Right        Family History   Problem Relation Age of Onset    Alcohol abuse Mother     Arthritis Mother     Alcohol abuse Father     Coronary artery disease Father     COPD Father        Social History     Occupational History    Occupation: retired   Tobacco Use    Smoking status: Never    Smokeless tobacco: Never   Vaping Use    Vaping status: Never Used   Substance and Sexual Activity    Alcohol use: Not Currently    Drug use: Never    Sexual activity: Yes     Partners: Female     Birth control/protection: Male Sterilization       Current Outpatient Medications on File Prior to Visit   Medication Sig    aspirin 81 mg chewable tablet Chew 1 tablet (81 mg total) daily    clopidogrel (Plavix) 75 mg tablet Take 1 tablet (75 mg total) by mouth daily    co-enzyme Q-10 30 MG capsule Take 30 mg by mouth daily    Empagliflozin 25 MG TABS Take 1 tablet (25 mg total) by mouth daily    glimepiride (AMARYL) 2 mg tablet Take 1 tablet (2 mg total) by mouth daily with breakfast    metFORMIN (GLUCOPHAGE) 1000 MG tablet Take 1 tablet (1,000 mg total) by mouth 2 (two) times a day with meals    metoprolol tartrate (LOPRESSOR) 25 mg tablet Take 0.5 tablets (12.5 mg total) by mouth every 12 (twelve) hours    pravastatin (PRAVACHOL) 40 mg tablet TAKE 1 TABLET BY MOUTH EVERY DAY    pregabalin (LYRICA) 100 mg capsule Take 1 capsule (100 mg total) by mouth 3 (three) times a day    sertraline (ZOLOFT) 100 mg tablet TAKE 1.5 TABLETS (150MG TOTAL) BY MOUTH DAILY    [DISCONTINUED] atorvastatin (LIPITOR) 40 mg tablet Take 1 tablet (40 mg total) by mouth daily with dinner     No current facility-administered medications on file prior to visit.       Allergies   Allergen Reactions    Rosuvastatin GI Intolerance       Physical Exam:    /70   Ht 5' 10\" (1.778 m)   Wt 73.9 kg (163 lb)   " BMI 23.39 kg/m²     Constitutional:normal, well developed, well nourished, alert, in no distress and non-toxic and no overt pain behavior.  Eyes:anicteric  HEENT:grossly intact  Neck:supple, symmetric, trachea midline and no masses   Pulmonary:even and unlabored  Cardiovascular:No edema or pitting edema present  Skin:Normal without rashes or lesions and well hydrated  Psychiatric:Mood and affect appropriate  Neurologic: Motor function is grossly intact with no focal neurologic deficits   Musculoskeletal: Limited lumbar spine range of motion.    Imaging     Discharged

## 2024-07-19 ENCOUNTER — TELEPHONE (OUTPATIENT)
Age: 64
End: 2024-07-19

## 2024-07-19 NOTE — TELEPHONE ENCOUNTER
Pt's wife phoned in with questions about medication hold for pt's upcoming procedure. Wanted to know if med hold was approved. Please call  Mechelle Ruiz at [facility] [phone] before 3pm today in regards to med hold. Patient would like a call back [phone] as well.

## 2024-07-22 ENCOUNTER — TELEPHONE (OUTPATIENT)
Age: 64
End: 2024-07-22

## 2024-07-22 NOTE — TELEPHONE ENCOUNTER
Pt's wife phoned in again this morning with questions about medication hold for pt's upcoming procedure stated that this weeks pain injection was cancelled due medication not being held. Wanted to know if med hold was approved. Please call Mechelle Ruiz at [facility] [phone] before 3pm today in regards to med hold. Patient would like a call back [phone] as well.

## 2024-07-23 NOTE — TELEPHONE ENCOUNTER
Dr Mar completed letter for plavix hold. Called and left message on patients cell of same .Called pain and spine told same. Letter is available to view in letters in Epic.

## 2024-07-23 NOTE — TELEPHONE ENCOUNTER
Patient scheduled for L3-4 LESI on 8/1, fu on 8/27 ppr.    Reviewed instructions: , NPO 1 hour prior, loose-fitting/comfortable pants, if ill/fever/infx/abx to call and reschedule. No immunizations or vaccinations 2w prior/after steroid injections. Hold the following meds: PLAVIX    Patient stated verbal understanding.     Nurses-patient waiting hold instructions

## 2024-07-26 ENCOUNTER — RA CDI HCC (OUTPATIENT)
Dept: OTHER | Facility: HOSPITAL | Age: 64
End: 2024-07-26

## 2024-08-01 ENCOUNTER — HOSPITAL ENCOUNTER (OUTPATIENT)
Dept: RADIOLOGY | Facility: MEDICAL CENTER | Age: 64
Discharge: HOME | End: 2024-08-01
Payer: MEDICARE

## 2024-08-01 VITALS
OXYGEN SATURATION: 97 % | SYSTOLIC BLOOD PRESSURE: 117 MMHG | HEART RATE: 62 BPM | RESPIRATION RATE: 16 BRPM | TEMPERATURE: 98.3 F | DIASTOLIC BLOOD PRESSURE: 70 MMHG

## 2024-08-01 DIAGNOSIS — M54.16 LUMBAR RADICULOPATHY: ICD-10-CM

## 2024-08-01 PROCEDURE — 62323 NJX INTERLAMINAR LMBR/SAC: CPT | Performed by: ANESTHESIOLOGY

## 2024-08-01 RX ORDER — BUPIVACAINE HCL/PF 2.5 MG/ML
1 VIAL (ML) INJECTION ONCE
Status: COMPLETED | OUTPATIENT
Start: 2024-08-01 | End: 2024-08-01

## 2024-08-01 RX ORDER — METHYLPREDNISOLONE ACETATE 80 MG/ML
80 INJECTION, SUSPENSION INTRA-ARTICULAR; INTRALESIONAL; INTRAMUSCULAR; PARENTERAL; SOFT TISSUE ONCE
Status: COMPLETED | OUTPATIENT
Start: 2024-08-01 | End: 2024-08-01

## 2024-08-01 RX ADMIN — METHYLPREDNISOLONE ACETATE 80 MG: 80 INJECTION, SUSPENSION INTRA-ARTICULAR; INTRALESIONAL; INTRAMUSCULAR; PARENTERAL; SOFT TISSUE at 14:54

## 2024-08-01 RX ADMIN — BUPIVACAINE HYDROCHLORIDE 1 ML: 2.5 INJECTION, SOLUTION EPIDURAL; INFILTRATION; INTRACAUDAL at 14:54

## 2024-08-01 RX ADMIN — IOHEXOL 1 ML: 300 INJECTION, SOLUTION INTRAVENOUS at 14:53

## 2024-08-01 NOTE — H&P
History of Present Illness: The patient is a 64 y.o. male who presents with complaints of back pain    Past Medical History:   Diagnosis Date    Allergic     Anxiety     Arthritis     Carpal tunnel syndrome, bilateral     Coronary artery disease 7/13/20    Depression     Diabetes mellitus (HCC)     GERD (gastroesophageal reflux disease)     Heart attack (HCC)     Hyperlipidemia     Hypertension     Myocardial infarction (HCC) 7/13/20    OCD (obsessive compulsive disorder)     S/P PTCA (percutaneous transluminal coronary angioplasty)     elective cath 11/10/23 w/ PCI/JENNY - mLAD       Past Surgical History:   Procedure Laterality Date    BACK SURGERY      CARDIAC CATHETERIZATION Left 11/10/2023    Procedure: Cardiac Left Heart Cath;  Surgeon: Gregorio Ojeda MD;  Location: [facility];  Service: Cardiology    CARDIAC SURGERY  Stent    CARPECTOMY HAND Bilateral     KNEE SURGERY Right          Current Outpatient Medications:     aspirin 81 mg chewable tablet, Chew 1 tablet (81 mg total) daily, Disp: 30 tablet, Rfl: 11    clopidogrel (Plavix) 75 mg tablet, Take 1 tablet (75 mg total) by mouth daily, Disp: 90 tablet, Rfl: 2    co-enzyme Q-10 30 MG capsule, Take 30 mg by mouth daily, Disp: , Rfl:     Empagliflozin 25 MG TABS, Take 1 tablet (25 mg total) by mouth daily, Disp: 90 tablet, Rfl: 1    glimepiride (AMARYL) 2 mg tablet, Take 1 tablet (2 mg total) by mouth daily with breakfast, Disp: 90 tablet, Rfl: 1    metFORMIN (GLUCOPHAGE) 1000 MG tablet, Take 1 tablet (1,000 mg total) by mouth 2 (two) times a day with meals, Disp: 180 tablet, Rfl: 1    metoprolol tartrate (LOPRESSOR) 25 mg tablet, Take 0.5 tablets (12.5 mg total) by mouth every 12 (twelve) hours, Disp: 90 tablet, Rfl: 3    pravastatin (PRAVACHOL) 40 mg tablet, TAKE 1 TABLET BY MOUTH EVERY DAY, Disp: 90 tablet, Rfl: 1    pregabalin (LYRICA) 100 mg capsule, Take 1 capsule (100 mg total) by mouth 3 (three) times a day, Disp: 90 capsule, Rfl: 0     sertraline (ZOLOFT) 100 mg tablet, TAKE 1.5 TABLETS (150MG TOTAL) BY MOUTH DAILY, Disp: 135 tablet, Rfl: 1    Current Facility-Administered Medications:     bupivacaine (PF) (MARCAINE) 0.25 % injection 1 mL, 1 mL, Epidural, Once, Will Davy Kitchen MD    iohexol (OMNIPAQUE) 300 mg/mL injection 1 mL, 1 mL, Epidural, Once, Will Davy Kitchen MD    methylPREDNISolone acetate (DEPO-MEDROL) injection 80 mg, 80 mg, Epidural, Once, Will Davy Kitchen MD    Allergies   Allergen Reactions    Rosuvastatin GI Intolerance       Physical Exam:   Vitals:    08/01/24 1439   BP: 122/69   Pulse: 63   Resp: 20   Temp: 98.3 °F (36.8 °C)   SpO2: 97%     General: Awake, Alert, Oriented x 3, Mood and affect appropriate  Respiratory: Respirations even and unlabored  Cardiovascular: Peripheral pulses intact; no edema  Musculoskeletal Exam: back pain    ASA Score: 3    Patient/Chart Verification  Patient ID Verified: Verbal  ID Band Applied: No  Consents Confirmed: Procedural, To be obtained in the Pre-Procedure area  H&P( within 30 days) Verified: To be obtained in the Pre-Procedure area  Interval H&P(within 24 hr) Complete (required for Outpatients and Surgery Admit only): To be obtained in the Pre-Procedure area  Allergies Reviewed: Yes  Anticoag/NSAID held?: Yes (no plavix since 7/25/24)  Currently on antibiotics?: No    Assessment:   1. Lumbar radiculopathy        Plan: L3-4 JUAN CARLOS

## 2024-08-01 NOTE — DISCHARGE INSTRUCTIONS
Epidural Steroid Injection   WHAT YOU NEED TO KNOW:   An epidural steroid injection (JOHNNY) is a procedure to inject steroid medicine into the epidural space. The epidural space is between your spinal cord and vertebrae. Steroids reduce inflammation and fluid buildup in your spine that may be causing pain. You may be given pain medicine along with the steroids.          ACTIVITY  Do not drive or operate machinery today.  No strenuous activity today - bending, lifting, etc.  You may resume normal activites starting tomorrow - start slowly and as tolerated.  You may shower today, but no tub baths or hot tubs.  You may have numbness for several hours from the local anesthetic. Please use caution and common sense, especially with weight-bearing activities.    CARE OF THE INJECTION SITE  If you have soreness or pain, apply ice to the area today (20 minutes on/20 minutes off).  Starting tomorrow, you may use warm, moist heat or ice if needed.  You may have an increase or change in your discomfort for 36-48 hours after your treatment.  Apply ice and continue with any pain medication you have been prescribed.  Notify the [facility] if you have any of the following: redness, drainage, swelling, headache, stiff neck or fever above 100°F.    SPECIAL INSTRUCTIONS  Our office will contact you in approximately 14 days for a progress report.    MEDICATIONS  Continue to take all routine medications.May restart Plavix tomorrow after 3PM 8/2.  Our office may have instructed you to hold some medications.    As no general anesthesia was used in today's procedure, you should not experience any side effects related to anesthesia.     If you are diabetic, the steroids used in today's injection may temporarily increase your blood sugar levels after the first few days after your injection. Please keep a close eye on your sugars and alert the doctor who manages your diabetes if your sugars are significantly high from your baseline  or you are symptomatic.     If you have a problem specifically related to your procedure, please call our office at [phone].  Problems not related to your procedure should be directed to your primary care physician.

## 2024-08-09 ENCOUNTER — OFFICE VISIT (OUTPATIENT)
Dept: FAMILY MEDICINE CLINIC | Facility: CLINIC | Age: 64
End: 2024-08-09
Payer: MEDICARE

## 2024-08-09 VITALS
OXYGEN SATURATION: 99 % | TEMPERATURE: 96.6 F | WEIGHT: 170.2 LBS | SYSTOLIC BLOOD PRESSURE: 102 MMHG | HEIGHT: 69 IN | DIASTOLIC BLOOD PRESSURE: 70 MMHG | HEART RATE: 67 BPM | BODY MASS INDEX: 25.21 KG/M2

## 2024-08-09 DIAGNOSIS — G47.19 EXCESSIVE DAYTIME SLEEPINESS: ICD-10-CM

## 2024-08-09 DIAGNOSIS — Z12.5 PROSTATE CANCER SCREENING: ICD-10-CM

## 2024-08-09 DIAGNOSIS — G47.10 HYPERSOMNIA: ICD-10-CM

## 2024-08-09 DIAGNOSIS — Z00.00 MEDICARE WELCOME EXAM: ICD-10-CM

## 2024-08-09 DIAGNOSIS — Z00.00 ENCOUNTER FOR MEDICARE ANNUAL WELLNESS EXAM: Primary | ICD-10-CM

## 2024-08-09 DIAGNOSIS — E11.65 TYPE 2 DIABETES MELLITUS WITH HYPERGLYCEMIA, UNSPECIFIED WHETHER LONG TERM INSULIN USE (HCC): ICD-10-CM

## 2024-08-09 PROCEDURE — G0403 EKG FOR INITIAL PREVENT EXAM: HCPCS | Performed by: FAMILY MEDICINE

## 2024-08-09 PROCEDURE — G0402 INITIAL PREVENTIVE EXAM: HCPCS | Performed by: FAMILY MEDICINE

## 2024-08-09 PROCEDURE — 99214 OFFICE O/P EST MOD 30 MIN: CPT | Performed by: FAMILY MEDICINE

## 2024-08-09 NOTE — PROGRESS NOTES
Assessment and Plan:     Problem List Items Addressed This Visit          Endocrine    Type 2 diabetes mellitus with hyperglycemia, unspecified whether long term insulin use (HCC)    Relevant Orders    Hemoglobin A1C       Neurology/Sleep    Excessive daytime sleepiness    Relevant Orders    Ambulatory Referral to Sleep Medicine    Hypersomnia    Relevant Orders    Ambulatory Referral to Sleep Medicine       Other    Medicare welcome exam     Other Visit Diagnoses       Encounter for Medicare annual wellness exam    -  Primary    Relevant Orders    POCT ECG (Completed)    Prostate cancer screening        Relevant Orders    PSA, Total Screen          65 y/o male with: DM2, excessive daytime sleepiness and hypersomnia along with Welcome to Medicare well visit. Will continue meds. And refer to sleep study. Discussed supportive care and return parameters.     Regarding Welcome to Medicare Annual well visit. Discussed various safety and health maintenance issues including healthy diet like the Mediterranean diet, exercise, ample sleep, stress reduction, and healthy weight as tolerated. Discussed supportive care and return parameters.        Preventive health issues were discussed with patient, and age appropriate screening tests were ordered as noted in patient's After Visit Summary.  Personalized health advice and appropriate referrals for health education or preventive services given if needed, as noted in patient's After Visit Summary.     History of Present Illness:     Patient presents for a Medicare Wellness Visit    Patient is a 65 y/o male who presents for follow-up on DM2, excessive daytime sleepiness and hypersomnia. No fevers chills nausea or vomiting. Pt also here for welcome to medicare visit. Pt admits being active eats and sleeps well.       Patient Care Team:  Abdiaziz Gayle MD as PCP - General (Family Medicine)  Sim Sawyer MD     Review of Systems:     Review of Systems   Constitutional:   Positive for fatigue.   HENT: Negative.     Eyes: Negative.    Respiratory: Negative.     Cardiovascular: Negative.    Gastrointestinal: Negative.    Endocrine: Negative.    Genitourinary: Negative.    Musculoskeletal: Negative.    Allergic/Immunologic: Negative.    Neurological: Negative.    Hematological: Negative.    Psychiatric/Behavioral: Negative.     All other systems reviewed and are negative.     Problem List:     Patient Active Problem List   Diagnosis    Trigger finger, left index finger    Labral tear of long head of biceps tendon, right, initial encounter    Diabetes (HCC)    Mixed dyslipidemia    History of percutaneous coronary intervention    CAD, S/P MI June 2020, s/p PCI D1 July 2020,S/P PCI of mid LAD 11/10/2023 with residual disease in left main and diagonal branches of LAD and in RCA    Chronic fatigue    SOB (shortness of breath)    Lumbar radiculopathy    Allergic rhinitis    Allergic rhinitis due to pollen    Anxiety attack    Depression    Gastroesophageal reflux disease without esophagitis    Generalized osteoarthritis    Hyperlipidemia    Lower back pain    Obsessive compulsive disorder    Pure hypercholesterolemia    Lumbosacral spondylosis without myelopathy    Lumbar spondylosis    At risk for medication noncompliance    Major depressive disorder with single episode, in partial remission (HCC)    Platelets decreased ([facility])    Atherosclerosis of native coronary artery of native heart with angina pectoris ([facility])    Medicare welcome exam    Type 2 diabetes mellitus with hyperglycemia, unspecified whether long term insulin use ([facility])    Excessive daytime sleepiness    Hypersomnia      Past Medical and Surgical History:     Past Medical History:   Diagnosis Date    Allergic     Anxiety     Arthritis     Carpal tunnel syndrome, bilateral     Coronary artery disease 7/13/20    Depression     Diabetes mellitus (HCC)     GERD (gastroesophageal reflux disease)     Heart attack ([facility])      Hyperlipidemia     Hypertension     Myocardial infarction (HCC) 7/13/20    OCD (obsessive compulsive disorder)     S/P PTCA (percutaneous transluminal coronary angioplasty)     elective cath 11/10/23 w/ PCI/JENNY - mLAD     Past Surgical History:   Procedure Laterality Date    BACK SURGERY      CARDIAC CATHETERIZATION Left 11/10/2023    Procedure: Cardiac Left Heart Cath;  Surgeon: Gregorio Ojeda MD;  Location: [facility];  Service: Cardiology    CARDIAC SURGERY  Stent    CARPECTOMY HAND Bilateral     KNEE SURGERY Right       Family History:     Family History   Problem Relation Age of Onset    Alcohol abuse Mother     Arthritis Mother     Alcohol abuse Father     Coronary artery disease Father     COPD Father       Social History:     Social History     Socioeconomic History    Marital status: /Civil Union     Spouse name: None    Number of children: None    Years of education: None    Highest education level: None   Occupational History    Occupation: retired   Tobacco Use    Smoking status: Never    Smokeless tobacco: Never   Vaping Use    Vaping status: Never Used   Substance and Sexual Activity    Alcohol use: Not Currently    Drug use: Never    Sexual activity: Yes     Partners: Female     Birth control/protection: Male Sterilization   Other Topics Concern    None   Social History Narrative    None     Social Determinants of Health     Financial Resource Strain: Not on file   Food Insecurity: No Food Insecurity (8/9/2024)    Hunger Vital Sign     Worried About Running Out of Food in the Last Year: Never true     Ran Out of Food in the Last Year: Never true   Transportation Needs: No Transportation Needs (8/9/2024)    PRAPARE - Transportation     Lack of Transportation (Medical): No     Lack of Transportation (Non-Medical): No   Physical Activity: Not on file   Stress: Not on file   Social Connections: Not on file   Intimate Partner Violence: Not on file   Housing Stability: Low Risk  (8/9/2024)     Housing Stability Vital Sign     Unable to Pay for Housing in the Last Year: No     Number of Times Moved in the Last Year: 0     Homeless in the Last Year: No      Medications and Allergies:     Current Outpatient Medications   Medication Sig Dispense Refill    aspirin 81 mg chewable tablet Chew 1 tablet (81 mg total) daily 30 tablet 11    clopidogrel (Plavix) 75 mg tablet Take 1 tablet (75 mg total) by mouth daily 90 tablet 2    co-enzyme Q-10 30 MG capsule Take 30 mg by mouth daily      Empagliflozin 25 MG TABS Take 1 tablet (25 mg total) by mouth daily 90 tablet 1    glimepiride (AMARYL) 2 mg tablet Take 1 tablet (2 mg total) by mouth daily with breakfast 90 tablet 1    metFORMIN (GLUCOPHAGE) 1000 MG tablet Take 1 tablet (1,000 mg total) by mouth 2 (two) times a day with meals 180 tablet 1    metoprolol tartrate (LOPRESSOR) 25 mg tablet Take 0.5 tablets (12.5 mg total) by mouth every 12 (twelve) hours 90 tablet 3    pravastatin (PRAVACHOL) 40 mg tablet TAKE 1 TABLET BY MOUTH EVERY DAY 90 tablet 1    pregabalin (LYRICA) 100 mg capsule Take 1 capsule (100 mg total) by mouth 3 (three) times a day 90 capsule 0    sertraline (ZOLOFT) 100 mg tablet TAKE 1.5 TABLETS (150MG TOTAL) BY MOUTH DAILY 135 tablet 1     No current facility-administered medications for this visit.     Allergies   Allergen Reactions    Rosuvastatin GI Intolerance      Immunizations:     Immunization History   Administered Date(s) Administered    COVID-19 PFIZER VACCINE 0.3 ML IM 07/22/2021, 08/12/2021    Tdap 09/16/2023      Health Maintenance:         Topic Date Due    Hepatitis C Screening  Never done    HIV Screening  Never done    Colorectal Cancer Screening  10/25/2026         Topic Date Due    Pneumococcal Vaccine: 65+ Years (1 of 2 - PCV) Never done    COVID-19 Vaccine (3 - 2023-24 season) 09/01/2023    Influenza Vaccine (1) 09/01/2024      Medicare Screening Tests and Risk Assessments:     Mukul is here for his Welcome to Medicare  visit.     Health Risk Assessment:   Patient rates overall health as good. Patient feels that their physical health rating is slightly worse. Patient is satisfied with their life. Eyesight was rated as same. Hearing was rated as same. Patient feels that their emotional and mental health rating is same. Patients states they are sometimes angry. Patient states they are always unusually tired/fatigued. Pain experienced in the last 7 days has been a lot. Patient's pain rating has been 9/10. Patient states that he has experienced weight loss or gain in last 6 months.     Depression Screening:   PHQ-9 Score: 14      Fall Risk Screening:   In the past year, patient has experienced: no history of falling in past year      Home Safety:  Patient does not have trouble with stairs inside or outside of their home. Patient has working smoke alarms and has working carbon monoxide detector. Home safety hazards include: medications that cause fatigue.     Nutrition:   Current diet is Unhealthy.     Medications:   Patient is not currently taking any over-the-counter supplements. Patient is able to manage medications.     Activities of Daily Living (ADLs)/Instrumental Activities of Daily Living (IADLs):   Walk and transfer into and out of bed and chair?: Yes  Dress and groom yourself?: Yes    Bathe or shower yourself?: Yes    Feed yourself? Yes  Do your laundry/housekeeping?: Yes  Manage your money, pay your bills and track your expenses?: Yes  Make your own meals?: Yes    Do your own shopping?: Yes    Previous Hospitalizations:   Any hospitalizations or ED visits within the last 12 months?: Yes    How many hospitalizations have you had in the last year?: 1-2    Advance Care Planning:   Living will: Yes    Durable POA for healthcare: Yes    Advanced directive: Yes      Cognitive Screening:   Provider or family/friend/caregiver concerned regarding cognition?: No    PREVENTIVE SCREENINGS      Cardiovascular Screening:    General:  "Screening Not Indicated and History Lipid Disorder      Diabetes Screening:     General: Screening Not Indicated and History Diabetes      Colorectal Cancer Screening:     General: Screening Current      Prostate Cancer Screening:    General: Screening Current      Osteoporosis Screening:    General: Screening Not Indicated      Abdominal Aortic Aneurysm (AAA) Screening:    Risk factors include: age between 65-74 yo        General: Screening Not Indicated      Lung Cancer Screening:     General: Screening Not Indicated      Hepatitis C Screening:    General: Screening Not Indicated    Screening, Brief Intervention, and Referral to Treatment (SBIRT)    Screening  Typical number of drinks in a day: 0  Typical number of drinks in a week: 0  Interpretation: Low risk drinking behavior.    AUDIT-C Screenin) How often did you have a drink containing alcohol in the past year? never  2) How many drinks did you have on a typical day when you were drinking in the past year? 0  3) How often did you have 6 or more drinks on one occasion in the past year? never    AUDIT-C Score: 0  Interpretation: Score 0-3 (male): Negative screen for alcohol misuse    Single Item Drug Screening:  How often have you used an illegal drug (including marijuana) or a prescription medication for non-medical reasons in the past year? never    Single Item Drug Screen Score: 0  Interpretation: Negative screen for possible drug use disorder    No results found.     Physical Exam:     /70 (BP Location: Left arm, Patient Position: Sitting, Cuff Size: Standard)   Pulse 67   Temp (!) 96.6 °F (35.9 °C) (Tympanic)   Ht 5' 8.5\" (1.74 m)   Wt 77.2 kg (170 lb 3.2 oz)   SpO2 99%   BMI 25.50 kg/m²     Physical Exam  Vitals reviewed.   Constitutional:       General: He is not in acute distress.     Appearance: He is well-developed. He is not diaphoretic.   HENT:      Head: Normocephalic and atraumatic.      Right Ear: External ear normal.      Left " Ear: External ear normal.      Nose: Nose normal.   Eyes:      General: No scleral icterus.        Right eye: No discharge.         Left eye: No discharge.      Conjunctiva/sclera: Conjunctivae normal.      Pupils: Pupils are equal, round, and reactive to light.   Neck:      Thyroid: No thyromegaly.      Trachea: No tracheal deviation.   Cardiovascular:      Rate and Rhythm: Normal rate and regular rhythm.      Heart sounds: Normal heart sounds. No murmur heard.     No friction rub.   Pulmonary:      Effort: Pulmonary effort is normal. No respiratory distress.      Breath sounds: Normal breath sounds. No stridor. No wheezing or rales.   Abdominal:      General: There is no distension.      Palpations: Abdomen is soft. There is no mass.      Tenderness: There is no abdominal tenderness. There is no guarding or rebound.   Musculoskeletal:         General: Normal range of motion.      Cervical back: Normal range of motion and neck supple.   Lymphadenopathy:      Cervical: No cervical adenopathy.   Skin:     General: Skin is warm.   Neurological:      Mental Status: He is alert and oriented to person, place, and time.      Cranial Nerves: No cranial nerve deficit.   Psychiatric:         Behavior: Behavior normal.         Thought Content: Thought content normal.         Judgment: Judgment normal.          Abdiaziz Gayle MD

## 2024-08-09 NOTE — PROGRESS NOTES
"Diabetic Foot Exam    Patient's shoes and socks removed.    Right Foot/Ankle   Right Foot Inspection  Skin Exam: skin normal and skin intact. No dry skin, no warmth, no callus, no erythema, no maceration, no abnormal color, no pre-ulcer, no ulcer and no callus.     Toe Exam: ROM and strength within normal limits.     Sensory   Monofilament testing: intact    Vascular  The right DP pulse is 2+. The right PT pulse is 2+.     Left Foot/Ankle  Left Foot Inspection  Skin Exam: skin normal and skin intact. No dry skin, no warmth, no erythema, no maceration, normal color, no pre-ulcer, no ulcer and no callus.     Toe Exam: ROM and strength within normal limits.     Sensory   Monofilament testing: intact    Vascular  The left DP pulse is 2+. The left PT pulse is 2+.     Assign Risk Category  No deformity present  No loss of protective sensation  No weak pulses  Risk: 0    Answers submitted by the patient for this visit:  Medicare Annual Wellness Visit (Submitted on 8/5/2024)  How would you rate your overall health?: good  Compared to last year, how is your physical health?: slightly worse  In general, how satisfied are you with your life?: satisfied  Compared to last year, how is your eyesight?: same  Compared to last year, how is your hearing?: same  Compared to last year, how is your emotional/mental health?: same  How often is anger a problem for you?: sometimes  How often do you feel unusually tired/fatigued?: always  In the past 7 days, how much pain have you experienced?: a lot  If you answered \"some\" or \"a lot\", please rate the severity of your pain on a scale of 1 to 10 (1 being the least severe pain and 10 being the most intense pain).: 9/10  In the past 6 months, have you lost or gained 10 pounds without trying?: Yes  One or more falls in the last year: No  Do you have trouble with the stairs inside or outside your home?: No  Does your home have working smoke alarms?: Yes  Does your home have a carbon monoxide " monitor?: Yes  Which safety hazards (if any) have you experienced in your home? Please select all that apply.: medications that cause fatigue  How would you describe your current diet? Please select all that apply.: Unhealthy  In addition to prescription medications, are you taking any over-the-counter supplements?: No  Can you manage your medications?: Yes  Are you currently taking any opioid medications?: No  Can you walk and transfer into and out of your bed and chair?: Yes  Can you dress and groom yourself?: Yes  Can you bathe or shower yourself?: Yes  Can you feed yourself?: Yes  Can you do your laundry/ housekeeping?: Yes  Can you manage your money, pay your bills, and track your expenses?: Yes  Can you make your own meals?: Yes  Can you do your own shopping?: Yes  Within the last 12 months, have you had any hospitalizations or Emergency Department visits?: Yes  If yes, how many times have you been hospitalized within the past year?: 1-2  Do you have a living will?: Yes  Do you have a Durable POA (Power of ) for healthcare decisions?: Yes  Do you have an Advanced Directive for end of life decisions?: Yes  How often have you used an illegal drug (including marijuana) or a prescription medication for non-medical reasons in the past year?: never  What is the typical number of drinks you consume in a day?: 0  What is the typical number of drinks you consume in a week?: 0  How often did you have a drink containing alcohol in the past year?: never  How many drinks did you have on a typical day  when you were drinking in the past year?: 0  How often did you have 6 or more drinks on one occasion in the past year?: never

## 2024-08-12 ENCOUNTER — TELEPHONE (OUTPATIENT)
Dept: ADMINISTRATIVE | Facility: OTHER | Age: 64
End: 2024-08-12

## 2024-08-12 NOTE — LETTER
Diabetic Eye Exam Form    Date Requested: 24  Patient: Mukul Roldan  Patient : 1960   Referring Provider: Abdiaziz Gayle MD      DIABETIC Eye Exam Date _______________________________      Type of Exam MUST be documented for Diabetic Eye Exams. Please CHECK ONE.     Retinal Exam       Dilated Retinal Exam       OCT       Optomap-Iris Exam      Fundus Photography       Left Eye - Please check Retinopathy or No Retinopathy        Exam did show retinopathy    Exam did not show retinopathy       Right Eye - Please check Retinopathy or No Retinopathy       Exam did show retinopathy    Exam did not show retinopathy       Comments __________________________________________________________    Practice Providing Exam ______________________________________________    Exam Performed By (print name) _______________________________________      Provider Signature ___________________________________________________      These reports are needed for  compliance.  Please fax this completed form and a copy of the Diabetic Eye Exam report to our office located at [address] [place] [address] as soon as possible via Fax [phone] attention [facility]: Phone [phone]  We thank you for your assistance in treating our mutual patient.

## 2024-08-12 NOTE — LETTER
Diabetic Eye Exam Form    Date Requested: 24  Patient: Mukul Roldan  Patient : 1960   Referring Provider: Abdiaziz Gayle MD      DIABETIC Eye Exam Date _______________________________      Type of Exam MUST be documented for Diabetic Eye Exams. Please CHECK ONE.     Retinal Exam       Dilated Retinal Exam       OCT       Optomap-Iris Exam      Fundus Photography       Left Eye - Please check Retinopathy or No Retinopathy        Exam did show retinopathy    Exam did not show retinopathy       Right Eye - Please check Retinopathy or No Retinopathy       Exam did show retinopathy    Exam did not show retinopathy       Comments __________________________________________________________    Practice Providing Exam ______________________________________________    Exam Performed By (print name) _______________________________________      Provider Signature ___________________________________________________      These reports are needed for  compliance.  Please fax this completed form and a copy of the Diabetic Eye Exam report to our office located at [address] [place] [address] as soon as possible via Fax [phone] attention [facility]: Phone [phone]  We thank you for your assistance in treating our mutual patient.

## 2024-08-12 NOTE — TELEPHONE ENCOUNTER
Upon review of the In Basket request and the patient's chart, initial outreach has been made via fax to facility. Please see Contacts section for details.     Thank you  Humera Glez MA

## 2024-08-12 NOTE — TELEPHONE ENCOUNTER
----- Message from Marietta ALVARADO sent at 8/9/2024  8:38 AM EDT -----  Regarding: Update HM - DM Eye  Please obtain DM eye exam from Dr Haas on [address] from 2023.     Thank you.

## 2024-08-13 ENCOUNTER — APPOINTMENT (OUTPATIENT)
Dept: LAB | Facility: HOSPITAL | Age: 64
End: 2024-08-13
Payer: MEDICARE

## 2024-08-13 DIAGNOSIS — E11.65 TYPE 2 DIABETES MELLITUS WITH HYPERGLYCEMIA, UNSPECIFIED WHETHER LONG TERM INSULIN USE (HCC): ICD-10-CM

## 2024-08-13 DIAGNOSIS — F32.4 MAJOR DEPRESSIVE DISORDER WITH SINGLE EPISODE, IN PARTIAL REMISSION (HCC): ICD-10-CM

## 2024-08-13 DIAGNOSIS — I25.119 ATHEROSCLEROSIS OF NATIVE CORONARY ARTERY OF NATIVE HEART WITH ANGINA PECTORIS (HCC): ICD-10-CM

## 2024-08-13 DIAGNOSIS — E11.9 TYPE 2 DIABETES MELLITUS WITHOUT COMPLICATION, WITHOUT LONG-TERM CURRENT USE OF INSULIN (HCC): ICD-10-CM

## 2024-08-13 DIAGNOSIS — Z00.00 MEDICARE WELCOME EXAM: ICD-10-CM

## 2024-08-13 DIAGNOSIS — D69.6 PLATELETS DECREASED (HCC): ICD-10-CM

## 2024-08-13 DIAGNOSIS — Z12.5 PROSTATE CANCER SCREENING: ICD-10-CM

## 2024-08-13 LAB
ALBUMIN SERPL BCG-MCNC: 4.3 G/DL (ref 3.5–5)
ALP SERPL-CCNC: 55 U/L (ref 34–104)
ALT SERPL W P-5'-P-CCNC: 19 U/L (ref 7–52)
ANION GAP SERPL CALCULATED.3IONS-SCNC: 9 MMOL/L (ref 4–13)
AST SERPL W P-5'-P-CCNC: 15 U/L (ref 13–39)
BASOPHILS # BLD AUTO: 0.03 THOUSANDS/ÂΜL (ref 0–0.1)
BASOPHILS NFR BLD AUTO: 0 % (ref 0–1)
BILIRUB SERPL-MCNC: 0.57 MG/DL (ref 0.2–1)
BUN SERPL-MCNC: 21 MG/DL (ref 5–25)
CALCIUM SERPL-MCNC: 9.3 MG/DL (ref 8.4–10.2)
CHLORIDE SERPL-SCNC: 103 MMOL/L (ref 96–108)
CHOLEST SERPL-MCNC: 145 MG/DL
CO2 SERPL-SCNC: 24 MMOL/L (ref 21–32)
CREAT SERPL-MCNC: 1.04 MG/DL (ref 0.6–1.3)
EOSINOPHIL # BLD AUTO: 0.45 THOUSAND/ÂΜL (ref 0–0.61)
EOSINOPHIL NFR BLD AUTO: 6 % (ref 0–6)
ERYTHROCYTE [DISTWIDTH] IN BLOOD BY AUTOMATED COUNT: 11.1 % (ref 11.6–15.1)
EST. AVERAGE GLUCOSE BLD GHB EST-MCNC: 189 MG/DL
GFR SERPL CREATININE-BSD FRML MDRD: 74 ML/MIN/1.73SQ M
GLUCOSE P FAST SERPL-MCNC: 164 MG/DL (ref 65–99)
HBA1C MFR BLD: 8.2 %
HCT VFR BLD AUTO: 49.7 % (ref 36.5–49.3)
HDLC SERPL-MCNC: 51 MG/DL
HGB BLD-MCNC: 17.3 G/DL (ref 12–17)
IMM GRANULOCYTES # BLD AUTO: 0.02 THOUSAND/UL (ref 0–0.2)
IMM GRANULOCYTES NFR BLD AUTO: 0 % (ref 0–2)
LDLC SERPL CALC-MCNC: 66 MG/DL (ref 0–100)
LYMPHOCYTES # BLD AUTO: 1.88 THOUSANDS/ÂΜL (ref 0.6–4.47)
LYMPHOCYTES NFR BLD AUTO: 26 % (ref 14–44)
MCH RBC QN AUTO: 30.1 PG (ref 26.8–34.3)
MCHC RBC AUTO-ENTMCNC: 34.8 G/DL (ref 31.4–37.4)
MCV RBC AUTO: 86 FL (ref 82–98)
MONOCYTES # BLD AUTO: 0.56 THOUSAND/ÂΜL (ref 0.17–1.22)
MONOCYTES NFR BLD AUTO: 8 % (ref 4–12)
NEUTROPHILS # BLD AUTO: 4.31 THOUSANDS/ÂΜL (ref 1.85–7.62)
NEUTS SEG NFR BLD AUTO: 60 % (ref 43–75)
NRBC BLD AUTO-RTO: 0 /100 WBCS
PLATELET # BLD AUTO: 144 THOUSANDS/UL (ref 149–390)
PMV BLD AUTO: 10.8 FL (ref 8.9–12.7)
POTASSIUM SERPL-SCNC: 3.9 MMOL/L (ref 3.5–5.3)
PROT SERPL-MCNC: 7.2 G/DL (ref 6.4–8.4)
PSA SERPL-MCNC: 0.86 NG/ML (ref 0–4)
RBC # BLD AUTO: 5.75 MILLION/UL (ref 3.88–5.62)
SODIUM SERPL-SCNC: 136 MMOL/L (ref 135–147)
TRIGL SERPL-MCNC: 141 MG/DL
TSH SERPL DL<=0.05 MIU/L-ACNC: 2.27 UIU/ML (ref 0.45–4.5)
WBC # BLD AUTO: 7.25 THOUSAND/UL (ref 4.31–10.16)

## 2024-08-13 PROCEDURE — 80061 LIPID PANEL: CPT

## 2024-08-13 PROCEDURE — 85025 COMPLETE CBC W/AUTO DIFF WBC: CPT

## 2024-08-13 PROCEDURE — G0103 PSA SCREENING: HCPCS

## 2024-08-13 PROCEDURE — 83036 HEMOGLOBIN GLYCOSYLATED A1C: CPT

## 2024-08-13 PROCEDURE — 84443 ASSAY THYROID STIM HORMONE: CPT

## 2024-08-13 PROCEDURE — 36415 COLL VENOUS BLD VENIPUNCTURE: CPT

## 2024-08-13 PROCEDURE — 80053 COMPREHEN METABOLIC PANEL: CPT

## 2024-08-15 ENCOUNTER — TELEPHONE (OUTPATIENT)
Dept: OBGYN CLINIC | Facility: HOSPITAL | Age: 64
End: 2024-08-15

## 2024-08-15 NOTE — TELEPHONE ENCOUNTER
Caller: Mukul  Doctor/office: Fidelina HENRIQUEZ#: [phone]    % of improvement: 10%  Pain Scale (1-10): 8/10    Not all the time when he walks its worse

## 2024-08-16 NOTE — TELEPHONE ENCOUNTER
As a follow-up, a second attempt has been made for outreach via fax to facility. Please see Contacts section for details.    Thank you  Humera Glez MA

## 2024-08-17 DIAGNOSIS — G47.19 EXCESSIVE DAYTIME SLEEPINESS: Primary | ICD-10-CM

## 2024-08-17 DIAGNOSIS — G47.10 HYPERSOMNIA: ICD-10-CM

## 2024-08-21 DIAGNOSIS — E11.9 TYPE 2 DIABETES MELLITUS WITHOUT COMPLICATION, WITHOUT LONG-TERM CURRENT USE OF INSULIN (HCC): ICD-10-CM

## 2024-08-21 RX ORDER — GLIMEPIRIDE 2 MG/1
2 TABLET ORAL
Qty: 90 TABLET | Refills: 1 | Status: SHIPPED | OUTPATIENT
Start: 2024-08-21

## 2024-08-21 RX ORDER — EMPAGLIFLOZIN 25 MG/1
TABLET, FILM COATED ORAL
Qty: 90 TABLET | Refills: 1 | Status: SHIPPED | OUTPATIENT
Start: 2024-08-21

## 2024-08-22 ENCOUNTER — APPOINTMENT (OUTPATIENT)
Dept: LAB | Facility: HOSPITAL | Age: 64
End: 2024-08-22
Payer: MEDICARE

## 2024-08-22 DIAGNOSIS — F32.4 MAJOR DEPRESSIVE DISORDER WITH SINGLE EPISODE, IN PARTIAL REMISSION (HCC): ICD-10-CM

## 2024-08-22 DIAGNOSIS — Z00.00 ROUTINE GENERAL MEDICAL EXAMINATION AT A HEALTH CARE FACILITY: ICD-10-CM

## 2024-08-22 DIAGNOSIS — I25.119 ATHEROSCLEROSIS OF NATIVE CORONARY ARTERY OF NATIVE HEART WITH ANGINA PECTORIS (HCC): ICD-10-CM

## 2024-08-22 DIAGNOSIS — E11.9 TYPE 2 DIABETES MELLITUS WITHOUT COMPLICATION, WITHOUT LONG-TERM CURRENT USE OF INSULIN (HCC): ICD-10-CM

## 2024-08-22 DIAGNOSIS — D69.6 THROMBOCYTOPENIA, UNSPECIFIED (HCC): ICD-10-CM

## 2024-08-22 LAB
CREAT UR-MCNC: 90.9 MG/DL
MICROALBUMIN UR-MCNC: <7 MG/L

## 2024-08-22 PROCEDURE — 82570 ASSAY OF URINE CREATININE: CPT

## 2024-08-22 PROCEDURE — 82043 UR ALBUMIN QUANTITATIVE: CPT

## 2024-08-22 NOTE — TELEPHONE ENCOUNTER
As a final attempt, a third outreach has been made via telephone call to facility. Please see Contacts section for details. This encounter will be closed and completed by end of day. Should we receive the requested information because of previous outreach attempts, the requested patient's chart will be updated appropriately.     Thank you  Humera Glez MA

## 2024-08-27 ENCOUNTER — OFFICE VISIT (OUTPATIENT)
Dept: PAIN MEDICINE | Facility: CLINIC | Age: 64
End: 2024-08-27
Payer: MEDICARE

## 2024-08-27 VITALS — WEIGHT: 170 LBS | BODY MASS INDEX: 25.18 KG/M2 | HEIGHT: 69 IN

## 2024-08-27 DIAGNOSIS — M48.061 SPINAL STENOSIS OF LUMBAR REGION, UNSPECIFIED WHETHER NEUROGENIC CLAUDICATION PRESENT: Primary | ICD-10-CM

## 2024-08-27 DIAGNOSIS — M47.816 LUMBAR SPONDYLOSIS: ICD-10-CM

## 2024-08-27 PROCEDURE — 99213 OFFICE O/P EST LOW 20 MIN: CPT | Performed by: ANESTHESIOLOGY

## 2024-08-27 PROCEDURE — G2211 COMPLEX E/M VISIT ADD ON: HCPCS | Performed by: ANESTHESIOLOGY

## 2024-08-27 NOTE — PROGRESS NOTES
Assessment:  1. Spinal stenosis of lumbar region, unspecified whether neurogenic claudication present    2. Lumbar spondylosis      Patient was accompanied by spouse today.    Patient presenting for follow-up visit.  He has a history of with chronic low back and bilateral buttock> leg cramping and numbness for 2+ years.     Pain is likely associated with lumbar spondylosis, lumbar spinal stenosis, lumbar radiculitis.  Symptoms are accompanied by pain 8+/10 on the pain scale with inability to participate in IADLs for >6 weeks.  Denies any bowel or bladder symptoms, saddle anesthesia.     Patient has previously tried chiropractic therapy and massage therapy. Has tried taking NSAIDs, baclofen, gabapentin, Lyrica with minimal benefit.       Reviewed and interpreted lumbar MRI.  This shows facet arthropathy throughout both lumbar spine with congenital narrowing and varying degrees of disc bulges resulting in moderate canal stenosis at L2-3 and L4-5.     Patient interlaminar epidural steroid injection previously had a L4-5 with no reported benefit.  He then underwent successful diagnostic lumbar medial branch blocks and ultimately bilateral lumbar radiofrequency ablation.  After the lumbar radiofrequency ablation he still continues to note persistent low back pain symptoms that radiates to the buttocks.     Plan:     Unfortunately repeat LESI failed to provide significant relief. At this time I recommend a surgical evaluation for spinal stenosis. The patient would like to seek a surgical opinion outside of [facility].    Discussed that they can follow up after surgical evaluation if they discuss and surgery is not recommended.    Reviewed external notes from chiropractic therapy, primary care physician offices in regards to recent and prior relevant medical histories, treatment recommendations, medication and/or interventional treatment responses.    My impressions and treatment recommendations were discussed in detail with  the patient who verbalized understanding and had no further questions.  Discharge instructions were provided. I personally saw and examined the patient and I agree with the above discussed plan of care.      History of Present Illness:  Mukul Roldan is a 64 y.o. male who presents for a follow up office visit in regards to Back Pain.   The patient’s current symptoms include continued back pain symptoms rated 8+/10 on the pain scale. Pain is described as a nearly constant dull/aching, sharp, cramping, pressure-like, shooting pain throughout the entire day.    I have personally reviewed and/or updated the patient's past medical history, past surgical history, family history, social history, current medications, allergies, and vital signs today.     Review of Systems   Constitutional:  Negative for chills and fever.   HENT:  Negative for ear pain and sore throat.    Eyes:  Negative for pain and visual disturbance.   Respiratory:  Negative for cough and shortness of breath.    Cardiovascular:  Negative for chest pain and palpitations.   Gastrointestinal:  Negative for abdominal pain and vomiting.   Genitourinary:  Negative for dysuria and hematuria.   Musculoskeletal:  Positive for back pain, gait problem and myalgias. Negative for arthralgias.   Skin:  Negative for color change and rash.   Neurological:  Positive for weakness. Negative for seizures and syncope.   All other systems reviewed and are negative.      Patient Active Problem List   Diagnosis    Trigger finger, left index finger    Labral tear of long head of biceps tendon, right, initial encounter    Diabetes (HCC)    Mixed dyslipidemia    History of percutaneous coronary intervention    CAD, S/P MI June 2020, s/p PCI D1 July 2020,S/P PCI of mid LAD 11/10/2023 with residual disease in left main and diagonal branches of LAD and in RCA    Chronic fatigue    SOB (shortness of breath)    Lumbar radiculopathy    Allergic rhinitis    Allergic rhinitis due to  pollen    Anxiety attack    Depression    Gastroesophageal reflux disease without esophagitis    Generalized osteoarthritis    Hyperlipidemia    Lower back pain    Obsessive compulsive disorder    Pure hypercholesterolemia    Lumbosacral spondylosis without myelopathy    Lumbar spondylosis    At risk for medication noncompliance    Major depressive disorder with single episode, in partial remission (HCC)    Platelets decreased (HCC)    Atherosclerosis of native coronary artery of native heart with angina pectoris ([facility])    Medicare welcome exam    Type 2 diabetes mellitus with hyperglycemia, unspecified whether long term insulin use ([facility])    Excessive daytime sleepiness    Hypersomnia       Past Medical History:   Diagnosis Date    Allergic     Anxiety     Arthritis     Carpal tunnel syndrome, bilateral     Coronary artery disease 7/13/20    Depression     Diabetes mellitus (HCC)     GERD (gastroesophageal reflux disease)     Heart attack ([facility])     Hyperlipidemia     Hypertension     Myocardial infarction ([facility]) 7/13/20    OCD (obsessive compulsive disorder)     S/P PTCA (percutaneous transluminal coronary angioplasty)     elective cath 11/10/23 w/ PCI/JENNY - mLAD       Past Surgical History:   Procedure Laterality Date    BACK SURGERY      CARDIAC CATHETERIZATION Left 11/10/2023    Procedure: Cardiac Left Heart Cath;  Surgeon: Gregorio Ojeda MD;  Location: [facility];  Service: Cardiology    CARDIAC SURGERY  Stent    CARPECTOMY HAND Bilateral     KNEE SURGERY Right        Family History   Problem Relation Age of Onset    Alcohol abuse Mother     Arthritis Mother     Alcohol abuse Father     Coronary artery disease Father     COPD Father        Social History     Occupational History    Occupation: retired   Tobacco Use    Smoking status: Never    Smokeless tobacco: Never   Vaping Use    Vaping status: Never Used   Substance and Sexual Activity    Alcohol use: Not Currently    Drug use: Never    Sexual  "activity: Yes     Partners: Female     Birth control/protection: Male Sterilization       Current Outpatient Medications on File Prior to Visit   Medication Sig    aspirin 81 mg chewable tablet Chew 1 tablet (81 mg total) daily    clopidogrel (Plavix) 75 mg tablet Take 1 tablet (75 mg total) by mouth daily    co-enzyme Q-10 30 MG capsule Take 30 mg by mouth daily    glimepiride (AMARYL) 2 mg tablet TAKE 1 TABLET BY MOUTH DAILY WITH BREAKFAST    Jardiance 25 MG TABS TAKE 1 TABLET (25 MG TOTAL) BY MOUTH DAILY.    metFORMIN (GLUCOPHAGE) 1000 MG tablet Take 1 tablet (1,000 mg total) by mouth 2 (two) times a day with meals    metoprolol tartrate (LOPRESSOR) 25 mg tablet Take 0.5 tablets (12.5 mg total) by mouth every 12 (twelve) hours    pravastatin (PRAVACHOL) 40 mg tablet TAKE 1 TABLET BY MOUTH EVERY DAY    pregabalin (LYRICA) 100 mg capsule Take 1 capsule (100 mg total) by mouth 3 (three) times a day    sertraline (ZOLOFT) 100 mg tablet TAKE 1.5 TABLETS (150MG TOTAL) BY MOUTH DAILY    [DISCONTINUED] atorvastatin (LIPITOR) 40 mg tablet Take 1 tablet (40 mg total) by mouth daily with dinner     No current facility-administered medications on file prior to visit.       Allergies   Allergen Reactions    Rosuvastatin GI Intolerance       Physical Exam:    Ht 5' 8.5\" (1.74 m)   Wt 77.1 kg (170 lb)   BMI 25.47 kg/m²     Constitutional:normal, well developed, well nourished, alert, in no distress and non-toxic and no overt pain behavior.  Eyes:anicteric  HEENT:grossly intact  Neck:supple, symmetric, trachea midline and no masses   Pulmonary:even and unlabored  Cardiovascular:No edema or pitting edema present  Skin:Normal without rashes or lesions and well hydrated  Psychiatric:Mood and affect appropriate  Neurologic: Motor function is grossly intact with no focal neurologic deficits   Musculoskeletal: ambulates with forward flexion    Imaging    "

## 2024-09-08 PROBLEM — Z00.00 MEDICARE WELCOME EXAM: Status: RESOLVED | Noted: 2024-05-10 | Resolved: 2024-09-08

## 2024-09-27 DIAGNOSIS — E78.2 MIXED HYPERLIPIDEMIA: ICD-10-CM

## 2024-09-27 RX ORDER — PRAVASTATIN SODIUM 40 MG
40 TABLET ORAL DAILY
Qty: 90 TABLET | Refills: 1 | Status: SHIPPED | OUTPATIENT
Start: 2024-09-27

## 2024-10-01 LAB
LEFT EYE DIABETIC RETINOPATHY: NORMAL
RIGHT EYE DIABETIC RETINOPATHY: NORMAL
SEVERITY (EYE EXAM): NORMAL

## 2024-10-04 ENCOUNTER — OFFICE VISIT (OUTPATIENT)
Age: 64
End: 2024-10-04
Payer: MEDICARE

## 2024-10-04 ENCOUNTER — TELEPHONE (OUTPATIENT)
Dept: ADMINISTRATIVE | Facility: OTHER | Age: 64
End: 2024-10-04

## 2024-10-04 VITALS
HEIGHT: 70 IN | OXYGEN SATURATION: 97 % | SYSTOLIC BLOOD PRESSURE: 118 MMHG | TEMPERATURE: 98.1 F | HEART RATE: 72 BPM | DIASTOLIC BLOOD PRESSURE: 70 MMHG | WEIGHT: 167.6 LBS | BODY MASS INDEX: 23.99 KG/M2

## 2024-10-04 DIAGNOSIS — M47.816 LUMBAR SPONDYLOSIS: Primary | ICD-10-CM

## 2024-10-04 DIAGNOSIS — Z01.818 PREOPERATIVE CLEARANCE: ICD-10-CM

## 2024-10-04 PROCEDURE — 99213 OFFICE O/P EST LOW 20 MIN: CPT | Performed by: FAMILY MEDICINE

## 2024-10-04 PROCEDURE — G2211 COMPLEX E/M VISIT ADD ON: HCPCS | Performed by: FAMILY MEDICINE

## 2024-10-04 NOTE — TELEPHONE ENCOUNTER
----- Message from Gustavo CURIEL sent at 10/4/2024  9:18 AM EDT -----  Regarding: dm eye exam  10/04/24 9:19 AM    Hello, our patient Mukul Roldan has had Diabetic Eye Exam completed/performed. Please assist in updating the patient chart by making an External outreach to Dr Haas on [facility]  facility located in [place]. The date of service is Sept/ Oct 2024.    Thank you,  BOBBY Carmona [facility]

## 2024-10-04 NOTE — LETTER
Diabetic Eye Exam Form    Date Requested: 10/04/24  Patient: Mukul Roldan  Patient : 1960   Referring Provider: Abdiaziz Gayle MD      DIABETIC Eye Exam Date _______________________________      Type of Exam MUST be documented for Diabetic Eye Exams. Please CHECK ONE.     Retinal Exam       Dilated Retinal Exam       OCT       Optomap-Iris Exam      Fundus Photography       Left Eye - Please check Retinopathy or No Retinopathy        Exam did show retinopathy    Exam did not show retinopathy       Right Eye - Please check Retinopathy or No Retinopathy       Exam did show retinopathy    Exam did not show retinopathy       Comments __________________________________________________________    Practice Providing Exam ______________________________________________    Exam Performed By (print name) _______________________________________      Provider Signature ___________________________________________________      These reports are needed for  compliance.  Please fax this completed form and a copy of the Diabetic Eye Exam report to our office located at [address] [place] [address] as soon as possible via Fax [phone] attention Jessica: Phone [phone]  We thank you for your assistance in treating our mutual patient.

## 2024-10-04 NOTE — TELEPHONE ENCOUNTER
Upon review of the In Basket request and the patient's chart, initial outreach has been made via fax to facility. Please see Contacts section for details.     Thank you  Jessica Huertas MA

## 2024-10-05 PROBLEM — Z01.818 PREOPERATIVE CLEARANCE: Status: ACTIVE | Noted: 2024-10-05

## 2024-10-05 NOTE — PROGRESS NOTES
Assessment/Plan:    63 y/o male with: preop clearance for upcoming laminectomy for lumbar spondylosis. Pt plans to get cardiac clearance otherwise admits good functional capacity and may proceed to the OR with acceptable risk, pending cardiac clearance. Discussed supportive care and return parameters.     No problem-specific Assessment & Plan notes found for this encounter.       Diagnoses and all orders for this visit:    Lumbar spondylosis    Preoperative clearance          Subjective:     Chief Complaint   Patient presents with    Pre-op Exam     Preop for laminectomy L2-4 scheduled for 11/7/24 with Dr Claude Garcia. No further concerns, ng        Patient ID: Mukul Roldan is a 64 y.o. male.    Patient is a 63 y/o male who presents for follow-up on preop clearance for upcoming laminectomy for lumbar spondylosis. Pt denies CP or SOB he admits having good functional capacity. No fevers chills nausea or vomiting.    Pre-op Exam    Pre-operative Risk Factors:    History of cerebrovascular disease: No    History of ischemic heart disease: Yes    Pre-operative treatment with insulin: No  Pre-operative creatinine >2 mg/dL: No    History of congestive heart failure: No      The following portions of the patient's history were reviewed and updated as appropriate: allergies, current medications, past family history, past medical history, past social history, past surgical history and problem list.    Review of Systems   Constitutional: Negative.    HENT: Negative.     Eyes: Negative.    Respiratory: Negative.     Cardiovascular: Negative.    Gastrointestinal: Negative.    Endocrine: Negative.    Genitourinary: Negative.    Musculoskeletal:  Positive for back pain.   Allergic/Immunologic: Negative.    Neurological: Negative.    Hematological: Negative.    Psychiatric/Behavioral: Negative.     All other systems reviewed and are negative.        Objective:      /70 (BP Location: Right arm, Patient Position:  "Sitting, Cuff Size: Large)   Pulse 72   Temp 98.1 °F (36.7 °C) (Temporal)   Ht 5' 10\" (1.778 m)   Wt 76 kg (167 lb 9.6 oz)   SpO2 97%   BMI 24.05 kg/m²          Physical Exam  Constitutional:       Appearance: He is well-developed.   HENT:      Head: Atraumatic.      Right Ear: External ear normal.      Left Ear: External ear normal.   Eyes:      Extraocular Movements: EOM normal.      Conjunctiva/sclera: Conjunctivae normal.      Pupils: Pupils are equal, round, and reactive to light.   Cardiovascular:      Rate and Rhythm: Normal rate and regular rhythm.      Heart sounds: Normal heart sounds.   Pulmonary:      Effort: Pulmonary effort is normal. No respiratory distress.      Breath sounds: Normal breath sounds.   Abdominal:      General: There is no distension.      Palpations: Abdomen is soft.      Tenderness: There is no abdominal tenderness. There is no guarding or rebound.   Musculoskeletal:         General: Normal range of motion.      Cervical back: Normal range of motion.   Skin:     General: Skin is warm and dry.   Neurological:      Mental Status: He is alert and oriented to person, place, and time.      Cranial Nerves: No cranial nerve deficit.   Psychiatric:         Mood and Affect: Mood and affect normal.         Behavior: Behavior normal.         Thought Content: Thought content normal.         Judgment: Judgment normal.         "

## 2024-10-08 NOTE — TELEPHONE ENCOUNTER
As a follow-up, a second attempt has been made for outreach via fax to facility. Please see Contacts section for details.    Thank you  Jessica Huertas MA

## 2024-10-09 ENCOUNTER — VBI (OUTPATIENT)
Dept: ADMINISTRATIVE | Facility: OTHER | Age: 64
End: 2024-10-09

## 2024-10-09 NOTE — TELEPHONE ENCOUNTER
Upon review of the In Basket request we were able to locate, review, and update the patient chart as requested for Diabetic Eye Exam.    Any additional questions or concerns should be emailed to the Practice Liaisons via the appropriate education email address, please do not reply via In Basket.    Thank you  Humera Glez MA   [facility]

## 2024-10-11 NOTE — TELEPHONE ENCOUNTER
Upon review of the In Basket request we were able to note that no further action is required. The patient chart is up to date as a result of a previous request.      Any additional questions or concerns should be emailed to the Practice Liaisons via the appropriate education email address, please do not reply via In Basket.    Thank you  Jessica Huertas MA   [facility]

## 2024-10-17 ENCOUNTER — OFFICE VISIT (OUTPATIENT)
Dept: CARDIOLOGY CLINIC | Facility: CLINIC | Age: 64
End: 2024-10-17
Payer: MEDICARE

## 2024-10-17 VITALS
BODY MASS INDEX: 23.91 KG/M2 | WEIGHT: 167 LBS | HEIGHT: 70 IN | SYSTOLIC BLOOD PRESSURE: 117 MMHG | DIASTOLIC BLOOD PRESSURE: 60 MMHG | HEART RATE: 61 BPM

## 2024-10-17 DIAGNOSIS — Z01.810 PRE-OPERATIVE CARDIOVASCULAR EXAMINATION: ICD-10-CM

## 2024-10-17 DIAGNOSIS — I25.10 CORONARY ARTERY DISEASE INVOLVING NATIVE CORONARY ARTERY OF NATIVE HEART WITHOUT ANGINA PECTORIS: Primary | ICD-10-CM

## 2024-10-17 DIAGNOSIS — E78.2 MIXED DYSLIPIDEMIA: ICD-10-CM

## 2024-10-17 DIAGNOSIS — E11.59 TYPE 2 DIABETES MELLITUS WITH OTHER CIRCULATORY COMPLICATION, WITHOUT LONG-TERM CURRENT USE OF INSULIN (HCC): ICD-10-CM

## 2024-10-17 DIAGNOSIS — R53.82 CHRONIC FATIGUE: ICD-10-CM

## 2024-10-17 PROBLEM — I25.119 ATHEROSCLEROSIS OF NATIVE CORONARY ARTERY OF NATIVE HEART WITH ANGINA PECTORIS (HCC): Status: RESOLVED | Noted: 2024-05-10 | Resolved: 2024-10-17

## 2024-10-17 PROBLEM — E11.9 DIABETES (HCC): Status: RESOLVED | Noted: 2020-07-12 | Resolved: 2024-10-17

## 2024-10-17 PROCEDURE — 99214 OFFICE O/P EST MOD 30 MIN: CPT | Performed by: INTERNAL MEDICINE

## 2024-10-17 PROCEDURE — 93000 ELECTROCARDIOGRAM COMPLETE: CPT | Performed by: INTERNAL MEDICINE

## 2024-10-17 NOTE — PROGRESS NOTES
[facility]  [facility]  Primary Office: [address] [place] [address]  Phone: [phone]; Fax: [phone]  *-*-*-*-*-*-*-*-*-*-*-*-*-*-*-*-*-*-*-*-*-*-*-*-*-*-*-*-*-*-*-*-*-*-*-*-*-*-*-*-*-*-*-*-*-*-*-*-*-*-*-*-*-*  ENCOUNTER DATE: 10/17/24 4:54 PM  PATIENT NAME: Mukul Roldan   1960    [id number]  AGE:64 y.o.      SEX: male  ENCOUNTER PROVIDER: Faye Mar MD, [facility], [facility]  PRIMARY CARE PHYSICIAN: Abdiaziz Gayle MD    DIAGNOSES:   1. One-vessel coronary artery disease status post ACS-NSTEMI July 2020, status post PCI of diagonal branch on July 13, 2020, status post PCI of mid LAD 11/10/2023  2. Normal left ventricular systolic function.   3. Diabetes mellitus with poor glycemic control   4. Dyslipidemia   5. Family history premature CAD with brother having MI at 50 years.   6. Remote history of ITP   7. History of trigger finger   8. History of OCD  9.  Medication noncompliance history.    CARDIAC CATHETERIZATION 11/10/2023:  Ostial left main 40% stenosis with LEA-3 flow with luminal area 13 mm²  LAD: Moderate diffuse disease throughout vessel.  90% focal lesion in mid LAD with moderate plaque burden.  IVUS showed that lesion was eccentric and fibrinous.  Underwent drug-eluting stent placement.  First diagonal branch 50% stenosis.  Widely patent previous 2 stents.  RCA: Proximal RCA 50% stenosis    REGADENOSON NUCLEAR STRESS TEST May 2022:  1. Negative regadenoson nuclear stress test for symptoms of angina pectoris and negative for ECG changes of ischemia.  2. Normal myocardial perfusion scan with no evidence of reversible or fixed perfusion abnormality.  3. Normal left ventricular systolic function and normal regional wall motion. Post-stress left ventricular ejection fraction was determined as 65%  4. Normal resting blood pressure and appropriate blood pressure response to exercise.  5. Nonspecific resting ECG abnormality. There was sinus  tachycardia response to low-level physical activity suggesting possible physical deconditioning.   6. There was an incidental finding relatively nonfocal soft tissue uptake of tracer in left shoulder/axillary/upper back region. This may represent an inflammation or possible muscle strain. Clinical correlation is recommended.    EXTENDED HOLTER MONITOR November 2021: Patient was monitored for 13 days 23 hours. Average heart rate was 87, minimum heart rate was 58. There was 1 run of ventricular tachycardia involving 6 beats. There were 14 runs of nonsustained SVT with longest run lasting 9 beats at 245 beats per minute. Overall ventricular ectopy burden was less than 1%. There were no diary entries but there were triggered events which correlated with sinus rhythm and sinus tachycardia.     CARDIAC CATHETERIZATION JULY 13, 2020:   The coronary circulation is right dominant.   -- There was 1-vessel coronary artery disease.   -- Left main: Angiography showed minor luminal irregularities.   -- LAD: Angiography showed minor luminal irregularities.   -- 1st diagonal:   -- 1st diagonal: There was a 95 % stenosis. There was LEA grade 2 flow through the vessel (partial perfusion). This lesion is a likely culprit for the patient's recent myocardial infarction. It appears amenable to percutaneous   intervention.   -- Circumflex: Angiography showed minor luminal irregularities.   -- Proximal RCA: There was a 50 % stenosis.     ECHOCARDIOGRAM JULY 13, 2020:   Technical quality: Good   Cardiac rhythm: Normal sinus     1. Normal left ventricular size and systolic and diastolic function, EF around 63%.   2. Normal right ventricular size and systolic function. 4. Normal left and right atrial size.   5. Mild aortic valve sclerosis, no aortic valve stenosis or regurgitation.   6. Trace mitral and tricuspid valve regurgitation.   7. No obvious pulmonary hypertension.   8. No pericardial effusion.       Holter monitor July 2020:   1.  Holter monitor reveals the underlying rhythm is sinus rhythm with an average heart rate of 73 beats per minute, a minimum heart rate of 54 beats per minute and a maximum heart rate of 121 beats per minute.   2. There are rare ventricular ectopy comprised of isolated VPCs   3. There are rare supraventricular ectopy comprised of isolated APCs   4. There is no evidence of significant bradyarrhythmia or advanced heart block. The longest R to R was 1.4 seconds.   5. Patient diary did not report any symptoms.           CURRENT ECG     Results for orders placed or performed in visit on 10/17/24   POCT ECG    Narrative    Sinus rhythm, HR 61 bpm, leftward axis, normal intervals, left atrial abnormality, possible prior septal infarction, no right atrial enlargement no LVH, no changes of ischemia.        CARDIOLOGY ASSESSMENT & PLAN      Diagnosis ICD-10-CM Associated Orders   1. Coronary artery disease involving native coronary artery of native heart without angina pectoris  I25.10 POCT ECG     Echo complete w/ contrast if indicated      2. Mixed dyslipidemia  E78.2       3. Chronic fatigue  R53.82       4. Type 2 diabetes mellitus with other circulatory complication, without long-term current use of insulin ([facility])  E11.59       5. Pre-operative cardiovascular examination  Z01.810 Echo complete w/ contrast if indicated         1. Coronary artery disease involving native coronary artery of native heart without angina pectoris  Assessment & Plan:    RELEVANT FINDINGS   Condition has been overall stable with no recent anginal-like symptoms.  Had PCI in November 2023 to mid LAD.  Has some residual disease in other vessels.   CURRENT RELEVANT MEDICATIONS He is on dual antiplatelet therapy, on pravastatin Jardiance and low-dose beta-blocker therapy.  Has been resistant to higher dose of statin use in the past.  Lipids are reasonably controlled.   GOALS Prevent progression of CAD and recurrence of angina.   MEDICATION CHANGES  Advising to continue current medications.   OTHER RECOMMENDATIONS Will need holding of Plavix for-7 days before the planned laminectomy surgery.  Advise continuing aspirin therapy however if bleeding risk is very high on leak then can hold aspirin.     Orders:  -     POCT ECG  -     Echo complete w/ contrast if indicated; Future; Expected date: 10/17/2024  2. Mixed dyslipidemia  Assessment & Plan:  Has been hesitant to initiate high intensity statin therapy in the past.  Is tolerating moderate intensity statin.  Lipids are reasonably controlled.  -- Will continue current medications.  3. Chronic fatigue  4. Type 2 diabetes mellitus with other circulatory complication, without long-term current use of insulin (HCC)  5. Pre-operative cardiovascular examination  -     Echo complete w/ contrast if indicated; Future; Expected date: 10/17/2024        INTERVAL HISTORY, HISTORY OF PRESENT ILLNESS & COMPREHENSIVE VISIT INFORMATION     Mukul COMFORT Yuli is here for follow-up regarding his cardiac comorbidities which include: Coronary artery disease with history of MI and PCI in 2020 and in November 2023, dyslipidemia and other comorbidities.  He was seen by me in February 2024.  He is here for follow-up today.  He mentions that since last visit he has now been diagnosed with progression of his degenerative joint disease of the time.  He has severe lumbosacral intervertebral disc spinal stenosis and neurogenic claudication.  He is now being considered for surgery (L2-L4 laminectomy).  This is scheduled for 11/7/2024.  Cardiac clearance has been requested.  From a symptom perspective he reports that he has had no recent unusual chest pain shortness of breath with normal activity or palpitation or dizziness or passing out or near passing out episode.  His physical activity is restricted due to back pain.  He does walk a little bit with a cane.  Denies any orthopnea or PND or worsening pedal edema.    Functional capacity  status:  Moderately decreased.    (Excellent- >10 METs; Good: (7-10 METs); Moderate (4-7 METs); Poor (<= 4 METs)    Any chronic stressors:  None   (feeling of poor health, financial problems, and social isolation etc).    Tobacco or alcohol dependence:  He does not smoke.  He does not drink.    Current cardiac medications:    Metoprolol to tartrate 12.5 mg twice daily, pravastatin 40 mg daily, aspirin 81 mg daily, clopidogrel 75 mg daily, Jardiance 25 mg daily    Blood work 8/13/2024 sodium 136 potassium 3.9 chloride 103 bicarb 24 BUN 21 creatinine 1.04 GFR 74  Normal LFTs  Total cholesterol 145 triglyceride 141 HDL 51 calculated LDL 66  Hemoglobin 17.3 hematocrit 49.7 platelet count 144  Hemoglobin A1c 8.2  TSH 2.271   2  The 10-year ASCVD risk score (Bolivar YOUNG, et al., 2019) is: 16.1%    Values used to calculate the score:      Age: 64 years      Sex: Male      Is Non- : No      Diabetic: Yes      Tobacco smoker: No      Systolic Blood Pressure: 117 mmHg      Is BP treated: No      HDL Cholesterol: 51 mg/dL      Total Cholesterol: 145 mg/dL  (Low risk: Less than <5%; borderline risk: >=5% to <7.5%; intermediate risk: >=7.5% to <20%; high risk:>=20%)  Patient's ASCVD risk category: High.  He has established coronary artery disease.    REVIEW OF SYSTEMS   Positive for: As noted above in HPI  Negative for: All remaining as reviewed below and in HPI.    SYSTEM SYMPTOMS REVIEWED:  General--weight change, fever, night sweats  Respiratory--cough, wheezing, shortness of breath, sputum production  Cardiovascular--chest pain, syncope, dyspnea on exertion, edema, decline in exercise tolerance, claudication   Gastrointestinal--persistent vomiting, diarrhea, abdominal distention, blood in stool   Muscular or skeletal--joint pain or swelling   Neurologic--headaches, syncope, abnormal movement  Hematologic--history of easy bruising and bleeding   Endocrine--thyroid enlargement, heat or cold intolerance,  "polyuria   Psychiatric--anxiety, depression     *-*-*-*-*-*-*-*-*-*-*-*-*-*-*-*-*-*-*-*-*-*-*-*-*-*-*-*-*-*-*-*-*-*-*-*-*-*-*-*-*-*-*-*-*-*-*-*-*-*-*-*-*-*-  VITAL SIGNS     CURRENT VITAL SIGNS:   Vitals:    10/17/24 1616   BP: 117/60   Pulse: 61   Weight: 75.8 kg (167 lb)   Height: 5' 10\" (1.778 m)       BMI: Body mass index is 23.96 kg/m².    WEIGHTS:   Wt Readings from Last 25 Encounters:   10/17/24 75.8 kg (167 lb)   10/04/24 76 kg (167 lb 9.6 oz)   08/27/24 77.1 kg (170 lb)   08/09/24 77.2 kg (170 lb 3.2 oz)   07/17/24 73.9 kg (163 lb)   07/02/24 73.9 kg (163 lb)   06/25/24 73.9 kg (163 lb)   06/17/24 73.9 kg (163 lb)   06/11/24 73.9 kg (163 lb)   06/03/24 73.9 kg (163 lb)   05/20/24 73.9 kg (163 lb)   05/15/24 73.9 kg (163 lb)   05/10/24 73.9 kg (163 lb)   02/22/24 73.1 kg (161 lb 3.2 oz)   02/09/24 73.3 kg (161 lb 9.6 oz)   11/14/23 72.6 kg (160 lb)   11/13/23 70.8 kg (156 lb)   11/10/23 73 kg (160 lb 15 oz)   11/08/23 74.4 kg (164 lb)   11/03/23 73.9 kg (163 lb)   10/27/23 73.9 kg (163 lb)   10/18/23 77.4 kg (170 lb 10.2 oz)   10/15/23 74.8 kg (165 lb)   09/26/23 74.8 kg (165 lb)   09/16/23 74.8 kg (165 lb)        *-*-*-*-*-*-*-*-*-*-*-*-*-*-*-*-*-*-*-*-*-*-*-*-*-*-*-*-*-*-*-*-*-*-*-*-*-*-*-*-*-*-*-*-*-*-*-*-*-*-*-*-*-*-  PHYSICAL EXAM     General Appearance:    Alert, cooperative, no distress, appears stated age   Head, Eyes, ENT:    No obvious abnormality, moist mucous mebranes.   Neck:   Supple, no carotid bruit. No JVD, no obvious lymphadenoapthy   Back:     Symmetric, no curvature.   Lungs:     Respirations unlabored. Clear to auscultation bilaterally,    Chest wall:    No tenderness or deformity   Heart:    Regular rate and rhythm, S1 and S2 normal, no murmur, rub  or gallop.   Abdomen:     Soft, non-tender.   Extremities:   Extremities warm, no cyanosis or edema    Skin:   No venostatic changes in lower extremities.   Normal skin color, texture, and turgor. No rashes or lesions   Neuro: Pt is alert " and oriented time 3 with no gross motor deficits.   Psych/ behavioral: Mood is normal. Behavior is normal.     *-*-*-*-*-*-*-*-*-*-*-*-*-*-*-*-*-*-*-*-*-*-*-*-*-*-*-*-*-*-*-*-*-*-*-*-*-*-*-*-*-*-*-*-*-*-*-*-*-*-*-*-*-*-  CURRENT MEDICATIONS LIST     Current Outpatient Medications:     aspirin 81 mg chewable tablet, Chew 1 tablet (81 mg total) daily, Disp: 30 tablet, Rfl: 11    clopidogrel (Plavix) 75 mg tablet, Take 1 tablet (75 mg total) by mouth daily, Disp: 90 tablet, Rfl: 2    co-enzyme Q-10 30 MG capsule, Take 30 mg by mouth daily, Disp: , Rfl:     glimepiride (AMARYL) 2 mg tablet, TAKE 1 TABLET BY MOUTH DAILY WITH BREAKFAST, Disp: 90 tablet, Rfl: 1    Jardiance 25 MG TABS, TAKE 1 TABLET (25 MG TOTAL) BY MOUTH DAILY., Disp: 90 tablet, Rfl: 1    metFORMIN (GLUCOPHAGE) 1000 MG tablet, Take 1 tablet (1,000 mg total) by mouth 2 (two) times a day with meals, Disp: 180 tablet, Rfl: 1    metoprolol tartrate (LOPRESSOR) 25 mg tablet, Take 0.5 tablets (12.5 mg total) by mouth every 12 (twelve) hours, Disp: 90 tablet, Rfl: 3    pravastatin (PRAVACHOL) 40 mg tablet, TAKE 1 TABLET BY MOUTH EVERY DAY, Disp: 90 tablet, Rfl: 1    pregabalin (LYRICA) 100 mg capsule, Take 1 capsule (100 mg total) by mouth 3 (three) times a day, Disp: 90 capsule, Rfl: 0    sertraline (ZOLOFT) 100 mg tablet, TAKE 1.5 TABLETS (150MG TOTAL) BY MOUTH DAILY, Disp: 135 tablet, Rfl: 1       ALLERGIES     Allergies   Allergen Reactions    Rosuvastatin GI Intolerance       *-*-*-*-*-*-*-*-*-*-*-*-*-*-*-*-*-*-*-*-*-*-*-*-*-*-*-*-*-*-*-*-*-*-*-*-*-*-*-*-*-*-*-*-*-*-*-*-*-*-  SIGNATURES:   @SIG@   Faye Mar MD; [facility]    *-*-*-*-*-*-*-*-*-*-*-*-*-*-*-*-*-*-*-*-*-*-*-*-*-*-*-*-*-*-*-*-*-*-*-*-*-*-*-*-*-*-*-*-*-*-*-*-*-*-*-*-*-*-  PAST MEDICAL HISTORY IN:  Past Medical History:   Diagnosis Date    Allergic     Anxiety     Arthritis     Carpal tunnel syndrome, bilateral     Coronary artery disease 7/13/20    Depression     Diabetes mellitus (HCC)     GERD  (gastroesophageal reflux disease)     Heart attack (HCC)     Hyperlipidemia     Hypertension     Myocardial infarction (HCC) 7/13/20    OCD (obsessive compulsive disorder)     S/P PTCA (percutaneous transluminal coronary angioplasty)     elective cath 11/10/23 w/ PCI/JENNY - mLAD    PAST SURGICAL HISTORY:  Past Surgical History:   Procedure Laterality Date    BACK SURGERY      CARDIAC CATHETERIZATION Left 11/10/2023    Procedure: Cardiac Left Heart Cath;  Surgeon: Gregorio Ojeda MD;  Location: [facility];  Service: Cardiology    CARDIAC SURGERY  Stent    CARPECTOMY HAND Bilateral     KNEE SURGERY Right          FAMILY HISTORY:  Family History   Problem Relation Age of Onset    Alcohol abuse Mother     Arthritis Mother     Alcohol abuse Father     Coronary artery disease Father     COPD Father     SOCIAL HISTORY:  Social History     Tobacco Use   Smoking Status Never   Smokeless Tobacco Never      Social History     Substance and Sexual Activity   Alcohol Use Not Currently     Social History     Substance and Sexual Activity   Drug Use Never    [unfilled]       *-*-*-*-*-*-*-*-*-*-*-*-*-*-*-*-*-*-*-*-*-*-*-*-*-*-*-*-*-*-*-*-*-*-*-*-*-*-*-*-*-*-*-*-*-*-*-*-*-*-*-*-*-*

## 2024-10-17 NOTE — ASSESSMENT & PLAN NOTE
RELEVANT FINDINGS   Condition has been overall stable with no recent anginal-like symptoms.  Had PCI in November 2023 to mid LAD.  Has some residual disease in other vessels.   CURRENT RELEVANT MEDICATIONS He is on dual antiplatelet therapy, on pravastatin Jardiance and low-dose beta-blocker therapy.  Has been resistant to higher dose of statin use in the past.  Lipids are reasonably controlled.   GOALS Prevent progression of CAD and recurrence of angina.   MEDICATION CHANGES Advising to continue current medications.   OTHER RECOMMENDATIONS Will need holding of Plavix for-7 days before the planned laminectomy surgery.  Advise continuing aspirin therapy however if bleeding risk is very high on leak then can hold aspirin.

## 2024-10-17 NOTE — ASSESSMENT & PLAN NOTE
Has been hesitant to initiate high intensity statin therapy in the past.  Is tolerating moderate intensity statin.  Lipids are reasonably controlled.  -- Will continue current medications.

## 2024-10-18 ENCOUNTER — DOCUMENTATION (OUTPATIENT)
Dept: CARDIOLOGY CLINIC | Facility: CLINIC | Age: 64
End: 2024-10-18

## 2024-10-22 DIAGNOSIS — Z98.61 S/P PTCA (PERCUTANEOUS TRANSLUMINAL CORONARY ANGIOPLASTY): ICD-10-CM

## 2024-10-23 RX ORDER — CLOPIDOGREL BISULFATE 75 MG/1
75 TABLET ORAL DAILY
Qty: 90 TABLET | Refills: 1 | Status: SHIPPED | OUTPATIENT
Start: 2024-10-23

## 2024-10-24 ENCOUNTER — HOSPITAL ENCOUNTER (OUTPATIENT)
Dept: NON INVASIVE DIAGNOSTICS | Facility: HOSPITAL | Age: 64
Discharge: HOME | End: 2024-10-24
Payer: MEDICARE

## 2024-10-24 VITALS
DIASTOLIC BLOOD PRESSURE: 60 MMHG | HEART RATE: 61 BPM | BODY MASS INDEX: 23.91 KG/M2 | HEIGHT: 70 IN | SYSTOLIC BLOOD PRESSURE: 117 MMHG | WEIGHT: 167 LBS

## 2024-10-24 DIAGNOSIS — I25.10 CORONARY ARTERY DISEASE INVOLVING NATIVE CORONARY ARTERY OF NATIVE HEART WITHOUT ANGINA PECTORIS: ICD-10-CM

## 2024-10-24 DIAGNOSIS — Z01.810 PRE-OPERATIVE CARDIOVASCULAR EXAMINATION: ICD-10-CM

## 2024-10-24 LAB
AORTIC ROOT: 3 CM
AORTIC VALVE MEAN VELOCITY: 9 M/S
APICAL FOUR CHAMBER EJECTION FRACTION: 67 %
ASCENDING AORTA: 3 CM
AV AREA BY CONTINUOUS VTI: 2.2 CM2
AV AREA PEAK VELOCITY: 2.4 CM2
AV LVOT MEAN GRADIENT: 3 MMHG
AV LVOT PEAK GRADIENT: 5 MMHG
AV MEAN GRADIENT: 4 MMHG
AV PEAK GRADIENT: 6 MMHG
AV VALVE AREA: 2.18 CM2
AV VELOCITY RATIO: 0.93
BSA FOR ECHO PROCEDURE: 1.93 M2
DOP CALC AO PEAK VEL: 1.19 M/S
DOP CALC AO VTI: 26.66 CM
DOP CALC LVOT AREA: 2.54 CM2
DOP CALC LVOT CARDIAC INDEX: 2.32 L/MIN/M2
DOP CALC LVOT CARDIAC OUTPUT: 4.48 L/MIN
DOP CALC LVOT DIAMETER: 1.8 CM
DOP CALC LVOT PEAK VEL VTI: 22.82 CM
DOP CALC LVOT PEAK VEL: 1.11 M/S
DOP CALC LVOT STROKE INDEX: 30.1 ML/M2
DOP CALC LVOT STROKE VOLUME: 58
E WAVE DECELERATION TIME: 183 MS
E/A RATIO: 1.48
FRACTIONAL SHORTENING: 32 (ref 28–44)
INTERVENTRICULAR SEPTUM IN DIASTOLE (PARASTERNAL SHORT AXIS VIEW): 1 CM
INTERVENTRICULAR SEPTUM: 1 CM (ref 0.6–1.1)
LAAS-AP2: 22 CM2
LAAS-AP4: 22.4 CM2
LEFT ATRIUM SIZE: 3.4 CM
LEFT ATRIUM VOLUME (MOD BIPLANE): 68 ML
LEFT ATRIUM VOLUME INDEX (MOD BIPLANE): 35.2 ML/M2
LEFT INTERNAL DIMENSION IN SYSTOLE: 2.5 CM (ref 2.1–4)
LEFT VENTRICULAR INTERNAL DIMENSION IN DIASTOLE: 3.7 CM (ref 3.5–6)
LEFT VENTRICULAR POSTERIOR WALL IN END DIASTOLE: 1.1 CM
LEFT VENTRICULAR STROKE VOLUME: 36 ML
LVSV (TEICH): 36 ML
MV E'TISSUE VEL-LAT: 8 CM/S
MV E'TISSUE VEL-SEP: 9 CM/S
MV PEAK A VEL: 0.67 M/S
MV PEAK E VEL: 99 CM/S
MV STENOSIS PRESSURE HALF TIME: 53 MS
MV VALVE AREA P 1/2 METHOD: 4.2
RIGHT ATRIUM AREA SYSTOLE A4C: 15.9 CM2
RIGHT VENTRICLE ID DIMENSION: 3.8 CM
SL CV LEFT ATRIUM LENGTH A2C: 5.9 CM
SL CV LV EF: 67
SL CV PED ECHO LEFT VENTRICLE DIASTOLIC VOLUME (MOD BIPLANE) 2D: 58 ML
SL CV PED ECHO LEFT VENTRICLE SYSTOLIC VOLUME (MOD BIPLANE) 2D: 22 ML
TRICUSPID ANNULAR PLANE SYSTOLIC EXCURSION: 2.2 CM

## 2024-10-24 PROCEDURE — 93306 TTE W/DOPPLER COMPLETE: CPT

## 2024-10-24 PROCEDURE — 93306 TTE W/DOPPLER COMPLETE: CPT | Performed by: STUDENT IN AN ORGANIZED HEALTH CARE EDUCATION/TRAINING PROGRAM

## 2024-12-13 ENCOUNTER — OFFICE VISIT (OUTPATIENT)
Age: 64
End: 2024-12-13
Payer: MEDICARE

## 2024-12-13 VITALS
WEIGHT: 168.4 LBS | BODY MASS INDEX: 24.11 KG/M2 | DIASTOLIC BLOOD PRESSURE: 78 MMHG | SYSTOLIC BLOOD PRESSURE: 120 MMHG | HEIGHT: 70 IN | HEART RATE: 84 BPM | TEMPERATURE: 98.5 F | OXYGEN SATURATION: 97 %

## 2024-12-13 DIAGNOSIS — F32.4 MAJOR DEPRESSIVE DISORDER WITH SINGLE EPISODE, IN PARTIAL REMISSION (HCC): ICD-10-CM

## 2024-12-13 DIAGNOSIS — E11.59 TYPE 2 DIABETES MELLITUS WITH OTHER CIRCULATORY COMPLICATION, WITHOUT LONG-TERM CURRENT USE OF INSULIN (HCC): ICD-10-CM

## 2024-12-13 DIAGNOSIS — I25.10 CORONARY ARTERY DISEASE INVOLVING NATIVE CORONARY ARTERY OF NATIVE HEART WITHOUT ANGINA PECTORIS: ICD-10-CM

## 2024-12-13 DIAGNOSIS — D69.6 PLATELETS DECREASED (HCC): ICD-10-CM

## 2024-12-13 DIAGNOSIS — E11.65 TYPE 2 DIABETES MELLITUS WITH HYPERGLYCEMIA, UNSPECIFIED WHETHER LONG TERM INSULIN USE (HCC): Primary | ICD-10-CM

## 2024-12-13 LAB — SL AMB POCT HEMOGLOBIN AIC: 7.2 (ref ?–6.5)

## 2024-12-13 PROCEDURE — 83036 HEMOGLOBIN GLYCOSYLATED A1C: CPT | Performed by: FAMILY MEDICINE

## 2024-12-13 PROCEDURE — 99214 OFFICE O/P EST MOD 30 MIN: CPT | Performed by: FAMILY MEDICINE

## 2024-12-13 RX ORDER — TAMSULOSIN HYDROCHLORIDE 0.4 MG/1
0.4 CAPSULE ORAL
COMMUNITY
Start: 2024-11-12 | End: 2025-02-10

## 2024-12-17 NOTE — PROGRESS NOTES
Name: Mukul Roldan      : 1960      MRN: [id number]  Encounter Provider: Abdiaziz Gayle MD  Encounter Date: 2024   Encounter department: [facility]  :  Assessment & Plan  Type 2 diabetes mellitus with hyperglycemia, unspecified whether long term insulin use (HCC)  Stable, continue meds. Discussed supportive care and return parameters.   Lab Results   Component Value Date    HGBA1C 7.2 (A) 2024       Orders:    POCT hemoglobin A1c    Glucometer    Glucometer test strips    Lancets    Type 2 diabetes mellitus with other circulatory complication, without long-term current use of insulin (HCC)  Stable, continue meds. Discussed supportive care and return parameters.   Lab Results   Component Value Date    HGBA1C 7.2 (A) 2024            Coronary artery disease involving native coronary artery of native heart without angina pectoris  Stable, continue meds. Discussed supportive care and return parameters.        Major depressive disorder with single episode, in partial remission (HCC)  Stable, continue meds. Discussed supportive care and return parameters.          Platelets decreased (HCC)  Discussed supportive care and return parameters.               History of Present Illness     Patient is a 63 y/o male who presents for follow-up on DM2, CAD, MDD, thrombocytopenia. Pt admits being stable on meds. And denies acute complaints no fevers chills nausea or vomiting.    Diabetes  He has type 2 diabetes mellitus. No MedicAlert identification noted. The initial diagnosis of diabetes was made 7 years ago. Hypoglycemia symptoms include dizziness, mood changes, sleepiness and tremors. Pertinent negatives for hypoglycemia include no confusion, headaches, hunger, nervousness/anxiousness, pallor, seizures, speech difficulty or sweats. Associated symptoms include fatigue. Pertinent negatives for diabetes include no blurred vision, no chest pain, no foot paresthesias, no  foot ulcerations, no polydipsia, no polyphagia, no polyuria, no visual change, no weakness and no weight loss. Pertinent negatives for hypoglycemia complications include no blackouts, no hospitalization, no nocturnal hypoglycemia, no required assistance and no required glucagon injection. Symptoms are stable. Diabetic complications include heart disease and impotence. Pertinent negatives for diabetic complications include no CVA, nephropathy, peripheral neuropathy, PVD or retinopathy. Risk factors for coronary artery disease include family history. Current diabetic treatment includes oral agent (triple therapy). He is compliant with treatment all of the time. His weight is stable. He is following a generally unhealthy diet. When asked about meal planning, he reported none. He has not had a previous visit with a dietitian. He rarely participates in exercise. He monitors blood glucose at home 1-2 x per day. He monitors urine at home <1 x per month. Blood glucose monitoring compliance is good. His home blood glucose trend is decreasing rapidly. His breakfast blood glucose is taken between 5-6 am. His breakfast blood glucose range is generally 140-180 mg/dl. His lunch blood glucose is taken between 11-12 pm. His lunch blood glucose range is generally 110-130 mg/dl. His dinner blood glucose is taken between 5-6 pm. His dinner blood glucose range is generally 110-130 mg/dl. His bedtime blood glucose is taken between 8-9 pm. His bedtime blood glucose range is generally 130-140 mg/dl. His overall blood glucose range is 140-180 mg/dl. He does not see a podiatrist.Eye exam is current.     Review of Systems   Constitutional:  Positive for fatigue. Negative for weight loss.   HENT: Negative.     Eyes: Negative.  Negative for blurred vision.   Respiratory: Negative.     Cardiovascular: Negative.  Negative for chest pain.   Gastrointestinal: Negative.    Endocrine: Negative.  Negative for polydipsia, polyphagia and polyuria.  "  Genitourinary:  Positive for impotence.   Musculoskeletal: Negative.    Skin:  Negative for pallor.   Allergic/Immunologic: Negative.    Neurological:  Positive for dizziness and tremors. Negative for seizures, speech difficulty, weakness and headaches.   Hematological: Negative.    Psychiatric/Behavioral: Negative.  Negative for confusion. The patient is not nervous/anxious.    All other systems reviewed and are negative.      Objective   /78 (BP Location: Left arm, Patient Position: Sitting, Cuff Size: Standard)   Pulse 84   Temp 98.5 °F (36.9 °C) (Temporal)   Ht 5' 10\" (1.778 m)   Wt 76.4 kg (168 lb 6.4 oz)   SpO2 97%   BMI 24.16 kg/m²      Physical Exam  Vitals reviewed.   Constitutional:       General: He is not in acute distress.     Appearance: He is well-developed. He is not diaphoretic.   HENT:      Head: Normocephalic and atraumatic.      Right Ear: External ear normal.      Left Ear: External ear normal.      Nose: Nose normal.   Eyes:      General: No scleral icterus.        Right eye: No discharge.         Left eye: No discharge.      Conjunctiva/sclera: Conjunctivae normal.      Pupils: Pupils are equal, round, and reactive to light.   Neck:      Thyroid: No thyromegaly.      Trachea: No tracheal deviation.   Cardiovascular:      Rate and Rhythm: Normal rate and regular rhythm.      Heart sounds: Normal heart sounds. No murmur heard.     No friction rub.   Pulmonary:      Effort: Pulmonary effort is normal. No respiratory distress.      Breath sounds: Normal breath sounds. No stridor. No wheezing or rales.   Abdominal:      General: There is no distension.      Palpations: Abdomen is soft. There is no mass.      Tenderness: There is no abdominal tenderness. There is no guarding or rebound.   Musculoskeletal:         General: Normal range of motion.      Cervical back: Normal range of motion and neck supple.   Lymphadenopathy:      Cervical: No cervical adenopathy.   Skin:     General: " Skin is warm.   Neurological:      Mental Status: He is alert and oriented to person, place, and time.      Cranial Nerves: No cranial nerve deficit.   Psychiatric:         Behavior: Behavior normal.         Thought Content: Thought content normal.         Judgment: Judgment normal.

## 2024-12-17 NOTE — ASSESSMENT & PLAN NOTE
Stable, continue meds. Discussed supportive care and return parameters.   Lab Results   Component Value Date    HGBA1C 7.2 (A) 12/13/2024

## 2024-12-17 NOTE — ASSESSMENT & PLAN NOTE
Stable, continue meds. Discussed supportive care and return parameters.   Lab Results   Component Value Date    HGBA1C 7.2 (A) 12/13/2024       Orders:    POCT hemoglobin A1c    Glucometer    Glucometer test strips    Lancets

## 2024-12-23 ENCOUNTER — TELEPHONE (OUTPATIENT)
Age: 64
End: 2024-12-23

## 2024-12-23 DIAGNOSIS — E11.65 TYPE 2 DIABETES MELLITUS WITH HYPERGLYCEMIA, UNSPECIFIED WHETHER LONG TERM INSULIN USE (HCC): Primary | ICD-10-CM

## 2024-12-23 RX ORDER — LANCETS 33 GAUGE
EACH MISCELLANEOUS
Qty: 100 EACH | Refills: 3 | Status: SHIPPED | OUTPATIENT
Start: 2024-12-23

## 2024-12-23 RX ORDER — BLOOD SUGAR DIAGNOSTIC
STRIP MISCELLANEOUS
Qty: 100 EACH | Refills: 3 | Status: SHIPPED | OUTPATIENT
Start: 2024-12-23

## 2024-12-23 RX ORDER — BLOOD-GLUCOSE METER
KIT MISCELLANEOUS
Qty: 1 KIT | Refills: 0 | Status: SHIPPED | OUTPATIENT
Start: 2024-12-23

## 2024-12-23 NOTE — TELEPHONE ENCOUNTER
Patient called in and stated that he got a script for diabetic supplies - Didn't see anything in the chart for meter/ testing strips / lancets - Patient stated that the script needs to say patient is testing 1 time daily. Called over to the office and talked with Lacy who will get this taken care of. Advised patient.

## 2025-01-13 DIAGNOSIS — E11.9 TYPE 2 DIABETES MELLITUS WITHOUT COMPLICATION, WITHOUT LONG-TERM CURRENT USE OF INSULIN (HCC): ICD-10-CM

## 2025-03-17 DIAGNOSIS — E11.9 TYPE 2 DIABETES MELLITUS WITHOUT COMPLICATION, WITHOUT LONG-TERM CURRENT USE OF INSULIN (HCC): ICD-10-CM

## 2025-03-18 RX ORDER — EMPAGLIFLOZIN 25 MG/1
TABLET, FILM COATED ORAL
Qty: 90 TABLET | Refills: 1 | Status: SHIPPED | OUTPATIENT
Start: 2025-03-18

## 2025-04-01 DIAGNOSIS — I25.10 CORONARY ARTERY DISEASE INVOLVING NATIVE CORONARY ARTERY OF NATIVE HEART WITHOUT ANGINA PECTORIS: ICD-10-CM

## 2025-04-01 DIAGNOSIS — E78.2 MIXED HYPERLIPIDEMIA: ICD-10-CM

## 2025-04-01 RX ORDER — METOPROLOL TARTRATE 25 MG/1
12.5 TABLET, FILM COATED ORAL EVERY 12 HOURS SCHEDULED
Qty: 90 TABLET | Refills: 1 | Status: SHIPPED | OUTPATIENT
Start: 2025-04-01

## 2025-04-02 RX ORDER — PRAVASTATIN SODIUM 40 MG
40 TABLET ORAL DAILY
Qty: 90 TABLET | Refills: 1 | Status: SHIPPED | OUTPATIENT
Start: 2025-04-02

## 2025-04-04 DIAGNOSIS — Z98.61 S/P PTCA (PERCUTANEOUS TRANSLUMINAL CORONARY ANGIOPLASTY): ICD-10-CM

## 2025-04-04 RX ORDER — CLOPIDOGREL BISULFATE 75 MG/1
75 TABLET ORAL DAILY
Qty: 30 TABLET | Refills: 0 | Status: SHIPPED | OUTPATIENT
Start: 2025-04-04

## 2025-04-22 ENCOUNTER — OFFICE VISIT (OUTPATIENT)
Dept: CARDIOLOGY CLINIC | Facility: CLINIC | Age: 65
End: 2025-04-22
Payer: MEDICARE

## 2025-04-22 VITALS
HEART RATE: 76 BPM | BODY MASS INDEX: 24.08 KG/M2 | WEIGHT: 168.2 LBS | SYSTOLIC BLOOD PRESSURE: 100 MMHG | HEIGHT: 70 IN | DIASTOLIC BLOOD PRESSURE: 60 MMHG

## 2025-04-22 DIAGNOSIS — E78.2 MIXED HYPERLIPIDEMIA: Primary | ICD-10-CM

## 2025-04-22 DIAGNOSIS — I25.10 CORONARY ARTERY DISEASE INVOLVING NATIVE CORONARY ARTERY OF NATIVE HEART WITHOUT ANGINA PECTORIS: ICD-10-CM

## 2025-04-22 DIAGNOSIS — E11.59 TYPE 2 DIABETES MELLITUS WITH OTHER CIRCULATORY COMPLICATION, WITHOUT LONG-TERM CURRENT USE OF INSULIN (HCC): ICD-10-CM

## 2025-04-22 PROCEDURE — 99214 OFFICE O/P EST MOD 30 MIN: CPT | Performed by: INTERNAL MEDICINE

## 2025-04-22 PROCEDURE — G2211 COMPLEX E/M VISIT ADD ON: HCPCS | Performed by: INTERNAL MEDICINE

## 2025-04-22 RX ORDER — METOPROLOL TARTRATE 25 MG/1
25 TABLET, FILM COATED ORAL EVERY 12 HOURS SCHEDULED
Qty: 180 TABLET | Refills: 3 | Status: SHIPPED | OUTPATIENT
Start: 2025-04-22

## 2025-04-22 RX ORDER — EZETIMIBE 10 MG/1
10 TABLET ORAL DAILY
Qty: 90 TABLET | Refills: 3 | Status: SHIPPED | OUTPATIENT
Start: 2025-04-22

## 2025-04-22 NOTE — PROGRESS NOTES
Name: Mukul Roldan      : 1960      MRN: [id number]  Encounter Provider: Faye Mar MD  Encounter Date: 2025   Encounter department: [facility] CARDIOLOGY [place]    DIAGNOSES:   1. One-vessel coronary artery disease status post ACS-NSTEMI 2020, status post PCI of diagonal branch on 2020, status post PCI of mid LAD 11/10/2023  2. Normal left ventricular systolic function.   3. Diabetes mellitus with poor glycemic control   4. Dyslipidemia   5. Family history premature CAD with brother having MI at 50 years.   6. Remote history of ITP   7. History of trigger finger   8. History of OCD  9.  Severe degenerative joint disease, status post L2-L4 laminectomy surgery 2024    CARDIAC CATHETERIZATION 11/10/2023:  Ostial left main 40% stenosis with LEA-3 flow with luminal area 13 mm²  LAD: Moderate diffuse disease throughout vessel.  90% focal lesion in mid LAD with moderate plaque burden.  IVUS showed that lesion was eccentric and fibrinous.  Underwent drug-eluting stent placement.  First diagonal branch 50% stenosis.  Widely patent previous 2 stents.  RCA: Proximal RCA 50% stenosis    REGADENOSON NUCLEAR STRESS TEST May 2022:  1. Negative regadenoson nuclear stress test for symptoms of angina pectoris and negative for ECG changes of ischemia.  2. Normal myocardial perfusion scan with no evidence of reversible or fixed perfusion abnormality.  3. Normal left ventricular systolic function and normal regional wall motion. Post-stress left ventricular ejection fraction was determined as 65%  4. Normal resting blood pressure and appropriate blood pressure response to exercise.  5. Nonspecific resting ECG abnormality. There was sinus tachycardia response to low-level physical activity suggesting possible physical deconditioning.   6. There was an incidental finding relatively nonfocal soft tissue uptake of tracer in left shoulder/axillary/upper back region. This may represent an  inflammation or possible muscle strain. Clinical correlation is recommended.    EXTENDED HOLTER MONITOR November 2021: Patient was monitored for 13 days 23 hours. Average heart rate was 87, minimum heart rate was 58. There was 1 run of ventricular tachycardia involving 6 beats. There were 14 runs of nonsustained SVT with longest run lasting 9 beats at 245 beats per minute. Overall ventricular ectopy burden was less than 1%. There were no diary entries but there were triggered events which correlated with sinus rhythm and sinus tachycardia.     CARDIAC CATHETERIZATION JULY 13, 2020:   The coronary circulation is right dominant.   -- There was 1-vessel coronary artery disease.   -- Left main: Angiography showed minor luminal irregularities.   -- LAD: Angiography showed minor luminal irregularities.   -- 1st diagonal:   -- 1st diagonal: There was a 95 % stenosis. There was LEA grade 2 flow through the vessel (partial perfusion). This lesion is a likely culprit for the patient's recent myocardial infarction. It appears amenable to percutaneous   intervention.   -- Circumflex: Angiography showed minor luminal irregularities.   -- Proximal RCA: There was a 50 % stenosis.     ECHOCARDIOGRAM JULY 13, 2020:   Technical quality: Good   Cardiac rhythm: Normal sinus     1. Normal left ventricular size and systolic and diastolic function, EF around 63%.   2. Normal right ventricular size and systolic function. 4. Normal left and right atrial size.   5. Mild aortic valve sclerosis, no aortic valve stenosis or regurgitation.   6. Trace mitral and tricuspid valve regurgitation.   7. No obvious pulmonary hypertension.   8. No pericardial effusion.       Holter monitor July 2020:   1. Holter monitor reveals the underlying rhythm is sinus rhythm with an average heart rate of 73 beats per minute, a minimum heart rate of 54 beats per minute and a maximum heart rate of 121 beats per minute.   2. There are rare ventricular ectopy  comprised of isolated VPCs   3. There are rare supraventricular ectopy comprised of isolated APCs   4. There is no evidence of significant bradyarrhythmia or advanced heart block. The longest R to R was 1.4 seconds.   5. Patient diary did not report any symptoms.    Assessment & Plan  Coronary artery disease involving native coronary artery of native heart without angina pectoris      Condition has been stable with no recent anginal-like symptoms.  Last intervention was in 2023 when he underwent PCI of mid LAD.  Had widely patent stent in diagonal branch other than time.  Does have significant residual disease including  to 40% left main disease, 50% proximal first diagonal branch stenosis and 50% proximal RCA stenosis.  Is on maximally tolerated medications.  Lipids are reasonably controlled although can be better.  Did not want to take high intensity statin so he is on pravastatin.  Not tolerate atorvastatin due to fatigue and muscle aches in the past.  Is willing to try  higher intensity statin now.    Will continue current medications plus will add ezetimibe 10 mg daily.  Strongly encouraged to continue to be physically active and maintain healthy weight and control diabetes.  If he  develops any chest tightness or shortness of breath with activity is advised to let us know.      Orders:    metoprolol tartrate (LOPRESSOR) 25 mg tablet; Take 1 tablet (25 mg total) by mouth every 12 (twelve) hours    ezetimibe (ZETIA) 10 mg tablet; Take 1 tablet (10 mg total) by mouth daily    Type 2 diabetes mellitus with other circulatory complication, without long-term current use of insulin ([facility])    Lab Results   Component Value Date    HGBA1C 7.2 (A) 12/13/2024   Diabetes is overall well-controlled.  Advised to continue current medications and follow-up with primary physician regularly.       Mixed hyperlipidemia  Goal LDL is 50 mg/dL or less. Was intolerant to atorvastatin in the past.  Adding ezetimibe 10 mg daily to the  medical regimen.  Orders:    ezetimibe (ZETIA) 10 mg tablet; Take 1 tablet (10 mg total) by mouth daily        History of Present Illness   HPI  Mukul Roldan is a 64 y.o. male who presents for follow-up regarding his cardiac comorbidities which include: Coronary artery disease with history of MI and PCI in 2020 and in November 2023, dyslipidemia and other comorbidities.  He was last seen by me in October 2024.    History obtained from: patient    He is here for visit accompanied with his wife.  Since last visit he has undergone L2-L4 laminectomy surgery in November.  He tolerated the surgery well.  Besides this he has had no new diagnoses or hospitalizations or significant illnesses.  From a symptom perspective he reports that he has been overall feeling well.  He occasionally gets some discomfort in the chest and some shortness of breath when he exerts himself but overall symptoms are mild and occur rarely.  Also reports some feeling of heartburn which is relieved with famotidine.  Denies any typical anginal-like chest tightness or shortness of breath with normal activity orthopnea PND or worsening pedal edema.    Functional capacity status: Mildly decreased   (Excellent- >10 METs; Good: (7-10 METs); Moderate (4-7 METs); Poor (<= 4 METs)     Any chronic stressors:  None   (feeling of poor health, financial problems, and social isolation etc).     Tobacco or alcohol dependence:  He does not smoke.  He does not drink    Last recent comprehensive blood work available:   Blood work 8/13/2024 significant for sodium 136 potassium 3.9 chloride 103 bicarb 24 BUN 21 creatinine 1.04 GFR 74  Normal liver function test  Lipid profile   HDL 51 Calc LDL 66  Hemoglobin 17.3 hematocrit 49.7 platelet count 144  Hemoglobin A1c 7.2 on 12/13/2024 improved from previous of 9.8 in May 2024  TSH 2.271 on 8/13/2024.    The 10-year ASCVD risk score (Bolivar YOUNG, et al., 2019) is: 12.4%    Values used to calculate the  score:      Age: 64 years      Sex: Male      Is Non- : No      Diabetic: Yes      Tobacco smoker: No      Systolic Blood Pressure: 100 mmHg      Is BP treated: No      HDL Cholesterol: 51 mg/dL      Total Cholesterol: 145 mg/dL  (Low risk: Less than <5%; borderline risk: >=5% to <7.5%; intermediate risk: >=7.5% to <20%; high risk:>=20%)  Patient's ASCVD risk category: Has established ASCVD    ECG: No results found for this visit on 04/22/25.    Current cardiac medications:    Metoprolol tartrate 12.5 mg twice daily, pravastatin 40 mg daily, aspirin 81 mg daily, clopidogrel 75 mg daily, Jardiance 25 mg daily.  Says that he was taking metoprolol tartrate 25 twice daily instead of 12.5 and he was tolerating it.    CURRENT MEDICATIONS LIST     Current Outpatient Medications:     clopidogrel (PLAVIX) 75 mg tablet, TAKE 1 TABLET BY MOUTH EVERY DAY, Disp: 30 tablet, Rfl: 0    co-enzyme Q-10 30 MG capsule, Take 30 mg by mouth daily, Disp: , Rfl:     glimepiride (AMARYL) 2 mg tablet, TAKE 1 TABLET BY MOUTH DAILY WITH BREAKFAST, Disp: 90 tablet, Rfl: 1    Jardiance 25 MG TABS, TAKE 1 TABLET (25 MG TOTAL) BY MOUTH DAILY., Disp: 90 tablet, Rfl: 1    metFORMIN (GLUCOPHAGE) 1000 MG tablet, TAKE 1 TABLET BY MOUTH TWICE A DAY WITH MEALS, Disp: 180 tablet, Rfl: 1    metoprolol tartrate (LOPRESSOR) 25 mg tablet, TAKE 0.5 TABLETS (12.5 MG TOTAL) BY MOUTH EVERY 12 (TWELVE) HOURS, Disp: 90 tablet, Rfl: 1    pravastatin (PRAVACHOL) 40 mg tablet, TAKE 1 TABLET BY MOUTH EVERY DAY, Disp: 90 tablet, Rfl: 1    sertraline (ZOLOFT) 100 mg tablet, TAKE 1.5 TABLETS (150MG TOTAL) BY MOUTH DAILY, Disp: 135 tablet, Rfl: 1    aspirin 81 mg chewable tablet, Chew 1 tablet (81 mg total) daily (Patient not taking: Reported on 4/22/2025), Disp: 30 tablet, Rfl: 11    Blood Glucose Monitoring Suppl (OneTouch Verio Reflect) w/Device KIT, Check blood sugars once daily. Please substitute with appropriate alternative as covered by  "patient's insurance. Dx: E11.65, Disp: 1 kit, Rfl: 0    glucose blood (OneTouch Verio) test strip, Check blood sugars once daily. Please substitute with appropriate alternative as covered by patient's insurance. Dx: E11.65, Disp: 100 each, Rfl: 3    OneTouch Delica Lancets 33G MISC, Check blood sugars once daily. Please substitute with appropriate alternative as covered by patient's insurance. Dx: E11.65, Disp: 100 each, Rfl: 3       ALLERGIES     Allergies   Allergen Reactions    Rosuvastatin GI Intolerance       *-*-*-*-*-*-*-*-*-*-*-*-*-*-*-*-*-*-*-*-*-*-*-*-*-*-*-*-*-*-*-*-*-*-*-*-*-*-*-*-*-*-*-*-*-*-*-*-*-*-*-*-*-*-    Review of Systems       Objective   /60   Pulse 76   Ht 5' 10\" (1.778 m)   Wt 76.3 kg (168 lb 3.2 oz)   BMI 24.13 kg/m²      Physical Exam      "

## 2025-04-22 NOTE — ASSESSMENT & PLAN NOTE
Condition has been stable with no recent anginal-like symptoms.  Last intervention was in 2023 when he underwent PCI of mid LAD.  Had widely patent stent in diagonal branch other than time.  Does have significant residual disease including  to 40% left main disease, 50% proximal first diagonal branch stenosis and 50% proximal RCA stenosis.  Is on maximally tolerated medications.  Lipids are reasonably controlled although can be better.  Did not want to take high intensity statin so he is on pravastatin.  Not tolerate atorvastatin due to fatigue and muscle aches in the past.  Is willing to try  higher intensity statin now.    Will continue current medications plus will add ezetimibe 10 mg daily.  Strongly encouraged to continue to be physically active and maintain healthy weight and control diabetes.  If he  develops any chest tightness or shortness of breath with activity is advised to let us know.      Orders:    metoprolol tartrate (LOPRESSOR) 25 mg tablet; Take 1 tablet (25 mg total) by mouth every 12 (twelve) hours    ezetimibe (ZETIA) 10 mg tablet; Take 1 tablet (10 mg total) by mouth daily

## 2025-04-22 NOTE — ASSESSMENT & PLAN NOTE
Lab Results   Component Value Date    HGBA1C 7.2 (A) 12/13/2024   Diabetes is overall well-controlled.  Advised to continue current medications and follow-up with primary physician regularly.

## 2025-04-22 NOTE — ASSESSMENT & PLAN NOTE
Goal LDL is 50 mg/dL or less. Was intolerant to atorvastatin in the past.  Adding ezetimibe 10 mg daily to the medical regimen.  Orders:    ezetimibe (ZETIA) 10 mg tablet; Take 1 tablet (10 mg total) by mouth daily

## 2025-04-22 NOTE — PATIENT INSTRUCTIONS
Assessment & Plan  Coronary artery disease involving native coronary artery of native heart without angina pectoris      Condition has been stable with no recent anginal-like symptoms.  Last intervention was in 2023 when he underwent PCI of mid LAD.  Had widely patent stent in diagonal branch other than time.  Does have significant residual disease including  to 40% left main disease, 50% proximal first diagonal branch stenosis and 50% proximal RCA stenosis.  Is on maximally tolerated medications.  Lipids are reasonably controlled although can be better.  Did not want to take high intensity statin so he is on pravastatin.  Not tolerate atorvastatin due to fatigue and muscle aches in the past.  Is willing to try  higher intensity statin now.    Will continue current medications plus will add ezetimibe 10 mg daily.  Strongly encouraged to continue to be physically active and maintain healthy weight and control diabetes.  If he  develops any chest tightness or shortness of breath with activity is advised to let us know.      Orders:    metoprolol tartrate (LOPRESSOR) 25 mg tablet; Take 1 tablet (25 mg total) by mouth every 12 (twelve) hours    ezetimibe (ZETIA) 10 mg tablet; Take 1 tablet (10 mg total) by mouth daily    Type 2 diabetes mellitus with other circulatory complication, without long-term current use of insulin ([facility])    Lab Results   Component Value Date    HGBA1C 7.2 (A) 12/13/2024   Diabetes is overall well-controlled.  Advised to continue current medications and follow-up with primary physician regularly.       Mixed hyperlipidemia  Goal LDL is 50 mg/dL or less. Was intolerant to atorvastatin in the past.  Adding ezetimibe 10 mg daily to the medical regimen.

## 2025-04-28 DIAGNOSIS — E11.9 TYPE 2 DIABETES MELLITUS WITHOUT COMPLICATION, WITHOUT LONG-TERM CURRENT USE OF INSULIN (HCC): ICD-10-CM

## 2025-04-28 RX ORDER — GLIMEPIRIDE 2 MG/1
2 TABLET ORAL
Qty: 90 TABLET | Refills: 1 | Status: SHIPPED | OUTPATIENT
Start: 2025-04-28

## 2025-05-23 ENCOUNTER — OFFICE VISIT (OUTPATIENT)
Age: 65
End: 2025-05-23
Payer: MEDICARE

## 2025-05-23 VITALS
SYSTOLIC BLOOD PRESSURE: 130 MMHG | HEIGHT: 70 IN | WEIGHT: 168 LBS | TEMPERATURE: 98.1 F | OXYGEN SATURATION: 98 % | DIASTOLIC BLOOD PRESSURE: 90 MMHG | BODY MASS INDEX: 24.05 KG/M2 | HEART RATE: 78 BPM

## 2025-05-23 DIAGNOSIS — L30.9 DERMATITIS: ICD-10-CM

## 2025-05-23 DIAGNOSIS — E11.9 TYPE 2 DIABETES MELLITUS WITHOUT COMPLICATION, WITHOUT LONG-TERM CURRENT USE OF INSULIN (HCC): Primary | ICD-10-CM

## 2025-05-23 LAB
CREAT UR-MCNC: 157.3 MG/DL
MICROALBUMIN UR-MCNC: <7 MG/L
SL AMB POCT HEMOGLOBIN AIC: 7.7 (ref ?–6.5)

## 2025-05-23 PROCEDURE — 82043 UR ALBUMIN QUANTITATIVE: CPT | Performed by: FAMILY MEDICINE

## 2025-05-23 PROCEDURE — 99214 OFFICE O/P EST MOD 30 MIN: CPT | Performed by: FAMILY MEDICINE

## 2025-05-23 PROCEDURE — 83036 HEMOGLOBIN GLYCOSYLATED A1C: CPT | Performed by: FAMILY MEDICINE

## 2025-05-23 PROCEDURE — 82570 ASSAY OF URINE CREATININE: CPT | Performed by: FAMILY MEDICINE

## 2025-05-23 PROCEDURE — G2211 COMPLEX E/M VISIT ADD ON: HCPCS | Performed by: FAMILY MEDICINE

## 2025-05-23 RX ORDER — NYSTATIN 100000 [USP'U]/G
POWDER TOPICAL 4 TIMES DAILY
Qty: 60 G | Refills: 1 | Status: SHIPPED | OUTPATIENT
Start: 2025-05-23

## 2025-05-23 RX ORDER — TAMSULOSIN HYDROCHLORIDE 0.4 MG/1
CAPSULE ORAL
COMMUNITY
Start: 2025-05-17

## 2025-05-23 RX ORDER — LORATADINE 10 MG/1
10 TABLET ORAL DAILY
Qty: 60 TABLET | Refills: 1 | Status: SHIPPED | OUTPATIENT
Start: 2025-05-23

## 2025-05-26 PROBLEM — E11.9 TYPE 2 DIABETES MELLITUS WITHOUT COMPLICATION, WITHOUT LONG-TERM CURRENT USE OF INSULIN (HCC): Status: ACTIVE | Noted: 2025-05-26

## 2025-05-26 NOTE — ASSESSMENT & PLAN NOTE
Add anti-itch treatment with topical. Discussed supportive care and return parameters. If not improving pt to call back or refer to derm.   Orders:    loratadine (CLARITIN) 10 mg tablet; Take 1 tablet (10 mg total) by mouth daily    camphor-menthol (SARNA) lotion; Apply topically as needed for itching    nystatin (MYCOSTATIN) powder; Apply topically 4 (four) times a day

## 2025-05-26 NOTE — ASSESSMENT & PLAN NOTE
Pt to restart meds. And make lifestyle changes. Discussed supportive care and return parameters.   Lab Results   Component Value Date    HGBA1C 7.7 (A) 05/23/2025       Orders:    POCT hemoglobin A1c    Albumin / creatinine urine ratio

## 2025-05-26 NOTE — PROGRESS NOTES
Name: Mukul Roldan      : 1960      MRN: [id number]  Encounter Provider: Abdiaziz Gayle MD  Encounter Date: 2025   Encounter department: [facility] PRIMARY CARE  :  Assessment & Plan  Type 2 diabetes mellitus without complication, without long-term current use of insulin (HCC)  Pt to restart meds. And make lifestyle changes. Discussed supportive care and return parameters.   Lab Results   Component Value Date    HGBA1C 7.7 (A) 2025       Orders:    POCT hemoglobin A1c    Albumin / creatinine urine ratio    Dermatitis  Add anti-itch treatment with topical. Discussed supportive care and return parameters. If not improving pt to call back or refer to derm.   Orders:    loratadine (CLARITIN) 10 mg tablet; Take 1 tablet (10 mg total) by mouth daily    camphor-menthol (SARNA) lotion; Apply topically as needed for itching    nystatin (MYCOSTATIN) powder; Apply topically 4 (four) times a day           History of Present Illness   Patient is a 64 y/o male who presents for follow-up on rash in the setting of DM2, no fevers chills nausea or vomiting. Rash especially in groin but spreading onto abdomen, penis and back. Pt also admits being stable on meds. Otherwise.    Rash  This is a new problem. The current episode started in the past 7 days. The problem has been gradually worsening since onset. The affected locations include the groin and genitalia. The rash is characterized by redness and itchiness. It is unknown if there was an exposure to a precipitant. Pertinent negatives include no anorexia, congestion, cough, diarrhea, facial edema, fatigue, fever, joint pain, rhinorrhea, shortness of breath, sore throat or vomiting.     Review of Systems   Constitutional: Negative.  Negative for fatigue and fever.   HENT: Negative.  Negative for congestion, rhinorrhea and sore throat.    Eyes: Negative.  Negative for pain.   Respiratory: Negative.  Negative for cough and shortness of breath.   "  Cardiovascular: Negative.    Gastrointestinal: Negative.  Negative for anorexia, diarrhea and vomiting.   Endocrine: Negative.    Genitourinary: Negative.    Musculoskeletal: Negative.  Negative for joint pain.   Skin:  Positive for rash.   Allergic/Immunologic: Negative.    Neurological: Negative.    Hematological: Negative.    Psychiatric/Behavioral: Negative.     All other systems reviewed and are negative.      Objective   /90 (BP Location: Left arm, Patient Position: Sitting, Cuff Size: Standard)   Pulse 78   Temp 98.1 °F (36.7 °C) (Temporal)   Ht 5' 10\" (1.778 m)   Wt 76.2 kg (168 lb)   SpO2 98%   BMI 24.11 kg/m²      Physical Exam  Vitals reviewed.   Constitutional:       General: He is not in acute distress.     Appearance: He is well-developed. He is not diaphoretic.   HENT:      Head: Normocephalic and atraumatic.      Right Ear: External ear normal.      Left Ear: External ear normal.      Nose: Nose normal.     Eyes:      General: No scleral icterus.        Right eye: No discharge.         Left eye: No discharge.      Conjunctiva/sclera: Conjunctivae normal.      Pupils: Pupils are equal, round, and reactive to light.     Neck:      Thyroid: No thyromegaly.      Trachea: No tracheal deviation.     Cardiovascular:      Rate and Rhythm: Normal rate and regular rhythm.      Heart sounds: Normal heart sounds. No murmur heard.     No friction rub.   Pulmonary:      Effort: Pulmonary effort is normal. No respiratory distress.      Breath sounds: Normal breath sounds. No stridor. No wheezing or rales.   Abdominal:      General: There is no distension.      Palpations: Abdomen is soft. There is no mass.      Tenderness: There is no abdominal tenderness. There is no guarding or rebound.     Musculoskeletal:         General: Normal range of motion.      Cervical back: Normal range of motion and neck supple.   Lymphadenopathy:      Cervical: No cervical adenopathy.     Skin:     General: Skin is warm. "      Findings: Rash present.     Neurological:      Mental Status: He is alert and oriented to person, place, and time.      Cranial Nerves: No cranial nerve deficit.     Psychiatric:         Behavior: Behavior normal.         Thought Content: Thought content normal.         Judgment: Judgment normal.         Answers submitted by the patient for this visit:  Rash Questionnaire (Submitted on 5/22/2025)  Chief Complaint: Rash  nail changes: No

## 2025-06-17 ENCOUNTER — RA CDI HCC (OUTPATIENT)
Dept: OTHER | Facility: HOSPITAL | Age: 65
End: 2025-06-17

## 2025-06-19 ENCOUNTER — OFFICE VISIT (OUTPATIENT)
Age: 65
End: 2025-06-19
Payer: MEDICARE

## 2025-06-19 VITALS
DIASTOLIC BLOOD PRESSURE: 80 MMHG | HEIGHT: 70 IN | SYSTOLIC BLOOD PRESSURE: 108 MMHG | WEIGHT: 170 LBS | TEMPERATURE: 98.1 F | OXYGEN SATURATION: 98 % | HEART RATE: 62 BPM | BODY MASS INDEX: 24.34 KG/M2

## 2025-06-19 DIAGNOSIS — K21.9 GASTROESOPHAGEAL REFLUX DISEASE WITHOUT ESOPHAGITIS: ICD-10-CM

## 2025-06-19 DIAGNOSIS — E78.2 MIXED DYSLIPIDEMIA: ICD-10-CM

## 2025-06-19 DIAGNOSIS — E78.00 PURE HYPERCHOLESTEROLEMIA: ICD-10-CM

## 2025-06-19 DIAGNOSIS — E11.9 TYPE 2 DIABETES MELLITUS WITHOUT COMPLICATION, WITHOUT LONG-TERM CURRENT USE OF INSULIN (HCC): ICD-10-CM

## 2025-06-19 DIAGNOSIS — E11.59 TYPE 2 DIABETES MELLITUS WITH OTHER CIRCULATORY COMPLICATION, WITHOUT LONG-TERM CURRENT USE OF INSULIN (HCC): Primary | ICD-10-CM

## 2025-06-19 DIAGNOSIS — Z12.5 ENCOUNTER FOR SCREENING FOR MALIGNANT NEOPLASM OF PROSTATE: ICD-10-CM

## 2025-06-19 DIAGNOSIS — D69.6 PLATELETS DECREASED (HCC): ICD-10-CM

## 2025-06-19 DIAGNOSIS — F32.4 MAJOR DEPRESSIVE DISORDER WITH SINGLE EPISODE, IN PARTIAL REMISSION (HCC): ICD-10-CM

## 2025-06-19 PROCEDURE — G2211 COMPLEX E/M VISIT ADD ON: HCPCS | Performed by: FAMILY MEDICINE

## 2025-06-19 PROCEDURE — 99214 OFFICE O/P EST MOD 30 MIN: CPT | Performed by: FAMILY MEDICINE

## 2025-06-19 RX ORDER — GLIMEPIRIDE 4 MG/1
4 TABLET ORAL
Qty: 90 TABLET | Refills: 1 | Status: SHIPPED | OUTPATIENT
Start: 2025-06-19

## 2025-06-24 NOTE — ASSESSMENT & PLAN NOTE
Adjust meds. Discussed supportive care and return parameters.   Lab Results   Component Value Date    HGBA1C 7.7 (A) 05/23/2025       Orders:    glimepiride (AMARYL) 4 mg tablet; Take 1 tablet (4 mg total) by mouth daily with breakfast    CBC and differential; Future    Comprehensive metabolic panel; Future    TSH, 3rd generation with Free T4 reflex; Future    Lipid Panel with Direct LDL reflex; Future    Hemoglobin A1C; Future    Albumin / creatinine urine ratio; Future    PSA, Total Screen; Future

## 2025-06-24 NOTE — PROGRESS NOTES
Name: Mukul Roldan      : 1960      MRN: [id number]  Encounter Provider: Abdiaziz Gayle MD  Encounter Date: 2025   Encounter department: [facility]IMARY [facility]  :  Assessment & Plan  Type 2 diabetes mellitus without complication, without long-term current use of insulin (HCC)  Adjust meds. Discussed supportive care and return parameters.   Lab Results   Component Value Date    HGBA1C 7.7 (A) 2025       Orders:    glimepiride (AMARYL) 4 mg tablet; Take 1 tablet (4 mg total) by mouth daily with breakfast    CBC and differential; Future    Comprehensive metabolic panel; Future    TSH, 3rd generation with Free T4 reflex; Future    Lipid Panel with Direct LDL reflex; Future    Hemoglobin A1C; Future    Albumin / creatinine urine ratio; Future    PSA, Total Screen; Future    Type 2 diabetes mellitus with other circulatory complication, without long-term current use of insulin (HCC)  Adjust meds. Discussed supportive care and return parameters.   Lab Results   Component Value Date    HGBA1C 7.7 (A) 2025       Orders:    CBC and differential; Future    Comprehensive metabolic panel; Future    TSH, 3rd generation with Free T4 reflex; Future    Lipid Panel with Direct LDL reflex; Future    Hemoglobin A1C; Future    Albumin / creatinine urine ratio; Future    PSA, Total Screen; Future    Gastroesophageal reflux disease without esophagitis  Stable, continue meds. And check labs. Discussed supportive care and return parameters.   Orders:    CBC and differential; Future    Comprehensive metabolic panel; Future    TSH, 3rd generation with Free T4 reflex; Future    Lipid Panel with Direct LDL reflex; Future    Hemoglobin A1C; Future    Albumin / creatinine urine ratio; Future    PSA, Total Screen; Future    Platelets decreased (HCC)  Stable, continue meds. And check labs. Discussed supportive care and return parameters.   Orders:    CBC and differential; Future    Comprehensive  metabolic panel; Future    TSH, 3rd generation with Free T4 reflex; Future    Lipid Panel with Direct LDL reflex; Future    Hemoglobin A1C; Future    Albumin / creatinine urine ratio; Future    PSA, Total Screen; Future    Major depressive disorder with single episode, in partial remission (HCC)  Depression Screening Follow-up Plan: Patient's depression screening was positive with a PHQ-9 score of 8. Patient assessed for underlying major depression. They have no active suicidal ideations. Brief counseling provided and recommend additional follow-up/re-evaluation next office visit.  Stable, continue meds. And check labs. Discussed supportive care and return parameters.   Orders:    CBC and differential; Future    Comprehensive metabolic panel; Future    TSH, 3rd generation with Free T4 reflex; Future    Lipid Panel with Direct LDL reflex; Future    Hemoglobin A1C; Future    Albumin / creatinine urine ratio; Future    PSA, Total Screen; Future    Mixed dyslipidemia  Stable, continue meds. And check labs. Discussed supportive care and return parameters.   Orders:    CBC and differential; Future    Comprehensive metabolic panel; Future    TSH, 3rd generation with Free T4 reflex; Future    Lipid Panel with Direct LDL reflex; Future    Hemoglobin A1C; Future    Albumin / creatinine urine ratio; Future    PSA, Total Screen; Future    Pure hypercholesterolemia  Stable, continue meds. And check labs. Discussed supportive care and return parameters.   Orders:    CBC and differential; Future    Comprehensive metabolic panel; Future    TSH, 3rd generation with Free T4 reflex; Future    Lipid Panel with Direct LDL reflex; Future    Hemoglobin A1C; Future    Albumin / creatinine urine ratio; Future    PSA, Total Screen; Future    Encounter for screening for malignant neoplasm of prostate    Orders:    PSA, Total Screen; Future           History of Present Illness   Patient is a 64 y/o male who presents for follow-up on DM2, GERD,  "thrombocytopenia, MDD, HLD. Pt admits being stable on meds and denies acute complaints no fevers chills nausea or vomiting.      Review of Systems   Constitutional: Negative.    HENT: Negative.     Eyes: Negative.    Respiratory: Negative.     Cardiovascular: Negative.    Gastrointestinal: Negative.    Endocrine: Negative.    Genitourinary: Negative.    Musculoskeletal: Negative.    Allergic/Immunologic: Negative.    Neurological: Negative.    Hematological: Negative.    Psychiatric/Behavioral: Negative.     All other systems reviewed and are negative.      Objective   /80 (BP Location: Right arm, Patient Position: Sitting, Cuff Size: Standard)   Pulse 62   Temp 98.1 °F (36.7 °C) (Temporal)   Ht 5' 10\" (1.778 m)   Wt 77.1 kg (170 lb)   SpO2 98%   BMI 24.39 kg/m²      Physical Exam  Vitals reviewed.   Constitutional:       General: He is not in acute distress.     Appearance: He is well-developed. He is not diaphoretic.   HENT:      Head: Normocephalic and atraumatic.      Right Ear: External ear normal.      Left Ear: External ear normal.      Nose: Nose normal.     Eyes:      General: No scleral icterus.        Right eye: No discharge.         Left eye: No discharge.      Conjunctiva/sclera: Conjunctivae normal.      Pupils: Pupils are equal, round, and reactive to light.     Neck:      Thyroid: No thyromegaly.      Trachea: No tracheal deviation.     Cardiovascular:      Rate and Rhythm: Normal rate and regular rhythm.      Heart sounds: Normal heart sounds. No murmur heard.     No friction rub.   Pulmonary:      Effort: Pulmonary effort is normal. No respiratory distress.      Breath sounds: Normal breath sounds. No stridor. No wheezing or rales.   Abdominal:      General: There is no distension.      Palpations: Abdomen is soft. There is no mass.      Tenderness: There is no abdominal tenderness. There is no guarding or rebound.     Musculoskeletal:         General: Normal range of motion.      " Cervical back: Normal range of motion and neck supple.   Lymphadenopathy:      Cervical: No cervical adenopathy.     Skin:     General: Skin is warm.     Neurological:      Mental Status: He is alert and oriented to person, place, and time.      Cranial Nerves: No cranial nerve deficit.     Psychiatric:         Behavior: Behavior normal.         Thought Content: Thought content normal.         Judgment: Judgment normal.

## 2025-06-24 NOTE — ASSESSMENT & PLAN NOTE
Stable, continue meds. And check labs. Discussed supportive care and return parameters.   Orders:    CBC and differential; Future    Comprehensive metabolic panel; Future    TSH, 3rd generation with Free T4 reflex; Future    Lipid Panel with Direct LDL reflex; Future    Hemoglobin A1C; Future    Albumin / creatinine urine ratio; Future    PSA, Total Screen; Future

## 2025-06-24 NOTE — ASSESSMENT & PLAN NOTE
Depression Screening Follow-up Plan: Patient's depression screening was positive with a PHQ-9 score of 8. Patient assessed for underlying major depression. They have no active suicidal ideations. Brief counseling provided and recommend additional follow-up/re-evaluation next office visit.  Stable, continue meds. And check labs. Discussed supportive care and return parameters.   Orders:    CBC and differential; Future    Comprehensive metabolic panel; Future    TSH, 3rd generation with Free T4 reflex; Future    Lipid Panel with Direct LDL reflex; Future    Hemoglobin A1C; Future    Albumin / creatinine urine ratio; Future    PSA, Total Screen; Future

## (undated) DEVICE — DGW .035 FC J3MM 260CM TEF: Brand: EMERALD

## (undated) DEVICE — TR BAND RADIAL ARTERY COMPRESSION DEVICE: Brand: TR BAND

## (undated) DEVICE — RUNTHROUGH NS EXTRA FLOPPY PTCA GUIDEWIRE: Brand: RUNTHROUGH

## (undated) DEVICE — CATH GUIDE LAUNCHER 6FR EBU 3.5

## (undated) DEVICE — RADIFOCUS OPTITORQUE ANGIOGRAPHIC CATHETER: Brand: OPTITORQUE

## (undated) DEVICE — GLIDESHEATH BASIC HYDROPHILIC COATED INTRODUCER SHEATH: Brand: GLIDESHEATH

## (undated) DEVICE — CATH IVUS EAGLE EYE PLATINUM 5FR 150CM

## (undated) DEVICE — GUIDEWIRE WHOLEY HI TORQUE INTERM MOD J .035 145CM